# Patient Record
Sex: FEMALE | Race: WHITE | NOT HISPANIC OR LATINO | Employment: OTHER | URBAN - METROPOLITAN AREA
[De-identification: names, ages, dates, MRNs, and addresses within clinical notes are randomized per-mention and may not be internally consistent; named-entity substitution may affect disease eponyms.]

---

## 2017-02-17 ENCOUNTER — HOSPITAL ENCOUNTER (EMERGENCY)
Facility: MEDICAL CENTER | Age: 52
End: 2017-02-17
Payer: COMMERCIAL

## 2017-02-17 VITALS
HEART RATE: 100 BPM | TEMPERATURE: 98.8 F | WEIGHT: 138.89 LBS | OXYGEN SATURATION: 94 % | RESPIRATION RATE: 18 BRPM | HEIGHT: 68 IN | SYSTOLIC BLOOD PRESSURE: 130 MMHG | DIASTOLIC BLOOD PRESSURE: 93 MMHG | BODY MASS INDEX: 21.05 KG/M2

## 2017-02-17 ASSESSMENT — PAIN SCALES - GENERAL: PAINLEVEL_OUTOF10: 0

## 2017-02-17 NOTE — ED NOTES
Called for pt in lobby, no answer.  states he witnessed pt and friend walk out of ER to parking lot.

## 2017-03-06 ENCOUNTER — HOSPITAL ENCOUNTER (EMERGENCY)
Facility: MEDICAL CENTER | Age: 52
End: 2017-03-06
Attending: EMERGENCY MEDICINE
Payer: COMMERCIAL

## 2017-03-06 VITALS
RESPIRATION RATE: 16 BRPM | HEIGHT: 68 IN | SYSTOLIC BLOOD PRESSURE: 118 MMHG | TEMPERATURE: 98.5 F | WEIGHT: 140.65 LBS | OXYGEN SATURATION: 95 % | HEART RATE: 100 BPM | BODY MASS INDEX: 21.32 KG/M2 | DIASTOLIC BLOOD PRESSURE: 85 MMHG

## 2017-03-06 DIAGNOSIS — F10.29 ALCOHOL DEPENDENCE WITH UNSPECIFIED ALCOHOL-INDUCED DISORDER (HCC): ICD-10-CM

## 2017-03-06 PROCEDURE — 99284 EMERGENCY DEPT VISIT MOD MDM: CPT

## 2017-03-06 RX ORDER — CHLORDIAZEPOXIDE HYDROCHLORIDE 25 MG/1
25 CAPSULE, GELATIN COATED ORAL 3 TIMES DAILY PRN
Qty: 30 CAP | Refills: 0 | Status: ON HOLD | OUTPATIENT
Start: 2017-03-06 | End: 2020-06-21

## 2017-03-06 ASSESSMENT — PAIN SCALES - GENERAL: PAINLEVEL_OUTOF10: 0

## 2017-03-06 NOTE — DISCHARGE INSTRUCTIONS
Alcohol Abuse and Nutrition  Alcohol abuse is any pattern of alcohol consumption that harms your health, relationships, or work. Alcohol abuse can affect how your body breaks down and absorbs nutrients from food by causing your liver to work abnormally. Additionally, many people who abuse alcohol do not eat enough carbohydrates, protein, fat, vitamins, and minerals. This can cause poor nutrition (malnutrition) and a lack of nutrients (nutrient deficiencies), which can lead to further complications.  Nutrients that are commonly lacking (deficient) among people who abuse alcohol include:  · Vitamins.  ¨ Vitamin A. This is stored in your liver. It is important for your vision, metabolism, and ability to fight off infections (immunity).  ¨ B vitamins. These include vitamins such as folate, thiamin, and niacin. These are important in new cell growth and maintenance.  ¨ Vitamin C. This plays an important role in iron absorption, wound healing, and immunity.  ¨ Vitamin D. This is produced by your liver, but you can also get vitamin D from food. Vitamin D is necessary for your body to absorb and use calcium.  · Minerals.  ¨ Calcium. This is important for your bones and your heart and blood vessel (cardiovascular) function.  ¨ Iron. This is important for blood, muscle, and nervous system functioning.  ¨ Magnesium. This plays an important role in muscle and nerve function, and it helps to control blood sugar and blood pressure.  ¨ Zinc. This is important for the normal function of your nervous system and digestive system (gastrointestinal tract).  Nutrition is an essential component of therapy for alcohol abuse. Your health care provider or dietitian will work with you to design a plan that can help restore nutrients to your body and prevent potential complications.  WHAT IS MY PLAN?  Your dietitian may develop a specific diet plan that is based on your condition and any other complications you may have. A diet plan will  commonly include:  · A balanced diet.  ¨ Grains: 6-8 oz per day.  ¨ Vegetables: 2-3 cups per day.  ¨ Fruits: 1-2 cups per day.  ¨ Meat and other protein: 5-6 oz per day.  ¨ Dairy: 2-3 cups per day.  · Vitamin and mineral supplements.  WHAT DO I NEED TO KNOW ABOUT ALCOHOL AND NUTRITION?  · Consume foods that are high in antioxidants, such as grapes, berries, nuts, green tea, and dark green and orange vegetables. This can help to counteract some of the stress that is placed on your liver by consuming alcohol.  · Avoid food and drinks that are high in fat and sugar. Foods such as sugared soft drinks, salty snack foods, and candy contain empty calories. This means that they lack important nutrients such as protein, fiber, and vitamins.  · Eat frequent meals and snacks. Try to eat 5-6 small meals each day.  · Eat a variety of fresh fruits and vegetables each day. This will help you get plenty of water, fiber, and vitamins in your diet.  · Drink plenty of water and other clear fluids. Try to drink at least 48-64 oz (1.5-2 L) of water per day.  · If you are a vegetarian, eat a variety of protein-rich foods. Pair whole grains with plant-based proteins at meals and snacks to obtain the greatest nutrient benefit from your food. For example, eat rice with beans, put peanut butter on whole-grain toast, or eat oatmeal with sunflower seeds.  · Soak beans and whole grains overnight before cooking. This can help your body to absorb the nutrients more easily.  · Include foods fortified with vitamins and minerals in your diet. Commonly fortified foods include milk, orange juice, cereal, and bread.  · If you are malnourished, your dietitian may recommend a high-protein, high-calorie diet. This may include:  ¨ 2,000-3,000 calories (kilocalories) per day.  ¨  grams of protein per day.  · Your health care provider may recommend a complete nutritional supplement beverage. This can help to restore calories, protein, and vitamins to  your body. Depending on your condition, you may be advised to consume this instead of or in addition to meals.  · Limit your intake of caffeine. Replace drinks like coffee and black tea with decaffeinated coffee and herbal tea.  · Eat a variety of foods that are high in omega fatty acids. These include fish, nuts and seeds, and soybeans. These foods may help your liver to recover and may also stabilize your mood.  · Certain medicines may cause changes in your appetite, taste, and weight. Work with your health care provider and dietitian to make any adjustments to your medicines and diet plan.  · Include other healthy lifestyle choices in your daily routine.  ¨ Be physically active.  ¨ Get enough sleep.  ¨ Spend time doing activities that you enjoy.  · If you are unable to take in enough food and calories by mouth, your health care provider may recommend a feeding tube. This is a tube that passes through your nose and throat, directly into your stomach. Nutritional supplement beverages can be given to you through the feeding tube to help you get the nutrients you need.  · Take vitamin or mineral supplements as recommended by your health care provider.  WHAT FOODS CAN I EAT?  Grains  Enriched pasta. Enriched rice. Fortified whole-grain bread. Fortified whole-grain cereal. Barley. Brown rice. Quinoa. Millet.  Vegetables  All fresh, frozen, and canned vegetables. Spinach. Kale. Artichoke. Carrots. Winter squash and pumpkin. Sweet potatoes. Broccoli. Cabbage. Cucumbers. Tomatoes. Sweet peppers. Green beans. Peas. Corn.  Fruits  All fresh and frozen fruits. Berries. Grapes. Isaias. Papaya. Guava. Cherries. Apples. Bananas. Peaches. Plums. Pineapple. Watermelon. Cantaloupe. Oranges. Avocado.  Meats and Other Protein Sources  Beef liver. Lean beef. Pork. Fresh and canned chicken. Fresh fish. Oysters. Sardines. Canned tuna. Shrimp. Eggs with yolks. Nuts and seeds. Peanut butter. Beans and lentils. Soybeans.  Tofu.  Dairy  Whole, low-fat, and nonfat milk. Whole, low-fat, and nonfat yogurt. Cottage cheese. Sour cream. Hard and soft cheeses.  Beverages  Water. Herbal tea. Decaffeinated coffee. Decaffeinated green tea. 100% fruit juice. 100% vegetable juice. Instant breakfast shakes.  Condiments  Ketchup. Mayonnaise. Mustard. Salad dressing. Barbecue sauce.  Sweets and Desserts  Sugar-free ice cream. Sugar-free pudding. Sugar-free gelatin.  Fats and Oils  Butter. Vegetable oil, flaxseed oil, olive oil, and walnut oil.  Other  Complete nutrition shakes. Protein bars. Sugar-free gum.  The items listed above may not be a complete list of recommended foods or beverages. Contact your dietitian for more options.  WHAT FOODS ARE NOT RECOMMENDED?  Grains  Sugar-sweetened breakfast cereals. Flavored instant oatmeal. Fried breads.  Vegetables  Breaded or deep-fried vegetables.  Fruits  Dried fruit with added sugar. Candied fruit. Canned fruit in syrup.  Meats and Other Protein Sources  Breaded or deep-fried meats.  Dairy  Flavored milks. Fried cheese curds or fried cheese sticks.  Beverages  Alcohol. Sugar-sweetened soft drinks. Sugar-sweetened tea. Caffeinated coffee and tea.  Condiments  Sugar. Honey. Agave nectar. Molasses.  Sweets and Desserts  Chocolate. Cake. Cookies. Candy.  Other  Potato chips. Pretzels. Salted nuts. Candied nuts.  The items listed above may not be a complete list of foods and beverages to avoid. Contact your dietitian for more information.     This information is not intended to replace advice given to you by your health care provider. Make sure you discuss any questions you have with your health care provider.     Document Released: 10/12/2006 Document Revised: 01/08/2016 Document Reviewed: 07/21/2015  [employer] Interactive Patient Education ©2016 [employer]

## 2017-03-06 NOTE — ED AVS SNAPSHOT
Home Care Instructions                                                                                                                Yohana Brandon   MRN: [id number]    Department:  [facility], Emergency Dept   Date of Visit:  3/6/2017            [facility], Emergency Dept    [address]    [place] [address]    Phone:  [phone]      You were seen by     Asim Fry M.D.      Your Diagnosis Was     Alcohol dependence with unspecified alcohol-induced disorder (CMS-HCC)     F10.29       Follow-up Information     1. Follow up with Lopez Jackson M.D..    Specialty:  Internal Medicine    Contact information    [address] [place] [address] [phone]        Medication Information     Review all of your home medications and newly ordered medications with your primary doctor and/or pharmacist as soon as possible. Follow medication instructions as directed by your doctor and/or pharmacist.     Please keep your complete medication list with you and share with your physician. Update the information when medications are discontinued, doses are changed, or new medications (including over-the-counter products) are added; and carry medication information at all times in the event of emergency situations.               Medication List      START taking these medications        Instructions    chlordiazepoxide 25 MG Caps   Commonly known as:  LIBRIUM    Take 1 Cap by mouth 3 times a day as needed for Anxiety.   Dose:  25 mg         ASK your doctor about these medications        Instructions    DHEA PO    Take 2 Tabs by mouth every day.   Dose:  2 Tab       fish oil 1000 MG Caps capsule    Take 2,000 mg by mouth 4 times a day.   Dose:  2000 mg       hydrochlorothiazide 25 MG Tabs   Commonly known as:  HYDRODIURIL    Take 25 mg by mouth every day. Indications: High Blood Pressure   Dose:  25 mg       multivitamin Tabs    Take 1 Tab by mouth every  day.   Dose:  1 Tab       NON SPECIFIED    1 Dose by Injection route every day. HGH - From Dr. Jackson   Dose:  1 Dose       POTASSIUM PO    Take 1 Tab by mouth every day. Indications: OTC   Dose:  1 Tab       telmisartan 80 MG Tabs   Commonly known as:  MICARDIS    Take 80 mg by mouth every day.   Dose:  80 mg       VITAMIN D PO    Take 1 Tab by mouth every day.   Dose:  1 Tab                 Discharge Instructions       Alcohol Abuse and Nutrition  Alcohol abuse is any pattern of alcohol consumption that harms your health, relationships, or work. Alcohol abuse can affect how your body breaks down and absorbs nutrients from food by causing your liver to work abnormally. Additionally, many people who abuse alcohol do not eat enough carbohydrates, protein, fat, vitamins, and minerals. This can cause poor nutrition (malnutrition) and a lack of nutrients (nutrient deficiencies), which can lead to further complications.  Nutrients that are commonly lacking (deficient) among people who abuse alcohol include:  · Vitamins.  ¨ Vitamin A. This is stored in your liver. It is important for your vision, metabolism, and ability to fight off infections (immunity).  ¨ B vitamins. These include vitamins such as folate, thiamin, and niacin. These are important in new cell growth and maintenance.  ¨ Vitamin C. This plays an important role in iron absorption, wound healing, and immunity.  ¨ Vitamin D. This is produced by your liver, but you can also get vitamin D from food. Vitamin D is necessary for your body to absorb and use calcium.  · Minerals.  ¨ Calcium. This is important for your bones and your heart and blood vessel (cardiovascular) function.  ¨ Iron. This is important for blood, muscle, and nervous system functioning.  ¨ Magnesium. This plays an important role in muscle and nerve function, and it helps to control blood sugar and blood pressure.  ¨ Zinc. This is important for the normal function of your nervous system and  digestive system (gastrointestinal tract).  Nutrition is an essential component of therapy for alcohol abuse. Your health care provider or dietitian will work with you to design a plan that can help restore nutrients to your body and prevent potential complications.  WHAT IS MY PLAN?  Your dietitian may develop a specific diet plan that is based on your condition and any other complications you may have. A diet plan will commonly include:  · A balanced diet.  ¨ Grains: 6-8 oz per day.  ¨ Vegetables: 2-3 cups per day.  ¨ Fruits: 1-2 cups per day.  ¨ Meat and other protein: 5-6 oz per day.  ¨ Dairy: 2-3 cups per day.  · Vitamin and mineral supplements.  WHAT DO I NEED TO KNOW ABOUT ALCOHOL AND NUTRITION?  · Consume foods that are high in antioxidants, such as grapes, berries, nuts, green tea, and dark green and orange vegetables. This can help to counteract some of the stress that is placed on your liver by consuming alcohol.  · Avoid food and drinks that are high in fat and sugar. Foods such as sugared soft drinks, salty snack foods, and candy contain empty calories. This means that they lack important nutrients such as protein, fiber, and vitamins.  · Eat frequent meals and snacks. Try to eat 5-6 small meals each day.  · Eat a variety of fresh fruits and vegetables each day. This will help you get plenty of water, fiber, and vitamins in your diet.  · Drink plenty of water and other clear fluids. Try to drink at least 48-64 oz (1.5-2 L) of water per day.  · If you are a vegetarian, eat a variety of protein-rich foods. Pair whole grains with plant-based proteins at meals and snacks to obtain the greatest nutrient benefit from your food. For example, eat rice with beans, put peanut butter on whole-grain toast, or eat oatmeal with sunflower seeds.  · Soak beans and whole grains overnight before cooking. This can help your body to absorb the nutrients more easily.  · Include foods fortified with vitamins and minerals in  your diet. Commonly fortified foods include milk, orange juice, cereal, and bread.  · If you are malnourished, your dietitian may recommend a high-protein, high-calorie diet. This may include:  ¨ 2,000-3,000 calories (kilocalories) per day.  ¨  grams of protein per day.  · Your health care provider may recommend a complete nutritional supplement beverage. This can help to restore calories, protein, and vitamins to your body. Depending on your condition, you may be advised to consume this instead of or in addition to meals.  · Limit your intake of caffeine. Replace drinks like coffee and black tea with decaffeinated coffee and herbal tea.  · Eat a variety of foods that are high in omega fatty acids. These include fish, nuts and seeds, and soybeans. These foods may help your liver to recover and may also stabilize your mood.  · Certain medicines may cause changes in your appetite, taste, and weight. Work with your health care provider and dietitian to make any adjustments to your medicines and diet plan.  · Include other healthy lifestyle choices in your daily routine.  ¨ Be physically active.  ¨ Get enough sleep.  ¨ Spend time doing activities that you enjoy.  · If you are unable to take in enough food and calories by mouth, your health care provider may recommend a feeding tube. This is a tube that passes through your nose and throat, directly into your stomach. Nutritional supplement beverages can be given to you through the feeding tube to help you get the nutrients you need.  · Take vitamin or mineral supplements as recommended by your health care provider.  WHAT FOODS CAN I EAT?  Grains  Enriched pasta. Enriched rice. Fortified whole-grain bread. Fortified whole-grain cereal. Barley. Brown rice. Quinoa. Millet.  Vegetables  All fresh, frozen, and canned vegetables. Spinach. Kale. Artichoke. Carrots. Winter squash and pumpkin. Sweet potatoes. Broccoli. Cabbage. Cucumbers. Tomatoes. Sweet peppers. Green  beans. Peas. [place].  Fruits  All fresh and frozen fruits. Berries. Grapes. Isaias. Papaya. Guava. Cherries. Apples. Bananas. Peaches. Plums. Pineapple. Watermelon. Cantaloupe. Oranges. Avocado.  Meats and Other Protein Sources  Beef liver. Lean beef. Pork. Fresh and canned chicken. Fresh fish. Oysters. Sardines. Canned tuna. Shrimp. Eggs with yolks. Nuts and seeds. Peanut butter. Beans and lentils. Soybeans. Tofu.  Dairy  Whole, low-fat, and nonfat milk. Whole, low-fat, and nonfat yogurt. Cottage cheese. Sour cream. Hard and soft cheeses.  Beverages  Water. Herbal tea. Decaffeinated coffee. Decaffeinated green tea. 100% fruit juice. 100% vegetable juice. Instant breakfast shakes.  Condiments  Ketchup. Mayonnaise. Mustard. Salad dressing. Barbecue sauce.  Sweets and Desserts  Sugar-free ice cream. Sugar-free pudding. Sugar-free gelatin.  Fats and Oils  Butter. Vegetable oil, flaxseed oil, olive oil, and walnut oil.  Other  Complete nutrition shakes. Protein bars. Sugar-free gum.  The items listed above may not be a complete list of recommended foods or beverages. Contact your dietitian for more options.  WHAT FOODS ARE NOT RECOMMENDED?  Grains  Sugar-sweetened breakfast cereals. Flavored instant oatmeal. Fried breads.  Vegetables  Breaded or deep-fried vegetables.  Fruits  Dried fruit with added sugar. Candied fruit. Canned fruit in syrup.  Meats and Other Protein Sources  Breaded or deep-fried meats.  Dairy  Flavored milks. Fried cheese curds or fried cheese sticks.  Beverages  Alcohol. Sugar-sweetened soft drinks. Sugar-sweetened tea. Caffeinated coffee and tea.  Condiments  Sugar. Honey. Agave nectar. Molasses.  Sweets and Desserts  Chocolate. Cake. Cookies. Candy.  Other  Potato chips. Pretzels. Salted nuts. Candied nuts.  The items listed above may not be a complete list of foods and beverages to avoid. Contact your dietitian for more information.     This information is not intended to replace advice given to you  by your health care provider. Make sure you discuss any questions you have with your health care provider.     Document Released: 10/12/2006 Document Revised: 01/08/2016 Document Reviewed: 07/21/2015  [employer] Interactive Patient Education ©2016 [employer]            Patient Information     Patient Information    Following emergency treatment: all patient requiring follow-up care must return either to a private physician or a clinic if your condition worsens before you are able to obtain further medical attention, please return to the emergency room.     Billing Information    At [facility], we work to make the billing process streamlined for our patients.  Our Representatives are here to answer any questions you may have regarding your hospital bill.  If you have insurance coverage and have supplied your insurance information to us, we will submit a claim to your insurer on your behalf.  Should you have any questions regarding your bill, we can be reached online or by phone as follows:  Online: You are able pay your bills online or live chat with our representatives about any billing questions you may have. We are here to help Monday - Friday from 8:00am to 7:30pm and 9:00am - 12:00pm on Saturdays.  Please visit [email or web address]  for more information.   Phone:  [phone] or [phone]    Please note that your emergency physician, surgeon, pathologist, radiologist, anesthesiologist, and other specialists are not employed by [facility] and will therefore bill separately for their services.  Please contact them directly for any questions concerning their bills at the numbers below:     Emergency Physician Services:  [phone]  [facility]:  [phone]  [facility]:  [phone]  [facility]:  [phone]    1. Your final bill may vary from the amount quoted upon discharge if all procedures are not complete at that  time, or if your doctor has additional procedures of which we are not aware. You will receive an additional bill if you return to the Emergency Department at [facility] for suture removal regardless of the facility of which the sutures were placed.     2. Please arrange for settlement of this account at the emergency registration.    3. All self-pay accounts are due in full at the time of treatment.  If you are unable to meet this obligation then payment is expected within 4-5 days.     4. If you have had radiology studies (CT, X-ray, Ultrasound, MRI), you have received a preliminary result during your emergency department visit. Please contact the radiology department [phone] to receive a copy of your final result. Please discuss the Final result with your primary physician or with the follow up physician provided.     Crisis Hotline:  [place] Crisis Hotline:  3-242-GNQUPCN or 1-348.264.1911  [place] Crisis Hotline:    1-308.768.3712 or 307-875-3720         ED Discharge Follow Up Questions    1. In order to provide you with very good care, we would like to follow up with a phone call in the next few days.  May we have your permission to contact you?     YES /  NO    2. What is the best phone number to call you? (       )_____-__________    3. What is the best time to call you?      Morning  /  Afternoon  /  Evening                   Patient Signature:  ____________________________________________________________    Date:  ____________________________________________________________

## 2017-03-06 NOTE — ED NOTES
"Pt's s/o at pt bedside with questions r/t pt treatment. Pt's s/o wants pt to have \"banana bag\". Dr Fry to bedside to answer questions.   "

## 2017-03-06 NOTE — ED AVS SNAPSHOT
3/6/2017          Yohana Brandon  [address] [place] [address]    Dear Yohana:    [facility] wants to ensure your discharge home is safe and you or your loved ones have had all your questions answered regarding your care after you leave the hospital.    You may receive a telephone call within two days of your discharge.  This call is to make certain you understand your discharge instructions as well as ensure we provided you with the best care possible during your stay with us.     The call will only last approximately 3-5 minutes and will be done by a nurse.    Once again, we want to ensure your discharge home is safe and that you have a clear understanding of any next steps in your care.  If you have any questions or concerns, please do not hesitate to contact us, we are here for you.  Thank you for choosing [facility] for your healthcare needs.    Sincerely,    Jay Braswell    [facility]

## 2017-03-06 NOTE — ED NOTES
Pt ambulated independently from ER. Pt with ride home. Questions and concerns addressed by Dr Fry.

## 2017-03-06 NOTE — ED PROVIDER NOTES
ED Provider Note    CHIEF COMPLAINT  Chief Complaint   Patient presents with   • Alcohol Intoxication   • Detox   • Medical Clearance       HPI  Yohana Brandon is a 51 y.o. female who presents to for medical clearance. She has inpatient detox arranged tomorrow and [place]. Brought in by family who request detox in the hospital prior to going to [place]. The patient drank alcohol heavily this morning. She has had several falls lately. Denies any significant trauma. She's been at her baseline mental status. She has not had vomiting nausea or abdominal pain. She denies any symptoms and understands the need for cessation of alcohol.    Mental record is reviewed. Admitted to the hospital recently with alcohol withdrawal. Treated successfully. The next month it appears she came to the hospital, but left prior to being seen    REVIEW OF SYSTEMS  Pertinent negative: As per HPI. No black or bloody stool. No abdominal pain. No head injury. No neurologic symptoms.    PAST MEDICAL HISTORY  Past Medical History   Diagnosis Date   • Hypertension    • ASTHMA    • Psychiatric disorder      alcohol dependance   • Elevated LFTs    • Heart palpitations      w/ alcohol withdrawal   • Alcohol abuse        SOCIAL HISTORY  Social History   Substance Use Topics   • Smoking status: Never Smoker    • Smokeless tobacco: Never Used   • Alcohol Use: Yes      Comment: Daily Vodka       SURGICAL HISTORY  Past Surgical History   Procedure Laterality Date   • Appendectomy     • Pr endometrial ablation, thermal     • Pr remove tonsils/adenoids,<13 y/o  1973   • Pr anesth,surg breast reconstructive       bilateral    • Hemorrhoidectomy  2011     Performed by SONY MACDONALD at SURGERY [facility] ORS   • Anal sphincterotomy  2011     Performed by SONY MACDONALD at SURGERY [facility] ORS   • Bunionectomy       bilateral   • Pr  delivery+postpartum care       x2       ALLERGIES  Allergies   Allergen Reactions  "  • Egg White Swelling   • Kiwi Extract Swelling   • Latex Anaphylaxis   • Morphine Hives   • Banana    • Other Food      Tropical fruit         PHYSICAL EXAM  VITAL SIGNS: Pulse 100  Temp(Src) 36.9 °C (98.5 °F)  Resp 16  Ht 1.727 m (5' 8\")  Wt 63.8 kg (140 lb 10.5 oz)  BMI 21.39 kg/m2  SpO2 95%  LMP 09/14/2006   Constitutional: Awake and alert. Nontoxic  HENT:  Grossly normal  Eyes: Grossly normal  Neck: Normal range of motion  Cardiovascular: Normal heart rate   Thorax & Lungs: No respiratory distress  Abdomen: Nontender  Skin:  No pathologic rash.   Extremities: Well perfused  Neurologic: Awake alert oriented. Careful about the situation. Slurred speech. Odor of alcohol.      COURSE & MEDICAL DECISION MAKING  Patient presents to the ER intoxicated requesting detox. She drank just before coming in. The  wanted her to get a banana bag to help her sober up. I explained to him that this was not helpful in this situation. The patient was given oral multivitamin. She is intoxicated and does not require any medication for withdrawal at this time. I've given a prescription for Librium should withdrawal symptoms ensue which are likely. There is no indication for hospital admission. The patient has plan for inpatient detox tomorrow which appears entirely appropriate. At this point stable for discharge. She is to return to the ER for concerning medical symptoms.      FINAL IMPRESSION  1. Alcohol abuse and addiction      Disposition: home      This dictation was created using voice recognition software. The accuracy of the dictation is limited to the abilities of the software.  The nursing notes were reviewed and certain aspects of this information were incorporated into this note.      Electronically signed by: Asim Fry, 3/6/2017 11:12 AM        "

## 2017-03-06 NOTE — ED AVS SNAPSHOT
[facility] Access Code: [email or web address]  Expires: 4/5/2017 10:46 AM    [facility]  A secure, online tool to manage your health information     [facility]® is a secure, online tool that connects you to your personalized health information from the privacy of your home -- day or night - making it very easy for you to manage your healthcare. Once the activation process is completed, you can even access your medical information using the [facility] comfort, which is available for free in the Apple Comfort store or Google Play store.     [facility] provides the following levels of access (as shown below):   My Chart Features   [facility] Primary Care Doctor [facility]  Specialists [facility]  Urgent  Care [facility]  Primary Care  Doctor   Email your healthcare team securely and privately 24/7 X X X X   Manage appointments: schedule your next appointment; view details of past/upcoming appointments X      Request prescription refills. X      View recent personal medical records, including lab and immunizations X X X X   View health record, including health history, allergies, medications X X X X   Read reports about your outpatient visits, procedures, consult and ER notes X X X X   See your discharge summary, which is a recap of your hospital and/or ER visit that includes your diagnosis, lab results, and care plan. X X       How to register for [facility]:  1. Go to  [email or web address].  2. Click on the Sign Up Now box, which takes you to the New Member Sign Up page. You will need to provide the following information:  a. Enter your [facility] Access Code exactly as it appears at the top of this page. (You will not need to use this code after you’ve completed the sign-up process. If you do not sign up before the expiration date, you must request a new code.)   b. Enter your date of birth.   c. Enter your home email address.   d. Click Submit, and follow the next screen’s instructions.  3. Create a [facility] ID. This will be your [facility]  login ID and cannot be changed, so think of one that is secure and easy to remember.  4. Create a [employer] password. You can change your password at any time.  5. Enter your Password Reset Question and Answer. This can be used at a later time if you forget your password.   6. Enter your e-mail address. This allows you to receive e-mail notifications when new information is available in [employer].  7. Click Sign Up. You can now view your health information.    For assistance activating your [employer] account, call [phone]

## 2017-03-06 NOTE — ED NOTES
States is an alcoholic and wants to detox, last drank this am, needs medical clearance to enter program, seen here 3 weeks ago for same

## 2019-02-28 ENCOUNTER — HOSPITAL ENCOUNTER (EMERGENCY)
Facility: MEDICAL CENTER | Age: 54
End: 2019-03-01
Attending: EMERGENCY MEDICINE
Payer: COMMERCIAL

## 2019-02-28 ENCOUNTER — APPOINTMENT (OUTPATIENT)
Dept: RADIOLOGY | Facility: MEDICAL CENTER | Age: 54
End: 2019-02-28
Attending: EMERGENCY MEDICINE
Payer: COMMERCIAL

## 2019-02-28 VITALS
HEIGHT: 67 IN | HEART RATE: 100 BPM | OXYGEN SATURATION: 97 % | BODY MASS INDEX: 23.53 KG/M2 | RESPIRATION RATE: 17 BRPM | DIASTOLIC BLOOD PRESSURE: 68 MMHG | SYSTOLIC BLOOD PRESSURE: 102 MMHG | WEIGHT: 149.91 LBS | TEMPERATURE: 100.3 F

## 2019-02-28 DIAGNOSIS — R19.7 DIARRHEA OF PRESUMED INFECTIOUS ORIGIN: ICD-10-CM

## 2019-02-28 DIAGNOSIS — R11.2 NON-INTRACTABLE VOMITING WITH NAUSEA, UNSPECIFIED VOMITING TYPE: ICD-10-CM

## 2019-02-28 LAB
ALBUMIN SERPL BCP-MCNC: 4.7 G/DL (ref 3.2–4.9)
ALBUMIN/GLOB SERPL: 1.4 G/DL
ALP SERPL-CCNC: 81 U/L (ref 30–99)
ALT SERPL-CCNC: 37 U/L (ref 2–50)
ANION GAP SERPL CALC-SCNC: 12 MMOL/L (ref 0–11.9)
AST SERPL-CCNC: 29 U/L (ref 12–45)
BASOPHILS # BLD AUTO: 0.5 % (ref 0–1.8)
BASOPHILS # BLD: 0.06 K/UL (ref 0–0.12)
BILIRUB SERPL-MCNC: 1.4 MG/DL (ref 0.1–1.5)
BUN SERPL-MCNC: 26 MG/DL (ref 8–22)
CALCIUM SERPL-MCNC: 9.5 MG/DL (ref 8.4–10.2)
CHLORIDE SERPL-SCNC: 99 MMOL/L (ref 96–112)
CO2 SERPL-SCNC: 23 MMOL/L (ref 20–33)
CREAT SERPL-MCNC: 1.26 MG/DL (ref 0.5–1.4)
EOSINOPHIL # BLD AUTO: 0.1 K/UL (ref 0–0.51)
EOSINOPHIL NFR BLD: 0.8 % (ref 0–6.9)
ERYTHROCYTE [DISTWIDTH] IN BLOOD BY AUTOMATED COUNT: 41.1 FL (ref 35.9–50)
GLOBULIN SER CALC-MCNC: 3.4 G/DL (ref 1.9–3.5)
GLUCOSE SERPL-MCNC: 119 MG/DL (ref 65–99)
HCT VFR BLD AUTO: 46.9 % (ref 37–47)
HGB BLD-MCNC: 15.6 G/DL (ref 12–16)
IMM GRANULOCYTES # BLD AUTO: 0.05 K/UL (ref 0–0.11)
IMM GRANULOCYTES NFR BLD AUTO: 0.4 % (ref 0–0.9)
LACTATE BLD-SCNC: 1.5 MMOL/L (ref 0.5–2)
LYMPHOCYTES # BLD AUTO: 0.34 K/UL (ref 1–4.8)
LYMPHOCYTES NFR BLD: 2.6 % (ref 22–41)
MCH RBC QN AUTO: 30.6 PG (ref 27–33)
MCHC RBC AUTO-ENTMCNC: 33.3 G/DL (ref 33.6–35)
MCV RBC AUTO: 92.1 FL (ref 81.4–97.8)
MONOCYTES # BLD AUTO: 0.4 K/UL (ref 0–0.85)
MONOCYTES NFR BLD AUTO: 3 % (ref 0–13.4)
NEUTROPHILS # BLD AUTO: 12.17 K/UL (ref 2–7.15)
NEUTROPHILS NFR BLD: 92.7 % (ref 44–72)
NRBC # BLD AUTO: 0 K/UL
NRBC BLD-RTO: 0 /100 WBC
PLATELET # BLD AUTO: 285 K/UL (ref 164–446)
PMV BLD AUTO: 10 FL (ref 9–12.9)
POTASSIUM SERPL-SCNC: 3.1 MMOL/L (ref 3.6–5.5)
PROT SERPL-MCNC: 8.1 G/DL (ref 6–8.2)
RBC # BLD AUTO: 5.09 M/UL (ref 4.2–5.4)
SODIUM SERPL-SCNC: 134 MMOL/L (ref 135–145)
WBC # BLD AUTO: 13.1 K/UL (ref 4.8–10.8)

## 2019-02-28 PROCEDURE — 93005 ELECTROCARDIOGRAM TRACING: CPT | Performed by: EMERGENCY MEDICINE

## 2019-02-28 PROCEDURE — 99285 EMERGENCY DEPT VISIT HI MDM: CPT

## 2019-02-28 PROCEDURE — 36415 COLL VENOUS BLD VENIPUNCTURE: CPT

## 2019-02-28 PROCEDURE — 700102 HCHG RX REV CODE 250 W/ 637 OVERRIDE(OP): Performed by: EMERGENCY MEDICINE

## 2019-02-28 PROCEDURE — 71045 X-RAY EXAM CHEST 1 VIEW: CPT

## 2019-02-28 PROCEDURE — 87040 BLOOD CULTURE FOR BACTERIA: CPT | Mod: 91

## 2019-02-28 PROCEDURE — A9270 NON-COVERED ITEM OR SERVICE: HCPCS | Performed by: EMERGENCY MEDICINE

## 2019-02-28 PROCEDURE — 700111 HCHG RX REV CODE 636 W/ 250 OVERRIDE (IP): Performed by: EMERGENCY MEDICINE

## 2019-02-28 PROCEDURE — 83605 ASSAY OF LACTIC ACID: CPT

## 2019-02-28 PROCEDURE — 96374 THER/PROPH/DIAG INJ IV PUSH: CPT

## 2019-02-28 PROCEDURE — 700105 HCHG RX REV CODE 258: Performed by: EMERGENCY MEDICINE

## 2019-02-28 PROCEDURE — 80053 COMPREHEN METABOLIC PANEL: CPT

## 2019-02-28 PROCEDURE — 93005 ELECTROCARDIOGRAM TRACING: CPT

## 2019-02-28 PROCEDURE — 85025 COMPLETE CBC W/AUTO DIFF WBC: CPT

## 2019-02-28 RX ORDER — ONDANSETRON 4 MG/1
4 TABLET, ORALLY DISINTEGRATING ORAL EVERY 6 HOURS PRN
Qty: 15 TAB | Refills: 0 | Status: ON HOLD | OUTPATIENT
Start: 2019-02-28 | End: 2020-06-21

## 2019-02-28 RX ORDER — ACETAMINOPHEN 325 MG/1
650 TABLET ORAL ONCE
Status: COMPLETED | OUTPATIENT
Start: 2019-02-28 | End: 2019-02-28

## 2019-02-28 RX ORDER — DICYCLOMINE HCL 20 MG
20 TABLET ORAL EVERY 6 HOURS
Qty: 20 TAB | Refills: 0 | Status: ON HOLD | OUTPATIENT
Start: 2019-02-28 | End: 2020-06-21

## 2019-02-28 RX ORDER — SODIUM CHLORIDE 9 MG/ML
1000 INJECTION, SOLUTION INTRAVENOUS ONCE
Status: COMPLETED | OUTPATIENT
Start: 2019-02-28 | End: 2019-02-28

## 2019-02-28 RX ORDER — DICYCLOMINE HCL 20 MG
20 TABLET ORAL ONCE
Status: COMPLETED | OUTPATIENT
Start: 2019-02-28 | End: 2019-02-28

## 2019-02-28 RX ORDER — DIPHENOXYLATE HYDROCHLORIDE AND ATROPINE SULFATE 2.5; .025 MG/1; MG/1
1 TABLET ORAL 4 TIMES DAILY PRN
Qty: 20 TAB | Refills: 0 | Status: SHIPPED | OUTPATIENT
Start: 2019-02-28 | End: 2019-03-04

## 2019-02-28 RX ORDER — ONDANSETRON 2 MG/ML
4 INJECTION INTRAMUSCULAR; INTRAVENOUS ONCE
Status: COMPLETED | OUTPATIENT
Start: 2019-02-28 | End: 2019-02-28

## 2019-02-28 RX ADMIN — ACETAMINOPHEN 650 MG: 325 TABLET, FILM COATED ORAL at 22:44

## 2019-02-28 RX ADMIN — DICYCLOMINE HYDROCHLORIDE 20 MG: 20 TABLET ORAL at 22:45

## 2019-02-28 RX ADMIN — SODIUM CHLORIDE 1000 ML: 9 INJECTION, SOLUTION INTRAVENOUS at 22:44

## 2019-02-28 RX ADMIN — ONDANSETRON 4 MG: 2 INJECTION INTRAMUSCULAR; INTRAVENOUS at 22:44

## 2019-03-01 NOTE — ED TRIAGE NOTES
"Chief Complaint   Patient presents with   • Nausea/Vomiting/Diarrhea     Pt c/o \"violent vomiting and diarrhea\" all day   • Chest Pain     Central chest pain that started around 5pm   • Tingling     C/o left arm tingling   • Body Aches     Generalized body aches       "

## 2019-03-01 NOTE — ED NOTES
Pt given d/c paperwork and prescriptions, pt verbalized understanding all information given. Pt ambulated out of the ER w/o difficulty w/ spouse.

## 2019-03-05 LAB
BACTERIA BLD CULT: NORMAL
BACTERIA BLD CULT: NORMAL
SIGNIFICANT IND 70042: NORMAL
SIGNIFICANT IND 70042: NORMAL
SITE SITE: NORMAL
SITE SITE: NORMAL
SOURCE SOURCE: NORMAL
SOURCE SOURCE: NORMAL

## 2019-03-10 LAB — EKG IMPRESSION: NORMAL

## 2019-03-10 NOTE — ED PROVIDER NOTES
ED Provider Note    Chief complaint is nausea vomiting and diarrhea    \[facility] [place] [facility]\""  Yohana Brandon is a 53 y.o. female who presents with reported violent vomiting and diarrhea all day long that started early morning.  She began having some chest discomfort centralized about 5 PM with some left arm tingling but generalized body aches.  Arrives with slight fever but does not flag for sepsis.  Does have a history of alcohol dependence but has not been actively drinking lately.  No reported abdominal pain.  Denies any shortness of breath or other complaints    REVIEW OF SYSTEMS  See HPI for further details. All other systems are negative.     PAST MEDICAL HISTORY  Past Medical History:   Diagnosis Date   • Alcohol abuse    • ASTHMA    • Elevated LFTs    • Heart palpitations     w/ alcohol withdrawal   • Hypertension    • Psychiatric disorder     alcohol dependance       FAMILY HISTORY  Family History   Problem Relation Age of Onset   • Stroke Mother        SOCIAL HISTORY   reports that she has never smoked. She has never used smokeless tobacco. She reports that she does not drink alcohol or use drugs.    SURGICAL HISTORY  Past Surgical History:   Procedure Laterality Date   • HEMORRHOIDECTOMY  2011    Performed by SONY MACDONALD at SURGERY [facility]   • ANAL SPHINCTEROTOMY  2011    Performed by SONY MACDONALD at SURGERY [facility]   • PB ENDOMETRIAL ABLATION, THERMAL     • APPENDECTOMY     • PB ANESTH,SURG BREAST RECONSTRUCTIVE      bilateral    • BUNIONECTOMY      bilateral   • PB REMOVE TONSILS/ADENOIDS,<13 Y/O  1973   • PB  DELIVERY+POSTPARTUM CARE      x2       CURRENT MEDICATIONS  Home Medications    **Home medications have not yet been reviewed for this encounter**         ALLERGIES  Allergies   Allergen Reactions   • Egg White Swelling   • Kiwi Extract Swelling   • Latex Anaphylaxis   • Morphine Hives   • Banana    • Other Food      Tropical fruit         PHYSICAL  "EXAM  VITAL SIGNS: /68   Pulse 100   Temp 37.9 °C (100.3 °F) (Temporal)   Resp 17   Ht 1.702 m (5' 7\")   Wt 68 kg (149 lb 14.6 oz)   LMP 08/15/2011   SpO2 97%   BMI 23.48 kg/m²    Constitutional: Well developed, Well nourished, No acute distress, moderately ill appearance.   HENT: Normocephalic, Atraumatic, Bilateral external ears normal, Oropharynx is clear mucous membranes are dry. No oral exudates or nasal discharge.   Eyes: Pupils are equal round and reactive, EOMI, Conjunctiva normal, No discharge.   Neck: Normal range of motion, No tenderness, Supple, No stridor. No meningismus.  Lymphatic: No lymphadenopathy noted.   Cardiovascular: Regular rate and rhythm without murmur rub or gallop.  Thorax & Lungs: Clear breath sounds bilaterally without wheezes, rhonchi or rales. There is no chest wall tenderness.   Abdomen: Soft non-tender non-distended. There is no rebound or guarding. No organomegaly is appreciated. Bowel sounds are hyperactive.  Skin: Pallor without rash.   Back: No CVA or spinal tenderness.   Extremities: Intact distal pulses, No edema, No tenderness, No cyanosis, No clubbing. Capillary refill is less than 2 seconds.  Musculoskeletal: Good range of motion in all major joints. No tenderness to palpation or major deformities noted.   Neurologic: Alert & oriented x 3, Normal motor function, Normal sensory function, No focal deficits noted. Reflexes are normal.  Psychiatric: Affect normal, Judgment normal, Mood normal. There is no suicidal ideation or patient reported hallucinations.     EKG  Results for orders placed or performed during the hospital encounter of 19   EKG   Result Value Ref Range    Report       [facility] Emergency Dept.    Test Date:  2019  Pt Name:    SONIA DAMON          Department: [facility]  MRN:        [id number]                      Room:  Gender:     Female                       Technician: SHONNA  :        1965            "        Requested By:ER TRIAGE PROTOCOL  Order #:    [id number]                    Reading MD: MARISSA GOOD MD    Measurements  Intervals                                Axis  Rate:       78                           P:          74  VT:         128                          QRS:        55  QRSD:       78                           T:          73  QT:         412  QTc:        470    Interpretive Statements  SINUS RHYTHM  LEFT ATRIAL ABNORMALITY  Compared to ECG 09/22/2016 06:19:38  Atrial abnormality now present  T-wave abnormality no longer present    Electronically Signed On 3- 8:20:36 PDT by MARISSA GOOD MD           RADIOLOGY/PROCEDURES  DX-CHEST-PORTABLE (1 VIEW)   Final Result      No radiographic evidence of acute cardiopulmonary process.            COURSE & MEDICAL DECISION MAKING  Pertinent Labs & Imaging studies reviewed. (See chart for details)  Patient had a screening EKG performed given her chest complaints which I believe are secondary to her gastroenteritis symptoms.  She has normal sinus rhythm with no evidence of acute ischemic changes or dysrhythmia.    Chest x-ray shows no evidence of airspace disease.    HYDRATION: Based on the patient's presentation of Dehydration the patient was given IV fluids. IV Hydration was used because oral hydration was not adequate alone. Upon recheck following hydration, the patient was Much improved.     Laboratory evaluation reveals leukocytosis at 13,100 with a 93% neutrophil shift.  Slight hypokalemia at 3.1 with mild acidosis.  No evidence of liver or kidney injury.    Patient was reassessed just prior to discharge and found to be much improved.  Reexamination of the abdomen was quite soft and nontender.  I believe she has a viral gastroenteritis and is given medications on discharge to ameliorate symptoms she will return of any significant change in symptoms    FINAL IMPRESSION  1. Non-intractable vomiting with nausea, unspecified vomiting type    2. Diarrhea of  presumed infectious origin             Electronically signed by: Soren Barry, 3/10/2019 8:19 AM

## 2019-05-01 ENCOUNTER — HOSPITAL ENCOUNTER (OUTPATIENT)
Dept: RADIOLOGY | Facility: MEDICAL CENTER | Age: 54
End: 2019-05-01
Attending: ORTHOPAEDIC SURGERY
Payer: COMMERCIAL

## 2019-05-01 DIAGNOSIS — S93.692A OTHER SPRAIN OF LEFT FOOT, INITIAL ENCOUNTER: ICD-10-CM

## 2019-05-01 PROCEDURE — 73700 CT LOWER EXTREMITY W/O DYE: CPT | Mod: LT

## 2019-08-06 ENCOUNTER — OFFICE VISIT (OUTPATIENT)
Dept: DERMATOLOGY | Facility: IMAGING CENTER | Age: 54
End: 2019-08-06
Payer: COMMERCIAL

## 2019-08-06 VITALS — HEIGHT: 68 IN | BODY MASS INDEX: 21.98 KG/M2 | WEIGHT: 145 LBS

## 2019-08-06 DIAGNOSIS — L82.1 SEBORRHEIC KERATOSES: ICD-10-CM

## 2019-08-06 DIAGNOSIS — D18.01 CHERRY ANGIOMA: ICD-10-CM

## 2019-08-06 DIAGNOSIS — D22.9 MULTIPLE NEVI: ICD-10-CM

## 2019-08-06 PROCEDURE — 99203 OFFICE O/P NEW LOW 30 MIN: CPT | Performed by: DERMATOLOGY

## 2019-08-06 ASSESSMENT — ENCOUNTER SYMPTOMS
FEVER: 0
CHILLS: 0

## 2019-08-06 NOTE — PROGRESS NOTES
Dermatology New Patient Visit    Chief Complaint   Patient presents with   • Establish Care     SOHAIL       Subjective:     HPI:   Yohana Brandon is a 54 y.o. female presenting for    Patient here for full skin exam.  Last exam was twelve years ago      History of skin cancer: No  History of precancers/actinic keratoses: No  History of biopsies:No  History of blistering/severe sunburns:No  Family history of skin cancer:Yes, Details: Mother, type unknown, treating Efudex  Family history of atypical moles:No          Past Medical History:   Diagnosis Date   • Alcohol abuse    • ASTHMA    • Elevated LFTs    • Heart palpitations     w/ alcohol withdrawal   • Hypertension    • Psychiatric disorder     alcohol dependance       Current Outpatient Medications on File Prior to Visit   Medication Sig Dispense Refill   • NON SPECIFIED 1 Dose by Injection route every day. [facility] - From Dr. Jackson     • Omega-3 Fatty Acids (FISH OIL) 1000 MG Cap capsule Take 2,000 mg by mouth 4 times a day.     • Nutritional Supplements (DHEA PO) Take 2 Tabs by mouth every day.     • multivitamin (THERAGRAN) Tab Take 1 Tab by mouth every day.     • hydrochlorothiazide (HYDRODIURIL) 25 MG TABS Take 25 mg by mouth every day. Indications: High Blood Pressure     • telmisartan (MICARDIS) 80 MG TABS Take 80 mg by mouth every day.     • ondansetron (ZOFRAN ODT) 4 MG TABLET DISPERSIBLE Take 1 Tab by mouth every 6 hours as needed. (Patient not taking: Reported on 8/6/2019) 15 Tab 0   • dicyclomine (BENTYL) 20 MG Tab Take 1 Tab by mouth every 6 hours. (Patient not taking: Reported on 8/6/2019) 20 Tab 0   • POTASSIUM PO Take 1 Tab by mouth every day. Indications: OTC     • chlordiazepoxide (LIBRIUM) 25 MG Cap Take 1 Cap by mouth 3 times a day as needed for Anxiety. (Patient not taking: Reported on 8/6/2019) 30 Cap 0   • Cholecalciferol (VITAMIN D PO) Take 1 Tab by mouth every day.       No current facility-administered medications on file prior to  visit.        Allergies   Allergen Reactions   • Egg White Swelling   • Kiwi Extract Swelling   • Latex Anaphylaxis   • Morphine Hives   • Banana    • Other Food      Tropical fruit         Family History   Problem Relation Age of Onset   • Stroke Mother        Social History     Socioeconomic History   • Marital status:      Spouse name: Not on file   • Number of children: Not on file   • Years of education: Not on file   • Highest education level: Not on file   Occupational History   • Not on file   Social Needs   • Financial resource strain: Not on file   • Food insecurity:     Worry: Not on file     Inability: Not on file   • Transportation needs:     Medical: Not on file     Non-medical: Not on file   Tobacco Use   • Smoking status: Never Smoker   • Smokeless tobacco: Never Used   Substance and Sexual Activity   • Alcohol use: No     Comment: Pt been off alcohol for 22 monthes as of 2/28/19   • Drug use: No   • Sexual activity: Not on file   Lifestyle   • Physical activity:     Days per week: Not on file     Minutes per session: Not on file   • Stress: Not on file   Relationships   • Social connections:     Talks on phone: Not on file     Gets together: Not on file     Attends [employer] service: Not on file     Active member of club or organization: Not on file     Attends meetings of clubs or organizations: Not on file     Relationship status: Not on file   • Intimate partner violence:     Fear of current or ex partner: Not on file     Emotionally abused: Not on file     Physically abused: Not on file     Forced sexual activity: Not on file   Other Topics Concern   • Not on file   Social History Narrative   • Not on file       ROS     Objective:     A {kmskinexamfullfocused:20740} exam was completed including: scalp, hair, ears, face, eyelids, conjunctiva, lips, gums/tongue/oropharynx***, neck, {KMTSECB:20696}, abdomen, back, left and right upper extremities (including {KMTSEhands:20697}), left and  "right lower extremities (including {kmtsefeet:01522}), buttocks, {kmtsegenitalia:63672} external genitalia (patient refusal***) with the following pertinent findings listed below. Remaining above-listed examined areas within normal limits / negative for rashes or lesions.    Ht 1.727 m (5' 8\")   Wt 65.8 kg (145 lb)     Physical Exam    DATA: none applicable to review***    Assessment and Plan:     There are no diagnoses linked to this encounter.    Followup: No follow-ups on file.    Swetha Lundberg, Med Ass't        "

## 2019-08-06 NOTE — PROGRESS NOTES
DERMATOLOGY NEW PATIENT VISIT      Subjective:      Yohana Brandon is a 54 y.o. female who presents with Establish Care (SOHAIL)            Here to have several moles/spots checked  Has been 12 years since last exam  Tough to see the back side, but has noticed increased number of spots on the back since youth     History of skin cancer: No  History of precancers/actinic keratoses: No  History of biopsies:Yes, Details: chest, years ago - benign  Family history of skin cancer:No      Past Medical History:   Diagnosis Date   • Alcohol abuse    • ASTHMA    • Elevated LFTs    • Heart palpitations     w/ alcohol withdrawal   • Hypertension    • Psychiatric disorder     alcohol dependance     Egg white; Kiwi extract; Latex; Morphine; Banana; and Other food    Past Surgical History:   Procedure Laterality Date   • HEMORRHOIDECTOMY  2011    Performed by SONY MACDONALD at SURGERY [facility] ORS   • ANAL SPHINCTEROTOMY  2011    Performed by SONY MACDONALD at SURGERY [facility] OR[facility]   • PB ENDOMETRIAL ABLATION, THERMAL     • APPENDECTOMY     • PB ANESTH,SURG BREAST RECONSTRUCTIVE      bilateral    • BUNIONECTOMY      bilateral   • PB REMOVE TONSILS/ADENOIDS,<11 Y/O  1973   • PB  DELIVERY+POSTPARTUM CARE      x2     Social History     Socioeconomic History   • Marital status:      Spouse name: Not on file   • Number of children: Not on file   • Years of education: Not on file   • Highest education level: Not on file   Occupational History   • Not on file   Social Needs   • Financial resource strain: Not on file   • Food insecurity:     Worry: Not on file     Inability: Not on file   • Transportation needs:     Medical: Not on file     Non-medical: Not on file   Tobacco Use   • Smoking status: Never Smoker   • Smokeless tobacco: Never Used   Substance and Sexual Activity   • Alcohol use: No     Comment: Pt been off alcohol for 22 monthes as of 21   • Drug use: No   • Sexual  "activity: Not on file   Lifestyle   • Physical activity:     Days per week: Not on file     Minutes per session: Not on file   • Stress: Not on file   Relationships   • Social connections:     Talks on phone: Not on file     Gets together: Not on file     Attends [employer] service: Not on file     Active member of club or organization: Not on file     Attends meetings of clubs or organizations: Not on file     Relationship status: Not on file   • Intimate partner violence:     Fear of current or ex partner: Not on file     Emotionally abused: Not on file     Physically abused: Not on file     Forced sexual activity: Not on file   Other Topics Concern   • Not on file   Social History Narrative   • Not on file       Review of Systems   Constitutional: Negative for chills and fever.   Skin: Negative for itching and rash.          Objective:     Ht 1.727 m (5' 8\")   Wt 65.8 kg (145 lb)   LMP 08/15/2011   BMI 22.05 kg/m²      Physical Exam   Constitutional: She is oriented to person, place, and time. She appears well-developed and well-nourished.   HENT:   Head: Normocephalic and atraumatic.   Right Ear: External ear normal.   Left Ear: External ear normal.   Nose: Nose normal.   Mouth/Throat: Oropharynx is clear and moist.   Eyes: Pupils are equal, round, and reactive to light. Conjunctivae are normal.   Neck: Normal range of motion. Neck supple.   Cardiovascular: Intact distal pulses.   Pulmonary/Chest: Effort normal.   Musculoskeletal: Normal range of motion.   Neurological: She is alert and oriented to person, place, and time.   Skin: Skin is warm and dry.        Psychiatric: She has a normal mood and affect. Judgment normal.   Vitals reviewed.              Assessment/Plan:     1. Multiple nevi  - Benign-appearing nature of lesions discussed. Advised to return to clinic for any new or concerning changes.  - ABCDE's of melanoma discussed  - discussed importance of regular sun protection/sunscreen use, SPF 30 or " greater with broad spectrum coverage, need for reapplication every  minutes    2. Seborrheic keratoses  - Benign-appearing nature of lesions discussed. Advised to return to clinic for any new or concerning changes.    3. Cherry angiomas  - Benign-appearing nature of lesions discussed. Advised to return to clinic for any new or concerning changes.    RTC 1-2 years for SOHAIL, prn any new/changing lesions    Kate Craig

## 2019-08-23 ENCOUNTER — HOSPITAL ENCOUNTER (OUTPATIENT)
Dept: LAB | Facility: MEDICAL CENTER | Age: 54
End: 2019-08-23
Attending: NURSE PRACTITIONER
Payer: COMMERCIAL

## 2019-08-23 LAB
APPEARANCE UR: CLEAR
BACTERIA #/AREA URNS HPF: NEGATIVE /HPF
BILIRUB UR QL STRIP.AUTO: NEGATIVE
COLOR UR: ABNORMAL
EPI CELLS #/AREA URNS HPF: NORMAL /HPF
GLUCOSE UR STRIP.AUTO-MCNC: NEGATIVE MG/DL
HYALINE CASTS #/AREA URNS LPF: NORMAL /LPF
KETONES UR STRIP.AUTO-MCNC: NEGATIVE MG/DL
LEUKOCYTE ESTERASE UR QL STRIP.AUTO: ABNORMAL
MICRO URNS: ABNORMAL
NITRITE UR QL STRIP.AUTO: POSITIVE
PH UR STRIP.AUTO: 7 [PH] (ref 5–8)
PROT UR QL STRIP: NEGATIVE MG/DL
RBC # URNS HPF: NORMAL /HPF
RBC UR QL AUTO: NEGATIVE
SP GR UR STRIP.AUTO: 1.01
UROBILINOGEN UR STRIP.AUTO-MCNC: 1 MG/DL
WBC #/AREA URNS HPF: NORMAL /HPF

## 2019-08-23 PROCEDURE — 81001 URINALYSIS AUTO W/SCOPE: CPT

## 2019-08-23 PROCEDURE — 87086 URINE CULTURE/COLONY COUNT: CPT

## 2019-08-25 LAB
BACTERIA UR CULT: NORMAL
SIGNIFICANT IND 70042: NORMAL
SITE SITE: NORMAL
SOURCE SOURCE: NORMAL

## 2019-09-17 ENCOUNTER — HOSPITAL ENCOUNTER (OUTPATIENT)
Dept: LAB | Facility: MEDICAL CENTER | Age: 54
End: 2019-09-17
Attending: NURSE PRACTITIONER
Payer: COMMERCIAL

## 2019-09-17 LAB
HAV IGM SERPL QL IA: NEGATIVE
HBV CORE IGM SER QL: NEGATIVE
HBV SURFACE AG SER QL: NEGATIVE
HCV AB SER QL: NEGATIVE
HIV 1+2 AB+HIV1 P24 AG SERPL QL IA: NON REACTIVE
TREPONEMA PALLIDUM IGG+IGM AB [PRESENCE] IN SERUM OR PLASMA BY IMMUNOASSAY: NON REACTIVE

## 2019-09-17 PROCEDURE — 87389 HIV-1 AG W/HIV-1&-2 AB AG IA: CPT

## 2019-09-17 PROCEDURE — 86780 TREPONEMA PALLIDUM: CPT

## 2019-09-17 PROCEDURE — 36415 COLL VENOUS BLD VENIPUNCTURE: CPT

## 2019-09-17 PROCEDURE — 80074 ACUTE HEPATITIS PANEL: CPT

## 2019-09-17 PROCEDURE — 86696 HERPES SIMPLEX TYPE 2 TEST: CPT

## 2019-09-19 LAB — HSV2 GG IGG SER-ACNC: 0.1 IV

## 2019-10-21 ENCOUNTER — OFFICE VISIT (OUTPATIENT)
Dept: CARDIOLOGY | Facility: MEDICAL CENTER | Age: 54
End: 2019-10-21
Payer: COMMERCIAL

## 2019-10-21 VITALS
OXYGEN SATURATION: 97 % | BODY MASS INDEX: 21.98 KG/M2 | SYSTOLIC BLOOD PRESSURE: 110 MMHG | DIASTOLIC BLOOD PRESSURE: 70 MMHG | HEIGHT: 68 IN | WEIGHT: 145 LBS | HEART RATE: 94 BPM

## 2019-10-21 DIAGNOSIS — Z86.79 H/O: HYPERTENSION: ICD-10-CM

## 2019-10-21 DIAGNOSIS — I25.10 CORONARY ARTERY CALCIFICATION SEEN ON CT SCAN: ICD-10-CM

## 2019-10-21 PROCEDURE — 99243 OFF/OP CNSLTJ NEW/EST LOW 30: CPT | Performed by: INTERNAL MEDICINE

## 2019-10-21 RX ORDER — ROSUVASTATIN CALCIUM 10 MG/1
10 TABLET, COATED ORAL EVERY EVENING
Qty: 30 TAB | Refills: 11 | Status: SHIPPED | OUTPATIENT
Start: 2019-10-21 | End: 2021-01-05 | Stop reason: SDUPTHER

## 2019-10-21 ASSESSMENT — ENCOUNTER SYMPTOMS
RESPIRATORY NEGATIVE: 1
GASTROINTESTINAL NEGATIVE: 1
MUSCULOSKELETAL NEGATIVE: 1
CONSTITUTIONAL NEGATIVE: 1
NEUROLOGICAL NEGATIVE: 1
CARDIOVASCULAR NEGATIVE: 1

## 2019-10-21 NOTE — PROGRESS NOTES
No chief complaint on file.      Subjective:   Yohana Brandon is a 54 y.o. female who is seen in consultation at the request of Dr. Jackson for evaluation of abnormal coronary artery calcification scan.  There is no history of known coronary disease, exercise intolerance, palpitations or chest pain.  Patient is non-smoker not diabetic.  Her coronary calcification study revealed a score of 142, which places her in the 90th percentile for age.  Her family history is notable for the fact her mother had a stroke at young age but was also cigarette smoker.  The patient is a history of hypertension.  The patient been abstinent from alcohol for 2 years and is quite physically active and works on a treadmill etc.    Past Medical History:   Diagnosis Date   • Alcohol abuse    • ASTHMA    • Elevated LFTs    • Heart palpitations     w/ alcohol withdrawal   • Hypertension    • Psychiatric disorder     alcohol dependance     Past Surgical History:   Procedure Laterality Date   • HEMORRHOIDECTOMY  2011    Performed by SONY MACDONALD at SURGERY [facility] ORS   • ANAL SPHINCTEROTOMY  2011    Performed by SONY MACDONALD at SURGERY [facility] ORS   • PB ENDOMETRIAL ABLATION, THERMAL     • APPENDECTOMY     • PB ANESTH,SURG BREAST RECONSTRUCTIVE      bilateral    • BUNIONECTOMY      bilateral   • PB REMOVE TONSILS/ADENOIDS,<11 Y/O     • PB  DELIVERY+POSTPARTUM CARE      x2     Family History   Problem Relation Age of Onset   • Stroke Mother      Social History     Socioeconomic History   • Marital status:      Spouse name: Not on file   • Number of children: Not on file   • Years of education: Not on file   • Highest education level: Not on file   Occupational History   • Not on file   Social Needs   • Financial resource strain: Not on file   • Food insecurity:     Worry: Not on file     Inability: Not on file   • Transportation needs:     Medical: Not on file     Non-medical: Not  on file   Tobacco Use   • Smoking status: Never Smoker   • Smokeless tobacco: Never Used   Substance and Sexual Activity   • Alcohol use: No     Comment: Pt been off alcohol for 22 monthes as of 2/28/19   • Drug use: No   • Sexual activity: Not on file   Lifestyle   • Physical activity:     Days per week: Not on file     Minutes per session: Not on file   • Stress: Not on file   Relationships   • Social connections:     Talks on phone: Not on file     Gets together: Not on file     Attends [employer] service: Not on file     Active member of club or organization: Not on file     Attends meetings of clubs or organizations: Not on file     Relationship status: Not on file   • Intimate partner violence:     Fear of current or ex partner: Not on file     Emotionally abused: Not on file     Physically abused: Not on file     Forced sexual activity: Not on file   Other Topics Concern   • Not on file   Social History Narrative   • Not on file     Allergies   Allergen Reactions   • Egg White Swelling   • Kiwi Extract Swelling   • Latex Anaphylaxis   • Morphine Hives   • Banana    • Other Food      Tropical fruit       Outpatient Encounter Medications as of 10/21/2019   Medication Sig Dispense Refill   • Levothyroxine Sodium (LEVOTHROID PO) Take  by mouth.     • rosuvastatin (CRESTOR) 10 MG Tab Take 1 Tab by mouth every evening. 30 Tab 11   • POTASSIUM PO Take 1 Tab by mouth every day. Indications: OTC     • Omega-3 Fatty Acids (FISH OIL) 1000 MG Cap capsule Take 2,000 mg by mouth 4 times a day.     • Cholecalciferol (VITAMIN D PO) Take 1 Tab by mouth every day.     • Nutritional Supplements (DHEA PO) Take 2 Tabs by mouth every day.     • multivitamin (THERAGRAN) Tab Take 1 Tab by mouth every day.     • hydrochlorothiazide (HYDRODIURIL) 25 MG TABS Take 25 mg by mouth every day. Indications: High Blood Pressure     • telmisartan (MICARDIS) 80 MG TABS Take 80 mg by mouth every day.     • ondansetron (ZOFRAN ODT) 4 MG TABLET  "DISPERSIBLE Take 1 Tab by mouth every 6 hours as needed. (Patient not taking: Reported on 8/6/2019) 15 Tab 0   • dicyclomine (BENTYL) 20 MG Tab Take 1 Tab by mouth every 6 hours. (Patient not taking: Reported on 8/6/2019) 20 Tab 0   • chlordiazepoxide (LIBRIUM) 25 MG Cap Take 1 Cap by mouth 3 times a day as needed for Anxiety. (Patient not taking: Reported on 8/6/2019) 30 Cap 0   • NON SPECIFIED 1 Dose by Injection route every day. [facility] - From Dr. Jackson       No facility-administered encounter medications on file as of 10/21/2019.      Review of Systems   Constitutional: Negative.    HENT: Negative.    Respiratory: Negative.    Cardiovascular: Negative.    Gastrointestinal: Negative.    Musculoskeletal: Negative.    Skin: Negative.    Neurological: Negative.    Endo/Heme/Allergies: Negative.    Psychiatric/Behavioral:        History of alcohol abuse,  abstinent x2 years        Objective:   /70 (BP Location: Right arm, Patient Position: Sitting, BP Cuff Size: Adult)   Pulse 94   Ht 1.727 m (5' 8\")   Wt 65.8 kg (145 lb)   LMP 08/15/2011   SpO2 97%   BMI 22.05 kg/m²     Physical Exam   Constitutional: She is oriented to person, place, and time. No distress.   HENT:   Head: Normocephalic and atraumatic.   Eyes: Pupils are equal, round, and reactive to light. No scleral icterus.   Neck: No JVD present.   Cardiovascular: Normal rate and regular rhythm.   No murmur heard.  Pulmonary/Chest: No respiratory distress. She has no wheezes.   Abdominal: Soft. She exhibits no distension. There is no tenderness.   Musculoskeletal: She exhibits no edema.   Neurological: She is alert and oriented to person, place, and time.   Skin: Skin is warm.   Psychiatric: She has a normal mood and affect.       Assessment:     1. H/O: hypertension  Lipid Profile   2. Coronary artery calcification seen on CT scan  Lipid Profile       Medical Decision Making:  Today's Assessment / Status / Plan:   Coronary artery calcification: " Results as above.  According to recent review in the Journal of American College of cardiology, scores above 100 should be treated should be started on low-dose rosuvastatin and check lipid profile in about 3 months.  Her previous LDLs were reviewed and have been as high as 156 in the past.  Recommended target is between 80 and 100 for the LDL.    Hypertension: Overcontrolled now that she has been exercising.  Recommend she stop her hydrochlorothiazide,  particularly in the light of the fact that she been hypokalemic in the past.  She will check her pressure on Micardis only.    Plan: Start rosuvastatin 10 mg a day.  Check lipid profile in 3 months.

## 2019-10-21 NOTE — LETTER
[facility]   [address]   [address]  [place] [address]  Phone: [phone]  Fax: [phone]              Yohana Brandon  1965    Encounter Date: 10/21/2019    Devan Villavicencio M.D.          PROGRESS NOTE:  No chief complaint on file.      Subjective:   Yohana Brandon is a 54 y.o. female who is seen in consultation at the request of Dr. Jackson for evaluation of abnormal coronary artery calcification scan.  There is no history of known coronary disease, exercise intolerance, palpitations or chest pain.  Patient is non-smoker not diabetic.  Her coronary calcification study revealed a score of 142, which places her in the 90th percentile for age.  Her family history is notable for the fact her mother had a stroke at young age but was also cigarette smoker.  The patient is a history of hypertension.  The patient been abstinent from alcohol for 2 years and is quite physically active and works on a treadmill etc.    Past Medical History:   Diagnosis Date   • Alcohol abuse    • ASTHMA    • Elevated LFTs    • Heart palpitations     w/ alcohol withdrawal   • Hypertension    • Psychiatric disorder     alcohol dependance     Past Surgical History:   Procedure Laterality Date   • HEMORRHOIDECTOMY  2011    Performed by SONY MACDONALD at SURGERY [facility] ORS   • ANAL SPHINCTEROTOMY  2011    Performed by SONY MACDONALD at SURGERY [facility] ORS   • PB ENDOMETRIAL ABLATION, THERMAL     • APPENDECTOMY     • PB ANESTH,SURG BREAST RECONSTRUCTIVE      bilateral    • BUNIONECTOMY      bilateral   • PB REMOVE TONSILS/ADENOIDS,<13 Y/O     • PB  DELIVERY+POSTPARTUM CARE      x2     Family History   Problem Relation Age of Onset   • Stroke Mother      Social History     Socioeconomic History   • Marital status:      Spouse name: Not on file   • Number of children: Not on file   • Years of education: Not  on file   • Highest education level: Not on file   Occupational History   • Not on file   Social Needs   • Financial resource strain: Not on file   • Food insecurity:     Worry: Not on file     Inability: Not on file   • Transportation needs:     Medical: Not on file     Non-medical: Not on file   Tobacco Use   • Smoking status: Never Smoker   • Smokeless tobacco: Never Used   Substance and Sexual Activity   • Alcohol use: No     Comment: Pt been off alcohol for 22 monthes as of 2/28/19   • Drug use: No   • Sexual activity: Not on file   Lifestyle   • Physical activity:     Days per week: Not on file     Minutes per session: Not on file   • Stress: Not on file   Relationships   • Social connections:     Talks on phone: Not on file     Gets together: Not on file     Attends Anabaptism service: Not on file     Active member of club or organization: Not on file     Attends meetings of clubs or organizations: Not on file     Relationship status: Not on file   • Intimate partner violence:     Fear of current or ex partner: Not on file     Emotionally abused: Not on file     Physically abused: Not on file     Forced sexual activity: Not on file   Other Topics Concern   • Not on file   Social History Narrative   • Not on file     Allergies   Allergen Reactions   • Egg White Swelling   • Kiwi Extract Swelling   • Latex Anaphylaxis   • Morphine Hives   • Banana    • Other Food      Tropical fruit       Outpatient Encounter Medications as of 10/21/2019   Medication Sig Dispense Refill   • Levothyroxine Sodium (LEVOTHROID PO) Take  by mouth.     • rosuvastatin (CRESTOR) 10 MG Tab Take 1 Tab by mouth every evening. 30 Tab 11   • POTASSIUM PO Take 1 Tab by mouth every day. Indications: OTC     • Omega-3 Fatty Acids (FISH OIL) 1000 MG Cap capsule Take 2,000 mg by mouth 4 times a day.     • Cholecalciferol (VITAMIN D PO) Take 1 Tab by mouth every day.     • Nutritional Supplements (DHEA PO) Take 2 Tabs by mouth every day.     •  "multivitamin (THERAGRAN) Tab Take 1 Tab by mouth every day.     • hydrochlorothiazide (HYDRODIURIL) 25 MG TABS Take 25 mg by mouth every day. Indications: High Blood Pressure     • telmisartan (MICARDIS) 80 MG TABS Take 80 mg by mouth every day.     • ondansetron (ZOFRAN ODT) 4 MG TABLET DISPERSIBLE Take 1 Tab by mouth every 6 hours as needed. (Patient not taking: Reported on 8/6/2019) 15 Tab 0   • dicyclomine (BENTYL) 20 MG Tab Take 1 Tab by mouth every 6 hours. (Patient not taking: Reported on 8/6/2019) 20 Tab 0   • chlordiazepoxide (LIBRIUM) 25 MG Cap Take 1 Cap by mouth 3 times a day as needed for Anxiety. (Patient not taking: Reported on 8/6/2019) 30 Cap 0   • NON SPECIFIED 1 Dose by Injection route every day. [facility] - From Dr. Jackson       No facility-administered encounter medications on file as of 10/21/2019.      Review of Systems   Constitutional: Negative.    HENT: Negative.    Respiratory: Negative.    Cardiovascular: Negative.    Gastrointestinal: Negative.    Musculoskeletal: Negative.    Skin: Negative.    Neurological: Negative.    Endo/Heme/Allergies: Negative.    Psychiatric/Behavioral:        History of alcohol abuse,  abstinent x2 years        Objective:   /70 (BP Location: Right arm, Patient Position: Sitting, BP Cuff Size: Adult)   Pulse 94   Ht 1.727 m (5' 8\")   Wt 65.8 kg (145 lb)   LMP 08/15/2011   SpO2 97%   BMI 22.05 kg/m²      Physical Exam   Constitutional: She is oriented to person, place, and time. No distress.   HENT:   Head: Normocephalic and atraumatic.   Eyes: Pupils are equal, round, and reactive to light. No scleral icterus.   Neck: No JVD present.   Cardiovascular: Normal rate and regular rhythm.   No murmur heard.  Pulmonary/Chest: No respiratory distress. She has no wheezes.   Abdominal: Soft. She exhibits no distension. There is no tenderness.   Musculoskeletal: She exhibits no edema.   Neurological: She is alert and oriented to person, place, and time.   Skin: " Skin is warm.   Psychiatric: She has a normal mood and affect.       Assessment:     1. H/O: hypertension  Lipid Profile   2. Coronary artery calcification seen on CT scan  Lipid Profile       Medical Decision Making:  Today's Assessment / Status / Plan:   Coronary artery calcification: Results as above.  According to recent review in the Journal of American College of cardiology, scores above 100 should be treated should be started on low-dose rosuvastatin and check lipid profile in about 3 months.  Her previous LDLs were reviewed and have been as high as 156 in the past.  Recommended target is between 80 and 100 for the LDL.    Hypertension: Overcontrolled now that she has been exercising.  Recommend she stop her hydrochlorothiazide,  particularly in the light of the fact that she been hypokalemic in the past.  She will check her pressure on Micardis only.    Plan: Start rosuvastatin 10 mg a day.  Check lipid profile in 3 months.      Lopez Jackson M.D.  [address] [place] [address]  VIA Facsimile: [phone]

## 2020-03-10 ENCOUNTER — HOSPITAL ENCOUNTER (OUTPATIENT)
Dept: LAB | Facility: MEDICAL CENTER | Age: 55
End: 2020-03-10
Attending: EMERGENCY MEDICINE
Payer: COMMERCIAL

## 2020-03-10 LAB
ALBUMIN SERPL BCP-MCNC: 4.7 G/DL (ref 3.2–4.9)
ALBUMIN/GLOB SERPL: 1.5 G/DL
ALP SERPL-CCNC: 58 U/L (ref 30–99)
ALT SERPL-CCNC: 25 U/L (ref 2–50)
ANION GAP SERPL CALC-SCNC: 7 MMOL/L (ref 0–11.9)
AST SERPL-CCNC: 22 U/L (ref 12–45)
BASOPHILS # BLD AUTO: 0.9 % (ref 0–1.8)
BASOPHILS # BLD: 0.05 K/UL (ref 0–0.12)
BILIRUB SERPL-MCNC: 0.5 MG/DL (ref 0.1–1.5)
BUN SERPL-MCNC: 17 MG/DL (ref 8–22)
CALCIUM SERPL-MCNC: 10.4 MG/DL (ref 8.5–10.5)
CHLORIDE SERPL-SCNC: 97 MMOL/L (ref 96–112)
CHOLEST SERPL-MCNC: 192 MG/DL (ref 100–199)
CO2 SERPL-SCNC: 30 MMOL/L (ref 20–33)
CREAT SERPL-MCNC: 1.03 MG/DL (ref 0.5–1.4)
EOSINOPHIL # BLD AUTO: 0.13 K/UL (ref 0–0.51)
EOSINOPHIL NFR BLD: 2.4 % (ref 0–6.9)
ERYTHROCYTE [DISTWIDTH] IN BLOOD BY AUTOMATED COUNT: 42.9 FL (ref 35.9–50)
FASTING STATUS PATIENT QL REPORTED: NORMAL
GLOBULIN SER CALC-MCNC: 3.1 G/DL (ref 1.9–3.5)
GLUCOSE SERPL-MCNC: 91 MG/DL (ref 65–99)
HCT VFR BLD AUTO: 43.1 % (ref 37–47)
HDLC SERPL-MCNC: 66 MG/DL
HGB BLD-MCNC: 14.2 G/DL (ref 12–16)
IMM GRANULOCYTES # BLD AUTO: 0.03 K/UL (ref 0–0.11)
IMM GRANULOCYTES NFR BLD AUTO: 0.6 % (ref 0–0.9)
LDH SERPL L TO P-CCNC: 167 U/L (ref 107–266)
LDLC SERPL CALC-MCNC: 102 MG/DL
LYMPHOCYTES # BLD AUTO: 1.45 K/UL (ref 1–4.8)
LYMPHOCYTES NFR BLD: 27.2 % (ref 22–41)
MCH RBC QN AUTO: 32 PG (ref 27–33)
MCHC RBC AUTO-ENTMCNC: 32.9 G/DL (ref 33.6–35)
MCV RBC AUTO: 97.1 FL (ref 81.4–97.8)
MONOCYTES # BLD AUTO: 0.5 K/UL (ref 0–0.85)
MONOCYTES NFR BLD AUTO: 9.4 % (ref 0–13.4)
NEUTROPHILS # BLD AUTO: 3.17 K/UL (ref 2–7.15)
NEUTROPHILS NFR BLD: 59.5 % (ref 44–72)
NRBC # BLD AUTO: 0 K/UL
NRBC BLD-RTO: 0 /100 WBC
PHOSPHATE SERPL-MCNC: 3.5 MG/DL (ref 2.5–4.5)
PLATELET # BLD AUTO: 256 K/UL (ref 164–446)
PMV BLD AUTO: 11.2 FL (ref 9–12.9)
POTASSIUM SERPL-SCNC: 3.6 MMOL/L (ref 3.6–5.5)
PROGEST SERPL-MCNC: 2.7 NG/ML
PROT SERPL-MCNC: 7.8 G/DL (ref 6–8.2)
RBC # BLD AUTO: 4.44 M/UL (ref 4.2–5.4)
SODIUM SERPL-SCNC: 134 MMOL/L (ref 135–145)
TRIGL SERPL-MCNC: 119 MG/DL (ref 0–149)
URATE SERPL-MCNC: 6.1 MG/DL (ref 1.9–8.2)
WBC # BLD AUTO: 5.3 K/UL (ref 4.8–10.8)

## 2020-03-10 PROCEDURE — 84402 ASSAY OF FREE TESTOSTERONE: CPT

## 2020-03-10 PROCEDURE — 84305 ASSAY OF SOMATOMEDIN: CPT

## 2020-03-10 PROCEDURE — 80061 LIPID PANEL: CPT

## 2020-03-10 PROCEDURE — 83615 LACTATE (LD) (LDH) ENZYME: CPT

## 2020-03-10 PROCEDURE — 36415 COLL VENOUS BLD VENIPUNCTURE: CPT

## 2020-03-10 PROCEDURE — 80053 COMPREHEN METABOLIC PANEL: CPT

## 2020-03-10 PROCEDURE — 85025 COMPLETE CBC W/AUTO DIFF WBC: CPT

## 2020-03-10 PROCEDURE — 84144 ASSAY OF PROGESTERONE: CPT

## 2020-03-10 PROCEDURE — 84100 ASSAY OF PHOSPHORUS: CPT

## 2020-03-10 PROCEDURE — 84481 FREE ASSAY (FT-3): CPT

## 2020-03-10 PROCEDURE — 84550 ASSAY OF BLOOD/URIC ACID: CPT

## 2020-03-10 PROCEDURE — 84270 ASSAY OF SEX HORMONE GLOBUL: CPT

## 2020-03-10 PROCEDURE — 84403 ASSAY OF TOTAL TESTOSTERONE: CPT

## 2020-03-10 PROCEDURE — 82670 ASSAY OF TOTAL ESTRADIOL: CPT

## 2020-03-10 PROCEDURE — 82977 ASSAY OF GGT: CPT | Mod: QW

## 2020-03-12 LAB
ESTRADIOL SERPL-MCNC: 33 PG/ML
GGT SERPL-CCNC: 70 U/L (ref 7–34)
IGF-I SERPL-MCNC: 186 NG/ML (ref 53–234)
IGF-I Z-SCORE SERPL: 1.1
T3FREE SERPL-MCNC: 3.13 PG/ML (ref 2.4–4.2)

## 2020-03-14 LAB
SHBG SERPL-SCNC: 53 NMOL/L (ref 30–135)
TESTOST FREE SERPL-MCNC: 8.3 PG/ML (ref 0.6–3.8)
TESTOST SERPL-MCNC: 66 NG/DL (ref 9–55)

## 2020-05-11 ENCOUNTER — HOSPITAL ENCOUNTER (EMERGENCY)
Facility: MEDICAL CENTER | Age: 55
End: 2020-05-11
Attending: EMERGENCY MEDICINE
Payer: COMMERCIAL

## 2020-05-11 VITALS
HEART RATE: 91 BPM | RESPIRATION RATE: 18 BRPM | DIASTOLIC BLOOD PRESSURE: 81 MMHG | SYSTOLIC BLOOD PRESSURE: 130 MMHG | WEIGHT: 139.99 LBS | BODY MASS INDEX: 21.22 KG/M2 | OXYGEN SATURATION: 99 % | HEIGHT: 68 IN | TEMPERATURE: 98.2 F

## 2020-05-11 DIAGNOSIS — F10.220 ALCOHOL DEPENDENCE WITH UNCOMPLICATED INTOXICATION (HCC): ICD-10-CM

## 2020-05-11 LAB
ALBUMIN SERPL BCP-MCNC: 4.5 G/DL (ref 3.2–4.9)
ALBUMIN/GLOB SERPL: 1.7 G/DL
ALP SERPL-CCNC: 61 U/L (ref 30–99)
ALT SERPL-CCNC: 59 U/L (ref 2–50)
ANION GAP SERPL CALC-SCNC: 16 MMOL/L (ref 7–16)
AST SERPL-CCNC: 42 U/L (ref 12–45)
BASOPHILS # BLD AUTO: 1.4 % (ref 0–1.8)
BASOPHILS # BLD: 0.07 K/UL (ref 0–0.12)
BILIRUB SERPL-MCNC: 0.3 MG/DL (ref 0.1–1.5)
BUN SERPL-MCNC: 5 MG/DL (ref 8–22)
CALCIUM SERPL-MCNC: 9.1 MG/DL (ref 8.4–10.2)
CHLORIDE SERPL-SCNC: 106 MMOL/L (ref 96–112)
CO2 SERPL-SCNC: 26 MMOL/L (ref 20–33)
CREAT SERPL-MCNC: 0.76 MG/DL (ref 0.5–1.4)
EOSINOPHIL # BLD AUTO: 0.05 K/UL (ref 0–0.51)
EOSINOPHIL NFR BLD: 1 % (ref 0–6.9)
ERYTHROCYTE [DISTWIDTH] IN BLOOD BY AUTOMATED COUNT: 44.2 FL (ref 35.9–50)
GLOBULIN SER CALC-MCNC: 2.7 G/DL (ref 1.9–3.5)
GLUCOSE SERPL-MCNC: 93 MG/DL (ref 65–99)
HCT VFR BLD AUTO: 43.4 % (ref 37–47)
HGB BLD-MCNC: 14.8 G/DL (ref 12–16)
IMM GRANULOCYTES # BLD AUTO: 0 K/UL (ref 0–0.11)
IMM GRANULOCYTES NFR BLD AUTO: 0 % (ref 0–0.9)
LIPASE SERPL-CCNC: 30 U/L (ref 7–58)
LYMPHOCYTES # BLD AUTO: 2.23 K/UL (ref 1–4.8)
LYMPHOCYTES NFR BLD: 44.8 % (ref 22–41)
MCH RBC QN AUTO: 32 PG (ref 27–33)
MCHC RBC AUTO-ENTMCNC: 34.1 G/DL (ref 33.6–35)
MCV RBC AUTO: 93.9 FL (ref 81.4–97.8)
MONOCYTES # BLD AUTO: 0.47 K/UL (ref 0–0.85)
MONOCYTES NFR BLD AUTO: 9.4 % (ref 0–13.4)
NEUTROPHILS # BLD AUTO: 2.16 K/UL (ref 2–7.15)
NEUTROPHILS NFR BLD: 43.4 % (ref 44–72)
NRBC # BLD AUTO: 0 K/UL
NRBC BLD-RTO: 0 /100 WBC
PLATELET # BLD AUTO: 273 K/UL (ref 164–446)
PMV BLD AUTO: 9.3 FL (ref 9–12.9)
POTASSIUM SERPL-SCNC: 3.8 MMOL/L (ref 3.6–5.5)
PROT SERPL-MCNC: 7.2 G/DL (ref 6–8.2)
RBC # BLD AUTO: 4.62 M/UL (ref 4.2–5.4)
SODIUM SERPL-SCNC: 148 MMOL/L (ref 135–145)
WBC # BLD AUTO: 5 K/UL (ref 4.8–10.8)

## 2020-05-11 PROCEDURE — A9270 NON-COVERED ITEM OR SERVICE: HCPCS | Performed by: EMERGENCY MEDICINE

## 2020-05-11 PROCEDURE — 700101 HCHG RX REV CODE 250: Performed by: EMERGENCY MEDICINE

## 2020-05-11 PROCEDURE — 96375 TX/PRO/DX INJ NEW DRUG ADDON: CPT

## 2020-05-11 PROCEDURE — 700111 HCHG RX REV CODE 636 W/ 250 OVERRIDE (IP): Performed by: EMERGENCY MEDICINE

## 2020-05-11 PROCEDURE — 85025 COMPLETE CBC W/AUTO DIFF WBC: CPT

## 2020-05-11 PROCEDURE — 99284 EMERGENCY DEPT VISIT MOD MDM: CPT

## 2020-05-11 PROCEDURE — 700102 HCHG RX REV CODE 250 W/ 637 OVERRIDE(OP): Performed by: EMERGENCY MEDICINE

## 2020-05-11 PROCEDURE — 83690 ASSAY OF LIPASE: CPT

## 2020-05-11 PROCEDURE — 80053 COMPREHEN METABOLIC PANEL: CPT

## 2020-05-11 PROCEDURE — 96374 THER/PROPH/DIAG INJ IV PUSH: CPT

## 2020-05-11 PROCEDURE — 36415 COLL VENOUS BLD VENIPUNCTURE: CPT

## 2020-05-11 RX ORDER — ONDANSETRON 2 MG/ML
4 INJECTION INTRAMUSCULAR; INTRAVENOUS ONCE
Status: COMPLETED | OUTPATIENT
Start: 2020-05-11 | End: 2020-05-11

## 2020-05-11 RX ADMIN — THIAMINE HYDROCHLORIDE: 100 INJECTION, SOLUTION INTRAMUSCULAR; INTRAVENOUS at 18:23

## 2020-05-11 RX ADMIN — ONDANSETRON HYDROCHLORIDE 4 MG: 2 SOLUTION INTRAMUSCULAR; INTRAVENOUS at 20:23

## 2020-05-11 RX ADMIN — THERA TABS 1 TABLET: TAB at 18:22

## 2020-05-11 ASSESSMENT — FIBROSIS 4 INDEX: FIB4 SCORE: 0.89

## 2020-05-12 NOTE — ED TRIAGE NOTES
Pt amb to triage c/o alcohol intoxication; pt last drank vodka <1 hr ago. Pt has hx alcohol abuse. Pt denies suicidal ideation at this time

## 2020-05-12 NOTE — ED PROVIDER NOTES
ED Provider Note    CHIEF COMPLAINT  Chief Complaint   Patient presents with   • Alcohol Intoxication       HPI  Yohana Brandon is a 54 y.o. alcoholic female who presents stating that she has been drinking every day for the last several weeks.  She has been doing a lot of vomiting and diarrhea.  She states she has been dizzy and weak and falling down.  She denies having sustained injury.  She wants to get back on Suboxone as she was on that program through the [facility] clinic, but she does not think she has had a dose for several months.    REVIEW OF SYSTEMS  See HPI for further details.  The rest of the systems could not be obtained as the patient is intoxicated and not reliable historian    PAST MEDICAL HISTORY  Past Medical History:   Diagnosis Date   • Alcohol abuse    • ASTHMA    • Elevated LFTs    • Heart palpitations     w/ alcohol withdrawal   • Hypertension    • Psychiatric disorder     alcohol dependance       FAMILY HISTORY  Family History   Problem Relation Age of Onset   • Stroke Mother        SOCIAL HISTORY  Social History     Socioeconomic History   • Marital status:      Spouse name: Not on file   • Number of children: Not on file   • Years of education: Not on file   • Highest education level: Not on file   Occupational History   • Not on file   Social Needs   • Financial resource strain: Not on file   • Food insecurity     Worry: Not on file     Inability: Not on file   • Transportation needs     Medical: Not on file     Non-medical: Not on file   Tobacco Use   • Smoking status: Never Smoker   • Smokeless tobacco: Never Used   Substance and Sexual Activity   • Alcohol use: No     Comment: Pt been off alcohol for 22 monthes as of 2/28/19   • Drug use: No   • Sexual activity: Not on file   Lifestyle   • Physical activity     Days per week: Not on file     Minutes per session: Not on file   • Stress: Not on file   Relationships   • Social connections     Talks on phone: Not on file     Gets  together: Not on file     Attends [employer] service: Not on file     Active member of club or organization: Not on file     Attends meetings of clubs or organizations: Not on file     Relationship status: Not on file   • Intimate partner violence     Fear of current or ex partner: Not on file     Emotionally abused: Not on file     Physically abused: Not on file     Forced sexual activity: Not on file   Other Topics Concern   • Not on file   Social History Narrative   • Not on file       SURGICAL HISTORY  Past Surgical History:   Procedure Laterality Date   • HEMORRHOIDECTOMY  2011    Performed by SONY MACDONALD at SURGERY [facility] ORS   • ANAL SPHINCTEROTOMY  2011    Performed by SONY MACDONALD at SURGERY [facility] ORS   • PB ENDOMETRIAL ABLATION, THERMAL     • APPENDECTOMY     • PB ANESTH,SURG BREAST RECONSTRUCTIVE      bilateral    • BUNIONECTOMY      bilateral   • PB REMOVE TONSILS/ADENOIDS,<15 Y/O     • PB  DELIVERY+POSTPARTUM CARE      x2       CURRENT MEDICATIONS  No current facility-administered medications for this encounter.     Current Outpatient Medications:   •  Levothyroxine Sodium (LEVOTHROID PO), Take  by mouth., Disp: , Rfl:   •  rosuvastatin (CRESTOR) 10 MG Tab, Take 1 Tab by mouth every evening., Disp: 30 Tab, Rfl: 11  •  ondansetron (ZOFRAN ODT) 4 MG TABLET DISPERSIBLE, Take 1 Tab by mouth every 6 hours as needed. (Patient not taking: Reported on 2019), Disp: 15 Tab, Rfl: 0  •  dicyclomine (BENTYL) 20 MG Tab, Take 1 Tab by mouth every 6 hours. (Patient not taking: Reported on 2019), Disp: 20 Tab, Rfl: 0  •  POTASSIUM PO, Take 1 Tab by mouth every day. Indications: OTC, Disp: , Rfl:   •  chlordiazepoxide (LIBRIUM) 25 MG Cap, Take 1 Cap by mouth 3 times a day as needed for Anxiety. (Patient not taking: Reported on 2019), Disp: 30 Cap, Rfl: 0  •  NON SPECIFIED, 1 Dose by Injection route every day. [facility] - From Dr. Jackson, Disp: , Rfl:   •   "Omega-3 Fatty Acids (FISH OIL) 1000 MG Cap capsule, Take 2,000 mg by mouth 4 times a day., Disp: , Rfl:   •  Cholecalciferol (VITAMIN D PO), Take 1 Tab by mouth every day., Disp: , Rfl:   •  Nutritional Supplements (DHEA PO), Take 2 Tabs by mouth every day., Disp: , Rfl:   •  multivitamin (THERAGRAN) Tab, Take 1 Tab by mouth every day., Disp: , Rfl:   •  hydrochlorothiazide (HYDRODIURIL) 25 MG TABS, Take 25 mg by mouth every day. Indications: High Blood Pressure, Disp: , Rfl:   •  telmisartan (MICARDIS) 80 MG TABS, Take 80 mg by mouth every day., Disp: , Rfl:       ALLERGIES  Allergies   Allergen Reactions   • Egg White Swelling   • Kiwi Extract Swelling   • Latex Anaphylaxis   • Morphine Hives   • Banana    • Other Food      Tropical fruit         PHYSICAL EXAM  VITAL SIGNS: /108   Pulse (!) 118   Temp 36.8 °C (98.2 °F) (Temporal)   Resp 20   Ht 1.727 m (5' 8\")   Wt 63.5 kg (139 lb 15.9 oz)   LMP 08/15/2011   SpO2 95%   BMI 21.29 kg/m²  @[facility][[id number]::@   Constitutional: Well developed, Well nourished, No acute respiratory distress, Non-toxic appearance.   HENT: Normocephalic, Atraumatic, Bilateral external ears normal, Oropharynx clear, mucous membranes are dry t.  Eyes: PERRLA, EOMI, Conjunctiva normal, No discharge. No icterus.  Neck: Normal range of motion. Supple, No stridor.   Lymphatic: No cervical lymphadenopathy noted.   Cardiovascular: Echocardiac, Normal rhythm, No murmurs, No rubs, No gallops.   Thorax & Lungs: Clear to auscultation bilaterally, No respiratory distress, No wheezing.  Abdomen: Bowel sounds normal, Soft, No tenderness, No masses, no rebound or guarding   Skin: Warm, Dry, No erythema, No rash.   Extremities: Intact distal pulses, No edema, No tenderness  Musculoskeletal: Good range of motion in all major joints. No tenderness to palpation or major deformities noted.  Neurologic: Intoxicated but oriented x 3, cranial nerve and cerebellar exams grossly normal except for slurred " speech  Psychiatric: Agitated, intoxicated    COURSE & MEDICAL DECISION MAKING  Pertinent Labs & Imaging studies reviewed. (See chart for details)  Results for orders placed or performed during the hospital encounter of 05/11/20   CBC WITH DIFFERENTIAL   Result Value Ref Range    WBC 5.0 4.8 - 10.8 K/uL    RBC 4.62 4.20 - 5.40 M/uL    Hemoglobin 14.8 12.0 - 16.0 g/dL    Hematocrit 43.4 37.0 - 47.0 %    MCV 93.9 81.4 - 97.8 fL    MCH 32.0 27.0 - 33.0 pg    MCHC 34.1 33.6 - 35.0 g/dL    RDW 44.2 35.9 - 50.0 fL    Platelet Count 273 164 - 446 K/uL    MPV 9.3 9.0 - 12.9 fL    Neutrophils-Polys 43.40 (L) 44.00 - 72.00 %    Lymphocytes 44.80 (H) 22.00 - 41.00 %    Monocytes 9.40 0.00 - 13.40 %    Eosinophils 1.00 0.00 - 6.90 %    Basophils 1.40 0.00 - 1.80 %    Immature Granulocytes 0.00 0.00 - 0.90 %    Nucleated RBC 0.00 /100 WBC    Neutrophils (Absolute) 2.16 2.00 - 7.15 K/uL    Lymphs (Absolute) 2.23 1.00 - 4.80 K/uL    Monos (Absolute) 0.47 0.00 - 0.85 K/uL    Eos (Absolute) 0.05 0.00 - 0.51 K/uL    Baso (Absolute) 0.07 0.00 - 0.12 K/uL    Immature Granulocytes (abs) 0.00 0.00 - 0.11 K/uL    NRBC (Absolute) 0.00 K/uL   COMP METABOLIC PANEL   Result Value Ref Range    Sodium 148 (H) 135 - 145 mmol/L    Potassium 3.8 3.6 - 5.5 mmol/L    Chloride 106 96 - 112 mmol/L    Co2 26 20 - 33 mmol/L    Anion Gap 16.0 7.0 - 16.0    Glucose 93 65 - 99 mg/dL    Bun 5 (L) 8 - 22 mg/dL    Creatinine 0.76 0.50 - 1.40 mg/dL    Calcium 9.1 8.4 - 10.2 mg/dL    AST(SGOT) 42 12 - 45 U/L    ALT(SGPT) 59 (H) 2 - 50 U/L    Alkaline Phosphatase 61 30 - 99 U/L    Total Bilirubin 0.3 0.1 - 1.5 mg/dL    Albumin 4.5 3.2 - 4.9 g/dL    Total Protein 7.2 6.0 - 8.2 g/dL    Globulin 2.7 1.9 - 3.5 g/dL    A-G Ratio 1.7 g/dL   LIPASE   Result Value Ref Range    Lipase 30 7 - 58 U/L   ESTIMATED GFR   Result Value Ref Range    GFR If African American >60 >60 mL/min/1.73 m 2    GFR If Non African American >60 >60 mL/min/1.73 m 2      Patient was given IV  fluids for tachycardia and dry mucous membranes.  IV vitamins for likely abnormalities due to alcoholism.  Checked for hepatitis, pancreatitis which was negative.  After patient was hydrated, she felt better and was able to drink and eat some Jell-O.  She is feeling significantly better and wants to go home.  I have strongly suggested she get in touch with the [facility] clinic to get back into the Suboxone treatment program that she mentioned when she got here since she said that was successful for her.     The patient will return for new or worsening symptoms and is stable at the time of discharge.    The patient is referred to a primary physician for blood pressure management, diabetic screening, and for all other preventative health concerns.      DISPOSITION:  Patient will be discharged home in stable condition.    FOLLOW UP:  [address] [address]  [phone]  Schedule an appointment as soon as possible for a visit   for the Suboxone treatment program      OUTPATIENT MEDICATIONS:  New Prescriptions    No medications on file        FINAL IMPRESSION  1. Alcohol dependence with uncomplicated intoxication (HCC)               Electronically signed by: Candis Viera M.D., 5/11/2020 6:06 PM

## 2020-05-12 NOTE — ED NOTES
"Pt reports that she has been drinking more than her regular \"1/5th daily.\"  States that she would like to go to treatment.   "

## 2020-05-12 NOTE — ED NOTES
Assisted w/ patient care.  IV established.  Blood drawn and to lab.  Medicated as ordered and detox bag infusing.  Given warm blankets and call light in reach.

## 2020-05-22 ENCOUNTER — HOSPITAL ENCOUNTER (EMERGENCY)
Facility: MEDICAL CENTER | Age: 55
End: 2020-05-22
Attending: EMERGENCY MEDICINE
Payer: COMMERCIAL

## 2020-05-22 VITALS
TEMPERATURE: 98.5 F | OXYGEN SATURATION: 98 % | RESPIRATION RATE: 16 BRPM | WEIGHT: 140.21 LBS | SYSTOLIC BLOOD PRESSURE: 123 MMHG | HEIGHT: 68 IN | DIASTOLIC BLOOD PRESSURE: 74 MMHG | BODY MASS INDEX: 21.25 KG/M2 | HEART RATE: 85 BPM

## 2020-05-22 DIAGNOSIS — F10.20 ALCOHOLISM (HCC): ICD-10-CM

## 2020-05-22 LAB
ALBUMIN SERPL BCP-MCNC: 4.4 G/DL (ref 3.2–4.9)
ALBUMIN/GLOB SERPL: 1.5 G/DL
ALP SERPL-CCNC: 57 U/L (ref 30–99)
ALT SERPL-CCNC: 85 U/L (ref 2–50)
ANION GAP SERPL CALC-SCNC: 15 MMOL/L (ref 7–16)
AST SERPL-CCNC: 75 U/L (ref 12–45)
BASOPHILS # BLD AUTO: 1.8 % (ref 0–1.8)
BASOPHILS # BLD: 0.08 K/UL (ref 0–0.12)
BILIRUB SERPL-MCNC: 0.2 MG/DL (ref 0.1–1.5)
BUN SERPL-MCNC: 8 MG/DL (ref 8–22)
CALCIUM SERPL-MCNC: 9.4 MG/DL (ref 8.4–10.2)
CHLORIDE SERPL-SCNC: 104 MMOL/L (ref 96–112)
CO2 SERPL-SCNC: 26 MMOL/L (ref 20–33)
CREAT SERPL-MCNC: 0.76 MG/DL (ref 0.5–1.4)
EOSINOPHIL # BLD AUTO: 0.08 K/UL (ref 0–0.51)
EOSINOPHIL NFR BLD: 1.8 % (ref 0–6.9)
ERYTHROCYTE [DISTWIDTH] IN BLOOD BY AUTOMATED COUNT: 46.9 FL (ref 35.9–50)
ETHANOL BLD-MCNC: 347.5 MG/DL (ref 0–10.1)
GLOBULIN SER CALC-MCNC: 3 G/DL (ref 1.9–3.5)
GLUCOSE SERPL-MCNC: 81 MG/DL (ref 65–99)
HCG SERPL QL: NEGATIVE
HCT VFR BLD AUTO: 43.3 % (ref 37–47)
HGB BLD-MCNC: 14.3 G/DL (ref 12–16)
IMM GRANULOCYTES # BLD AUTO: 0.03 K/UL (ref 0–0.11)
IMM GRANULOCYTES NFR BLD AUTO: 0.7 % (ref 0–0.9)
LYMPHOCYTES # BLD AUTO: 1.97 K/UL (ref 1–4.8)
LYMPHOCYTES NFR BLD: 45.4 % (ref 22–41)
MCH RBC QN AUTO: 31.8 PG (ref 27–33)
MCHC RBC AUTO-ENTMCNC: 33 G/DL (ref 33.6–35)
MCV RBC AUTO: 96.2 FL (ref 81.4–97.8)
MONOCYTES # BLD AUTO: 0.41 K/UL (ref 0–0.85)
MONOCYTES NFR BLD AUTO: 9.4 % (ref 0–13.4)
NEUTROPHILS # BLD AUTO: 1.77 K/UL (ref 2–7.15)
NEUTROPHILS NFR BLD: 40.9 % (ref 44–72)
NRBC # BLD AUTO: 0 K/UL
NRBC BLD-RTO: 0 /100 WBC
PLATELET # BLD AUTO: 317 K/UL (ref 164–446)
PMV BLD AUTO: 9.5 FL (ref 9–12.9)
POTASSIUM SERPL-SCNC: 3.7 MMOL/L (ref 3.6–5.5)
PROT SERPL-MCNC: 7.4 G/DL (ref 6–8.2)
RBC # BLD AUTO: 4.5 M/UL (ref 4.2–5.4)
SODIUM SERPL-SCNC: 145 MMOL/L (ref 135–145)
WBC # BLD AUTO: 4.3 K/UL (ref 4.8–10.8)

## 2020-05-22 PROCEDURE — 84703 CHORIONIC GONADOTROPIN ASSAY: CPT

## 2020-05-22 PROCEDURE — 80307 DRUG TEST PRSMV CHEM ANLYZR: CPT

## 2020-05-22 PROCEDURE — 700105 HCHG RX REV CODE 258: Performed by: EMERGENCY MEDICINE

## 2020-05-22 PROCEDURE — 99284 EMERGENCY DEPT VISIT MOD MDM: CPT

## 2020-05-22 PROCEDURE — 96374 THER/PROPH/DIAG INJ IV PUSH: CPT

## 2020-05-22 PROCEDURE — 85025 COMPLETE CBC W/AUTO DIFF WBC: CPT

## 2020-05-22 PROCEDURE — 80053 COMPREHEN METABOLIC PANEL: CPT

## 2020-05-22 PROCEDURE — 700111 HCHG RX REV CODE 636 W/ 250 OVERRIDE (IP): Performed by: EMERGENCY MEDICINE

## 2020-05-22 RX ORDER — LORAZEPAM 2 MG/ML
1 INJECTION INTRAMUSCULAR ONCE
Status: COMPLETED | OUTPATIENT
Start: 2020-05-22 | End: 2020-05-22

## 2020-05-22 RX ORDER — SODIUM CHLORIDE 9 MG/ML
1000 INJECTION, SOLUTION INTRAVENOUS ONCE
Status: COMPLETED | OUTPATIENT
Start: 2020-05-22 | End: 2020-05-22

## 2020-05-22 RX ADMIN — SODIUM CHLORIDE 1000 ML: 9 INJECTION, SOLUTION INTRAVENOUS at 12:23

## 2020-05-22 RX ADMIN — LORAZEPAM 1 MG: 2 INJECTION INTRAMUSCULAR; INTRAVENOUS at 12:24

## 2020-05-22 ASSESSMENT — LIFESTYLE VARIABLES
PAROXYSMAL SWEATS: NO SWEAT VISIBLE
ORIENTATION AND CLOUDING OF SENSORIUM: DATE DISORIENTATION BY MORE THAN TWO CALENDAR DAYS
TREMOR: NO TREMOR
NAUSEA AND VOMITING: NO NAUSEA AND NO VOMITING
AGITATION: NORMAL ACTIVITY
HEADACHE, FULLNESS IN HEAD: NOT PRESENT
AUDITORY DISTURBANCES: NOT PRESENT
VISUAL DISTURBANCES: NOT PRESENT
ANXIETY: MILDLY ANXIOUS
TOTAL SCORE: 4

## 2020-05-22 ASSESSMENT — FIBROSIS 4 INDEX: FIB4 SCORE: 1.04

## 2020-05-22 NOTE — DISCHARGE INSTRUCTIONS
Reduce your alcohol intake.  Contact your primary care doctor and ask him to help you get into an alcohol treatment program.  Return here if you need emergency medical care or you have any wish to harm yourself or others

## 2020-05-22 NOTE — ED NOTES
Pt. Sister Madai called per pt. Request to have her come pick patient up. Pt. Wanting to go home; OK to discharge at this time per Dr. Muñiz.

## 2020-05-22 NOTE — ED TRIAGE NOTES
Pt presents with sister from home for alcohol detox. Last drink within the hour. Drinks approximately a bottle of vodka a day. Recently tried to stop but started drinking again on Sunday. Pt A&Ox2. Pt tearful. Pt unable walk with steady gait and has to be reminded of directions during triage.     Madai Sierra - sister [phone]; pt gives consent to share information with sister over the phone.     [phone] pt phone number for pharmacy med rec.    Patient masked. No respiratory symptoms, no recent travel, denies known COVID exposure.

## 2020-05-22 NOTE — ED PROVIDER NOTES
ED Provider Note    CHIEF COMPLAINT  No chief complaint on file.      HPI  Yohana Brandon is a 54 y.o. female who presents to the emergency department complaining that she is fearful that she has had too much to drink today.  The patient describes herself as an alcoholic she drinks heavily on a regular basis she has had previous ER visits for this.  The patient has not suffered any trauma her last drink of alcohol was about an hour prior to arrival.  She has no wish to harm herself or others and has not recently been ill    REVIEW OF SYSTEMS no recent fever chills nausea vomiting diarrhea constipation cough difficulty breathing no known COVID exposure no wish to harm herself or others.  All other systems negative    PAST MEDICAL HISTORY  Past Medical History:   Diagnosis Date   • Alcohol abuse    • ASTHMA    • Elevated LFTs    • Heart palpitations     w/ alcohol withdrawal   • Hypertension    • Psychiatric disorder     alcohol dependance       FAMILY HISTORY  Family History   Problem Relation Age of Onset   • Stroke Mother        SOCIAL HISTORY  Social History     Socioeconomic History   • Marital status:      Spouse name: Not on file   • Number of children: Not on file   • Years of education: Not on file   • Highest education level: Not on file   Occupational History   • Not on file   Social Needs   • Financial resource strain: Not on file   • Food insecurity     Worry: Not on file     Inability: Not on file   • Transportation needs     Medical: Not on file     Non-medical: Not on file   Tobacco Use   • Smoking status: Never Smoker   • Smokeless tobacco: Never Used   Substance and Sexual Activity   • Alcohol use: No     Comment: Pt been off alcohol for 22 monthes as of 2/28/19   • Drug use: No   • Sexual activity: Not on file   Lifestyle   • Physical activity     Days per week: Not on file     Minutes per session: Not on file   • Stress: Not on file   Relationships   • Social connections     Talks on  phone: Not on file     Gets together: Not on file     Attends Sikh service: Not on file     Active member of club or organization: Not on file     Attends meetings of clubs or organizations: Not on file     Relationship status: Not on file   • Intimate partner violence     Fear of current or ex partner: Not on file     Emotionally abused: Not on file     Physically abused: Not on file     Forced sexual activity: Not on file   Other Topics Concern   • Not on file   Social History Narrative   • Not on file       SURGICAL HISTORY  Past Surgical History:   Procedure Laterality Date   • HEMORRHOIDECTOMY  2011    Performed by SONY MACDONALD at SURGERY [facility]   • ANAL SPHINCTEROTOMY  2011    Performed by SONY MACDONALD at SURGERY [facility]   • PB ENDOMETRIAL ABLATION, THERMAL     • APPENDECTOMY     • PB ANESTH,SURG BREAST RECONSTRUCTIVE      bilateral    • BUNIONECTOMY      bilateral   • PB REMOVE TONSILS/ADENOIDS,<13 Y/O  1973   • PB  DELIVERY+POSTPARTUM CARE      x2       CURRENT MEDICATIONS  Home Medications     Reviewed by Leeann Ureña R.N. (Registered Nurse) on 20 at 1117  Med List Status: Not Addressed   Medication Last Dose Status   chlordiazepoxide (LIBRIUM) 25 MG Cap  Active   Cholecalciferol (VITAMIN D PO)  Active   dicyclomine (BENTYL) 20 MG Tab  Active   hydrochlorothiazide (HYDRODIURIL) 25 MG TABS  Active   Levothyroxine Sodium (LEVOTHROID PO)  Active   multivitamin (THERAGRAN) Tab  Active   NON SPECIFIED  Active   Nutritional Supplements (DHEA PO)  Active   Omega-3 Fatty Acids (FISH OIL) 1000 MG Cap capsule  Active   ondansetron (ZOFRAN ODT) 4 MG TABLET DISPERSIBLE  Active   POTASSIUM PO  Active   rosuvastatin (CRESTOR) 10 MG Tab  Active   telmisartan (MICARDIS) 80 MG TABS  Active                ALLERGIES  Allergies   Allergen Reactions   • Egg White Swelling   • Kiwi Extract Swelling   • Latex Anaphylaxis   • Morphine Hives   • Banana    • Other  "Food      Tropical fruit         PHYSICAL EXAM  VITAL SIGNS: /74   Pulse 85   Temp 36.9 °C (98.5 °F) (Temporal)   Resp 16   Ht 1.727 m (5' 8\")   Wt 63.6 kg (140 lb 3.4 oz)   LMP 08/15/2011   SpO2 98%   BMI 21.32 kg/m²    Oxygen saturation is interpreted as adequate  Constitutional: Awake and initially mildly slurring her speech consistent with intoxication but nontoxic-appearing  HENT: No trauma to the head  Eyes: Pupils round extraocular motion present no erythema discharge or jaundice  Neck: Trachea midline no JVD  Cardiovascular: Regular mild tachycardia at the time of arrival  Lungs: Clear and equal bilaterally with no apparent difficulty breathing  Abdomen/Back: Soft nontender nondistended no rebound guarding or peritoneal findings  Skin: Warm and dry  Musculoskeletal: No acute bony deformity  Neurologic: Awake verbal moving all extremities    CHART REVIEW  As noted above the patient has had previous ER visits for alcohol intoxication most recently on May 11 of this year    Laboratory  CBC is unremarkable basic metabolic panel is unremarkable there were minimal elevations of the LFTs with AST of 75 and ALT of 85 urine pregnancy test is negative blood alcohol level at the time of arrival is 347.5 which is elevated.    MEDICAL DECISION MAKING and DISPOSITION  At the time of arrival the patient was noted to be slightly hypertensive and tachycardic and I thought that she might already be having some signs of alcohol withdrawal so she was given intravenous Ativan and 1 L of intravenous normal saline.  Reevaluation shows that her vital sign abnormalities have completely resolved and over a period of time she clinically is more sober she is awake she is talking she is not slurring her speech she was able to tolerate oral intake with no difficulty and she is able to walk around with good balance and mobility.  The patient is a chronic alcoholic and at this point in time she is feeling more sober and would " like to go home she does not want urgent referral to a detox program and she has no wish to harm herself or others therefore I think it is safe for her to go home I have recommended that she reduce her intake of alcohol she is to call her primary care doctor and arrange office recheck next week and ask her doctor to refer her to an alcoholism treatment program.  If the patient has new or worsening symptoms or feels she needs emergency medical care or feels she may harm herself or others she is to return here immediately for recheck    IMPRESSION  1.  Alcoholism      Electronically signed by: Aniket Muñiz M.D., 5/22/2020 3:29 PM

## 2020-05-24 ENCOUNTER — APPOINTMENT (OUTPATIENT)
Dept: RADIOLOGY | Facility: MEDICAL CENTER | Age: 55
End: 2020-05-24
Attending: EMERGENCY MEDICINE
Payer: COMMERCIAL

## 2020-05-24 ENCOUNTER — HOSPITAL ENCOUNTER (EMERGENCY)
Facility: MEDICAL CENTER | Age: 55
End: 2020-05-24
Attending: EMERGENCY MEDICINE
Payer: COMMERCIAL

## 2020-05-24 VITALS
OXYGEN SATURATION: 93 % | BODY MASS INDEX: 22.25 KG/M2 | RESPIRATION RATE: 15 BRPM | SYSTOLIC BLOOD PRESSURE: 115 MMHG | DIASTOLIC BLOOD PRESSURE: 84 MMHG | WEIGHT: 141.76 LBS | TEMPERATURE: 98 F | HEART RATE: 92 BPM | HEIGHT: 67 IN

## 2020-05-24 DIAGNOSIS — F10.20 ALCOHOLISM (HCC): ICD-10-CM

## 2020-05-24 DIAGNOSIS — R07.89 ATYPICAL CHEST PAIN: ICD-10-CM

## 2020-05-24 LAB
ALBUMIN SERPL BCP-MCNC: 4.4 G/DL (ref 3.2–4.9)
ALBUMIN/GLOB SERPL: 1.5 G/DL
ALP SERPL-CCNC: 58 U/L (ref 30–99)
ALT SERPL-CCNC: 64 U/L (ref 2–50)
ANION GAP SERPL CALC-SCNC: 19 MMOL/L (ref 7–16)
AST SERPL-CCNC: 50 U/L (ref 12–45)
BASOPHILS # BLD AUTO: 1.5 % (ref 0–1.8)
BASOPHILS # BLD: 0.08 K/UL (ref 0–0.12)
BILIRUB SERPL-MCNC: 0.3 MG/DL (ref 0.1–1.5)
BUN SERPL-MCNC: 8 MG/DL (ref 8–22)
CALCIUM SERPL-MCNC: 9.7 MG/DL (ref 8.5–10.5)
CHLORIDE SERPL-SCNC: 100 MMOL/L (ref 96–112)
CO2 SERPL-SCNC: 24 MMOL/L (ref 20–33)
CREAT SERPL-MCNC: 0.7 MG/DL (ref 0.5–1.4)
EKG IMPRESSION: NORMAL
EOSINOPHIL # BLD AUTO: 0.07 K/UL (ref 0–0.51)
EOSINOPHIL NFR BLD: 1.3 % (ref 0–6.9)
ERYTHROCYTE [DISTWIDTH] IN BLOOD BY AUTOMATED COUNT: 46.7 FL (ref 35.9–50)
GLOBULIN SER CALC-MCNC: 3 G/DL (ref 1.9–3.5)
GLUCOSE SERPL-MCNC: 93 MG/DL (ref 65–99)
HCT VFR BLD AUTO: 43.1 % (ref 37–47)
HGB BLD-MCNC: 14.5 G/DL (ref 12–16)
IMM GRANULOCYTES # BLD AUTO: 0.02 K/UL (ref 0–0.11)
IMM GRANULOCYTES NFR BLD AUTO: 0.4 % (ref 0–0.9)
LIPASE SERPL-CCNC: 46 U/L (ref 11–82)
LYMPHOCYTES # BLD AUTO: 1.83 K/UL (ref 1–4.8)
LYMPHOCYTES NFR BLD: 33.5 % (ref 22–41)
MCH RBC QN AUTO: 32.3 PG (ref 27–33)
MCHC RBC AUTO-ENTMCNC: 33.6 G/DL (ref 33.6–35)
MCV RBC AUTO: 96 FL (ref 81.4–97.8)
MONOCYTES # BLD AUTO: 0.51 K/UL (ref 0–0.85)
MONOCYTES NFR BLD AUTO: 9.3 % (ref 0–13.4)
NEUTROPHILS # BLD AUTO: 2.95 K/UL (ref 2–7.15)
NEUTROPHILS NFR BLD: 54 % (ref 44–72)
NRBC # BLD AUTO: 0 K/UL
NRBC BLD-RTO: 0 /100 WBC
PLATELET # BLD AUTO: 368 K/UL (ref 164–446)
PMV BLD AUTO: 9.4 FL (ref 9–12.9)
POC BREATHALIZER: 0.26 PERCENT (ref 0–0.01)
POTASSIUM SERPL-SCNC: 3.7 MMOL/L (ref 3.6–5.5)
PROT SERPL-MCNC: 7.4 G/DL (ref 6–8.2)
RBC # BLD AUTO: 4.49 M/UL (ref 4.2–5.4)
SODIUM SERPL-SCNC: 143 MMOL/L (ref 135–145)
TROPONIN T SERPL-MCNC: 9 NG/L (ref 6–19)
WBC # BLD AUTO: 5.5 K/UL (ref 4.8–10.8)

## 2020-05-24 PROCEDURE — 93005 ELECTROCARDIOGRAM TRACING: CPT | Performed by: EMERGENCY MEDICINE

## 2020-05-24 PROCEDURE — 302970 POC BREATHALIZER

## 2020-05-24 PROCEDURE — 71045 X-RAY EXAM CHEST 1 VIEW: CPT

## 2020-05-24 PROCEDURE — 83690 ASSAY OF LIPASE: CPT

## 2020-05-24 PROCEDURE — 85025 COMPLETE CBC W/AUTO DIFF WBC: CPT

## 2020-05-24 PROCEDURE — 80053 COMPREHEN METABOLIC PANEL: CPT

## 2020-05-24 PROCEDURE — 84484 ASSAY OF TROPONIN QUANT: CPT

## 2020-05-24 PROCEDURE — 302970 POC BREATHALIZER: Performed by: EMERGENCY MEDICINE

## 2020-05-24 PROCEDURE — 99285 EMERGENCY DEPT VISIT HI MDM: CPT

## 2020-05-24 ASSESSMENT — FIBROSIS 4 INDEX: FIB4 SCORE: 1.33

## 2020-05-24 NOTE — ED PROVIDER NOTES
ED Provider Note    ED Provider Note    Primary care provider: Lopez Jackson M.D.  Means of arrival: Walk-in  History obtained from: Patient    CHIEF COMPLAINT  Chief Complaint   Patient presents with   • Detox     last alcoholic drink was this morning     Seen at 12:12 PM.   HPI  Yohana Brandon is a 54 y.o. female who presents to the Emergency Department alcohol intoxication and chest pain.  With regards the chest pain, the patient is unsure when it started.  She also notes some shortness of breath, again unsure when it started.  The patient pretty much shrugs her shoulders when I ask any particular type of questioning.  She admits to severe alcoholism and wanting to get sober.  She is in no apparent pain or distress but obviously intoxicated and further history is limited.    REVIEW OF SYSTEMS  See HPI,   Remainder of ROS unreliable.     PAST MEDICAL HISTORY   has a past medical history of Alcohol abuse, ASTHMA, Elevated LFTs, Heart palpitations, Hypertension, and Psychiatric disorder.    SURGICAL HISTORY   has a past surgical history that includes appendectomy (); endometrial ablation, thermal (); remove tonsils/adenoids,<13 y/o (); anesth,surg breast reconstructive (); hemorrhoidectomy (2011); anal sphincterotomy (2011); bunionectomy (); and  delivery+postpartum care.    SOCIAL HISTORY  Social History     Tobacco Use   • Smoking status: Never Smoker   • Smokeless tobacco: Never Used   Substance Use Topics   • Alcohol use: Yes     Comment: was sober for 2 years, started drinking again   • Drug use: No      Social History     Substance and Sexual Activity   Drug Use No       FAMILY HISTORY  Family History   Problem Relation Age of Onset   • Stroke Mother        CURRENT MEDICATIONS  Reviewed.  See Encounter Summary.     ALLERGIES  Allergies   Allergen Reactions   • Egg White Swelling   • Kiwi Extract Swelling   • Latex Anaphylaxis   • Morphine Hives   • Banana    •  "Other Food      Tropical fruit         PHYSICAL EXAM  VITAL SIGNS: /87   Pulse 83   Temp 35.8 °C (96.5 °F) (Temporal)   Resp 13   Ht 1.702 m (5' 7\")   Wt 64.3 kg (141 lb 12.1 oz)   LMP 08/15/2011   SpO2 93%   BMI 22.20 kg/m²   Constitutional: Awake, alert in no apparent distress.  Appears intoxicated.  HENT: Normocephalic, Bilateral external ears normal. Nose normal.   Eyes: Conjunctiva normal, non-icteric, EOMI.    Thorax & Lungs: Easy unlabored respirations, Clear to ascultation bilaterally.  Cardiovascular: Regular rate, Regular rhythm, No murmurs, rubs or gallops.  Abdomen:  Soft, nontender, nondistended, normal active bowel sounds.   :    Skin: Visualized skin is  Dry, No erythema, No rash.   Musculoskeletal:   No cyanosis, clubbing or edema.  Neurologic: Alert, Grossly non-focal.   Psychiatric: Normal affect, Normal mood  Lymphatic:  No cervical LAD    EKG   12 lead Interpreted by me  Rhythm:  Normal sinus rhythm   Rate: 87  Axis: normal  Ectopy: none  Conduction: normal  ST Segments: no acute change  T Waves: no acute change  Clinical Impression: Normal EKG without acute changes     RADIOLOGY  DX-CHEST-PORTABLE (1 VIEW)   Final Result      No acute cardiopulmonary abnormality.            COURSE & MEDICAL DECISION MAKING  Pertinent Labs & Imaging studies reviewed. (See chart for details)    Differential diagnoses include but are not limited to: Husam, gastritis, much less likely ACS.  Do not suspect PE.    12:12 PM - Medical record reviewed, patient seen and examined at bedside.    Decision Making:  This is a 54 y.o. year old female who presents with initial complaint of wanting detox when she checked into triage.  Apparently shortly after getting put in the exam room is when she complained of chest pain.  She was extremely vague and could not give me any specifics on to when it started.  She not appear to be in any pain or distress.  EKG does not show any acute ischemic changes.  " High-sensitivity troponin is negative.  Chest x-ray is normal, no evidence of infiltrate or widened mediastinum, vitals are unremarkable as well.    Heart score is low, do not suspect ACS at this time.  Sitting is her chief complaint was alcoholism we have given her resources with regard to detox.  She is intoxicated this time and not demonstrating any signs of withdrawal.  Plan to observe until she is clinically sober and able to ambulate without difficulty.  At this time she will be safely discharged and I recommend she go to [facility] for detox if tired.    Discharge Medications:  New Prescriptions    No medications on file       The patient was discharged home (see d/c instructions) was told to return immediately for any signs or symptoms listed, or any worsening at all.  The patient verbally agreed to the discharge precautions and follow-up plan which is documented in EPIC.    The patient's blood pressure is elevated today. >120/80. I have referred them to primary care for follow up.       FINAL IMPRESSION  1. Alcoholism (HCC)    2. Atypical chest pain

## 2020-05-24 NOTE — ED NOTES
ETA for ride 25 minutes, pt updated.  Patient to discharged with responsible party upon arrival.  Will continue to monitor.

## 2020-05-24 NOTE — ED NOTES
Pt discharged to home.  IV removed and bandaged.  No noted swelling or redness.  Pt tolerated well.  Catheter intact.  Pt given discharge paperwork and all applicable prescriptions written by provider.  Pt to follow with PCP, when indicated.  Pt verbalizes understanding of discharge teaching and will return to ED if condition worsens.  Pt discharged into the care of her twin sister.

## 2020-05-24 NOTE — ED NOTES
Pt rolled to room in wheelchair.  Pt stating I have chest pain right now, I've been having chest pain for many months.  Pt unable to recall how much alcohol she is using or what types.  Pt offered resources for detox, pt again stating concerns of chest pain.

## 2020-05-24 NOTE — ED TRIAGE NOTES
Wheeled to triage  Chief Complaint   Patient presents with   • Detox     last alcoholic drink was this morning     Pt was sober for 2 years, stated drinking again and would like help with quitting. Denies any n/v, hallucinations, headache or diaphoresis. Hx of withdrawal seizures.

## 2020-05-25 NOTE — DISCHARGE PLANNING
(late note) This 54y female  pt presented to the er with alcohol intoxcation. She denies any si or hi and denies any psychosis. She was discharged with her daughter and  op referrals and declined any inpt deox treatment at any local detox facilities, at ths time. Her ba on admit was 0.262. She is ambulatory and is a/a.ox3. She was given op referrals for alcohol treatment and therapy options form the er.

## 2020-06-18 ENCOUNTER — HOSPITAL ENCOUNTER (INPATIENT)
Facility: MEDICAL CENTER | Age: 55
LOS: 2 days | DRG: 897 | End: 2020-06-21
Attending: EMERGENCY MEDICINE | Admitting: HOSPITALIST
Payer: COMMERCIAL

## 2020-06-18 ENCOUNTER — APPOINTMENT (OUTPATIENT)
Dept: RADIOLOGY | Facility: MEDICAL CENTER | Age: 55
DRG: 897 | End: 2020-06-18
Attending: EMERGENCY MEDICINE
Payer: COMMERCIAL

## 2020-06-18 DIAGNOSIS — R07.9 ACUTE CHEST PAIN: ICD-10-CM

## 2020-06-18 DIAGNOSIS — E87.29 ALCOHOLIC KETOACIDOSIS: ICD-10-CM

## 2020-06-18 DIAGNOSIS — R94.31 PROLONGED Q-T INTERVAL ON ECG: ICD-10-CM

## 2020-06-18 DIAGNOSIS — E86.0 DEHYDRATION: ICD-10-CM

## 2020-06-18 DIAGNOSIS — E87.29 HIGH ANION GAP METABOLIC ACIDOSIS: ICD-10-CM

## 2020-06-18 DIAGNOSIS — F10.20 ALCOHOLISM (HCC): ICD-10-CM

## 2020-06-18 DIAGNOSIS — E87.20 LACTIC ACID ACIDOSIS: ICD-10-CM

## 2020-06-18 DIAGNOSIS — F41.9 ANXIETY: ICD-10-CM

## 2020-06-18 DIAGNOSIS — R11.14 BILIOUS VOMITING WITH NAUSEA: ICD-10-CM

## 2020-06-18 LAB
BASE EXCESS BLDV CALC-SCNC: -12 MMOL/L
BASOPHILS # BLD AUTO: 0.5 % (ref 0–1.8)
BASOPHILS # BLD: 0.05 K/UL (ref 0–0.12)
BODY TEMPERATURE: ABNORMAL CENTIGRADE
EOSINOPHIL # BLD AUTO: 0 K/UL (ref 0–0.51)
EOSINOPHIL NFR BLD: 0 % (ref 0–6.9)
ERYTHROCYTE [DISTWIDTH] IN BLOOD BY AUTOMATED COUNT: 43.8 FL (ref 35.9–50)
HCO3 BLDV-SCNC: 14 MMOL/L (ref 24–28)
HCT VFR BLD AUTO: 45.5 % (ref 37–47)
HGB BLD-MCNC: 14.9 G/DL (ref 12–16)
IMM GRANULOCYTES # BLD AUTO: 0.04 K/UL (ref 0–0.11)
IMM GRANULOCYTES NFR BLD AUTO: 0.4 % (ref 0–0.9)
LYMPHOCYTES # BLD AUTO: 0.76 K/UL (ref 1–4.8)
LYMPHOCYTES NFR BLD: 7.8 % (ref 22–41)
MCH RBC QN AUTO: 31.7 PG (ref 27–33)
MCHC RBC AUTO-ENTMCNC: 32.7 G/DL (ref 33.6–35)
MCV RBC AUTO: 96.8 FL (ref 81.4–97.8)
MONOCYTES # BLD AUTO: 0.21 K/UL (ref 0–0.85)
MONOCYTES NFR BLD AUTO: 2.2 % (ref 0–13.4)
NEUTROPHILS # BLD AUTO: 8.63 K/UL (ref 2–7.15)
NEUTROPHILS NFR BLD: 89.1 % (ref 44–72)
NRBC # BLD AUTO: 0 K/UL
NRBC BLD-RTO: 0 /100 WBC
PCO2 BLDV: 31.6 MMHG (ref 41–51)
PH BLDV: 7.25 [PH] (ref 7.31–7.45)
PLATELET # BLD AUTO: 392 K/UL (ref 164–446)
PMV BLD AUTO: 10 FL (ref 9–12.9)
PO2 BLDV: 50.6 MMHG (ref 25–40)
RBC # BLD AUTO: 4.7 M/UL (ref 4.2–5.4)
SAO2 % BLDV: 73.6 %
WBC # BLD AUTO: 9.7 K/UL (ref 4.8–10.8)

## 2020-06-18 PROCEDURE — 700111 HCHG RX REV CODE 636 W/ 250 OVERRIDE (IP)

## 2020-06-18 PROCEDURE — 82550 ASSAY OF CK (CPK): CPT

## 2020-06-18 PROCEDURE — 99285 EMERGENCY DEPT VISIT HI MDM: CPT

## 2020-06-18 PROCEDURE — 93005 ELECTROCARDIOGRAM TRACING: CPT | Performed by: EMERGENCY MEDICINE

## 2020-06-18 PROCEDURE — 83690 ASSAY OF LIPASE: CPT

## 2020-06-18 PROCEDURE — 96375 TX/PRO/DX INJ NEW DRUG ADDON: CPT

## 2020-06-18 PROCEDURE — 96374 THER/PROPH/DIAG INJ IV PUSH: CPT

## 2020-06-18 PROCEDURE — 84100 ASSAY OF PHOSPHORUS: CPT

## 2020-06-18 PROCEDURE — 83605 ASSAY OF LACTIC ACID: CPT

## 2020-06-18 PROCEDURE — 84484 ASSAY OF TROPONIN QUANT: CPT

## 2020-06-18 PROCEDURE — 85610 PROTHROMBIN TIME: CPT

## 2020-06-18 PROCEDURE — 84703 CHORIONIC GONADOTROPIN ASSAY: CPT

## 2020-06-18 PROCEDURE — 80307 DRUG TEST PRSMV CHEM ANLYZR: CPT

## 2020-06-18 PROCEDURE — 80053 COMPREHEN METABOLIC PANEL: CPT

## 2020-06-18 PROCEDURE — HZ2ZZZZ DETOXIFICATION SERVICES FOR SUBSTANCE ABUSE TREATMENT: ICD-10-PCS | Performed by: EMERGENCY MEDICINE

## 2020-06-18 PROCEDURE — 71045 X-RAY EXAM CHEST 1 VIEW: CPT

## 2020-06-18 PROCEDURE — 82803 BLOOD GASES ANY COMBINATION: CPT

## 2020-06-18 PROCEDURE — 85025 COMPLETE CBC W/AUTO DIFF WBC: CPT

## 2020-06-18 PROCEDURE — 83735 ASSAY OF MAGNESIUM: CPT | Mod: 91

## 2020-06-18 RX ORDER — LORAZEPAM 2 MG/ML
INJECTION INTRAMUSCULAR
Status: COMPLETED
Start: 2020-06-18 | End: 2020-06-18

## 2020-06-18 RX ORDER — LORAZEPAM 2 MG/ML
2 INJECTION INTRAMUSCULAR ONCE
Status: COMPLETED | OUTPATIENT
Start: 2020-06-19 | End: 2020-06-18

## 2020-06-18 RX ORDER — SODIUM CHLORIDE, SODIUM LACTATE, POTASSIUM CHLORIDE, CALCIUM CHLORIDE 600; 310; 30; 20 MG/100ML; MG/100ML; MG/100ML; MG/100ML
1000 INJECTION, SOLUTION INTRAVENOUS ONCE
Status: COMPLETED | OUTPATIENT
Start: 2020-06-19 | End: 2020-06-19

## 2020-06-18 RX ADMIN — LORAZEPAM 2 MG: 2 INJECTION INTRAMUSCULAR at 23:46

## 2020-06-18 RX ADMIN — LORAZEPAM 2 MG: 2 INJECTION INTRAMUSCULAR; INTRAVENOUS at 23:46

## 2020-06-18 ASSESSMENT — FIBROSIS 4 INDEX: FIB4 SCORE: 0.88

## 2020-06-19 PROBLEM — E87.29 ALCOHOLIC KETOACIDOSIS: Status: ACTIVE | Noted: 2020-06-19

## 2020-06-19 LAB
ALBUMIN SERPL BCP-MCNC: 4.8 G/DL (ref 3.2–4.9)
ALBUMIN/GLOB SERPL: 1.4 G/DL
ALP SERPL-CCNC: 72 U/L (ref 30–99)
ALT SERPL-CCNC: 52 U/L (ref 2–50)
ANION GAP SERPL CALC-SCNC: 35 MMOL/L (ref 7–16)
APAP SERPL-MCNC: <5 UG/ML (ref 10–30)
APPEARANCE UR: CLEAR
AST SERPL-CCNC: 53 U/L (ref 12–45)
BILIRUB SERPL-MCNC: 0.3 MG/DL (ref 0.1–1.5)
BILIRUB UR QL STRIP.AUTO: NEGATIVE
BUN SERPL-MCNC: 17 MG/DL (ref 8–22)
CALCIUM SERPL-MCNC: 9.8 MG/DL (ref 8.4–10.2)
CHLORIDE SERPL-SCNC: 93 MMOL/L (ref 96–112)
CK SERPL-CCNC: 32 U/L (ref 0–154)
CO2 SERPL-SCNC: 14 MMOL/L (ref 20–33)
COLOR UR: YELLOW
CREAT SERPL-MCNC: 1.33 MG/DL (ref 0.5–1.4)
EKG IMPRESSION: NORMAL
ETHANOL BLD-MCNC: 277.6 MG/DL (ref 0–10.1)
GLOBULIN SER CALC-MCNC: 3.4 G/DL (ref 1.9–3.5)
GLUCOSE SERPL-MCNC: 75 MG/DL (ref 65–99)
GLUCOSE UR STRIP.AUTO-MCNC: NEGATIVE MG/DL
HCG SERPL QL: NEGATIVE
INR PPP: 0.97 (ref 0.87–1.13)
KETONES UR STRIP.AUTO-MCNC: 15 MG/DL
LACTATE BLD-SCNC: 10.9 MMOL/L (ref 0.5–2)
LACTATE BLD-SCNC: 4 MMOL/L (ref 0.5–2)
LACTATE BLD-SCNC: 7.7 MMOL/L (ref 0.5–2)
LEUKOCYTE ESTERASE UR QL STRIP.AUTO: NEGATIVE
LIPASE SERPL-CCNC: 24 U/L (ref 7–58)
MAGNESIUM SERPL-MCNC: 1.4 MG/DL (ref 1.5–2.5)
MAGNESIUM SERPL-MCNC: 1.4 MG/DL (ref 1.5–2.5)
MICRO URNS: ABNORMAL
NITRITE UR QL STRIP.AUTO: NEGATIVE
PH UR STRIP.AUTO: 6 [PH] (ref 5–8)
PHOSPHATE SERPL-MCNC: 6.8 MG/DL (ref 2.5–4.5)
POTASSIUM SERPL-SCNC: 4.3 MMOL/L (ref 3.6–5.5)
PROT SERPL-MCNC: 8.2 G/DL (ref 6–8.2)
PROT UR QL STRIP: NEGATIVE MG/DL
PROTHROMBIN TIME: 13 SEC (ref 12–14.6)
RBC UR QL AUTO: NEGATIVE
SALICYLATES SERPL-MCNC: <1 MG/DL (ref 15–25)
SODIUM SERPL-SCNC: 142 MMOL/L (ref 135–145)
SP GR UR STRIP.AUTO: 1.02
TROPONIN T SERPL-MCNC: 10 NG/L (ref 6–19)

## 2020-06-19 PROCEDURE — 700111 HCHG RX REV CODE 636 W/ 250 OVERRIDE (IP): Performed by: HOSPITALIST

## 2020-06-19 PROCEDURE — 700105 HCHG RX REV CODE 258: Performed by: HOSPITALIST

## 2020-06-19 PROCEDURE — 81003 URINALYSIS AUTO W/O SCOPE: CPT

## 2020-06-19 PROCEDURE — 700105 HCHG RX REV CODE 258: Performed by: EMERGENCY MEDICINE

## 2020-06-19 PROCEDURE — A9270 NON-COVERED ITEM OR SERVICE: HCPCS | Performed by: HOSPITALIST

## 2020-06-19 PROCEDURE — 700102 HCHG RX REV CODE 250 W/ 637 OVERRIDE(OP): Performed by: HOSPITALIST

## 2020-06-19 PROCEDURE — 96375 TX/PRO/DX INJ NEW DRUG ADDON: CPT

## 2020-06-19 PROCEDURE — 93005 ELECTROCARDIOGRAM TRACING: CPT | Performed by: EMERGENCY MEDICINE

## 2020-06-19 PROCEDURE — 99223 1ST HOSP IP/OBS HIGH 75: CPT | Performed by: HOSPITALIST

## 2020-06-19 PROCEDURE — 700101 HCHG RX REV CODE 250: Performed by: EMERGENCY MEDICINE

## 2020-06-19 PROCEDURE — 83605 ASSAY OF LACTIC ACID: CPT | Mod: 91

## 2020-06-19 PROCEDURE — 770020 HCHG ROOM/CARE - TELE (206)

## 2020-06-19 RX ORDER — LORAZEPAM 1 MG/1
3 TABLET ORAL
Status: DISCONTINUED | OUTPATIENT
Start: 2020-06-19 | End: 2020-06-21 | Stop reason: HOSPADM

## 2020-06-19 RX ORDER — LORAZEPAM 1 MG/1
1 TABLET ORAL EVERY 4 HOURS PRN
Status: DISCONTINUED | OUTPATIENT
Start: 2020-06-19 | End: 2020-06-21 | Stop reason: HOSPADM

## 2020-06-19 RX ORDER — ONDANSETRON 4 MG/1
4 TABLET, ORALLY DISINTEGRATING ORAL EVERY 4 HOURS PRN
Status: DISCONTINUED | OUTPATIENT
Start: 2020-06-19 | End: 2020-06-21 | Stop reason: HOSPADM

## 2020-06-19 RX ORDER — SODIUM CHLORIDE 9 MG/ML
2000 INJECTION, SOLUTION INTRAVENOUS ONCE
Status: COMPLETED | OUTPATIENT
Start: 2020-06-19 | End: 2020-06-19

## 2020-06-19 RX ORDER — ONDANSETRON 2 MG/ML
4 INJECTION INTRAMUSCULAR; INTRAVENOUS EVERY 4 HOURS PRN
Status: DISCONTINUED | OUTPATIENT
Start: 2020-06-19 | End: 2020-06-21 | Stop reason: HOSPADM

## 2020-06-19 RX ORDER — PROCHLORPERAZINE EDISYLATE 5 MG/ML
5-10 INJECTION INTRAMUSCULAR; INTRAVENOUS EVERY 4 HOURS PRN
Status: DISCONTINUED | OUTPATIENT
Start: 2020-06-19 | End: 2020-06-21 | Stop reason: HOSPADM

## 2020-06-19 RX ORDER — LORAZEPAM 1 MG/1
0.5 TABLET ORAL EVERY 4 HOURS PRN
Status: DISCONTINUED | OUTPATIENT
Start: 2020-06-19 | End: 2020-06-21 | Stop reason: HOSPADM

## 2020-06-19 RX ORDER — SODIUM CHLORIDE, SODIUM LACTATE, POTASSIUM CHLORIDE, CALCIUM CHLORIDE 600; 310; 30; 20 MG/100ML; MG/100ML; MG/100ML; MG/100ML
INJECTION, SOLUTION INTRAVENOUS CONTINUOUS
Status: DISCONTINUED | OUTPATIENT
Start: 2020-06-19 | End: 2020-06-21 | Stop reason: HOSPADM

## 2020-06-19 RX ORDER — LORAZEPAM 2 MG/ML
2 INJECTION INTRAMUSCULAR
Status: DISCONTINUED | OUTPATIENT
Start: 2020-06-19 | End: 2020-06-21 | Stop reason: HOSPADM

## 2020-06-19 RX ORDER — LORAZEPAM 2 MG/ML
0.5 INJECTION INTRAMUSCULAR EVERY 4 HOURS PRN
Status: DISCONTINUED | OUTPATIENT
Start: 2020-06-19 | End: 2020-06-21 | Stop reason: HOSPADM

## 2020-06-19 RX ORDER — VALPROIC ACID 250 MG/1
250 CAPSULE, LIQUID FILLED ORAL EVERY 8 HOURS
Status: DISCONTINUED | OUTPATIENT
Start: 2020-06-19 | End: 2020-06-21 | Stop reason: HOSPADM

## 2020-06-19 RX ORDER — LORAZEPAM 1 MG/1
2 TABLET ORAL
Status: DISCONTINUED | OUTPATIENT
Start: 2020-06-19 | End: 2020-06-21 | Stop reason: HOSPADM

## 2020-06-19 RX ORDER — LORAZEPAM 1 MG/1
4 TABLET ORAL
Status: DISCONTINUED | OUTPATIENT
Start: 2020-06-19 | End: 2020-06-21 | Stop reason: HOSPADM

## 2020-06-19 RX ORDER — TELMISARTAN 40 MG/1
80 TABLET ORAL DAILY
Status: DISCONTINUED | OUTPATIENT
Start: 2020-06-19 | End: 2020-06-21 | Stop reason: HOSPADM

## 2020-06-19 RX ORDER — BISACODYL 10 MG
10 SUPPOSITORY, RECTAL RECTAL
Status: DISCONTINUED | OUTPATIENT
Start: 2020-06-19 | End: 2020-06-21 | Stop reason: HOSPADM

## 2020-06-19 RX ORDER — AMOXICILLIN 250 MG
2 CAPSULE ORAL 2 TIMES DAILY
Status: DISCONTINUED | OUTPATIENT
Start: 2020-06-19 | End: 2020-06-21 | Stop reason: HOSPADM

## 2020-06-19 RX ORDER — ROSUVASTATIN CALCIUM 10 MG/1
10 TABLET, COATED ORAL EVERY EVENING
Status: DISCONTINUED | OUTPATIENT
Start: 2020-06-19 | End: 2020-06-21 | Stop reason: HOSPADM

## 2020-06-19 RX ORDER — GABAPENTIN 100 MG/1
100 CAPSULE ORAL 3 TIMES DAILY
Status: DISCONTINUED | OUTPATIENT
Start: 2020-06-19 | End: 2020-06-21 | Stop reason: HOSPADM

## 2020-06-19 RX ORDER — PROMETHAZINE HYDROCHLORIDE 25 MG/1
12.5-25 SUPPOSITORY RECTAL EVERY 4 HOURS PRN
Status: DISCONTINUED | OUTPATIENT
Start: 2020-06-19 | End: 2020-06-21 | Stop reason: HOSPADM

## 2020-06-19 RX ORDER — PROMETHAZINE HYDROCHLORIDE 25 MG/1
12.5-25 TABLET ORAL EVERY 4 HOURS PRN
Status: DISCONTINUED | OUTPATIENT
Start: 2020-06-19 | End: 2020-06-21 | Stop reason: HOSPADM

## 2020-06-19 RX ORDER — LORAZEPAM 2 MG/ML
1.5 INJECTION INTRAMUSCULAR
Status: DISCONTINUED | OUTPATIENT
Start: 2020-06-19 | End: 2020-06-21 | Stop reason: HOSPADM

## 2020-06-19 RX ORDER — MAGNESIUM SULFATE HEPTAHYDRATE 40 MG/ML
2 INJECTION, SOLUTION INTRAVENOUS ONCE
Status: COMPLETED | OUTPATIENT
Start: 2020-06-19 | End: 2020-06-19

## 2020-06-19 RX ORDER — CLONIDINE HYDROCHLORIDE 0.1 MG/1
0.1 TABLET ORAL
Status: DISCONTINUED | OUTPATIENT
Start: 2020-06-19 | End: 2020-06-21 | Stop reason: HOSPADM

## 2020-06-19 RX ORDER — ACETAMINOPHEN 325 MG/1
650 TABLET ORAL EVERY 6 HOURS PRN
Status: DISCONTINUED | OUTPATIENT
Start: 2020-06-19 | End: 2020-06-21 | Stop reason: HOSPADM

## 2020-06-19 RX ORDER — LORAZEPAM 2 MG/ML
1 INJECTION INTRAMUSCULAR
Status: DISCONTINUED | OUTPATIENT
Start: 2020-06-19 | End: 2020-06-21 | Stop reason: HOSPADM

## 2020-06-19 RX ORDER — POLYETHYLENE GLYCOL 3350 17 G/17G
1 POWDER, FOR SOLUTION ORAL
Status: DISCONTINUED | OUTPATIENT
Start: 2020-06-19 | End: 2020-06-21 | Stop reason: HOSPADM

## 2020-06-19 RX ADMIN — LORAZEPAM 0.5 MG: 2 INJECTION INTRAMUSCULAR; INTRAVENOUS at 17:53

## 2020-06-19 RX ADMIN — VALPROIC ACID 250 MG: 250 CAPSULE, LIQUID FILLED ORAL at 13:40

## 2020-06-19 RX ADMIN — ROSUVASTATIN CALCIUM 10 MG: 10 TABLET, FILM COATED ORAL at 17:07

## 2020-06-19 RX ADMIN — SODIUM CHLORIDE, POTASSIUM CHLORIDE, SODIUM LACTATE AND CALCIUM CHLORIDE: 600; 310; 30; 20 INJECTION, SOLUTION INTRAVENOUS at 03:38

## 2020-06-19 RX ADMIN — LORAZEPAM 0.5 MG: 2 INJECTION INTRAMUSCULAR; INTRAVENOUS at 22:12

## 2020-06-19 RX ADMIN — LORAZEPAM 0.5 MG: 1 TABLET ORAL at 04:10

## 2020-06-19 RX ADMIN — ENOXAPARIN SODIUM 40 MG: 40 INJECTION SUBCUTANEOUS at 06:12

## 2020-06-19 RX ADMIN — GABAPENTIN 100 MG: 100 CAPSULE ORAL at 11:29

## 2020-06-19 RX ADMIN — TELMISARTAN 80 MG: 40 TABLET ORAL at 06:12

## 2020-06-19 RX ADMIN — GABAPENTIN 100 MG: 100 CAPSULE ORAL at 06:12

## 2020-06-19 RX ADMIN — THIAMINE HYDROCHLORIDE: 100 INJECTION, SOLUTION INTRAMUSCULAR; INTRAVENOUS at 00:05

## 2020-06-19 RX ADMIN — SODIUM CHLORIDE 2000 ML: 9 INJECTION, SOLUTION INTRAVENOUS at 01:45

## 2020-06-19 RX ADMIN — LORAZEPAM 0.5 MG: 2 INJECTION INTRAMUSCULAR; INTRAVENOUS at 09:38

## 2020-06-19 RX ADMIN — SODIUM CHLORIDE, POTASSIUM CHLORIDE, SODIUM LACTATE AND CALCIUM CHLORIDE: 600; 310; 30; 20 INJECTION, SOLUTION INTRAVENOUS at 19:00

## 2020-06-19 RX ADMIN — GABAPENTIN 100 MG: 100 CAPSULE ORAL at 17:07

## 2020-06-19 RX ADMIN — VALPROIC ACID 250 MG: 250 CAPSULE, LIQUID FILLED ORAL at 06:12

## 2020-06-19 RX ADMIN — SODIUM CHLORIDE, POTASSIUM CHLORIDE, SODIUM LACTATE AND CALCIUM CHLORIDE: 600; 310; 30; 20 INJECTION, SOLUTION INTRAVENOUS at 09:36

## 2020-06-19 RX ADMIN — MAGNESIUM SULFATE IN WATER 2 G: 40 INJECTION, SOLUTION INTRAVENOUS at 03:38

## 2020-06-19 RX ADMIN — VALPROIC ACID 250 MG: 250 CAPSULE, LIQUID FILLED ORAL at 22:05

## 2020-06-19 RX ADMIN — SODIUM CHLORIDE, POTASSIUM CHLORIDE, SODIUM LACTATE AND CALCIUM CHLORIDE 1000 ML: 600; 310; 30; 20 INJECTION, SOLUTION INTRAVENOUS at 00:05

## 2020-06-19 RX ADMIN — PROCHLORPERAZINE EDISYLATE 5 MG: 5 INJECTION INTRAMUSCULAR; INTRAVENOUS at 04:11

## 2020-06-19 RX ADMIN — LORAZEPAM 0.5 MG: 2 INJECTION INTRAMUSCULAR; INTRAVENOUS at 13:42

## 2020-06-19 RX ADMIN — SENNOSIDES-DOCUSATE SODIUM TAB 8.6-50 MG 2 TABLET: 8.6-5 TAB at 17:07

## 2020-06-19 ASSESSMENT — LIFESTYLE VARIABLES
NAUSEA AND VOMITING: NO NAUSEA AND NO VOMITING
AGITATION: NORMAL ACTIVITY
ALCOHOL_USE: YES
EVER_SMOKED: NEVER
HEADACHE, FULLNESS IN HEAD: NOT PRESENT
PAROXYSMAL SWEATS: NO SWEAT VISIBLE
HOW MANY TIMES IN THE PAST YEAR HAVE YOU HAD 5 OR MORE DRINKS IN A DAY: 365
TOTAL SCORE: 6
NAUSEA AND VOMITING: MILD NAUSEA WITH NO VOMITING
HEADACHE, FULLNESS IN HEAD: NOT PRESENT
EVER FELT BAD OR GUILTY ABOUT YOUR DRINKING: YES
HEADACHE, FULLNESS IN HEAD: NOT PRESENT
VISUAL DISTURBANCES: NOT PRESENT
ORIENTATION AND CLOUDING OF SENSORIUM: ORIENTED AND CAN DO SERIAL ADDITIONS
NAUSEA AND VOMITING: *
HEADACHE, FULLNESS IN HEAD: VERY MILD
ON A TYPICAL DAY WHEN YOU DRINK ALCOHOL HOW MANY DRINKS DO YOU HAVE: 8
VISUAL DISTURBANCES: NOT PRESENT
TOTAL SCORE: 4
VISUAL DISTURBANCES: NOT PRESENT
TOTAL SCORE: 8
AUDITORY DISTURBANCES: NOT PRESENT
VISUAL DISTURBANCES: NOT PRESENT
AUDITORY DISTURBANCES: NOT PRESENT
ORIENTATION AND CLOUDING OF SENSORIUM: ORIENTED AND CAN DO SERIAL ADDITIONS
ANXIETY: *
ORIENTATION AND CLOUDING OF SENSORIUM: CANNOT DO SERIAL ADDITIONS OR IS UNCERTAIN ABOUT DATE
AGITATION: NORMAL ACTIVITY
TOTAL SCORE: 9
ANXIETY: *
ANXIETY: *
PAROXYSMAL SWEATS: NO SWEAT VISIBLE
AGITATION: SOMEWHAT MORE THAN NORMAL ACTIVITY
PAROXYSMAL SWEATS: NO SWEAT VISIBLE
PAROXYSMAL SWEATS: NO SWEAT VISIBLE
AUDITORY DISTURBANCES: NOT PRESENT
AUDITORY DISTURBANCES: NOT PRESENT
NAUSEA AND VOMITING: NO NAUSEA AND NO VOMITING
ANXIETY: *
PAROXYSMAL SWEATS: NO SWEAT VISIBLE
AUDITORY DISTURBANCES: NOT PRESENT
TREMOR: *
AVERAGE NUMBER OF DAYS PER WEEK YOU HAVE A DRINK CONTAINING ALCOHOL: 7
HEADACHE, FULLNESS IN HEAD: NOT PRESENT
TOTAL SCORE: 3
TREMOR: MODERATE TREMOR WITH ARMS EXTENDED
ORIENTATION AND CLOUDING OF SENSORIUM: ORIENTED AND CAN DO SERIAL ADDITIONS
TOTAL SCORE: 8
SUBSTANCE_ABUSE: 1
AUDITORY DISTURBANCES: NOT PRESENT
TREMOR: MODERATE TREMOR WITH ARMS EXTENDED
TOTAL SCORE: 8
HAVE YOU EVER FELT YOU SHOULD CUT DOWN ON YOUR DRINKING: YES
CONSUMPTION TOTAL: POSITIVE
ANXIETY: MILDLY ANXIOUS
NAUSEA AND VOMITING: MILD NAUSEA WITH NO VOMITING
HAVE PEOPLE ANNOYED YOU BY CRITICIZING YOUR DRINKING: NO
HEADACHE, FULLNESS IN HEAD: NOT PRESENT
AUDITORY DISTURBANCES: NOT PRESENT
NAUSEA AND VOMITING: NO NAUSEA AND NO VOMITING
AGITATION: NORMAL ACTIVITY
ANXIETY: NO ANXIETY (AT EASE)
VISUAL DISTURBANCES: NOT PRESENT
PAROXYSMAL SWEATS: NO SWEAT VISIBLE
AGITATION: NORMAL ACTIVITY
TOTAL SCORE: 3
ORIENTATION AND CLOUDING OF SENSORIUM: ORIENTED AND CAN DO SERIAL ADDITIONS
TOTAL SCORE: 3
HEADACHE, FULLNESS IN HEAD: VERY MILD
TREMOR: MODERATE TREMOR WITH ARMS EXTENDED
VISUAL DISTURBANCES: NOT PRESENT
AGITATION: SOMEWHAT MORE THAN NORMAL ACTIVITY
EVER HAD A DRINK FIRST THING IN THE MORNING TO STEADY YOUR NERVES TO GET RID OF A HANGOVER: YES
TOTAL SCORE: 2
TREMOR: TREMOR NOT VISIBLE BUT CAN BE FELT, FINGERTIP TO FINGERTIP
ORIENTATION AND CLOUDING OF SENSORIUM: CANNOT DO SERIAL ADDITIONS OR IS UNCERTAIN ABOUT DATE
ORIENTATION AND CLOUDING OF SENSORIUM: ORIENTED AND CAN DO SERIAL ADDITIONS
TREMOR: *
VISUAL DISTURBANCES: VERY MILD SENSITIVITY
ANXIETY: MILDLY ANXIOUS
PAROXYSMAL SWEATS: BARELY PERCEPTIBLE SWEATING. PALMS MOIST
NAUSEA AND VOMITING: MILD NAUSEA WITH NO VOMITING
AGITATION: NORMAL ACTIVITY
TREMOR: TREMOR NOT VISIBLE BUT CAN BE FELT, FINGERTIP TO FINGERTIP

## 2020-06-19 ASSESSMENT — ENCOUNTER SYMPTOMS
MYALGIAS: 0
ABDOMINAL PAIN: 0
BRUISES/BLEEDS EASILY: 0
FLANK PAIN: 0
CONSTITUTIONAL NEGATIVE: 1
FEVER: 0
TREMORS: 1
RESPIRATORY NEGATIVE: 1
MUSCULOSKELETAL NEGATIVE: 1
NERVOUS/ANXIOUS: 0
BACK PAIN: 0
LOSS OF CONSCIOUSNESS: 0
DIZZINESS: 0
VOMITING: 1
WEIGHT LOSS: 0
CARDIOVASCULAR NEGATIVE: 1
COUGH: 0
WEAKNESS: 0
DEPRESSION: 0
NAUSEA: 1
SHORTNESS OF BREATH: 0
CHILLS: 0

## 2020-06-19 ASSESSMENT — COGNITIVE AND FUNCTIONAL STATUS - GENERAL
SUGGESTED CMS G CODE MODIFIER MOBILITY: CH
MOBILITY SCORE: 24
SUGGESTED CMS G CODE MODIFIER DAILY ACTIVITY: CH
DAILY ACTIVITIY SCORE: 24

## 2020-06-19 ASSESSMENT — COPD QUESTIONNAIRES
IN THE PAST 12 MONTHS DO YOU DO LESS THAN YOU USED TO BECAUSE OF YOUR BREATHING PROBLEMS: DISAGREE/UNSURE
DO YOU EVER COUGH UP ANY MUCUS OR PHLEGM?: NO/ONLY WITH OCCASIONAL COLDS OR INFECTIONS
COPD SCREENING SCORE: 1
HAVE YOU SMOKED AT LEAST 100 CIGARETTES IN YOUR ENTIRE LIFE: NO/DON'T KNOW
DURING THE PAST 4 WEEKS HOW MUCH DID YOU FEEL SHORT OF BREATH: NONE/LITTLE OF THE TIME

## 2020-06-19 ASSESSMENT — PATIENT HEALTH QUESTIONNAIRE - PHQ9
2. FEELING DOWN, DEPRESSED, IRRITABLE, OR HOPELESS: NOT AT ALL
1. LITTLE INTEREST OR PLEASURE IN DOING THINGS: NOT AT ALL
SUM OF ALL RESPONSES TO PHQ9 QUESTIONS 1 AND 2: 0

## 2020-06-19 ASSESSMENT — FIBROSIS 4 INDEX: FIB4 SCORE: 0.97

## 2020-06-19 NOTE — PROGRESS NOTES
Telemetry Shift Summary    Rhythm ST  HR Range 107  Ectopy rPVC  Measurements 0.16/0.08/0.34    Per [facility]    Normal Values  Rhythm SR  HR Range    Measurements 0.12-0.20 / 0.06-0.10  / 0.30-0.52

## 2020-06-19 NOTE — PROGRESS NOTES
0300: Received report from ER MILLER Philip, POC discussed. Patient not on floor, awaiting transport.    0311: Patient on floor, assessment complete. Complains of nausea, headache, and visual hallucinations, see MAR for interventions.

## 2020-06-19 NOTE — ASSESSMENT & PLAN NOTE
Resulting in severe lactic acidosis, dehydration.  IV fluids are cornerstone of treatment,  LA trended down  Zofran prn for vomiting  Advance diet as tolerated

## 2020-06-19 NOTE — CARE PLAN
Problem: Communication  Goal: The ability to communicate needs accurately and effectively will improve  Outcome: PROGRESSING AS EXPECTED  Intervention: [place] patient and significant other/support system to call light to alert staff of needs  Note: Patient will use call light for assistance.     Problem: Safety  Goal: Will remain free from injury  Outcome: PROGRESSING AS EXPECTED  Note: Seizure precautions in place, bed locked in lowest position, call light within reach.

## 2020-06-19 NOTE — PROGRESS NOTES
Patient seen and examined, admitted earlier today.  Alcohol withdrawal and alcoholic ketoacidosis.  On Ativan per CIWA, gabapentin, depakote.  She remains tachycardic and tremulous.

## 2020-06-19 NOTE — H&P
Hospital Medicine History & Physical Note    Date of Service  6/19/2020    Primary Care Physician  Lopez Jackson M.D.    Code Status  Full Code    Chief Complaint  No chief complaint on file.      History of Presenting Illness  54 y.o. female who presented 6/18/2020 with vomiting, tremors and anxiety. She has severe alcoholism failing multiple inpatient rehabilitation treatments and has been through withdrawal many times. Typically she drinks a liter of vodka per day but has cut back a bit and is feeling like she is going into withdrawal. Was vomiting all day long and came to ER when tremors worsened. She is seeing a psychiatrist to help her medically with her addiction problems.    I discussed the presenting symptoms, physical examination, lab and radiological study results with the emergency department physician.      Review of Systems  Review of Systems   Constitutional: Negative.  Negative for chills, fever, malaise/fatigue and weight loss.   HENT: Negative.    Respiratory: Negative.  Negative for cough and shortness of breath.    Cardiovascular: Negative.  Negative for chest pain and leg swelling.   Gastrointestinal: Positive for nausea and vomiting. Negative for abdominal pain.   Genitourinary: Negative.  Negative for dysuria and flank pain.   Musculoskeletal: Negative.  Negative for back pain and myalgias.   Neurological: Positive for tremors. Negative for dizziness, loss of consciousness and weakness.   Endo/Heme/Allergies: Negative.  Does not bruise/bleed easily.   Psychiatric/Behavioral: Positive for substance abuse. Negative for depression. The patient is not nervous/anxious.    All other systems reviewed and are negative.      Past Medical History   has a past medical history of Alcohol abuse, ASTHMA, Elevated LFTs, Heart palpitations, Hypertension, and Psychiatric disorder.    Surgical History   has a past surgical history that includes appendectomy (1997); pr endometrial ablation, thermal (1999);  pr remove tonsils/adenoids,<13 y/o (); pr anesth,surg breast reconstructive (); hemorrhoidectomy (2011); anal sphincterotomy (2011); bunionectomy (); and pr  delivery+postpartum care.     Family History  family history includes Stroke in her mother.     Social History   reports that she has never smoked. She has never used smokeless tobacco. She reports current alcohol use. She reports that she does not use drugs.    Allergies  Allergies   Allergen Reactions   • Egg White Swelling   • Kiwi Extract Swelling   • Latex Anaphylaxis   • Morphine Hives   • Banana    • Other Food      Tropical fruit         Medications  Prior to Admission Medications   Prescriptions Last Dose Informant Patient Reported? Taking?   Cholecalciferol (VITAMIN D PO)  Patient Yes No   Sig: Take 1 Tab by mouth every day.   Levothyroxine Sodium (LEVOTHROID PO)   Yes No   Sig: Take  by mouth.   NON SPECIFIED  Patient Yes No   Si Dose by Injection route every day. HGH - From Dr. Jackson   Nutritional Supplements (DHEA PO)  Patient Yes No   Sig: Take 2 Tabs by mouth every day.   Omega-3 Fatty Acids (FISH OIL) 1000 MG Cap capsule  Patient Yes No   Sig: Take 2,000 mg by mouth 4 times a day.   POTASSIUM PO   Yes No   Sig: Take 1 Tab by mouth every day. Indications: OTC   chlordiazepoxide (LIBRIUM) 25 MG Cap   No No   Sig: Take 1 Cap by mouth 3 times a day as needed for Anxiety.   Patient not taking: Reported on 2019   dicyclomine (BENTYL) 20 MG Tab   No No   Sig: Take 1 Tab by mouth every 6 hours.   Patient not taking: Reported on 2019   hydrochlorothiazide (HYDRODIURIL) 25 MG TABS  Patient Yes No   Sig: Take 25 mg by mouth every day. Indications: High Blood Pressure   multivitamin (THERAGRAN) Tab  Patient Yes No   Sig: Take 1 Tab by mouth every day.   ondansetron (ZOFRAN ODT) 4 MG TABLET DISPERSIBLE   No No   Sig: Take 1 Tab by mouth every 6 hours as needed.   Patient not taking: Reported on 2019    rosuvastatin (CRESTOR) 10 MG Tab   No No   Sig: Take 1 Tab by mouth every evening.   telmisartan (MICARDIS) 80 MG TABS  Patient Yes No   Sig: Take 80 mg by mouth every day.      Facility-Administered Medications: None       Physical Exam  Temp:  [36.6 °C (97.8 °F)] 36.6 °C (97.8 °F)  Pulse:  [104-124] 104  Resp:  [13-18] 13  BP: ()/(45-65) 106/65  SpO2:  [92 %-98 %] 92 %    Physical Exam  Vitals signs and nursing note reviewed.   Constitutional:       General: She is in acute distress.      Appearance: She is well-developed. She is ill-appearing. She is not diaphoretic.   HENT:      Right Ear: External ear normal.      Left Ear: External ear normal.      Nose: Nose normal.      Mouth/Throat:      Pharynx: No oropharyngeal exudate.   Eyes:      General: No scleral icterus.        Right eye: No discharge.         Left eye: No discharge.      Conjunctiva/sclera: Conjunctivae normal.   Neck:      Vascular: No JVD.      Trachea: No tracheal deviation.   Cardiovascular:      Rate and Rhythm: Normal rate and regular rhythm.      Heart sounds: Normal heart sounds.   Pulmonary:      Effort: Pulmonary effort is normal. No respiratory distress.      Breath sounds: Normal breath sounds. No stridor. No wheezing or rales.   Chest:      Chest wall: No tenderness.   Abdominal:      General: Bowel sounds are normal. There is no distension.      Palpations: Abdomen is soft.      Tenderness: There is no abdominal tenderness.   Musculoskeletal:         General: No tenderness.   Skin:     General: Skin is warm and dry.      Coloration: Skin is pale.   Neurological:      Mental Status: She is alert.      Cranial Nerves: No cranial nerve deficit.      Motor: No abnormal muscle tone.   Psychiatric:         Behavior: Behavior normal.         Laboratory:  Recent Labs     06/18/20  2342   WBC 9.7   RBC 4.70   HEMOGLOBIN 14.9   HEMATOCRIT 45.5   MCV 96.8   MCH 31.7   MCHC 32.7*   RDW 43.8   PLATELETCT 392   MPV 10.0     Recent Labs      06/18/20  2342   SODIUM 142   POTASSIUM 4.3   CHLORIDE 93*   CO2 14*   GLUCOSE 75   BUN 17   CREATININE 1.33   CALCIUM 9.8     Recent Labs     06/18/20  2342   ALTSGPT 52*   ASTSGOT 53*   ALKPHOSPHAT 72   TBILIRUBIN 0.3   LIPASE 24   GLUCOSE 75     Recent Labs     06/18/20  2342   INR 0.97     No results for input(s): NTPROBNP in the last 72 hours.      Recent Labs     06/18/20  2342   TROPONINT 10       Imaging:  DX-CHEST-PORTABLE (1 VIEW)   Final Result         1.  No acute cardiopulmonary disease.            Assessment/Plan:      * Alcoholic ketoacidosis- (present on admission)  Assessment & Plan  Resulting in severe lactic acidosis, dehydration.  IV fluids are cornerstone of treatment, LR boluses of three liters given in ER, continue at 125 cc per hour and trend lactic acid  Zofran prn for vomiting  Advance diet as tolerated    Alcohol withdrawal (HCC)- (present on admission)  Assessment & Plan  Admit to telemetry for treatment of alcohol withdrawal.  Alcohol withdrawal protocol ordered.  Ativan IV and oral on sliding scale based on CIWA score.  RAlly bag given in ER  Replace magnesium per protocol it is low  Check CMP, magnesium and phosphorus in am.  Clonidine 0.1 mg po every 3 hours as needed for blood pressure over 150 systolic.  Start oral gabapentin 100mg po tid and valproic acid 250mg po every 8 hours.  Social work consult during admission to provide information regarding alcohol dependence treatment options.      Hypomagnesemia- (present on admission)  Assessment & Plan  Due to alcohol abuse, replace per protocol, recheck in am

## 2020-06-19 NOTE — PROGRESS NOTES
Pt arrived to unit via gurney. Ambulated from [place] to bed, standby assist assist. Tele monitor applied, vitals taken. Pt assessed. A&O x4. Admit profile and med rec complete. Discussed POC with pt, including medications, trending labs and CIWA protocol. Welcome folder provided and discussed. Communication board filled out. Questions and concerns addressed, verbalized understanding. Fall precautions in place. Pt demonstrates ability to use call light appropriately. Pt left in lowest position. Bed locked and low.

## 2020-06-19 NOTE — ED PROVIDER NOTES
ED Provider Note    CHIEF COMPLAINT  No chief complaint on file.      HPI    Primary care provider: Lopez Jackson M.D.  Means of arrival: POV  History obtained from: Patient and   History limited by: Nothing    Yohana Brandon is a 54 y.o. female who presents with nausea vomiting and chest pain.  She also complains of tremulousness and anxiety.  The patient has a long history of alcoholism and has been through withdrawals many times, she has been drinking heavily up to a liter of vodka a day.  Last drink 2 hours ago.  She has been drinking slightly less than usual and is feeling like she is going into alcohol withdrawal, she has anterior achy nonradiating chest pain that is moderate in severity and constant for the last several hours.  She denies any abdominal pain but has had many bouts of nausea and vomiting this evening.  No black or bloody output.  She reports bilious vomiting.  She has been to rehab numerous times and been treated in the ER in the hospital numerous times for alcoholism.  Recently started on new medications with a new psychiatrist to try to manage her alcoholism, but she continues to drink heavily.  She does not want to stop drinking but has a very difficult time doing so.  Denies any suicidal thoughts or other ingestions today.  No sick contacts or cough or dyspnea or known close contact with anyone diagnosed with coronavirus.  No history of coronary artery disease.    REVIEW OF SYSTEMS  Constitutional: Negative for fever or chills.   Respiratory: Negative for cough or shortness of breath.    Cardiovascular: Positive for chest pain no syncope or palpitations.  Gastrointestinal: Positive for nausea and bilious vomiting, no bleeding or abdominal pain.  Genitourinary: Negative for dysuria or flank pain.   Musculoskeletal: Negative for back pain or joint pain.   Skin: Negative for itching or rash.   Neurological: Negative for sensory or motor changes.   Psychiatric/Behavioral:  "Positive for anxiety and alcoholism, no suicidal thoughts.  See HPI for further details. All other systems are negative.     PAST MEDICAL HISTORY   has a past medical history of Alcohol abuse, ASTHMA, Elevated LFTs, Heart palpitations, Hypertension, and Psychiatric disorder.    PAST FAMILY HISTORY  Family History   Problem Relation Age of Onset   • Stroke Mother        SOCIAL HISTORY  Social History     Tobacco Use   • Smoking status: Never Smoker   • Smokeless tobacco: Never Used   Substance and Sexual Activity   • Alcohol use: Yes     Comment: was sober for 2 years, started drinking again   • Drug use: No   • Sexual activity: Not on file       SURGICAL HISTORY   has a past surgical history that includes appendectomy (); endometrial ablation, thermal (); remove tonsils/adenoids,<13 y/o (); anesth,surg breast reconstructive (); hemorrhoidectomy (2011); anal sphincterotomy (2011); bunionectomy (); and  delivery+postpartum care.    CURRENT MEDICATIONS  Levothyroxine, rosuvastatin, Zofran, Bentyl, potassium, multivitamin, hydrochlorothiazide    ALLERGIES  Allergies   Allergen Reactions   • Egg White Swelling   • Kiwi Extract Swelling   • Latex Anaphylaxis   • Morphine Hives   • Banana    • Other Food      Tropical fruit         PHYSICAL EXAM  VITAL SIGNS: /65   Pulse (!) 104   Temp 36.6 °C (97.8 °F) (Temporal)   Resp 13   Ht 1.727 m (5' 8\")   Wt 62.3 kg (137 lb 5.6 oz)   LMP 08/15/2011   SpO2 92%   BMI 20.88 kg/m²    Pulse ox interpretation: On room air, I interpret this pulse ox as normal.  Constitutional: Well-developed, well-nourished, but lying on the stretcher in moderate distress actively vomiting.  HEENT: Normocephalic, atraumatic. Posterior pharynx clear, mucous membranes very dry.  Eyes:  EOMI. PERRLA 3-2, injected sclerae.  Neck: Supple, nontender.  Chest/Pulmonary: Clear to ausculation bilaterally, no wheezes or rhonchi.  Cardiovascular: Tachycardic rate, " regular rhythm, no murmur.   Abdomen: Soft, nontender; no rebound, guarding, or masses.  Back: No CVA or midline tenderness.   Musculoskeletal: No deformity or edema.  Neuro: Slightly slurred speech otherwise no focal weakness or asymmetry.  Psych: Anxious but quite pleasant and cooperative.  Skin: No rashes, warm and dry.      DIAGNOSTIC STUDIES / PROCEDURES    LABS & EKG  Results for orders placed or performed during the hospital encounter of 06/18/20   CBC WITH DIFFERENTIAL   Result Value Ref Range    WBC 9.7 4.8 - 10.8 K/uL    RBC 4.70 4.20 - 5.40 M/uL    Hemoglobin 14.9 12.0 - 16.0 g/dL    Hematocrit 45.5 37.0 - 47.0 %    MCV 96.8 81.4 - 97.8 fL    MCH 31.7 27.0 - 33.0 pg    MCHC 32.7 (L) 33.6 - 35.0 g/dL    RDW 43.8 35.9 - 50.0 fL    Platelet Count 392 164 - 446 K/uL    MPV 10.0 9.0 - 12.9 fL    Neutrophils-Polys 89.10 (H) 44.00 - 72.00 %    Lymphocytes 7.80 (L) 22.00 - 41.00 %    Monocytes 2.20 0.00 - 13.40 %    Eosinophils 0.00 0.00 - 6.90 %    Basophils 0.50 0.00 - 1.80 %    Immature Granulocytes 0.40 0.00 - 0.90 %    Nucleated RBC 0.00 /100 WBC    Neutrophils (Absolute) 8.63 (H) 2.00 - 7.15 K/uL    Lymphs (Absolute) 0.76 (L) 1.00 - 4.80 K/uL    Monos (Absolute) 0.21 0.00 - 0.85 K/uL    Eos (Absolute) 0.00 0.00 - 0.51 K/uL    Baso (Absolute) 0.05 0.00 - 0.12 K/uL    Immature Granulocytes (abs) 0.04 0.00 - 0.11 K/uL    NRBC (Absolute) 0.00 K/uL   COMP METABOLIC PANEL   Result Value Ref Range    Sodium 142 135 - 145 mmol/L    Potassium 4.3 3.6 - 5.5 mmol/L    Chloride 93 (L) 96 - 112 mmol/L    Co2 14 (L) 20 - 33 mmol/L    Anion Gap 35.0 (H) 7.0 - 16.0    Glucose 75 65 - 99 mg/dL    Bun 17 8 - 22 mg/dL    Creatinine 1.33 0.50 - 1.40 mg/dL    Calcium 9.8 8.4 - 10.2 mg/dL    AST(SGOT) 53 (H) 12 - 45 U/L    ALT(SGPT) 52 (H) 2 - 50 U/L    Alkaline Phosphatase 72 30 - 99 U/L    Total Bilirubin 0.3 0.1 - 1.5 mg/dL    Albumin 4.8 3.2 - 4.9 g/dL    Total Protein 8.2 6.0 - 8.2 g/dL    Globulin 3.4 1.9 - 3.5 g/dL    A-G  Ratio 1.4 g/dL   LIPASE   Result Value Ref Range    Lipase 24 7 - 58 U/L   TROPONIN   Result Value Ref Range    Troponin T 10 6 - 19 ng/L   URINALYSIS CULTURE, IF INDICATED    Specimen: Urine   Result Value Ref Range    Color Yellow     Character Clear     Specific Gravity 1.025 <1.035    Ph 6.0 5.0 - 8.0    Glucose Negative Negative mg/dL    Ketones 15 (A) Negative mg/dL    Protein Negative Negative mg/dL    Bilirubin Negative Negative    Nitrite Negative Negative    Leukocyte Esterase Negative Negative    Occult Blood Negative Negative    Micro Urine Req see below    MAGNESIUM   Result Value Ref Range    Magnesium 1.4 (L) 1.5 - 2.5 mg/dL   PROTHROMBIN TIME (INR)   Result Value Ref Range    PT 13.0 12.0 - 14.6 sec    INR 0.97 0.87 - 1.13   VENOUS BLOOD GAS   Result Value Ref Range    Venous Bg Ph 7.25 (L) 7.31 - 7.45    Venous Bg Pco2 31.6 (L) 41.0 - 51.0 mmHg    Venous Bg Po2 50.6 (H) 25.0 - 40.0 mmHg    Venous Bg O2 Saturation 73.6 %    Venous Bg Hco3 14 (L) 24 - 28 mmol/L    Venous Bg Base Excess -12 mmol/L    Body Temp see below Centigrade   BETA-HCG QUALITATIVE SERUM   Result Value Ref Range    Beta-Hcg Qualitative Serum Negative Negative   CREATINE KINASE   Result Value Ref Range    CPK Total 32 0 - 154 U/L   LACTIC ACID   Result Value Ref Range    Lactic Acid 10.9 (HH) 0.5 - 2.0 mmol/L   ACETAMINOPHEN   Result Value Ref Range    Acetaminophen -Tylenol <5 (L) 10 - 30 ug/mL   ETHYL ALCOHOL (BLOOD)   Result Value Ref Range    Diagnostic Alcohol 277.6 (H) 0.0 - 10.1 mg/dL   Salicylate   Result Value Ref Range    Salicylates, Quant. <1 (L) 15 - 25 mg/dL   ESTIMATED GFR   Result Value Ref Range    GFR If  51 (A) >60 mL/min/1.73 m 2    GFR If Non  42 (A) >60 mL/min/1.73 m 2   LACTIC ACID   Result Value Ref Range    Lactic Acid 7.7 (HH) 0.5 - 2.0 mmol/L   EKG (NOW)   Result Value Ref Range    Report       [facility] Emergency Dept[facility]    Test Date:   2020  Pt Name:    SONIA DAMON          Department: [facility]  MRN:        [id number]                      [facility]  Gender:     Female                       Technician:   :        [id number]                   Requested By:RAFFAELE MORALES  Order #:    [id number]                    Reading MD: Raffaele Morales MD    Measurements  Intervals                                Axis  Rate:       109                          P:          76  WA:         128                          QRS:        27  QRSD:       78                           T:          70  QT:         364  QTc:        491    Interpretive Statements  SINUS TACHYCARDIA  BORDERLINE PROLONGED QT INTERVAL  Compared to ECG 2020 12:08:19  Sinus rhythm no longer present  No STEMI  Electronically Signed On 2020 2:03:28 PDT by Raffaele Morales MD       Venous Access Procedure Note    Indication: emergent need for intravenous access    Procedure: The patient was placed in the appropriate position and the skin over the puncture site was prepped with chlorhexidine and draped in a sterile fashion. Intravenous access with an 18-gauge angiocatheter was obtained in the left antecubital vein and the site was secured appropriately.    The patient tolerated the procedure well.    Complications: None      RADIOLOGY  DX-CHEST-PORTABLE (1 VIEW)   Final Result         1.  No acute cardiopulmonary disease.          COURSE & MEDICAL DECISION MAKING    This is a 54 y.o. female who presents with vomiting chest pain anxiousness and tremulousness in the setting of alcohol abuse.    Differential Diagnosis includes but is not limited to:  Acute alcohol intoxication, pancreatitis, hepatobiliary disease, alcohol withdrawal, alcoholic ketoacidosis, dehydration, ACS    ED Course:  This is an unfortunate 54-year-old alcoholic female presenting with signs and symptoms consistent with prior episodes of alcohol withdrawal.  She is been drinking heavily recently, last  drink 2 hours ago, plan screening labs and EKG given the patient is quite tachycardic on arrival.  She is actively vomiting, will initially treat with 2 mg of lorazepam IV immediately, 2 peripheral IVs established on arrival.  Obviously dehydrated rally bag infusion as well as further crystalloid fluid bolus ordered because I must keep her n.p.o. in case she has a seizure and loses her airway or has any surgical process identified on work-up.    EKG reassuring sinus tachycardia no STEMI or strain or dysrhythmia, but labs are very concerning.  The patient has a lactic acidosis to 10.9 and an anion gap metabolic acidosis with a gap of 35.  This is likely alcoholic ketoacidosis no signs or symptoms of infection doubt sepsis.  Acetaminophen and salicylate levels are negative doubt occult other ingestion.  pH is low at 7.25.  INR is stable LFTs are very minimally elevated thankfully.  No fevers white count normal doubt infection.  No critical anemia.  Magnesium is low.    On serial rechecks patient's heart rate is improving with aggressive IV fluid rehydration I have ordered more than 3 L of crystalloids to be administered as well as parenteral vitamins and electrolytes.    Patient and  updated with concerning acidosis, and need for admission for further aggressive rehydration and management of the patient's acute alcohol withdrawal.  She is withdrawing despite having an alcohol level of greater than 200 meaning she could have a very complex clinical course.  However, vital signs are improving and stable and I feel it will be reasonable for her to be admitted to the telemetry unit should her lactic acid level start to clear with IV fluids.  Plan recheck of lactic acid level, discussed the case with hospitalist Dr. Lezama and she will kindly admit the patient for further work-up and treatment.    1:54 AM -repeat lactic acid level downtrending to 7.7, hemodynamics are stable.  After further discussion with  hospitalist patient stable for admission to telemetry floor in guarded condition.    Upon my evaluation, this patient had a high probability of imminent or life-threatening deterioration due to lactic acidosis, dehydration, alcoholic ketoacidosis, high anion gap metabolic acidosis.     I personally provided 35 minutes of total critical care time outside of time spent on separately billable/documented procedures. This required my direct attention, intervention, and management which included the following:  -review of laboratory data  -review of radiology studies  -discussion with consultant: hospitalist  -discussion with family and patient  -monitoring for potential decompensation with serial bedside reassessments  -aggressive IV fluid rehydration, parenteral benzodiazepines for acute alcohol withdrawal      Medications   LORazepam (ATIVAN) injection 2 mg (2 mg Intravenous Given 6/18/20 2346)   detox IV 1000 mL (D5LR + magnesium 1 g + thiamine 100 mg + folic acid 1 mg) infusion ( Intravenous Given 6/19/20 0005)   lactated ringers infusion (BOLUS) (0 mL Intravenous Stopped 6/19/20 0151)   NS infusion 2,000 mL (2,000 mL Intravenous New Bag 6/19/20 0145)   rosuvastatin (CRESTOR) tablet 10 mg (has no administration in time range)   telmisartan (MICARDIS) tablet 80 mg (has no administration in time range)   senna-docusate (PERICOLACE or SENOKOT S) 8.6-50 MG per tablet 2 Tab (has no administration in time range)   polyethylene glycol/lytes (MIRALAX) PACKET 1 Packet (has no administration in time range)   magnesium hydroxide (MILK OF MAGNESIA) suspension 30 mL (has no administration in time range)   bisacodyl (DULCOLAX) suppository 10 mg (has no administration in time range)   lactated ringers infusion (has no administration in time range)   enoxaparin (LOVENOX) inj 40 mg (has no administration in time range)   acetaminophen (TYLENOL) tablet 650 mg (has no administration in time range)   cloNIDine (CATAPRES) tablet 0.1 mg  (has no administration in time range)   ondansetron (ZOFRAN) syringe/vial injection 4 mg (has no administration in time range)   ondansetron (ZOFRAN ODT) dispertab 4 mg (has no administration in time range)   promethazine (PHENERGAN) tablet 12.5-25 mg (has no administration in time range)   promethazine (PHENERGAN) suppository 12.5-25 mg (has no administration in time range)   prochlorperazine (COMPAZINE) injection 5-10 mg (has no administration in time range)   LORazepam (ATIVAN) tablet 0.5 mg (has no administration in time range)   LORazepam (ATIVAN) tablet 1 mg (has no administration in time range)   LORazepam (ATIVAN) injection 0.5 mg (has no administration in time range)   LORazepam (ATIVAN) tablet 2 mg (has no administration in time range)   LORazepam (ATIVAN) injection 1 mg (has no administration in time range)   LORazepam (ATIVAN) tablet 3 mg (has no administration in time range)   LORazepam (ATIVAN) injection 1.5 mg (has no administration in time range)   LORazepam (ATIVAN) tablet 4 mg (has no administration in time range)   LORazepam (ATIVAN) injection 2 mg (has no administration in time range)   magnesium sulfate IVPB premix 2 g (has no administration in time range)   MAGNESIUM OXIDE 400 (241.3 MG) MG PO TABS (has no administration in time range)       FINAL IMPRESSION  1. Alcoholism (HCC)    2. Alcoholic ketoacidosis    3. Lactic acid acidosis    4. High anion gap metabolic acidosis    5. Dehydration    6. Acute chest pain    7. Bilious vomiting with nausea    8. Anxiety    9. Prolonged Q-T interval on ECG        -ADMIT-      Pertinent Labs & Imaging studies reviewed and verified by myself, as well as nursing notes and the patient's past medical, family, and social histories (See chart for details).    Portions of this record were made with voice recognition software.  Despite my review, spelling/grammar/context errors may still remain.  Interpretation of this chart should be taken in this  context.    Electronically signed by Raffaele Hartley M.D. on 6/19/2020 at 12:56 AM.

## 2020-06-19 NOTE — ED NOTES
Es from Lab called with critical result of lactic acid 10.9 at 0001. Critical lab result read back to Es.   Dr. Hartley notified of critical lab result at 0005.  Critical lab result read back by Dr. Hartley.

## 2020-06-19 NOTE — ASSESSMENT & PLAN NOTE
Admit to telemetry for treatment of alcohol withdrawal.  Alcohol withdrawal protocol ordered.  Ativan IV and oral on sliding scale based on CIWA score.  RAlly bag given in ER  Replace magnesium per protocol it is low  Check CMP, magnesium and phosphorus in am.  Clonidine 0.1 mg po every 3 hours as needed for blood pressure over 150 systolic.  Start oral gabapentin 100mg po tid and valproic acid 250mg po every 8 hours.  Social work consult during admission to provide information regarding alcohol dependence treatment options.

## 2020-06-20 LAB
ALBUMIN SERPL BCP-MCNC: 3.4 G/DL (ref 3.2–4.9)
ALBUMIN/GLOB SERPL: 1.5 G/DL
ALP SERPL-CCNC: 57 U/L (ref 30–99)
ALT SERPL-CCNC: 49 U/L (ref 2–50)
ANION GAP SERPL CALC-SCNC: 6 MMOL/L (ref 7–16)
AST SERPL-CCNC: 87 U/L (ref 12–45)
BASOPHILS # BLD AUTO: 0.6 % (ref 0–1.8)
BASOPHILS # BLD: 0.04 K/UL (ref 0–0.12)
BILIRUB SERPL-MCNC: 0.8 MG/DL (ref 0.1–1.5)
BUN SERPL-MCNC: 10 MG/DL (ref 8–22)
CALCIUM SERPL-MCNC: 8.7 MG/DL (ref 8.4–10.2)
CHLORIDE SERPL-SCNC: 103 MMOL/L (ref 96–112)
CO2 SERPL-SCNC: 27 MMOL/L (ref 20–33)
CREAT SERPL-MCNC: 0.69 MG/DL (ref 0.5–1.4)
EOSINOPHIL # BLD AUTO: 0.07 K/UL (ref 0–0.51)
EOSINOPHIL NFR BLD: 1.1 % (ref 0–6.9)
ERYTHROCYTE [DISTWIDTH] IN BLOOD BY AUTOMATED COUNT: 42.7 FL (ref 35.9–50)
GLOBULIN SER CALC-MCNC: 2.3 G/DL (ref 1.9–3.5)
GLUCOSE SERPL-MCNC: 89 MG/DL (ref 65–99)
HCT VFR BLD AUTO: 35.5 % (ref 37–47)
HGB BLD-MCNC: 12 G/DL (ref 12–16)
IMM GRANULOCYTES # BLD AUTO: 0.02 K/UL (ref 0–0.11)
IMM GRANULOCYTES NFR BLD AUTO: 0.3 % (ref 0–0.9)
LYMPHOCYTES # BLD AUTO: 1.81 K/UL (ref 1–4.8)
LYMPHOCYTES NFR BLD: 28.4 % (ref 22–41)
MCH RBC QN AUTO: 32.1 PG (ref 27–33)
MCHC RBC AUTO-ENTMCNC: 33.8 G/DL (ref 33.6–35)
MCV RBC AUTO: 94.9 FL (ref 81.4–97.8)
MONOCYTES # BLD AUTO: 0.4 K/UL (ref 0–0.85)
MONOCYTES NFR BLD AUTO: 6.3 % (ref 0–13.4)
NEUTROPHILS # BLD AUTO: 4.03 K/UL (ref 2–7.15)
NEUTROPHILS NFR BLD: 63.3 % (ref 44–72)
NRBC # BLD AUTO: 0 K/UL
NRBC BLD-RTO: 0 /100 WBC
PLATELET # BLD AUTO: 231 K/UL (ref 164–446)
PMV BLD AUTO: 9.7 FL (ref 9–12.9)
POTASSIUM SERPL-SCNC: 4 MMOL/L (ref 3.6–5.5)
PROT SERPL-MCNC: 5.7 G/DL (ref 6–8.2)
RBC # BLD AUTO: 3.74 M/UL (ref 4.2–5.4)
SODIUM SERPL-SCNC: 136 MMOL/L (ref 135–145)
WBC # BLD AUTO: 6.4 K/UL (ref 4.8–10.8)

## 2020-06-20 PROCEDURE — 80053 COMPREHEN METABOLIC PANEL: CPT

## 2020-06-20 PROCEDURE — 700105 HCHG RX REV CODE 258: Performed by: HOSPITALIST

## 2020-06-20 PROCEDURE — 85025 COMPLETE CBC W/AUTO DIFF WBC: CPT

## 2020-06-20 PROCEDURE — 770020 HCHG ROOM/CARE - TELE (206)

## 2020-06-20 PROCEDURE — 700105 HCHG RX REV CODE 258: Performed by: INTERNAL MEDICINE

## 2020-06-20 PROCEDURE — 99233 SBSQ HOSP IP/OBS HIGH 50: CPT | Performed by: INTERNAL MEDICINE

## 2020-06-20 PROCEDURE — 700102 HCHG RX REV CODE 250 W/ 637 OVERRIDE(OP): Performed by: HOSPITALIST

## 2020-06-20 PROCEDURE — 700111 HCHG RX REV CODE 636 W/ 250 OVERRIDE (IP): Performed by: HOSPITALIST

## 2020-06-20 PROCEDURE — A9270 NON-COVERED ITEM OR SERVICE: HCPCS | Performed by: HOSPITALIST

## 2020-06-20 RX ADMIN — SODIUM CHLORIDE, POTASSIUM CHLORIDE, SODIUM LACTATE AND CALCIUM CHLORIDE: 600; 310; 30; 20 INJECTION, SOLUTION INTRAVENOUS at 04:00

## 2020-06-20 RX ADMIN — ACETAMINOPHEN 650 MG: 325 TABLET, FILM COATED ORAL at 13:25

## 2020-06-20 RX ADMIN — GABAPENTIN 100 MG: 100 CAPSULE ORAL at 05:02

## 2020-06-20 RX ADMIN — LORAZEPAM 1 MG: 1 TABLET ORAL at 21:24

## 2020-06-20 RX ADMIN — ENOXAPARIN SODIUM 40 MG: 40 INJECTION SUBCUTANEOUS at 05:05

## 2020-06-20 RX ADMIN — VALPROIC ACID 250 MG: 250 CAPSULE, LIQUID FILLED ORAL at 21:25

## 2020-06-20 RX ADMIN — LORAZEPAM 0.5 MG: 1 TABLET ORAL at 17:30

## 2020-06-20 RX ADMIN — GABAPENTIN 100 MG: 100 CAPSULE ORAL at 17:29

## 2020-06-20 RX ADMIN — VALPROIC ACID 250 MG: 250 CAPSULE, LIQUID FILLED ORAL at 05:03

## 2020-06-20 RX ADMIN — SODIUM CHLORIDE, POTASSIUM CHLORIDE, SODIUM LACTATE AND CALCIUM CHLORIDE: 600; 310; 30; 20 INJECTION, SOLUTION INTRAVENOUS at 10:09

## 2020-06-20 RX ADMIN — SODIUM CHLORIDE, POTASSIUM CHLORIDE, SODIUM LACTATE AND CALCIUM CHLORIDE: 600; 310; 30; 20 INJECTION, SOLUTION INTRAVENOUS at 03:06

## 2020-06-20 RX ADMIN — ROSUVASTATIN CALCIUM 10 MG: 10 TABLET, FILM COATED ORAL at 17:29

## 2020-06-20 RX ADMIN — TELMISARTAN 80 MG: 40 TABLET ORAL at 05:03

## 2020-06-20 RX ADMIN — GABAPENTIN 100 MG: 100 CAPSULE ORAL at 13:19

## 2020-06-20 RX ADMIN — LORAZEPAM 1 MG: 1 TABLET ORAL at 10:06

## 2020-06-20 RX ADMIN — LORAZEPAM 0.5 MG: 1 TABLET ORAL at 13:25

## 2020-06-20 RX ADMIN — SODIUM CHLORIDE, POTASSIUM CHLORIDE, SODIUM LACTATE AND CALCIUM CHLORIDE: 600; 310; 30; 20 INJECTION, SOLUTION INTRAVENOUS at 21:26

## 2020-06-20 RX ADMIN — LORAZEPAM 1 MG: 1 TABLET ORAL at 05:03

## 2020-06-20 RX ADMIN — Medication 400 MG: at 17:29

## 2020-06-20 RX ADMIN — VALPROIC ACID 250 MG: 250 CAPSULE, LIQUID FILLED ORAL at 13:19

## 2020-06-20 RX ADMIN — Medication 400 MG: at 05:03

## 2020-06-20 ASSESSMENT — LIFESTYLE VARIABLES
VISUAL DISTURBANCES: NOT PRESENT
AUDITORY DISTURBANCES: NOT PRESENT
TOTAL SCORE: VERY MILD ITCHING, PINS AND NEEDLES SENSATION, BURNING OR NUMBNESS
TREMOR: *
PAROXYSMAL SWEATS: BARELY PERCEPTIBLE SWEATING. PALMS MOIST
VISUAL DISTURBANCES: NOT PRESENT
AUDITORY DISTURBANCES: NOT PRESENT
TOTAL SCORE: 8
ORIENTATION AND CLOUDING OF SENSORIUM: ORIENTED AND CAN DO SERIAL ADDITIONS
NAUSEA AND VOMITING: NO NAUSEA AND NO VOMITING
PAROXYSMAL SWEATS: BARELY PERCEPTIBLE SWEATING. PALMS MOIST
AUDITORY DISTURBANCES: NOT PRESENT
PAROXYSMAL SWEATS: BARELY PERCEPTIBLE SWEATING. PALMS MOIST
HEADACHE, FULLNESS IN HEAD: NOT PRESENT
AUDITORY DISTURBANCES: VERY MILD HARSHNESS OR ABILITY TO FRIGHTEN
VISUAL DISTURBANCES: NOT PRESENT
PAROXYSMAL SWEATS: NO SWEAT VISIBLE
HEADACHE, FULLNESS IN HEAD: NOT PRESENT
HEADACHE, FULLNESS IN HEAD: VERY MILD
TOTAL SCORE: 10
ANXIETY: *
PAROXYSMAL SWEATS: NO SWEAT VISIBLE
TOTAL SCORE: 3
ORIENTATION AND CLOUDING OF SENSORIUM: ORIENTED AND CAN DO SERIAL ADDITIONS
AGITATION: NORMAL ACTIVITY
TOTAL SCORE: 3
AGITATION: NORMAL ACTIVITY
AGITATION: NORMAL ACTIVITY
AUDITORY DISTURBANCES: NOT PRESENT
NAUSEA AND VOMITING: NO NAUSEA AND NO VOMITING
ANXIETY: MODERATELY ANXIOUS OR GUARDED, SO ANXIETY IS INFERRED
TREMOR: MODERATE TREMOR WITH ARMS EXTENDED
TOTAL SCORE: VERY MILD ITCHING, PINS AND NEEDLES SENSATION, BURNING OR NUMBNESS
ANXIETY: *
AGITATION: NORMAL ACTIVITY
NAUSEA AND VOMITING: NO NAUSEA AND NO VOMITING
HEADACHE, FULLNESS IN HEAD: VERY MILD
AGITATION: NORMAL ACTIVITY
NAUSEA AND VOMITING: NO NAUSEA AND NO VOMITING
TOTAL SCORE: VERY MILD ITCHING, PINS AND NEEDLES SENSATION, BURNING OR NUMBNESS
HEADACHE, FULLNESS IN HEAD: NOT PRESENT
ANXIETY: MILDLY ANXIOUS
ANXIETY: MILDLY ANXIOUS
VISUAL DISTURBANCES: NOT PRESENT
TREMOR: TREMOR NOT VISIBLE BUT CAN BE FELT, FINGERTIP TO FINGERTIP
ORIENTATION AND CLOUDING OF SENSORIUM: ORIENTED AND CAN DO SERIAL ADDITIONS
TOTAL SCORE: VERY MILD ITCHING, PINS AND NEEDLES SENSATION, BURNING OR NUMBNESS
TREMOR: *
AUDITORY DISTURBANCES: NOT PRESENT
ORIENTATION AND CLOUDING OF SENSORIUM: ORIENTED AND CAN DO SERIAL ADDITIONS
VISUAL DISTURBANCES: NOT PRESENT
TREMOR: TREMOR NOT VISIBLE BUT CAN BE FELT, FINGERTIP TO FINGERTIP
VISUAL DISTURBANCES: NOT PRESENT
ANXIETY: *
TOTAL SCORE: 8
TOTAL SCORE: VERY MILD ITCHING, PINS AND NEEDLES SENSATION, BURNING OR NUMBNESS
PAROXYSMAL SWEATS: NO SWEAT VISIBLE
ORIENTATION AND CLOUDING OF SENSORIUM: ORIENTED AND CAN DO SERIAL ADDITIONS
NAUSEA AND VOMITING: NO NAUSEA AND NO VOMITING
HEADACHE, FULLNESS IN HEAD: VERY MILD
TREMOR: *
NAUSEA AND VOMITING: NO NAUSEA AND NO VOMITING
AGITATION: NORMAL ACTIVITY
TOTAL SCORE: 7
ORIENTATION AND CLOUDING OF SENSORIUM: ORIENTED AND CAN DO SERIAL ADDITIONS

## 2020-06-20 ASSESSMENT — ENCOUNTER SYMPTOMS
SPEECH CHANGE: 0
CONSTIPATION: 0
INSOMNIA: 0
DEPRESSION: 0
HEADACHES: 0
SENSORY CHANGE: 0
CHILLS: 0
CLAUDICATION: 0
FEVER: 0
SHORTNESS OF BREATH: 0
MYALGIAS: 0
PHOTOPHOBIA: 0
HEARTBURN: 0
DIZZINESS: 0
NERVOUS/ANXIOUS: 1
BLURRED VISION: 0
WEAKNESS: 0
VOMITING: 0
COUGH: 0
DIARRHEA: 0
ABDOMINAL PAIN: 0

## 2020-06-20 ASSESSMENT — FIBROSIS 4 INDEX: FIB4 SCORE: 2.8

## 2020-06-20 NOTE — PROGRESS NOTES
Telemetry Shift Summary     Rhythm SR/ST  HR Range 87 - 108  Measurements 0.14 / 0.08 / 0.38           Normal Values  Rhythm SR  HR Range    Measurements 0.12-0.20 / 0.06-0.10  / 0.30-0.52

## 2020-06-20 NOTE — PROGRESS NOTES
Report received from MILLER Prakash. Pt denies needs at this time. Safety precautions in place. Call light within reach.

## 2020-06-20 NOTE — PROGRESS NOTES
Telemetry Shift Summary      Rhythm SR   HR Range 80-95  Ectopy -  Measurements 0.12/0.08/0.40        Normal Values  Rhythm SR  HR Range    Measurements 0.12-0.20 / 0.06-0.10  / 0.30-0.52

## 2020-06-20 NOTE — PROGRESS NOTES
Hospital Medicine Daily Progress Note    Date of Service  6/20/2020    Chief Complaint  54 y.o. female admitted 6/18/2020 with anxiety and vomiting.     Hospital Course    Patient presented with symptoms of alcohol withdrawal.  She was drinking 1 L of vodka daily and had cut back and started to feel withdrawal symptoms.  She was here 3 weeks prior with same symptoms.        Interval Problem Update  6/19 Patient seen and examined, admitted earlier today.  Alcohol withdrawal and alcoholic ketoacidosis.  On Ativan per CIWA, gabapentin, depakote.  She remains tachycardic and tremulous.  6/20 Patient admitted with CIWA protocol, gabapentin and depakote with improvement of symptoms. Day 2 she is feeling markedly better without need for ativan since early this am.  Will continue to monitor and I anticipate discharge home tomororw.    Consultants/Specialty  none    Code Status  full    Disposition  Home likely tomororw.    Review of Systems  Review of Systems   Constitutional: Negative for chills and fever.   HENT: Negative for congestion.    Eyes: Negative for blurred vision and photophobia.   Respiratory: Negative for cough and shortness of breath.    Cardiovascular: Negative for chest pain, claudication and leg swelling.   Gastrointestinal: Negative for abdominal pain, constipation, diarrhea, heartburn and vomiting.   Genitourinary: Negative for dysuria and hematuria.   Musculoskeletal: Negative for joint pain and myalgias.   Skin: Negative for itching and rash.   Neurological: Negative for dizziness, sensory change, speech change, weakness and headaches.   Psychiatric/Behavioral: Negative for depression. The patient is nervous/anxious (better). The patient does not have insomnia.         Physical Exam  Temp:  [36.6 °C (97.8 °F)-37.3 °C (99.2 °F)] 37.2 °C (98.9 °F)  Pulse:  [] 86  Resp:  [16-20] 18  BP: (120-159)/(66-90) 148/90  SpO2:  [92 %-96 %] 92 %    Physical Exam  Vitals signs and nursing note reviewed.    Constitutional:       General: She is not in acute distress.     Appearance: Normal appearance. She is not ill-appearing.   HENT:      Head: Normocephalic and atraumatic.      Nose: Nose normal.   Neck:      Musculoskeletal: Neck supple.   Cardiovascular:      Rate and Rhythm: Normal rate and regular rhythm.      Heart sounds: Normal heart sounds. No murmur.   Pulmonary:      Effort: Pulmonary effort is normal.      Breath sounds: Normal breath sounds.   Abdominal:      General: Bowel sounds are normal. There is no distension.      Palpations: Abdomen is soft.   Musculoskeletal:         General: No swelling or tenderness.   Skin:     General: Skin is warm and dry.   Neurological:      General: No focal deficit present.      Mental Status: She is alert and oriented to person, place, and time.   Psychiatric:         Mood and Affect: Mood normal.         Fluids    Intake/Output Summary (Last 24 hours) at 6/20/2020 1032  Last data filed at 6/20/2020 0729  Gross per 24 hour   Intake 1910 ml   Output --   Net 1910 ml       Laboratory  Recent Labs     06/18/20  2342 06/20/20  0138   WBC 9.7 6.4   RBC 4.70 3.74*   HEMOGLOBIN 14.9 12.0   HEMATOCRIT 45.5 35.5*   MCV 96.8 94.9   MCH 31.7 32.1   MCHC 32.7* 33.8   RDW 43.8 42.7   PLATELETCT 392 231   MPV 10.0 9.7     Recent Labs     06/18/20  2342 06/20/20  0138   SODIUM 142 136   POTASSIUM 4.3 4.0   CHLORIDE 93* 103   CO2 14* 27   GLUCOSE 75 89   BUN 17 10   CREATININE 1.33 0.69   CALCIUM 9.8 8.7     Recent Labs     06/18/20  2342   INR 0.97               Imaging  DX-CHEST-PORTABLE (1 VIEW)   Final Result         1.  No acute cardiopulmonary disease.           Assessment/Plan  * Alcoholic ketoacidosis- (present on admission)  Assessment & Plan  Resulting in severe lactic acidosis, dehydration.  IV fluids are cornerstone of treatment,  LA trended down  Zofran prn for vomiting  Advance diet as tolerated    Alcohol withdrawal (HCC)- (present on admission)  Assessment &  Plan  Admit to telemetry for treatment of alcohol withdrawal.  Alcohol withdrawal protocol ordered.  Ativan IV and oral on sliding scale based on CIWA score.  RAlly bag given in ER  Replace magnesium per protocol it is low  Check CMP, magnesium and phosphorus in am.  Clonidine 0.1 mg po every 3 hours as needed for blood pressure over 150 systolic.  Start oral gabapentin 100mg po tid and valproic acid 250mg po every 8 hours.  Social work consult during admission to provide information regarding alcohol dependence treatment options.      Hypomagnesemia- (present on admission)  Assessment & Plan  Due to alcohol abuse, replace per protocol, recheck in am         VTE prophylaxis: lovenox

## 2020-06-20 NOTE — PROGRESS NOTES
Patient rounded. Patient sleeping. Comfortable in bed. Appropriate safety precautions in place. Bed alarm ON. Will continue to monitor patient.

## 2020-06-20 NOTE — PROGRESS NOTES
Patient rounded. POC discussed. Medications administered as ordered. Comfortable in bed. Appropriate safety precautions in place. Bed alarm ON. Will continue to monitor patient.

## 2020-06-20 NOTE — PROGRESS NOTES
Received report from MILLER Rod, assumed care of pt. Pt disoriented to time, follows appropriately, pt tremulous. Tele strip reviewed, VSS. Medicated per DANITA, see MAR. Denies pain or needs, discussed plan of care, call light within reach.

## 2020-06-20 NOTE — PROGRESS NOTES
Received bedside patient report from MILELR Navas. Patient resting comfortably in bed, no complaints at this time. Safety precautions in place. Will continue to monitor.

## 2020-06-20 NOTE — CARE PLAN
Problem: Safety  Goal: Will remain free from injury  Outcome: PROGRESSING AS EXPECTED   Pt rings in appropriately for assistance after being reminded to use her call bell.  socks on, area free from clutter.    Problem: Pain Management  Goal: Pain level will decrease to patient's comfort goal  Outcome: PROGRESSING AS EXPECTED   Pt denies any pain at this time

## 2020-06-21 VITALS
HEART RATE: 79 BPM | BODY MASS INDEX: 23.09 KG/M2 | WEIGHT: 152.34 LBS | HEIGHT: 68 IN | RESPIRATION RATE: 18 BRPM | TEMPERATURE: 98 F | OXYGEN SATURATION: 94 % | SYSTOLIC BLOOD PRESSURE: 156 MMHG | DIASTOLIC BLOOD PRESSURE: 96 MMHG

## 2020-06-21 LAB
ANION GAP SERPL CALC-SCNC: 9 MMOL/L (ref 7–16)
BUN SERPL-MCNC: 6 MG/DL (ref 8–22)
CALCIUM SERPL-MCNC: 9 MG/DL (ref 8.4–10.2)
CHLORIDE SERPL-SCNC: 110 MMOL/L (ref 96–112)
CO2 SERPL-SCNC: 24 MMOL/L (ref 20–33)
CREAT SERPL-MCNC: 0.58 MG/DL (ref 0.5–1.4)
ERYTHROCYTE [DISTWIDTH] IN BLOOD BY AUTOMATED COUNT: 41.7 FL (ref 35.9–50)
GLUCOSE SERPL-MCNC: 85 MG/DL (ref 65–99)
HCT VFR BLD AUTO: 36.7 % (ref 37–47)
HGB BLD-MCNC: 12.3 G/DL (ref 12–16)
MCH RBC QN AUTO: 32.2 PG (ref 27–33)
MCHC RBC AUTO-ENTMCNC: 33.5 G/DL (ref 33.6–35)
MCV RBC AUTO: 96.1 FL (ref 81.4–97.8)
PLATELET # BLD AUTO: 179 K/UL (ref 164–446)
PMV BLD AUTO: 10.6 FL (ref 9–12.9)
POTASSIUM SERPL-SCNC: 3.8 MMOL/L (ref 3.6–5.5)
RBC # BLD AUTO: 3.82 M/UL (ref 4.2–5.4)
SODIUM SERPL-SCNC: 143 MMOL/L (ref 135–145)
WBC # BLD AUTO: 3.6 K/UL (ref 4.8–10.8)

## 2020-06-21 PROCEDURE — A9270 NON-COVERED ITEM OR SERVICE: HCPCS | Performed by: HOSPITALIST

## 2020-06-21 PROCEDURE — 85027 COMPLETE CBC AUTOMATED: CPT

## 2020-06-21 PROCEDURE — 700111 HCHG RX REV CODE 636 W/ 250 OVERRIDE (IP): Performed by: HOSPITALIST

## 2020-06-21 PROCEDURE — 80048 BASIC METABOLIC PNL TOTAL CA: CPT

## 2020-06-21 PROCEDURE — 99239 HOSP IP/OBS DSCHRG MGMT >30: CPT | Performed by: INTERNAL MEDICINE

## 2020-06-21 PROCEDURE — 700102 HCHG RX REV CODE 250 W/ 637 OVERRIDE(OP): Performed by: HOSPITALIST

## 2020-06-21 RX ORDER — LORAZEPAM 0.5 MG/1
0.5 TABLET ORAL EVERY 6 HOURS PRN
Qty: 10 TAB | Refills: 0 | Status: SHIPPED | OUTPATIENT
Start: 2020-06-21 | End: 2020-06-26

## 2020-06-21 RX ORDER — ONDANSETRON 4 MG/1
4 TABLET, ORALLY DISINTEGRATING ORAL EVERY 4 HOURS PRN
Qty: 30 TAB | Refills: 0 | Status: ON HOLD | OUTPATIENT
Start: 2020-06-21 | End: 2021-06-22

## 2020-06-21 RX ADMIN — LORAZEPAM 0.5 MG: 1 TABLET ORAL at 05:11

## 2020-06-21 RX ADMIN — GABAPENTIN 100 MG: 100 CAPSULE ORAL at 05:11

## 2020-06-21 RX ADMIN — Medication 400 MG: at 05:11

## 2020-06-21 RX ADMIN — TELMISARTAN 80 MG: 40 TABLET ORAL at 05:11

## 2020-06-21 RX ADMIN — ENOXAPARIN SODIUM 40 MG: 40 INJECTION SUBCUTANEOUS at 05:11

## 2020-06-21 RX ADMIN — VALPROIC ACID 250 MG: 250 CAPSULE, LIQUID FILLED ORAL at 05:11

## 2020-06-21 ASSESSMENT — LIFESTYLE VARIABLES
AUDITORY DISTURBANCES: NOT PRESENT
HEADACHE, FULLNESS IN HEAD: NOT PRESENT
AUDITORY DISTURBANCES: VERY MILD HARSHNESS OR ABILITY TO FRIGHTEN
VISUAL DISTURBANCES: NOT PRESENT
HEADACHE, FULLNESS IN HEAD: NOT PRESENT
ANXIETY: MILDLY ANXIOUS
ORIENTATION AND CLOUDING OF SENSORIUM: ORIENTED AND CAN DO SERIAL ADDITIONS
PAROXYSMAL SWEATS: BARELY PERCEPTIBLE SWEATING. PALMS MOIST
NAUSEA AND VOMITING: NO NAUSEA AND NO VOMITING
TREMOR: *
ANXIETY: *
AUDITORY DISTURBANCES: NOT PRESENT
ORIENTATION AND CLOUDING OF SENSORIUM: ORIENTED AND CAN DO SERIAL ADDITIONS
TOTAL SCORE: 4
NAUSEA AND VOMITING: NO NAUSEA AND NO VOMITING
PAROXYSMAL SWEATS: BARELY PERCEPTIBLE SWEATING. PALMS MOIST
TREMOR: *
PAROXYSMAL SWEATS: NO SWEAT VISIBLE
AGITATION: SOMEWHAT MORE THAN NORMAL ACTIVITY
ANXIETY: MILDLY ANXIOUS
TREMOR: TREMOR NOT VISIBLE BUT CAN BE FELT, FINGERTIP TO FINGERTIP
HEADACHE, FULLNESS IN HEAD: NOT PRESENT
AGITATION: NORMAL ACTIVITY
TOTAL SCORE: 6
VISUAL DISTURBANCES: NOT PRESENT
ORIENTATION AND CLOUDING OF SENSORIUM: ORIENTED AND CAN DO SERIAL ADDITIONS
TOTAL SCORE: 4
VISUAL DISTURBANCES: VERY MILD SENSITIVITY
NAUSEA AND VOMITING: NO NAUSEA AND NO VOMITING
AGITATION: NORMAL ACTIVITY

## 2020-06-21 NOTE — PROGRESS NOTES
Received report from MILLER Prakash. Plan of care discussed. Patient resting in bed comfortably with eyes closed. Bed alarm set. Assumed care of patient.

## 2020-06-21 NOTE — DISCHARGE SUMMARY
Discharge Summary    CHIEF COMPLAINT ON ADMISSION  No chief complaint on file.      Reason for Admission  detox, bp issues, heart issues     Admission Date  6/18/2020    CODE STATUS  Full Code    HPI & HOSPITAL COURSE  This is a 54 y.o. female here with nausea, anxiety and tremors.  She    presented with symptoms of acute alcohol withdrawal.  She was drinking 1 L of vodka daily and had cut back and started to feel withdrawal symptoms.  She was here 3 weeks prior with same symptoms.   She was admitted for [facility] protocol and received depakote and gabapentin to lessen the symptoms of withdrawal.  She tolerated this well and is appropriate for discharge home.      Therefore, she is discharged in good and stable condition to home with close outpatient follow-up.    The patient met 2-midnight criteria for an inpatient stay at the time of discharge.    Discharge Date  6/21/2020    FOLLOW UP ITEMS POST DISCHARGE  PCP - Dr Jackson    DISCHARGE DIAGNOSES  Principal Problem:    Alcoholic ketoacidosis POA: Yes  Active Problems:    Alcohol withdrawal (HCC) POA: Yes    Hypomagnesemia POA: Yes  Resolved Problems:    * No resolved hospital problems. *      FOLLOW UP  No future appointments.  Lopez Jackson M.D.  [address] [place] [address]  [phone]    In 2 weeks  follow up from hospitalization      MEDICATIONS ON DISCHARGE     Medication List      START taking these medications      Instructions   LORazepam 0.5 MG Tabs  Commonly known as:  ATIVAN   Take 1 Tab by mouth every 6 hours as needed for Anxiety for up to 5 days.  Dose:  0.5 mg        CHANGE how you take these medications      Instructions   ondansetron 4 MG Tbdp  What changed:    · when to take this  · reasons to take this  Commonly known as:  ZOFRAN ODT   Take 1 Tab by mouth every four hours as needed for Nausea (give PO if no IV route available).  Dose:  4 mg        CONTINUE taking these medications      Instructions   DHEA PO   Take 2 Tabs by mouth every  day.  Dose:  2 Tab     fish oil 1000 MG Caps capsule   Take 2,000 mg by mouth 4 times a day.  Dose:  2,000 mg     hydroCHLOROthiazide 25 MG Tabs  Commonly known as:  HYDRODIURIL   Take 25 mg by mouth every day. Indications: High Blood Pressure  Dose:  25 mg     LEVOTHROID PO   Take  by mouth.     multivitamin Tabs   Take 1 Tab by mouth every day.  Dose:  1 Tab     NON SPECIFIED   1 Dose by Injection route every day. HGH - From Dr. Jackson  Dose:  1 Dose     POTASSIUM PO   Take 1 Tab by mouth every day. Indications: OTC  Dose:  1 Tab     rosuvastatin 10 MG Tabs  Commonly known as:  CRESTOR   Take 1 Tab by mouth every evening.  Dose:  10 mg     telmisartan 80 MG Tabs  Commonly known as:  MICARDIS   Take 80 mg by mouth every day.  Dose:  80 mg     VITAMIN D PO   Take 1 Tab by mouth every day.  Dose:  1 Tab        STOP taking these medications    chlordiazePOXIDE 25 MG Caps  Commonly known as:  LIBRIUM     dicyclomine 20 MG Tabs  Commonly known as:  BENTYL            Allergies  Allergies   Allergen Reactions   • Kiwi Extract Swelling   • Latex Anaphylaxis   • Morphine Hives   • Vicodin [Hydrocodone-Acetaminophen] Rash     Generalized rash, puritis    • Banana    • Other Food      Tropical fruit         DIET  Orders Placed This Encounter   Procedures   • Diet Order Regular     Standing Status:   Standing     Number of Occurrences:   1     Order Specific Question:   Diet:     Answer:   Regular [1]       ACTIVITY  As tolerated.  Weight bearing as tolerated    CONSULTATIONS  none    PROCEDURES  none    LABORATORY  Lab Results   Component Value Date    SODIUM 143 06/21/2020    POTASSIUM 3.8 06/21/2020    CHLORIDE 110 06/21/2020    CO2 24 06/21/2020    GLUCOSE 85 06/21/2020    BUN 6 (L) 06/21/2020    CREATININE 0.58 06/21/2020        Lab Results   Component Value Date    WBC 3.6 (L) 06/21/2020    HEMOGLOBIN 12.3 06/21/2020    HEMATOCRIT 36.7 (L) 06/21/2020    PLATELETCT 179 06/21/2020        Total time of the discharge  process exceeds 35 minutes.

## 2020-06-21 NOTE — PROGRESS NOTES
Discharge order written. IV removed, patient tolerated. All personal belongings are in possession. AVS printed, reviewed and copy signed and placed on the chart. Patient has no further questions. Prescriptions sent to [facility] pharmacy and Rx hardcopy given to pt.  Discharged in satisfactory condition.PT left unit with self via walking. Staff escort  Transport.     Home medications returned to pt.

## 2020-06-21 NOTE — PROGRESS NOTES
Telemetry Strip     Strip printed: 0928  Measurements from am strip were as follows:  Rhythm: SR  HR: 70-74  Measurements: 0.10/ 0.10/ 0.40  Ectopy: r PVC             Normal Values  Rhythm SR  HR Range    Measurements 0.12-0.20 / 0.06-0.10  / 0.30-0.52

## 2020-06-21 NOTE — CARE PLAN
Problem: Communication  Goal: The ability to communicate needs accurately and effectively will improve  Outcome: PROGRESSING AS EXPECTED  Note: Pt verbalizes needs, call light in reach     Problem: Safety  Goal: Will remain free from injury  Note: Safety precautions in place, educated pt on plan of care including CIWA precautions.

## 2020-06-21 NOTE — DISCHARGE INSTRUCTIONS
Discharge Instructions    Discharged to home by car with relative. Discharged via wheelchair, hospital escort: Yes.  Special equipment needed: Not Applicable    Be sure to schedule a follow-up appointment with your primary care doctor or any specialists as instructed.     Discharge Plan:        I understand that a diet low in cholesterol, fat, and sodium is recommended for good health. Unless I have been given specific instructions below for another diet, I accept this instruction as my diet prescription.   Other diet: regular    Special Instructions: None    · Is patient discharged on Warfarin / Coumadin?   No     Depression / Suicide Risk    As you are discharged from this [facility] facility, it is important to learn how to keep safe from harming yourself.    Recognize the warning signs:  · Abrupt changes in personality, positive or negative- including increase in energy   · Giving away possessions  · Change in eating patterns- significant weight changes-  positive or negative  · Change in sleeping patterns- unable to sleep or sleeping all the time   · Unwillingness or inability to communicate  · Depression  · Unusual sadness, discouragement and loneliness  · Talk of wanting to die  · Neglect of personal appearance   · Rebelliousness- reckless behavior  · Withdrawal from people/activities they love  · Confusion- inability to concentrate     If you or a loved one observes any of these behaviors or has concerns about self-harm, here's what you can do:  · Talk about it- your feelings and reasons for harming yourself  · Remove any means that you might use to hurt yourself (examples: pills, rope, extension cords, firearm)  · Get professional help from the community (Mental Health, Substance Abuse, psychological counseling)  · Do not be alone:Call your Safe Contact- someone whom you trust who will be there for you.  · Call your local CRISIS HOTLINE 014-7628 or 004-419-0178  · Call your local Children's Mobile Crisis  Response Team [facility] (802) 292-4362 or www.Kere[employer]  · Call the toll free National Suicide Prevention Hotlines   · National Suicide Prevention Lifeline 338-448-MUMY (5343)  · [facility] Line Network 800-SUICIDE (055-8331)        Alcohol Withdrawal  Alcohol withdrawal is a group of symptoms that can develop when a person who drinks heavily and regularly stops drinking or drinks less.  What are the causes?  Heavy and regular drinking can cause chemicals that send signals from the brain to the body (neurotransmitters) to deactivate. Alcohol withdrawal develops when deactivated neurotransmitters reactivate because a person stops drinking or drinks less.  What increases the risk?  The more a person drinks and the longer he or she drinks, the greater the risk of alcohol withdrawal. Severe withdrawal is more likely to develop in someone who:  · Had severe alcohol withdrawal in the past.  · Had a seizure during a previous episode of alcohol withdrawal.  · Is elderly.  · Is pregnant.  · Has been abusing drugs.  · Has other medical problems, including:  ¨ Infection.  ¨ Heart, lung, or liver disease.  ¨ Seizures.  ¨ Mental health problems.  What are the signs or symptoms?  Symptoms of this condition can be mild to moderate, or they can be severe.  Mild to moderate symptoms may include:  · Fatigue.  · Nightmares.  · Trouble sleeping.  · Depression.  · Anxiety.  · Inability to think clearly.  · Mood swings.  · Irritability.  · Loss of appetite.  · Nausea or vomiting.  · Clammy skin.  · Extreme sweating.  · Rapid heartbeat.  · Shakiness.  · Uncontrollable shaking (tremor).  Severe symptoms may include:  · Fever.  · Seizures.  · Severe confusion.  · Feeling or seeing things that are not there (hallucinations).  Symptoms usually begin within eight hours after a person stops drinking or drinks less. They can last for weeks.  How is this diagnosed?  Alcohol withdrawal is diagnosed with a medical history and physical  exam. Sometimes, urine and blood tests are also done.  How is this treated?  Treatment may involve:  · Monitoring blood pressure, pulse, and breathing.  · Getting fluids through an IV tube.  · Medicine to reduce anxiety.  · Medicine to prevent or control seizures.  · Multivitamins and B vitamins.  · Having a health care provider check on you daily.  If symptoms are moderate to severe or if there is a risk of severe withdrawal, treatment may be done at a hospital or treatment center.  Follow these instructions at home:  · Take medicines and vitamin supplements only as directed by your health care provider.  · Do not drink alcohol.  · Have someone stay with you or be available if you need help.  · Drink enough fluid to keep your urine clear or pale yellow.  · Consider joining a 12-step program or another alcohol support group.  Contact a health care provider if:  · Your symptoms get worse or do not go away.  · You cannot keep food or water in your stomach.  · You are struggling with not drinking alcohol.  · You cannot stop drinking alcohol.  Get help right away if:  · You have an irregular heartbeat.  · You have chest pain.  · You have trouble breathing.  · You have symptoms of severe withdrawal, such as:  ¨ A fever.  ¨ Seizures.  ¨ Severe confusion.  ¨ Hallucinations.  This information is not intended to replace advice given to you by your health care provider. Make sure you discuss any questions you have with your health care provider.  Document Released: 09/27/2006 Document Revised: 04/26/2017 Document Reviewed: 10/06/2015  [employer] Interactive Patient Education © 2017 [employer]

## 2020-06-21 NOTE — PROGRESS NOTES
Telemetry Shift Summary    Rhythm SR  HR Range 70-85  Ectopy rPVC, oPAC  Measurements 0.14/0.08/0.40        Normal Values  Rhythm SR  HR Range    Measurements 0.12-0.20 / 0.06-0.10  / 0.30-0.52

## 2020-06-21 NOTE — PROGRESS NOTES
Took report from Dakota BHATT, pt is resting in bed no needs at this time. Bed in low locked position, call light in reach will continue to monitor.

## 2020-06-21 NOTE — PROGRESS NOTES
Received bedside patient report from MILLER Putnam. Patient resting comfortably in bed, no complaints at this time. Safety precautions in place. Family at bedside. Will continue to monitor.

## 2020-07-02 ENCOUNTER — HOSPITAL ENCOUNTER (EMERGENCY)
Facility: MEDICAL CENTER | Age: 55
End: 2020-07-02
Attending: EMERGENCY MEDICINE
Payer: COMMERCIAL

## 2020-07-02 VITALS
RESPIRATION RATE: 22 BRPM | TEMPERATURE: 98.3 F | HEART RATE: 83 BPM | DIASTOLIC BLOOD PRESSURE: 86 MMHG | HEIGHT: 67 IN | OXYGEN SATURATION: 95 % | BODY MASS INDEX: 22.01 KG/M2 | SYSTOLIC BLOOD PRESSURE: 134 MMHG | WEIGHT: 140.21 LBS

## 2020-07-02 DIAGNOSIS — F10.220 ALCOHOL DEPENDENCE WITH UNCOMPLICATED INTOXICATION (HCC): ICD-10-CM

## 2020-07-02 PROCEDURE — 99284 EMERGENCY DEPT VISIT MOD MDM: CPT

## 2020-07-02 SDOH — HEALTH STABILITY: MENTAL HEALTH: HOW MANY STANDARD DRINKS CONTAINING ALCOHOL DO YOU HAVE ON A TYPICAL DAY?: 10 OR MORE

## 2020-07-02 SDOH — HEALTH STABILITY: MENTAL HEALTH: HOW OFTEN DO YOU HAVE 6 OR MORE DRINKS ON ONE OCCASION?: DAILY OR ALMOST DAILY

## 2020-07-02 SDOH — HEALTH STABILITY: MENTAL HEALTH: HOW OFTEN DO YOU HAVE A DRINK CONTAINING ALCOHOL?: 4 OR MORE TIMES A WEEK

## 2020-07-02 ASSESSMENT — ENCOUNTER SYMPTOMS
SHORTNESS OF BREATH: 0
FEVER: 0
SENSORY CHANGE: 0
COUGH: 0
DIZZINESS: 0
HEADACHES: 0

## 2020-07-02 ASSESSMENT — FIBROSIS 4 INDEX: FIB4 SCORE: 3.61

## 2020-07-02 ASSESSMENT — LIFESTYLE VARIABLES: SUBSTANCE_ABUSE: 1

## 2020-07-02 NOTE — ED PROVIDER NOTES
ED Provider Note   2020  1:31 AM    Means of Arrival: Walk In  History obtained by: patient and sister  Limitations: none    CHIEF COMPLAINT  Chief Complaint   Patient presents with   • Detox     Pt states she needs to detox from alcohl, last drink about 1 hr ago       HPI  Yohana Brandon is a 54 y.o. female with history of alcohol dependence, multiple attempts at detox again presenting with concerns of wanting detox. She says she had several drinks today. Denies any withdrawal symptoms at this time but wants help with stopping drinking. Last drink was 1 hour ago. She has no complaints at this time. Last admission was on 20. Relapse shortly after discharge.     REVIEW OF SYSTEMS  Review of Systems   Constitutional: Negative for fever.   Respiratory: Negative for cough and shortness of breath.    Cardiovascular: Negative for chest pain.   Neurological: Negative for dizziness, sensory change and headaches.   Psychiatric/Behavioral: Positive for substance abuse.   All other systems reviewed and are negative.    See HPI for further details.     PAST MEDICAL HISTORY   has a past medical history of Alcohol abuse, ASTHMA, Elevated LFTs, Heart palpitations, Hypertension, and Psychiatric disorder.    SOCIAL HISTORY  Social History     Tobacco Use   • Smoking status: Never Smoker   • Smokeless tobacco: Never Used   Substance and Sexual Activity   • Alcohol use: Yes     Frequency: 4 or more times a week     Drinks per session: 10 or more     Binge frequency: Daily or almost daily     Comment: was sober for 2 years, started drinking again   • Drug use: No   • Sexual activity: Not on file       SURGICAL HISTORY   has a past surgical history that includes appendectomy (); endometrial ablation, thermal (); remove tonsils/adenoids,<11 y/o (); anesth,surg breast reconstructive (); hemorrhoidectomy (2011); anal sphincterotomy (2011); bunionectomy (); and  delivery+postpartum  "care.    CURRENT MEDICATIONS  Home Medications     Reviewed by Esperanza Gramajo R.N. (Registered Nurse) on 07/02/20 at 0123  Med List Status: Partial   Medication Last Dose Status   Cholecalciferol (VITAMIN D PO)  Active   hydrochlorothiazide (HYDRODIURIL) 25 MG TABS 7/1/2020 Active   Levothyroxine Sodium (LEVOTHROID PO) 7/1/2020 Active   multivitamin (THERAGRAN) Tab 7/1/2020 Active   NON SPECIFIED 7/1/2020 Active   Nutritional Supplements (DHEA PO) 7/1/2020 Active   Omega-3 Fatty Acids (FISH OIL) 1000 MG Cap capsule 7/1/2020 Active   ondansetron (ZOFRAN ODT) 4 MG TABLET DISPERSIBLE  Active   POTASSIUM PO 7/1/2020 Active   rosuvastatin (CRESTOR) 10 MG Tab 7/1/2020 Active   telmisartan (MICARDIS) 80 MG TABS 7/1/2020 Active                ALLERGIES  Allergies   Allergen Reactions   • Kiwi Extract Swelling   • Latex Anaphylaxis   • Morphine Hives   • Vicodin [Hydrocodone-Acetaminophen] Rash     Generalized rash, puritis    • Banana    • Other Food      Tropical fruit         PHYSICAL EXAM  VITAL SIGNS: /95   Pulse 89   Temp 36.8 °C (98.3 °F) (Temporal)   Resp (!) 24   Ht 1.702 m (5' 7\")   Wt 63.6 kg (140 lb 3.4 oz)   LMP 08/15/2011   SpO2 93%   Breastfeeding No   BMI 21.96 kg/m²    Pulse ox interpretation: I interpret this pulse ox as normal.  Constitutional: Alert in no apparent distress. Pleasant  HENT: Normocephalic, Atraumatic, Bilateral external ears normal. Nose normal.   Eyes: Pupils are equal. Conjunctiva normal, non-icteric.   Heart: Regular rate   Lungs: No respiratory distress, regular respirations.   Skin: Warm, Dry, No erythema, No rash.   Neurologic: Alert and oriented. Slightly slurred speech. Ambulatory.   Psychiatric: Affect normal, Judgment normal, Mood normal, Appears appropriate and showing mild signs of clinical intoxication  Physical Exam      COURSE & MEDICAL DECISION MAKING  Pertinent Labs & Imaging studies reviewed. (See chart for details)    1:31 AM This is an emergent " evaluation of a 54 y.o., female who presents with request for alcohol detox.  She is not showing any clinical signs of withdrawal at this time.  She does show signs of mild alcohol intoxication.  She also admits to drinking several drinks of alcohol today.  She has no other medical complaints.  She is here with her sister.  We have contacted renal behavioral health to see if there is a bed available for detox.  There is.  She is medically cleared.  There is no indication for further testing in the emergency department.  Her sister will provide [facility].       The patient will return for worsening symptoms and is stable at the time of discharge. The patient verbalizes understanding. Guidance was provided on appropriate use of medications including driving under the influence, overdose, and side effects.     FINAL IMPRESSION  1. Alcohol dependence with uncomplicated intoxication (HCC)               Electronically signed by: Tejas Kapoor II, M.D., 7/2/2020 1:31 AM

## 2020-07-02 NOTE — ED NOTES
Patient is stable for discharge at this time, anticipatory guidance provided, close follow-up is encouraged, and ED return instructions have been detailed. Patient and sister are both agreeable to the disposition and plan and discharged to go to [facility] for further Tx in ambulatory state and in good condition.

## 2020-07-02 NOTE — ED TRIAGE NOTES
"Chief Complaint   Patient presents with   • Detox     Pt states she needs to detox from alcohl, last drink about 1 hr ago     Pulse 89   Temp 36.8 °C (98.3 °F) (Temporal)   Resp (!) 24   Ht 1.702 m (5' 7\")   Wt 63.6 kg (140 lb 3.4 oz)   LMP 08/15/2011   SpO2 93%   Breastfeeding No   BMI 21.96 kg/m²       "

## 2020-08-20 ENCOUNTER — APPOINTMENT (OUTPATIENT)
Dept: RADIOLOGY | Facility: MEDICAL CENTER | Age: 55
End: 2020-08-20
Attending: FAMILY MEDICINE
Payer: COMMERCIAL

## 2020-08-25 ENCOUNTER — HOSPITAL ENCOUNTER (EMERGENCY)
Facility: MEDICAL CENTER | Age: 55
End: 2020-08-25
Attending: EMERGENCY MEDICINE
Payer: COMMERCIAL

## 2020-08-25 VITALS
SYSTOLIC BLOOD PRESSURE: 141 MMHG | HEART RATE: 89 BPM | DIASTOLIC BLOOD PRESSURE: 83 MMHG | OXYGEN SATURATION: 91 % | TEMPERATURE: 97.6 F | RESPIRATION RATE: 21 BRPM

## 2020-08-25 DIAGNOSIS — F10.920 ALCOHOLIC INTOXICATION WITHOUT COMPLICATION (HCC): ICD-10-CM

## 2020-08-25 DIAGNOSIS — F10.20 ALCOHOLISM (HCC): ICD-10-CM

## 2020-08-25 LAB
ALBUMIN SERPL BCP-MCNC: 4.7 G/DL (ref 3.2–4.9)
ALBUMIN/GLOB SERPL: 1.4 G/DL
ALP SERPL-CCNC: 86 U/L (ref 30–99)
ALT SERPL-CCNC: 38 U/L (ref 2–50)
ANION GAP SERPL CALC-SCNC: 16 MMOL/L (ref 7–16)
AST SERPL-CCNC: 38 U/L (ref 12–45)
BASOPHILS # BLD AUTO: 1.6 % (ref 0–1.8)
BASOPHILS # BLD: 0.09 K/UL (ref 0–0.12)
BILIRUB SERPL-MCNC: 0.2 MG/DL (ref 0.1–1.5)
BUN SERPL-MCNC: 15 MG/DL (ref 8–22)
CALCIUM SERPL-MCNC: 10.1 MG/DL (ref 8.4–10.2)
CHLORIDE SERPL-SCNC: 99 MMOL/L (ref 96–112)
CO2 SERPL-SCNC: 26 MMOL/L (ref 20–33)
CREAT SERPL-MCNC: 0.86 MG/DL (ref 0.5–1.4)
EKG IMPRESSION: NORMAL
EOSINOPHIL # BLD AUTO: 0.11 K/UL (ref 0–0.51)
EOSINOPHIL NFR BLD: 2 % (ref 0–6.9)
ERYTHROCYTE [DISTWIDTH] IN BLOOD BY AUTOMATED COUNT: 45.1 FL (ref 35.9–50)
ETHANOL BLD-MCNC: 403.2 MG/DL (ref 0–10.1)
GLOBULIN SER CALC-MCNC: 3.3 G/DL (ref 1.9–3.5)
GLUCOSE SERPL-MCNC: 99 MG/DL (ref 65–99)
HCT VFR BLD AUTO: 41.4 % (ref 37–47)
HGB BLD-MCNC: 14 G/DL (ref 12–16)
IMM GRANULOCYTES # BLD AUTO: 0.02 K/UL (ref 0–0.11)
IMM GRANULOCYTES NFR BLD AUTO: 0.4 % (ref 0–0.9)
LYMPHOCYTES # BLD AUTO: 1.74 K/UL (ref 1–4.8)
LYMPHOCYTES NFR BLD: 31.9 % (ref 22–41)
MCH RBC QN AUTO: 32.6 PG (ref 27–33)
MCHC RBC AUTO-ENTMCNC: 33.8 G/DL (ref 33.6–35)
MCV RBC AUTO: 96.5 FL (ref 81.4–97.8)
MONOCYTES # BLD AUTO: 0.43 K/UL (ref 0–0.85)
MONOCYTES NFR BLD AUTO: 7.9 % (ref 0–13.4)
NEUTROPHILS # BLD AUTO: 3.07 K/UL (ref 2–7.15)
NEUTROPHILS NFR BLD: 56.2 % (ref 44–72)
NRBC # BLD AUTO: 0 K/UL
NRBC BLD-RTO: 0 /100 WBC
PLATELET # BLD AUTO: 320 K/UL (ref 164–446)
PMV BLD AUTO: 9.2 FL (ref 9–12.9)
POTASSIUM SERPL-SCNC: 3.9 MMOL/L (ref 3.6–5.5)
PROT SERPL-MCNC: 8 G/DL (ref 6–8.2)
RBC # BLD AUTO: 4.29 M/UL (ref 4.2–5.4)
SODIUM SERPL-SCNC: 141 MMOL/L (ref 135–145)
TROPONIN T SERPL-MCNC: 8 NG/L (ref 6–19)
WBC # BLD AUTO: 5.5 K/UL (ref 4.8–10.8)

## 2020-08-25 PROCEDURE — 85025 COMPLETE CBC W/AUTO DIFF WBC: CPT

## 2020-08-25 PROCEDURE — 80307 DRUG TEST PRSMV CHEM ANLYZR: CPT

## 2020-08-25 PROCEDURE — 80053 COMPREHEN METABOLIC PANEL: CPT

## 2020-08-25 PROCEDURE — 84484 ASSAY OF TROPONIN QUANT: CPT

## 2020-08-25 PROCEDURE — 96374 THER/PROPH/DIAG INJ IV PUSH: CPT

## 2020-08-25 PROCEDURE — 99284 EMERGENCY DEPT VISIT MOD MDM: CPT

## 2020-08-25 PROCEDURE — 700111 HCHG RX REV CODE 636 W/ 250 OVERRIDE (IP): Performed by: EMERGENCY MEDICINE

## 2020-08-25 PROCEDURE — 36415 COLL VENOUS BLD VENIPUNCTURE: CPT

## 2020-08-25 PROCEDURE — 93005 ELECTROCARDIOGRAM TRACING: CPT | Performed by: EMERGENCY MEDICINE

## 2020-08-25 PROCEDURE — 700105 HCHG RX REV CODE 258: Performed by: EMERGENCY MEDICINE

## 2020-08-25 RX ORDER — TRAMADOL HYDROCHLORIDE 50 MG/1
50 TABLET ORAL 3 TIMES DAILY
Status: SHIPPED | COMMUNITY
Start: 2020-08-14 | End: 2021-06-20

## 2020-08-25 RX ORDER — SUVOREXANT 15 MG/1
15 TABLET, FILM COATED ORAL
Status: ON HOLD | COMMUNITY
End: 2021-12-28

## 2020-08-25 RX ORDER — SODIUM CHLORIDE, SODIUM LACTATE, POTASSIUM CHLORIDE, CALCIUM CHLORIDE 600; 310; 30; 20 MG/100ML; MG/100ML; MG/100ML; MG/100ML
1000 INJECTION, SOLUTION INTRAVENOUS ONCE
Status: COMPLETED | OUTPATIENT
Start: 2020-08-25 | End: 2020-08-25

## 2020-08-25 RX ORDER — LORAZEPAM 2 MG/ML
0.5 INJECTION INTRAMUSCULAR ONCE
Status: COMPLETED | OUTPATIENT
Start: 2020-08-25 | End: 2020-08-25

## 2020-08-25 RX ORDER — LORAZEPAM 0.5 MG/1
0.5 TABLET ORAL 2 TIMES DAILY
Status: SHIPPED | COMMUNITY
End: 2021-06-20

## 2020-08-25 RX ORDER — SUVOREXANT 15 MG/1
TABLET, FILM COATED ORAL
Status: SHIPPED | COMMUNITY
End: 2020-08-25

## 2020-08-25 RX ORDER — SUVOREXANT 15 MG/1
TABLET, FILM COATED ORAL
Status: SHIPPED | COMMUNITY
Start: 2020-08-18 | End: 2020-08-25

## 2020-08-25 RX ORDER — TRAMADOL HYDROCHLORIDE 50 MG/1
50 TABLET ORAL
Status: SHIPPED | COMMUNITY
Start: 2020-08-14 | End: 2020-08-25

## 2020-08-25 RX ORDER — ONDANSETRON 4 MG/1
TABLET, ORALLY DISINTEGRATING ORAL
Status: SHIPPED | COMMUNITY
End: 2020-08-25

## 2020-08-25 RX ADMIN — LORAZEPAM 0.5 MG: 2 INJECTION INTRAMUSCULAR; INTRAVENOUS at 15:25

## 2020-08-25 RX ADMIN — SODIUM CHLORIDE, POTASSIUM CHLORIDE, SODIUM LACTATE AND CALCIUM CHLORIDE 1000 ML: 600; 310; 30; 20 INJECTION, SOLUTION INTRAVENOUS at 15:25

## 2020-08-25 NOTE — ED NOTES
Pt sleeping Roused easily to tactile stimulation Pleasant and co operative VSS Warm blankets renewed POC reviewed

## 2020-08-25 NOTE — ED TRIAGE NOTES
Chief Complaint   Patient presents with   • Detox     alcohol detox      pt bib family. Pt  wanting to detox from alcohol and then go to rehab. Last drink about 2 hrs ago.

## 2020-08-25 NOTE — ED NOTES
Assumed pt care Pt sleeping VSS Remains sl tachy Family at bedside POC reviewed Still awaiting lab results

## 2020-08-25 NOTE — ED NOTES
Med rec updated and complete  Allergies reviewed  Pt is not sure what medication she is taking.  Called  ([facility]) @ [phone], wrong number  Called pts sister (Madai) @ [phone], left message.  Called [facility] @ [phone], to verify all prescription medications.  Pt is not sure when she took her medications last.  Pts pharmacy reports no antibiotics in the last 2 weeks  Per pharmacy pt had an RX for LORAZEPAM 0.5MG 1 tablet BID on 7/15/2020 for 30 day course

## 2020-08-25 NOTE — ED PROVIDER NOTES
"ED Provider Note    CHIEF COMPLAINT  Chief Complaint   Patient presents with   • Detox     alcohol detox       HPI  Yohana Brandon is a 55 y.o. female who presents for evaluation of alcohol detox.  Patient is an alcoholic.  She has had multiple visits to the emergency department this year for alcohol intoxication.  She evidently has had court ordered treatment in the past.  She is here with a friend who states that they went to [facility] today for treatment but they wanted her evaluated in the emergency department prior to excepting her.  She states her last drink was 2 hours ago.  She is complaining of \"heart pain\".  She denies any vomiting.    REVIEW OF SYSTEMS  See HPI for further details. All other systems negative.    PAST MEDICAL HISTORY  Past Medical History:   Diagnosis Date   • Alcohol abuse    • ASTHMA    • Elevated LFTs    • Heart palpitations     w/ alcohol withdrawal   • Hypertension    • Psychiatric disorder     alcohol dependance       FAMILY HISTORY  Family History   Problem Relation Age of Onset   • Stroke Mother        SOCIAL HISTORY  Social History     Socioeconomic History   • Marital status:      Spouse name: Not on file   • Number of children: Not on file   • Years of education: Not on file   • Highest education level: Not on file   Occupational History   • Not on file   Social Needs   • Financial resource strain: Not on file   • Food insecurity     Worry: Not on file     Inability: Not on file   • Transportation needs     Medical: Not on file     Non-medical: Not on file   Tobacco Use   • Smoking status: Never Smoker   • Smokeless tobacco: Never Used   Substance and Sexual Activity   • Alcohol use: Yes     Frequency: 4 or more times a week     Drinks per session: 10 or more     Binge frequency: Daily or almost daily     Comment: last drink was about 2 hrs ago   • Drug use: No   • Sexual activity: Not on file   Lifestyle   • Physical activity     Days per week: Not " on file     Minutes per session: Not on file   • Stress: Not on file   Relationships   • Social connections     Talks on phone: Not on file     Gets together: Not on file     Attends Confucianist service: Not on file     Active member of club or organization: Not on file     Attends meetings of clubs or organizations: Not on file     Relationship status: Not on file   • Intimate partner violence     Fear of current or ex partner: Not on file     Emotionally abused: Not on file     Physically abused: Not on file     Forced sexual activity: Not on file   Other Topics Concern   • Not on file   Social History Narrative   • Not on file       SURGICAL HISTORY  Past Surgical History:   Procedure Laterality Date   • HEMORRHOIDECTOMY  2011    Performed by SONY MACDONALD at SURGERY [facility]   • ANAL SPHINCTEROTOMY  2011    Performed by SONY MACDONALD at SURGERY [facility]   • PB ENDOMETRIAL ABLATION, THERMAL     • APPENDECTOMY     • PB ANESTH,SURG BREAST RECONSTRUCTIVE      bilateral    • BUNIONECTOMY      bilateral   • PB REMOVE TONSILS/ADENOIDS,<13 Y/O     • PB  DELIVERY+POSTPARTUM CARE      x2       CURRENT MEDICATIONS  Home Medications    **Home medications have not yet been reviewed for this encounter**         ALLERGIES  Allergies   Allergen Reactions   • Kiwi Extract Swelling   • Latex Anaphylaxis   • Morphine Hives   • Vicodin [Hydrocodone-Acetaminophen] Rash     Generalized rash, puritis    • Banana    • Other Food      Tropical fruit         PHYSICAL EXAM  VITAL SIGNS: /88   Pulse (!) 120   Temp 36.4 °C (97.6 °F) (Temporal)   Resp (!) 24   LMP 08/15/2011   SpO2 90%   Constitutional: Well developed, Well nourished,  Non-toxic appearance.   HENT: Normocephalic, Atraumatic.  Eyes:  EOMI, Conjunctiva normal, No discharge.   Cardiovascular: Tachycardic, Normal rhythm, No murmurs, No rubs, No gallops.   Thorax & Lungs: Clear to auscultation without wheezes, rales, or  rhonchi. No chest tenderness.   Abdomen: Soft and nontender.  Skin: Warm, Dry.  Musculoskeletal: Good range of motion in all major joints.   Neurologic: Awake and alert, No focal deficits noted.             COURSE & MEDICAL DECISION MAKING  Pertinent Labs & Imaging studies reviewed. (See chart for details)  This is a 55-year-old here for alcohol detox.  Patient is an alcoholic.  She has had multiple visits to the emergency department.  She is here with a friend.  They went to renal behavioral today and they requested that she come to the emergency department for evaluation prior to admission into their program.  Patient states that her last drink was 2 hours prior to arrival.  Patient is awake but does appear to be intoxicated.  She is tachycardic and states she is feeling anxious.  An IV is established and she is hydrated with IV fluid and treated with 1/2 mg of Ativan.  Her laboratories include chemistries which are completely normal to include liver function studies.  She had complained of heart pain and therefore a troponin is obtained and is normal.  I do not believe this to be cardiac in etiology.  CBC is normal.  Diagnostic alcohol is 400.  She has a heart score of 2 indicative of no need for acute hospitalization for cardiac evaluation.  At this point the patient will be here hours before she is sober enough to go to renal behavioral health for inpatient alcohol detoxification.  I will sign care of the patient out to my partner Dr. Meredith.    FINAL IMPRESSION  1.  Acute alcohol intoxication  2.  Alcoholism   3.         Electronically signed by: Surinder Powell M.D., 8/25/2020 3:16 PM

## 2020-08-26 NOTE — ED PROVIDER NOTES
ED Provider Note    Patient was signed out to me at approximately 5:30 PM.  We were awaiting the patient to be clinically sober enough to go to alcohol rehab.  After several hours of observation she was dilatory without ataxia and clinically appropriate for evaluation with rehab.  She will be taken over there by her sister

## 2020-08-26 NOTE — ED NOTES
Road tested pt Ambul about dept Staedy gait No assistance required  Well cyndi Pt AAO x 4 Pleasant and co operative ERP aware

## 2020-11-05 ENCOUNTER — APPOINTMENT (OUTPATIENT)
Dept: RADIOLOGY | Facility: MEDICAL CENTER | Age: 55
End: 2020-11-05
Attending: EMERGENCY MEDICINE
Payer: COMMERCIAL

## 2020-11-05 ENCOUNTER — HOSPITAL ENCOUNTER (EMERGENCY)
Facility: MEDICAL CENTER | Age: 55
End: 2020-11-06
Attending: EMERGENCY MEDICINE | Admitting: EMERGENCY MEDICINE
Payer: COMMERCIAL

## 2020-11-05 DIAGNOSIS — R07.9 CHEST PAIN, UNSPECIFIED TYPE: ICD-10-CM

## 2020-11-05 DIAGNOSIS — F10.920 ALCOHOLIC INTOXICATION WITHOUT COMPLICATION (HCC): ICD-10-CM

## 2020-11-05 DIAGNOSIS — K29.20 ACUTE ALCOHOLIC GASTRITIS WITHOUT HEMORRHAGE: ICD-10-CM

## 2020-11-05 LAB
ALBUMIN SERPL BCP-MCNC: 4.7 G/DL (ref 3.2–4.9)
ALBUMIN/GLOB SERPL: 1.5 G/DL
ALP SERPL-CCNC: 91 U/L (ref 30–99)
ALT SERPL-CCNC: 37 U/L (ref 2–50)
ANION GAP SERPL CALC-SCNC: 18 MMOL/L (ref 7–16)
AST SERPL-CCNC: 42 U/L (ref 12–45)
BASOPHILS # BLD AUTO: 1.1 % (ref 0–1.8)
BASOPHILS # BLD: 0.08 K/UL (ref 0–0.12)
BILIRUB SERPL-MCNC: 0.3 MG/DL (ref 0.1–1.5)
BUN SERPL-MCNC: 11 MG/DL (ref 8–22)
CALCIUM SERPL-MCNC: 10.3 MG/DL (ref 8.4–10.2)
CHLORIDE SERPL-SCNC: 100 MMOL/L (ref 96–112)
CO2 SERPL-SCNC: 25 MMOL/L (ref 20–33)
COVID ORDER STATUS COVID19: NORMAL
CREAT SERPL-MCNC: 0.67 MG/DL (ref 0.5–1.4)
EKG IMPRESSION: NORMAL
EOSINOPHIL # BLD AUTO: 0.07 K/UL (ref 0–0.51)
EOSINOPHIL NFR BLD: 1 % (ref 0–6.9)
ERYTHROCYTE [DISTWIDTH] IN BLOOD BY AUTOMATED COUNT: 43.4 FL (ref 35.9–50)
ETHANOL BLD-MCNC: 313.4 MG/DL (ref 0–10)
GLOBULIN SER CALC-MCNC: 3.2 G/DL (ref 1.9–3.5)
GLUCOSE SERPL-MCNC: 107 MG/DL (ref 65–99)
HCT VFR BLD AUTO: 44.3 % (ref 37–47)
HGB BLD-MCNC: 14.8 G/DL (ref 12–16)
IMM GRANULOCYTES # BLD AUTO: 0.02 K/UL (ref 0–0.11)
IMM GRANULOCYTES NFR BLD AUTO: 0.3 % (ref 0–0.9)
LIPASE SERPL-CCNC: 45 U/L (ref 7–58)
LYMPHOCYTES # BLD AUTO: 2.48 K/UL (ref 1–4.8)
LYMPHOCYTES NFR BLD: 34.3 % (ref 22–41)
MCH RBC QN AUTO: 32.1 PG (ref 27–33)
MCHC RBC AUTO-ENTMCNC: 33.4 G/DL (ref 33.6–35)
MCV RBC AUTO: 96.1 FL (ref 81.4–97.8)
MONOCYTES # BLD AUTO: 0.5 K/UL (ref 0–0.85)
MONOCYTES NFR BLD AUTO: 6.9 % (ref 0–13.4)
NEUTROPHILS # BLD AUTO: 4.08 K/UL (ref 2–7.15)
NEUTROPHILS NFR BLD: 56.4 % (ref 44–72)
NRBC # BLD AUTO: 0 K/UL
NRBC BLD-RTO: 0 /100 WBC
PLATELET # BLD AUTO: 299 K/UL (ref 164–446)
PMV BLD AUTO: 9.4 FL (ref 9–12.9)
POTASSIUM SERPL-SCNC: 3.9 MMOL/L (ref 3.6–5.5)
PROT SERPL-MCNC: 7.9 G/DL (ref 6–8.2)
RBC # BLD AUTO: 4.61 M/UL (ref 4.2–5.4)
SODIUM SERPL-SCNC: 143 MMOL/L (ref 135–145)
TROPONIN T SERPL-MCNC: <6 NG/L (ref 6–19)
WBC # BLD AUTO: 7.2 K/UL (ref 4.8–10.8)

## 2020-11-05 PROCEDURE — 96375 TX/PRO/DX INJ NEW DRUG ADDON: CPT

## 2020-11-05 PROCEDURE — 93005 ELECTROCARDIOGRAM TRACING: CPT

## 2020-11-05 PROCEDURE — 85025 COMPLETE CBC W/AUTO DIFF WBC: CPT

## 2020-11-05 PROCEDURE — U0003 INFECTIOUS AGENT DETECTION BY NUCLEIC ACID (DNA OR RNA); SEVERE ACUTE RESPIRATORY SYNDROME CORONAVIRUS 2 (SARS-COV-2) (CORONAVIRUS DISEASE [COVID-19]), AMPLIFIED PROBE TECHNIQUE, MAKING USE OF HIGH THROUGHPUT TECHNOLOGIES AS DESCRIBED BY CMS-2020-01-R: HCPCS

## 2020-11-05 PROCEDURE — 96374 THER/PROPH/DIAG INJ IV PUSH: CPT

## 2020-11-05 PROCEDURE — 80053 COMPREHEN METABOLIC PANEL: CPT

## 2020-11-05 PROCEDURE — A9270 NON-COVERED ITEM OR SERVICE: HCPCS | Performed by: EMERGENCY MEDICINE

## 2020-11-05 PROCEDURE — 71045 X-RAY EXAM CHEST 1 VIEW: CPT

## 2020-11-05 PROCEDURE — 80307 DRUG TEST PRSMV CHEM ANLYZR: CPT

## 2020-11-05 PROCEDURE — 99284 EMERGENCY DEPT VISIT MOD MDM: CPT

## 2020-11-05 PROCEDURE — 700102 HCHG RX REV CODE 250 W/ 637 OVERRIDE(OP): Performed by: EMERGENCY MEDICINE

## 2020-11-05 PROCEDURE — 83690 ASSAY OF LIPASE: CPT

## 2020-11-05 PROCEDURE — 700101 HCHG RX REV CODE 250: Performed by: EMERGENCY MEDICINE

## 2020-11-05 PROCEDURE — C9803 HOPD COVID-19 SPEC COLLECT: HCPCS | Performed by: EMERGENCY MEDICINE

## 2020-11-05 PROCEDURE — 84484 ASSAY OF TROPONIN QUANT: CPT

## 2020-11-05 PROCEDURE — 93005 ELECTROCARDIOGRAM TRACING: CPT | Performed by: EMERGENCY MEDICINE

## 2020-11-05 RX ORDER — LORAZEPAM 1 MG/1
3 TABLET ORAL
Status: DISCONTINUED | OUTPATIENT
Start: 2020-11-05 | End: 2020-11-06 | Stop reason: HOSPADM

## 2020-11-05 RX ORDER — LORAZEPAM 1 MG/1
2 TABLET ORAL
Status: DISCONTINUED | OUTPATIENT
Start: 2020-11-05 | End: 2020-11-06 | Stop reason: HOSPADM

## 2020-11-05 RX ORDER — LORAZEPAM 1 MG/1
4 TABLET ORAL
Status: DISCONTINUED | OUTPATIENT
Start: 2020-11-05 | End: 2020-11-06 | Stop reason: HOSPADM

## 2020-11-05 RX ORDER — LORAZEPAM 2 MG/ML
1.5 INJECTION INTRAMUSCULAR
Status: DISCONTINUED | OUTPATIENT
Start: 2020-11-05 | End: 2020-11-06 | Stop reason: HOSPADM

## 2020-11-05 RX ORDER — LORAZEPAM 2 MG/ML
1 INJECTION INTRAMUSCULAR
Status: DISCONTINUED | OUTPATIENT
Start: 2020-11-05 | End: 2020-11-06 | Stop reason: HOSPADM

## 2020-11-05 RX ORDER — LORAZEPAM 2 MG/ML
2 INJECTION INTRAMUSCULAR
Status: DISCONTINUED | OUTPATIENT
Start: 2020-11-05 | End: 2020-11-06 | Stop reason: HOSPADM

## 2020-11-05 RX ORDER — LORAZEPAM 2 MG/ML
0.5 INJECTION INTRAMUSCULAR EVERY 4 HOURS PRN
Status: DISCONTINUED | OUTPATIENT
Start: 2020-11-05 | End: 2020-11-06 | Stop reason: HOSPADM

## 2020-11-05 RX ORDER — LORAZEPAM 1 MG/1
0.5 TABLET ORAL EVERY 4 HOURS PRN
Status: DISCONTINUED | OUTPATIENT
Start: 2020-11-05 | End: 2020-11-06 | Stop reason: HOSPADM

## 2020-11-05 RX ORDER — LORAZEPAM 1 MG/1
1 TABLET ORAL EVERY 4 HOURS PRN
Status: DISCONTINUED | OUTPATIENT
Start: 2020-11-05 | End: 2020-11-06 | Stop reason: HOSPADM

## 2020-11-05 RX ADMIN — THIAMINE HYDROCHLORIDE: 100 INJECTION, SOLUTION INTRAMUSCULAR; INTRAVENOUS at 22:28

## 2020-11-05 RX ADMIN — LIDOCAINE HYDROCHLORIDE 30 ML: 20 SOLUTION OROPHARYNGEAL at 22:27

## 2020-11-05 ASSESSMENT — LIFESTYLE VARIABLES
AUDITORY DISTURBANCES: NOT PRESENT
TREMOR: NO TREMOR
TOTAL SCORE: 0
VISUAL DISTURBANCES: NOT PRESENT
PAROXYSMAL SWEATS: NO SWEAT VISIBLE
ANXIETY: NO ANXIETY (AT EASE)
HEADACHE, FULLNESS IN HEAD: NOT PRESENT
NAUSEA AND VOMITING: NO NAUSEA AND NO VOMITING
ORIENTATION AND CLOUDING OF SENSORIUM: ORIENTED AND CAN DO SERIAL ADDITIONS
AGITATION: NORMAL ACTIVITY

## 2020-11-05 ASSESSMENT — FIBROSIS 4 INDEX: FIB4 SCORE: 1.02

## 2020-11-06 VITALS
RESPIRATION RATE: 24 BRPM | SYSTOLIC BLOOD PRESSURE: 142 MMHG | WEIGHT: 153.44 LBS | TEMPERATURE: 98.2 F | DIASTOLIC BLOOD PRESSURE: 79 MMHG | BODY MASS INDEX: 23.26 KG/M2 | HEART RATE: 92 BPM | HEIGHT: 68 IN | OXYGEN SATURATION: 93 %

## 2020-11-06 LAB
SARS-COV-2 RNA RESP QL NAA+PROBE: NOTDETECTED
SPECIMEN SOURCE: NORMAL
TROPONIN T SERPL-MCNC: <6 NG/L (ref 6–19)

## 2020-11-06 PROCEDURE — 84484 ASSAY OF TROPONIN QUANT: CPT

## 2020-11-06 RX ORDER — OMEPRAZOLE 20 MG/1
20 CAPSULE, DELAYED RELEASE ORAL DAILY
Qty: 30 CAP | Refills: 0 | Status: SHIPPED | OUTPATIENT
Start: 2020-11-06 | End: 2020-12-06

## 2020-11-06 NOTE — DISCHARGE INSTRUCTIONS
You were seen in the ER for epigastric pain in the context of heavy alcohol use which is likely due to alcohol gastritis.  This is inflammation of the lining of the stomach due to alcohol as we discussed in the ER.  You did receive relief from the GI cocktail that we provided to you and so I am giving you a prescription for Prilosec.  Your labs and imaging did not reveal an acute abnormality and you are safe to go home.  Please follow-up with your primary care physician and continue to commit to discontinuing your alcohol use as you have planned.  It was a pleasure to meet you, good luck I hope you feel better soon!

## 2020-11-06 NOTE — ED PROVIDER NOTES
"ED Provider Note    CHIEF COMPLAINT  Chief Complaint   Patient presents with   • Alcohol Intoxication     drank \"a lot\" of vodka tonight, unsure of amount.    • Chest Pain     pt states she feels like shes having a heart attack. denies arm pain, jaw pain or back pain.        HPI  Yohana Brandon is a 55 y.o. female with a history of alcohol abuse, hypertension, and hyperlipidemia who presents with a chief complaint of nonradiating, constant, sharp epigastric pain that started approximately 1 hour prior to arrival while she was drinking alcohol.  She notes that she is due to go into an inpatient alcohol detox facility tomorrow morning but tonight, while drinking, she developed severe chest pain.  She did not try any medications for her symptoms.  She notes associated mild shortness of breath.  Nothing makes the pain better or worse.  She did not have any associated diaphoresis, nausea, vomiting beyond her baseline, diarrhea, constipation.  She has had a nonproductive cough for the past week without fevers but her  is currently ill with fever and cough.  She is uncertain if she was COVID-19 positive and has not been tested in the past.  She drinks at least 1 pint of alcohol on a daily basis and her last drink was 30 minutes prior to arrival.  She does have a history of alcohol withdrawal seizures.  She uses Ativan and Zofran at baseline at home.    ACS risk factors: No history of MI or DM, tobacco use, amphetamine/cocaine use, obesity, or family history of CAD prior to age 60.  Patient does have a history of hypertension and hyperlipidemia.    ACS features: Not exertional, no diaphoresis    PE risk factors: No history of DVT/PE, pleuritic component, hemoptysis, unilateral leg swelling/pain, recent travel/immobilization, recent surgery.  She is currently taking exogenous estrogen for menopausal symptoms.    Aortic dissection features: No neuro symptoms, does not radiate to the back, no ripping/tearing " "pain    No IVDA, not positional    REVIEW OF SYSTEMS  See HPI for further details.  Chest pain.  Alcohol intoxication.  All other systems are negative.     PAST MEDICAL HISTORY   has a past medical history of Alcohol abuse, ASTHMA, Elevated LFTs, Heart palpitations, Hypertension, and Psychiatric disorder.    SOCIAL HISTORY  Social History     Tobacco Use   • Smoking status: Never Smoker   • Smokeless tobacco: Never Used   Substance and Sexual Activity   • Alcohol use: Yes     Frequency: 4 or more times a week     Drinks per session: 10 or more     Binge frequency: Daily or almost daily   • Drug use: No   • Sexual activity: Not on file       SURGICAL HISTORY   has a past surgical history that includes appendectomy (); endometrial ablation, thermal (); remove tonsils/adenoids,<13 y/o (); anesth,surg breast reconstructive (); hemorrhoidectomy (2011); anal sphincterotomy (2011); bunionectomy (); and  delivery+postpartum care.    CURRENT MEDICATIONS  Home Medications    **Home medications have not yet been reviewed for this encounter**         ALLERGIES  Allergies   Allergen Reactions   • Kiwi Extract Swelling   • Latex Anaphylaxis   • Morphine Hives   • Vicodin [Hydrocodone-Acetaminophen] Rash     Generalized rash, puritis    • Banana Shortness of Breath   • Other Food Hives and Shortness of Breath     Tropical fruit         PHYSICAL EXAM  VITAL SIGNS: /92   Pulse 86   Temp 36.8 °C (98.2 °F) (Temporal)   Resp 16   Ht 1.715 m (5' 7.5\")   Wt 69.6 kg (153 lb 7 oz)   LMP 08/15/2011   SpO2 97%   Breastfeeding No   BMI 23.68 kg/m²    Pulse ox interpretation: I interpret this pulse ox as normal.  Constitutional: Alert in no apparent distress.  HENT: No signs of trauma, Bilateral external ears normal, Nose normal.  Dry mucous membranes.  Eyes: Pupils are equal and reactive, Conjunctiva normal, Non-icteric.   Neck: Normal range of motion, No tenderness, Supple, No stridor. "   Lymphatic: No lymphadenopathy noted.   Cardiovascular: Regular rate and rhythm, no murmurs. Pulses symmetrical.  Thorax & Lungs: Normal breath sounds, No respiratory distress, No wheezing, No chest tenderness.   Abdomen: Bowel sounds normal, Soft, No tenderness, No masses, No pulsatile masses. No peritoneal signs.  Skin: Warm, Dry, No erythema, No rash.   Back: Normal alignment.  Extremities: Intact distal pulses, No edema, No tenderness, No cyanosis.  Musculoskeletal: Good range of motion in all major joints. No tenderness to palpation or major deformities noted.   Neurologic: Alert, Normal motor function, Normal sensory function, No focal deficits noted.   Psychiatric: Affect normal, Judgment normal, Mood normal.     DIAGNOSTIC STUDIES / PROCEDURES    EKG    LABS  Results for orders placed or performed during the hospital encounter of 11/05/20   CBC WITH DIFFERENTIAL   Result Value Ref Range    WBC 7.2 4.8 - 10.8 K/uL    RBC 4.61 4.20 - 5.40 M/uL    Hemoglobin 14.8 12.0 - 16.0 g/dL    Hematocrit 44.3 37.0 - 47.0 %    MCV 96.1 81.4 - 97.8 fL    MCH 32.1 27.0 - 33.0 pg    MCHC 33.4 (L) 33.6 - 35.0 g/dL    RDW 43.4 35.9 - 50.0 fL    Platelet Count 299 164 - 446 K/uL    MPV 9.4 9.0 - 12.9 fL    Neutrophils-Polys 56.40 44.00 - 72.00 %    Lymphocytes 34.30 22.00 - 41.00 %    Monocytes 6.90 0.00 - 13.40 %    Eosinophils 1.00 0.00 - 6.90 %    Basophils 1.10 0.00 - 1.80 %    Immature Granulocytes 0.30 0.00 - 0.90 %    Nucleated RBC 0.00 /100 WBC    Neutrophils (Absolute) 4.08 2.00 - 7.15 K/uL    Lymphs (Absolute) 2.48 1.00 - 4.80 K/uL    Monos (Absolute) 0.50 0.00 - 0.85 K/uL    Eos (Absolute) 0.07 0.00 - 0.51 K/uL    Baso (Absolute) 0.08 0.00 - 0.12 K/uL    Immature Granulocytes (abs) 0.02 0.00 - 0.11 K/uL    NRBC (Absolute) 0.00 K/uL   Comp Metabolic Panel   Result Value Ref Range    Sodium 143 135 - 145 mmol/L    Potassium 3.9 3.6 - 5.5 mmol/L    Chloride 100 96 - 112 mmol/L    Co2 25 20 - 33 mmol/L    Anion Gap 18.0  (H) 7.0 - 16.0    Glucose 107 (H) 65 - 99 mg/dL    Bun 11 8 - 22 mg/dL    Creatinine 0.67 0.50 - 1.40 mg/dL    Calcium 10.3 (H) 8.4 - 10.2 mg/dL    AST(SGOT) 42 12 - 45 U/L    ALT(SGPT) 37 2 - 50 U/L    Alkaline Phosphatase 91 30 - 99 U/L    Total Bilirubin 0.3 0.1 - 1.5 mg/dL    Albumin 4.7 3.2 - 4.9 g/dL    Total Protein 7.9 6.0 - 8.2 g/dL    Globulin 3.2 1.9 - 3.5 g/dL    A-G Ratio 1.5 g/dL   TROPONIN   Result Value Ref Range    Troponin T <6 6 - 19 ng/L   LIPASE   Result Value Ref Range    Lipase 45 7 - 58 U/L   ETHYL ALCOHOL (BLOOD)   Result Value Ref Range    Diagnostic Alcohol 313.4 (H) 0.0 - 10.0 mg/dL   COVID/SARS CoV-2 PCR    Specimen: Nasopharyngeal; Respirate   Result Value Ref Range    COVID Order Status Received    ESTIMATED GFR   Result Value Ref Range    GFR If African American >60 >60 mL/min/1.73 m 2    GFR If Non African American >60 >60 mL/min/1.73 m 2   SARS-CoV-2, PCR (In-House)   Result Value Ref Range    SARS-CoV-2 Source NP Swab    EKG   Result Value Ref Range    Report       [facility] Emergency Dept.    Test Date:  2020  Pt Name:    SONIA DAMON          Department: [facility]  MRN:        [id number]                      Room:  Gender:     Female                       Technician: KENA  :        [id number]                   Requested By:ER TRIAGE PROTOCOL  Order #:    [id number]                    Reading MD: Peewee Cohen MD    Measurements  Intervals                                Axis  Rate:       91                           P:          79  AR:         143                          QRS:        21  QRSD:       84                           T:          78  QT:         365  QTc:        450    Interpretive Statements  Sinus rhythm  Baseline wander in lead(s) V5  Compared to ECG 2020 15:38:10  Sinus tachycardia no longer present  Electronically Signed On 2020 22:19:25 PST by Peewee Cohen MD       RADIOLOGY  Chest x-ray    COURSE & MEDICAL DECISION  "MAKING  Pertinent Labs & Imaging studies reviewed. (See chart for details)  Records obtained and reviewed: Patient was most recently seen in this emergency department for alcohol detox.  It was noted that she has had multiple visits to the ER for alcohol intoxication and had tried court ordered treatment in the past.  During this hospitalization she was complaining of \"heart pain\".  Her labs did not reveal acute abnormality.  She was observed until sober and was discharged to [facility] for inpatient alcohol detoxification.    This is a 55-year-old female with a history of alcohol abuse who is here with sharp, nonradiating epigastric pain after drinking at least 1 pint of vodka this evening.  Differential diagnosis includes but is not limited to, pancreatitis, gastritis, hepatitis, peptic ulcer disease, perforated peptic ulcer, ACS, PE, aortic dissection, pneumothorax, pneumonia, COVID-19 or other viral syndrome, natalee/pericarditis.    Patient arrives afebrile with normal vital signs.  She appears dehydrated with dry mucous membranes but nontoxic.  She does have epigastric tenderness without focal right or left upper quadrant tenderness.  Despite the tenderness her abdomen is soft without any masses or peritoneal signs.    EKG does not suggest ischemia.    Patient was given a GI cocktail with complete resolution of her symptoms.  Initial labs are reassuring with normal lipase and troponin.  She has no leukocytosis or left shift.  Her anion gap is slightly elevated to 18 with a blood glucose of 107.  Calcium is also slightly elevated to 10.3.  Diagnostic alcohol is quite elevated to 313.4 and the patient notes that she is going tomorrow to a 30-day inpatient alcohol detox program.  She does not want admission tonight for the same.    Chest x-ray is without acute abnormality.    A SARS-CoV-2 swab was taken given her 's fever and cough as this may potentially be the etiology of her symptoms as " well.    Patient was given a prescription for Prilosec.  I suspect she has gastritis due to her alcohol intake.  Low suspicion for ACS and/or PE.    Heart score: 2    The patient will return for worsening symptoms and is stable at the time of discharge. The patient verbalizes understanding and will comply.    HYDRATION  HYDRATION: Based on the patient's presentation of CIWA and Dehydration the patient was given IV fluids. IV Hydration was used because oral hydration was not adequate alone. Upon recheck following hydration, the patient was Improved.    FINAL IMPRESSION  1. Chest pain, unspecified type     2. Acute alcoholic gastritis without hemorrhage     3. Alcoholic intoxication without complication (HCC)           Electronically signed by: Peewee Cohen M.D., 11/5/2020 9:59 PM

## 2020-11-06 NOTE — ED NOTES
Pt given discharge instructions. RN answered questions. VSS. Pt ambulated steadily out to ER jonnie,  in car waiting.

## 2020-11-06 NOTE — ED TRIAGE NOTES
"Bib  for above complaints.     Chief Complaint   Patient presents with   • Alcohol Intoxication     drank \"a lot\" of vodka tonight, unsure of amount.    • Chest Pain     pt states she feels like shes having a heart attack. denies arm pain, jaw pain or back pain.      /92   Pulse 86   Temp 36.8 °C (98.2 °F) (Temporal)   Resp 16   Ht 1.715 m (5' 7.5\")   Wt 69.6 kg (153 lb 7 oz)   LMP 08/15/2011   SpO2 97%   Breastfeeding No   BMI 23.68 kg/m²     Pt states she is going to \"[facility]\" in [place] for 30 detox program tomorrow.     Denies respiratory symptoms or known covid contacts.   "

## 2020-12-09 ENCOUNTER — HOSPITAL ENCOUNTER (OUTPATIENT)
Dept: LAB | Facility: MEDICAL CENTER | Age: 55
End: 2020-12-09
Attending: FAMILY MEDICINE
Payer: COMMERCIAL

## 2020-12-09 PROCEDURE — C9803 HOPD COVID-19 SPEC COLLECT: HCPCS

## 2020-12-09 PROCEDURE — U0003 INFECTIOUS AGENT DETECTION BY NUCLEIC ACID (DNA OR RNA); SEVERE ACUTE RESPIRATORY SYNDROME CORONAVIRUS 2 (SARS-COV-2) (CORONAVIRUS DISEASE [COVID-19]), AMPLIFIED PROBE TECHNIQUE, MAKING USE OF HIGH THROUGHPUT TECHNOLOGIES AS DESCRIBED BY CMS-2020-01-R: HCPCS

## 2020-12-10 LAB — COVID ORDER STATUS COVID19: NORMAL

## 2020-12-11 LAB
SARS-COV-2 RNA RESP QL NAA+PROBE: NOTDETECTED
SPECIMEN SOURCE: NORMAL

## 2020-12-29 ENCOUNTER — HOSPITAL ENCOUNTER (EMERGENCY)
Facility: MEDICAL CENTER | Age: 55
End: 2020-12-29
Attending: EMERGENCY MEDICINE | Admitting: EMERGENCY MEDICINE
Payer: COMMERCIAL

## 2020-12-29 VITALS
WEIGHT: 149.47 LBS | TEMPERATURE: 98.2 F | RESPIRATION RATE: 19 BRPM | BODY MASS INDEX: 23.46 KG/M2 | DIASTOLIC BLOOD PRESSURE: 90 MMHG | HEART RATE: 75 BPM | OXYGEN SATURATION: 95 % | SYSTOLIC BLOOD PRESSURE: 122 MMHG | HEIGHT: 67 IN

## 2020-12-29 DIAGNOSIS — F10.20 ALCOHOLISM (HCC): ICD-10-CM

## 2020-12-29 LAB — EKG IMPRESSION: NORMAL

## 2020-12-29 PROCEDURE — 99284 EMERGENCY DEPT VISIT MOD MDM: CPT

## 2020-12-29 PROCEDURE — 700111 HCHG RX REV CODE 636 W/ 250 OVERRIDE (IP): Performed by: EMERGENCY MEDICINE

## 2020-12-29 PROCEDURE — 93005 ELECTROCARDIOGRAM TRACING: CPT | Performed by: EMERGENCY MEDICINE

## 2020-12-29 PROCEDURE — 96374 THER/PROPH/DIAG INJ IV PUSH: CPT

## 2020-12-29 PROCEDURE — 93005 ELECTROCARDIOGRAM TRACING: CPT

## 2020-12-29 RX ORDER — PHENOBARBITAL SODIUM 130 MG/ML
130 INJECTION, SOLUTION INTRAMUSCULAR; INTRAVENOUS ONCE
Status: COMPLETED | OUTPATIENT
Start: 2020-12-29 | End: 2020-12-29

## 2020-12-29 RX ADMIN — PHENOBARBITAL SODIUM 130 MG: 130 INJECTION INTRAMUSCULAR; INTRAVENOUS at 21:36

## 2020-12-29 ASSESSMENT — FIBROSIS 4 INDEX: FIB4 SCORE: 1.22

## 2020-12-30 NOTE — ED PROVIDER NOTES
"ED Provider Note    Scribed for Luis Ramirez M.D. by Luis Ramirez M.D.. 12/29/2020,  9:26 PM.    CHIEF COMPLAINT  Chief Complaint   Patient presents with   • Alcohol Intoxication       HPI  Yohana Brandon is a 55 y.o. chronic alcoholic female who presents to the Emergency Department after being refused by [facility] because her breathalyzer and blood pressure numbers were \"too high.\"  She has had several rounds of inpatient rehab, and went to [facility] today asking for admission for detox, because she likes that program.  She had blood pressure in the 170s systolic, though it is 147 systolic here, and a blood alcohol level of greater than 0.3 on breathalyzer.  On arrival here, she is tearful, but alert and cooperative, slightly slurred speech, but not somnolent.  She does report having some withdrawal symptoms if she stops drinking.  She is not had fevers or chills, cough, nausea or vomiting or shortness of breath.  The patient is accompanied by her , who reports that \"[facility] is saving her a bed for after you guys stabilize her.\"  I discussed with him that from our perspective, there is nothing to stabilize, but that we can assist with medication to try to control the patient's withdrawal symptoms, allowing her to metabolize her alcohol to allow for that [facility] will find acceptable. The patient and her  are agreeable to this plan of care.    REVIEW OF SYSTEMS  See HPI for further details. All other systems are negative.     PAST MEDICAL HISTORY   has a past medical history of Alcohol abuse, ASTHMA, Elevated LFTs, Heart palpitations, Hypertension, and Psychiatric disorder.    SOCIAL HISTORY  Social History     Tobacco Use   • Smoking status: Never Smoker   • Smokeless tobacco: Never Used   Substance and Sexual Activity   • Alcohol use: Yes     Frequency: 4 or more times a week     Drinks per session: 10 or more     Binge " "frequency: Daily or almost daily   • Drug use: No   • Sexual activity: Not on file     Social History     Substance and Sexual Activity   Drug Use No       SURGICAL HISTORY   has a past surgical history that includes appendectomy (); endometrial ablation, thermal (); remove tonsils/adenoids,<13 y/o (); anesth,surg breast reconstructive (); hemorrhoidectomy (2011); anal sphincterotomy (2011); bunionectomy (); and  delivery+postpartum care.    CURRENT MEDICATIONS  Home Medications     Reviewed by Leeann Ureña R.N. (Registered Nurse) on 20 at 2116  Med List Status: Not Addressed   Medication Last Dose Status   Cholecalciferol (VITAMIN D3 PO)  Active   hydrochlorothiazide (HYDRODIURIL) 25 MG TABS  Active   LORazepam (ATIVAN) 0.5 MG Tab  Active   multivitamin (THERAGRAN) Tab  Active   NON SPECIFIED  Active   Nutritional Supplements (DHEA PO)  Active   Omega-3 Fatty Acids (FISH OIL) 1000 MG Cap capsule  Active   ondansetron (ZOFRAN ODT) 4 MG TABLET DISPERSIBLE  Active   POTASSIUM PO  Active   rosuvastatin (CRESTOR) 10 MG Tab  Active   Suvorexant (BELSOMRA) 15 MG Tab  Active   telmisartan (MICARDIS) 80 MG TABS  Active   tramadol (ULTRAM) 50 MG Tab  Active                ALLERGIES  Allergies   Allergen Reactions   • Kiwi Extract Swelling   • Latex Anaphylaxis   • Morphine Hives   • Vicodin [Hydrocodone-Acetaminophen] Rash     Generalized rash, puritis    • Banana Shortness of Breath   • Other Food Hives and Shortness of Breath     Tropical fruit         PHYSICAL EXAM  VITAL SIGNS: /103   Pulse (!) 101   Temp 36.8 °C (98.2 °F) (Temporal)   Resp 19   Ht 1.702 m (5' 7\")   Wt 67.8 kg (149 lb 7.6 oz)   LMP 08/15/2011   SpO2 93%   BMI 23.41 kg/m²   Pulse ox interpretation: I interpret this pulse ox as normal.  Constitutional: Alert, tearful, cooperative.  HENT: No signs of trauma, Bilateral external ears normal, Nose normal.   Eyes: Pupils are equal and reactive, " Conjunctiva normal, Non-icteric.   Neck: Normal range of motion, Supple, No stridor.    Cardiovascular: Normal peripheral perfusion  Thorax & Lungs: Unlabored respirations, equal chest expansion, no accessory muscle use  Abdomen: Non-distended  Skin:  No erythema, No rash.   Back: Normal alignment and ROM  Extremities: No gross deformity  Musculoskeletal: Good range of motion in all major joints.   Neurologic: Alert, Normal motor function, No focal deficits noted.   Psychiatric: Affect emotional, Judgment normal, Mood normal.      DIAGNOSTIC STUDIES / PROCEDURES      LABS  Labs Reviewed - No data to display  All labs reviewed by me.    RADIOLOGY  No orders to display     The radiologist's interpretation of all radiological studies have been reviewed by me.    COURSE & MEDICAL DECISION MAKING  Nursing notes, VS, PMSFHx reviewed in chart.     9:26 PM Patient seen and examined at bedside.  She is an alcoholic who gets withdrawal symptoms.  She does drink quite heavily, as demonstrated by her alcohol level reportedly greater than 0.3, with which she is still awake and alert and conversant with only slightly slurred speech.  She does have a history of hypertension, and reports compliance with her medications.  Here, her blood pressure is 147/103.  I do not see any reason to treat that directly, since we discussed giving her a dose of IV phenobarbital to allow her to stop drinking, minimize withdrawal symptoms, and metabolize her alcohol to a lower level so that she can be admitted to treatment.  This will likely lower her blood pressure somewhat.     10:21 PM this patient has responded very well to treatment.  She is calm her, her blood pressure is much lower in the 120s systolic, she is not tachycardic, and is [place].  She is happy to be discharged to pursue alcohol recovery treatment.    This patient ambulated independently from the emergency department without difficulty or distress or any signs of withdrawal, and was  very grateful for the help provided today.       The patient will return for new or worsening symptoms and is stable at the time of discharge.    The patient is referred to a primary physician for blood pressure management, diabetic screening, and for all other preventative health concerns.    DISPOSITION:  Patient will be discharged home in stable condition.    FOLLOW UP:  [facility]  [address] [facility] [facility] [id number] [phone]  Today      OUTPATIENT MEDICATIONS:  New Prescriptions    No medications on file     FINAL IMPRESSION  1. Alcoholism (HCC)

## 2020-12-30 NOTE — ED NOTES
Pt was sent here from [facility] because here BP was too high and she blew a .3 on there breathalyzer. She went to [facility] to detox

## 2020-12-30 NOTE — DISCHARGE INSTRUCTIONS
You were given 130mg of IV phenobarbital to minimize withdrawal symptoms.  You do not have any medical condition that should prevent you from going to alcohol rehabilitation treatment.

## 2020-12-30 NOTE — ED NOTES
Patient and  verbalized understanding to plan of care and discharge information. Patient in stable condition. No signs of distress. Patient pain free. Patient ambulatory out of ED to personal vehicle with stable gait with family.

## 2021-01-05 DIAGNOSIS — I25.10 CORONARY ARTERY CALCIFICATION SEEN ON CT SCAN: ICD-10-CM

## 2021-01-06 RX ORDER — ROSUVASTATIN CALCIUM 10 MG/1
10 TABLET, COATED ORAL EVERY EVENING
Qty: 90 TAB | Refills: 0 | Status: ON HOLD | OUTPATIENT
Start: 2021-01-06 | End: 2021-06-22

## 2021-03-08 ENCOUNTER — APPOINTMENT (OUTPATIENT)
Dept: RADIOLOGY | Facility: MEDICAL CENTER | Age: 56
End: 2021-03-08
Attending: EMERGENCY MEDICINE
Payer: COMMERCIAL

## 2021-03-08 ENCOUNTER — HOSPITAL ENCOUNTER (EMERGENCY)
Facility: MEDICAL CENTER | Age: 56
End: 2021-03-09
Attending: EMERGENCY MEDICINE
Payer: COMMERCIAL

## 2021-03-08 DIAGNOSIS — R07.9 CHEST PAIN, UNSPECIFIED TYPE: ICD-10-CM

## 2021-03-08 DIAGNOSIS — R00.0 TACHYCARDIA: ICD-10-CM

## 2021-03-08 DIAGNOSIS — E86.0 DEHYDRATION: ICD-10-CM

## 2021-03-08 DIAGNOSIS — R10.13 EPIGASTRIC PAIN: ICD-10-CM

## 2021-03-08 DIAGNOSIS — F10.10 ALCOHOL ABUSE: ICD-10-CM

## 2021-03-08 LAB
ALBUMIN SERPL BCP-MCNC: 4.7 G/DL (ref 3.2–4.9)
ALBUMIN/GLOB SERPL: 1.4 G/DL
ALP SERPL-CCNC: 85 U/L (ref 30–99)
ALT SERPL-CCNC: 27 U/L (ref 2–50)
ANION GAP SERPL CALC-SCNC: 19 MMOL/L (ref 7–16)
APTT PPP: 29.3 SEC (ref 24.7–36)
AST SERPL-CCNC: 23 U/L (ref 12–45)
BASOPHILS # BLD AUTO: 1 % (ref 0–1.8)
BASOPHILS # BLD: 0.09 K/UL (ref 0–0.12)
BILIRUB SERPL-MCNC: 0.3 MG/DL (ref 0.1–1.5)
BUN SERPL-MCNC: 18 MG/DL (ref 8–22)
CALCIUM SERPL-MCNC: 9.9 MG/DL (ref 8.4–10.2)
CHLORIDE SERPL-SCNC: 94 MMOL/L (ref 96–112)
CO2 SERPL-SCNC: 24 MMOL/L (ref 20–33)
CREAT SERPL-MCNC: 0.89 MG/DL (ref 0.5–1.4)
EKG IMPRESSION: NORMAL
EOSINOPHIL # BLD AUTO: 0.04 K/UL (ref 0–0.51)
EOSINOPHIL NFR BLD: 0.5 % (ref 0–6.9)
ERYTHROCYTE [DISTWIDTH] IN BLOOD BY AUTOMATED COUNT: 40.6 FL (ref 35.9–50)
ETHANOL BLD-MCNC: 264 MG/DL (ref 0–10)
GLOBULIN SER CALC-MCNC: 3.4 G/DL (ref 1.9–3.5)
GLUCOSE SERPL-MCNC: 96 MG/DL (ref 65–99)
HCG SERPL QL: NEGATIVE
HCT VFR BLD AUTO: 47.5 % (ref 37–47)
HGB BLD-MCNC: 16.3 G/DL (ref 12–16)
IMM GRANULOCYTES # BLD AUTO: 0.02 K/UL (ref 0–0.11)
IMM GRANULOCYTES NFR BLD AUTO: 0.2 % (ref 0–0.9)
INR PPP: 0.93 (ref 0.87–1.13)
LYMPHOCYTES # BLD AUTO: 2.4 K/UL (ref 1–4.8)
LYMPHOCYTES NFR BLD: 28 % (ref 22–41)
MCH RBC QN AUTO: 31.8 PG (ref 27–33)
MCHC RBC AUTO-ENTMCNC: 34.3 G/DL (ref 33.6–35)
MCV RBC AUTO: 92.6 FL (ref 81.4–97.8)
MONOCYTES # BLD AUTO: 0.56 K/UL (ref 0–0.85)
MONOCYTES NFR BLD AUTO: 6.5 % (ref 0–13.4)
NEUTROPHILS # BLD AUTO: 5.47 K/UL (ref 2–7.15)
NEUTROPHILS NFR BLD: 63.8 % (ref 44–72)
NRBC # BLD AUTO: 0 K/UL
NRBC BLD-RTO: 0 /100 WBC
PLATELET # BLD AUTO: 363 K/UL (ref 164–446)
PMV BLD AUTO: 9.8 FL (ref 9–12.9)
POTASSIUM SERPL-SCNC: 3.7 MMOL/L (ref 3.6–5.5)
PROT SERPL-MCNC: 8.1 G/DL (ref 6–8.2)
PROTHROMBIN TIME: 12.2 SEC (ref 12–14.6)
RBC # BLD AUTO: 5.13 M/UL (ref 4.2–5.4)
SODIUM SERPL-SCNC: 137 MMOL/L (ref 135–145)
TROPONIN T SERPL-MCNC: 7 NG/L (ref 6–19)
WBC # BLD AUTO: 8.6 K/UL (ref 4.8–10.8)

## 2021-03-08 PROCEDURE — 96375 TX/PRO/DX INJ NEW DRUG ADDON: CPT

## 2021-03-08 PROCEDURE — 84484 ASSAY OF TROPONIN QUANT: CPT

## 2021-03-08 PROCEDURE — 80053 COMPREHEN METABOLIC PANEL: CPT

## 2021-03-08 PROCEDURE — 85610 PROTHROMBIN TIME: CPT

## 2021-03-08 PROCEDURE — 84703 CHORIONIC GONADOTROPIN ASSAY: CPT

## 2021-03-08 PROCEDURE — 99285 EMERGENCY DEPT VISIT HI MDM: CPT

## 2021-03-08 PROCEDURE — 93005 ELECTROCARDIOGRAM TRACING: CPT | Performed by: EMERGENCY MEDICINE

## 2021-03-08 PROCEDURE — 71045 X-RAY EXAM CHEST 1 VIEW: CPT

## 2021-03-08 PROCEDURE — 36415 COLL VENOUS BLD VENIPUNCTURE: CPT

## 2021-03-08 PROCEDURE — 85025 COMPLETE CBC W/AUTO DIFF WBC: CPT

## 2021-03-08 PROCEDURE — 85730 THROMBOPLASTIN TIME PARTIAL: CPT

## 2021-03-08 PROCEDURE — A9270 NON-COVERED ITEM OR SERVICE: HCPCS | Performed by: EMERGENCY MEDICINE

## 2021-03-08 PROCEDURE — 700111 HCHG RX REV CODE 636 W/ 250 OVERRIDE (IP): Performed by: EMERGENCY MEDICINE

## 2021-03-08 PROCEDURE — 96374 THER/PROPH/DIAG INJ IV PUSH: CPT

## 2021-03-08 PROCEDURE — 82077 ASSAY SPEC XCP UR&BREATH IA: CPT

## 2021-03-08 PROCEDURE — 700102 HCHG RX REV CODE 250 W/ 637 OVERRIDE(OP): Performed by: EMERGENCY MEDICINE

## 2021-03-08 PROCEDURE — 700105 HCHG RX REV CODE 258: Performed by: EMERGENCY MEDICINE

## 2021-03-08 RX ORDER — SODIUM CHLORIDE 9 MG/ML
1000 INJECTION, SOLUTION INTRAVENOUS ONCE
Status: COMPLETED | OUTPATIENT
Start: 2021-03-08 | End: 2021-03-08

## 2021-03-08 RX ORDER — DIAZEPAM 5 MG/1
10 TABLET ORAL ONCE
Status: COMPLETED | OUTPATIENT
Start: 2021-03-09 | End: 2021-03-09

## 2021-03-08 RX ORDER — SUCRALFATE 1 G/1
1 TABLET ORAL
Qty: 56 TABLET | Refills: 0 | Status: SHIPPED | OUTPATIENT
Start: 2021-03-08 | End: 2021-03-22

## 2021-03-08 RX ORDER — ONDANSETRON 2 MG/ML
4 INJECTION INTRAMUSCULAR; INTRAVENOUS ONCE
Status: COMPLETED | OUTPATIENT
Start: 2021-03-08 | End: 2021-03-08

## 2021-03-08 RX ORDER — OMEPRAZOLE 20 MG/1
20 CAPSULE, DELAYED RELEASE ORAL DAILY
Qty: 14 CAPSULE | Refills: 0 | Status: SHIPPED | OUTPATIENT
Start: 2021-03-08 | End: 2021-03-22

## 2021-03-08 RX ORDER — LORAZEPAM 2 MG/ML
1 INJECTION INTRAMUSCULAR ONCE
Status: COMPLETED | OUTPATIENT
Start: 2021-03-08 | End: 2021-03-08

## 2021-03-08 RX ADMIN — FAMOTIDINE 20 MG: 10 INJECTION INTRAVENOUS at 22:56

## 2021-03-08 RX ADMIN — LIDOCAINE HYDROCHLORIDE 30 ML: 20 SOLUTION OROPHARYNGEAL at 22:56

## 2021-03-08 RX ADMIN — LORAZEPAM 1 MG: 2 INJECTION INTRAMUSCULAR; INTRAVENOUS at 22:55

## 2021-03-08 RX ADMIN — SODIUM CHLORIDE 1000 ML: 9 INJECTION, SOLUTION INTRAVENOUS at 22:56

## 2021-03-08 RX ADMIN — ONDANSETRON 4 MG: 2 INJECTION INTRAMUSCULAR; INTRAVENOUS at 22:56

## 2021-03-08 ASSESSMENT — FIBROSIS 4 INDEX: FIB4 SCORE: 1.22

## 2021-03-08 ASSESSMENT — PAIN DESCRIPTION - PAIN TYPE: TYPE: ACUTE PAIN

## 2021-03-09 VITALS
WEIGHT: 147.27 LBS | DIASTOLIC BLOOD PRESSURE: 79 MMHG | SYSTOLIC BLOOD PRESSURE: 123 MMHG | RESPIRATION RATE: 15 BRPM | OXYGEN SATURATION: 91 % | HEIGHT: 67 IN | TEMPERATURE: 98.2 F | HEART RATE: 93 BPM | BODY MASS INDEX: 23.11 KG/M2

## 2021-03-09 PROCEDURE — A9270 NON-COVERED ITEM OR SERVICE: HCPCS | Performed by: EMERGENCY MEDICINE

## 2021-03-09 PROCEDURE — 700102 HCHG RX REV CODE 250 W/ 637 OVERRIDE(OP): Performed by: EMERGENCY MEDICINE

## 2021-03-09 RX ADMIN — DIAZEPAM 10 MG: 5 TABLET ORAL at 00:03

## 2021-03-09 NOTE — ED PROVIDER NOTES
ED Provider Note    CHIEF COMPLAINT  Chief Complaint   Patient presents with   • Detox   • Chest Pain       HPI  Patient is a 55-year-old female with a history of chronic alcohol abuse who presents emergency room for sensations of detoxing from alcohol and concerns regarding chest discomfort.  Per  who is in the room, he also states that the patient has been dealing with chronic alcohol use and left rehab approximately 28 years ago and fell off sobriety approximately Tuesday or Wednesday of last week.  During this time she has been drinking up to a liter of vodka daily, this is been a chronic issue for her, she has been having intermittent nausea, shakiness when she stops drinking causing her to drink more, intermittent chest discomfort that she says is always there whenever she drinks, she denies any exertional chest pain or discomfort, no syncope, no headaches or vision changes.  She reports that she fell, landing on her left arm with no acute pain or discomfort.  She denies any head injury, headaches or any persistent symptoms at this time.    PPE Note: I personally donned full PPE for all patient encounters during this visit, including being clean-shaven with an N95 respirator mask, gloves, and goggles.     Chart review shows that the patient has been seen multiple times in the emergency department and at [facility] for alcohol intoxication, inpatient admission for detox.      REVIEW OF SYSTEMS  Constitutional: No fevers, chills, or recent illness.  Skin: No rashes or diaphoresis.  No reported bruising  Eyes: No change in vision, no discharge.  ENT: No hearing change. No rhinorrhea or nasal congestion, no ST or difficulty swallowing.  Respiratory: No SOB. No coughing or hemoptysis. No Wheezing.  Cardiac: As above, history of palpitations with withdrawal, denies edema. No PND or orthopnea.  GI: Upper Abdominal Pain; positive nausea without vomiting, denies diarrhea, constipation. No blood in  stool.  : No dysuria. No D/C. No frequency or urgency. No hesitancy. LMP= unknown  MSK: No pain in joints or muscles. No calf pain or swelling.  Neuro: No HA or paresthesias. No focal weakness.  Endocrine: No polyuria or polydipsia. No heat or cold intolerance.  Heme: No easy bruising. No history of bleeding disorders or anemia.    PAST MEDICAL HISTORY   has a past medical history of Alcohol abuse, ASTHMA, Elevated LFTs, Heart palpitations, Hypertension, and Psychiatric disorder.    SOCIAL HISTORY  Social History     Tobacco Use   • Smoking status: Never Smoker   • Smokeless tobacco: Never Used   Substance and Sexual Activity   • Alcohol use: Yes   • Drug use: No   • Sexual activity: Not on file       SURGICAL HISTORY   has a past surgical history that includes appendectomy (); endometrial ablation, thermal (); remove tonsils/adenoids,<13 y/o (); anesth,surg breast reconstructive (); hemorrhoidectomy (2011); anal sphincterotomy (2011); bunionectomy (); and  delivery+postpartum care.    CURRENT MEDICATIONS  Home Medications     Reviewed by Leeann Ureña R.N. (Registered Nurse) on 21 at 2156  Med List Status: Not Addressed   Medication Last Dose Status   Cholecalciferol (VITAMIN D3 PO)  Active   hydrochlorothiazide (HYDRODIURIL) 25 MG TABS  Active   LORazepam (ATIVAN) 0.5 MG Tab  Active   multivitamin (THERAGRAN) Tab  Active   NON SPECIFIED  Active   Nutritional Supplements (DHEA PO)  Active   Omega-3 Fatty Acids (FISH OIL) 1000 MG Cap capsule  Active   ondansetron (ZOFRAN ODT) 4 MG TABLET DISPERSIBLE  Active   POTASSIUM PO  Active   rosuvastatin (CRESTOR) 10 MG Tab  Active   Suvorexant (BELSOMRA) 15 MG Tab  Active   telmisartan (MICARDIS) 80 MG TABS  Active   tramadol (ULTRAM) 50 MG Tab  Active                ALLERGIES  Allergies   Allergen Reactions   • Kiwi Extract Swelling   • Latex Anaphylaxis   • Morphine Hives   • Vicodin [Hydrocodone-Acetaminophen] Rash      "Generalized rash, puritis    • Banana Shortness of Breath   • Other Food Hives and Shortness of Breath     Tropical fruit         PHYSICAL EXAM  VITAL SIGNS: /83   Pulse (!) 102   Temp 37.1 °C (98.7 °F) (Temporal)   Resp 17   Ht 1.702 m (5' 7\")   Wt 66.8 kg (147 lb 4.3 oz)   LMP 08/15/2011   SpO2 91%   BMI 23.07 kg/m²   Pulse ox interpretation:   I interpret this pulse ox as normal.  Genl: F, lying in gurney comfortably, speaking clearly, appears in mild distress  Head: NC/AT   ENT: Mucous membranes dry, posterior pharynx clear, uvula midline, nares patent bilaterally   Eyes: Normal sclera, pupils equal round reactive to light  Neck: Supple, FROM, no LAD appreciated  Pulmonary: Lungs are clear to auscultation bilaterally  Chest: No TTP  CV:  tachycardia (108), no murmur appreciated, pulses 2+ in both upper and lower extremities,  Abdomen: soft, epigastric tenderness, no true Davis sign, no McBurney's point tenderness. ND; no rebound/guarding, no masses palpated, no HSM  : no CVA or suprapubic tenderness   Musculoskeletal: Pain free ROM of the neck. Moving upper and lower extremities and spontaneous in coordinated fashion  Neuro: A&Ox4 (person, place, time, situation), speech fluent, gait steady, no focal deficits appreciated, No cerebellar signs. Sensation is grossly intact in the distal upper and lower extremities.  5/5 strength in  and dorsiflexion/plantar flexion of the ankles  Psych: Patient has an appropriate affect and behavior  Skin: No rash or lesions.  No pallor or jaundice.  No cyanosis.  Warm and dry.     DIAGNOSTIC STUDIES / PROCEDURES    EKG  As interpreted by me at 2158: This is a sinus tachycardia at a rate of 108, no NM prolongation, no QTC prolongation, there is some nonspecific ST segment abnormalities, no concurrent ST elevations or significant reciprocal changes are noted.  Abnormal EKG.    LABS  Labs Reviewed   CBC WITH DIFFERENTIAL - Abnormal; Notable for the following " components:       Result Value    Hemoglobin 16.3 (*)     Hematocrit 47.5 (*)     All other components within normal limits    Narrative:     Indicate which anticoagulants the patient is on:->UNKNOWN  ** chronic ETOH   COMP METABOLIC PANEL - Abnormal; Notable for the following components:    Chloride 94 (*)     Anion Gap 19.0 (*)     All other components within normal limits    Narrative:     Indicate which anticoagulants the patient is on:->UNKNOWN  ** chronic ETOH   ETHYL ALCOHOL (BLOOD) - Abnormal; Notable for the following components:    Diagnostic Alcohol 264.0 (*)     All other components within normal limits    Narrative:     Indicate which anticoagulants the patient is on:->UNKNOWN  ** chronic ETOH   TROPONIN    Narrative:     Indicate which anticoagulants the patient is on:->UNKNOWN  ** chronic ETOH   PROTHROMBIN TIME    Narrative:     Indicate which anticoagulants the patient is on:->UNKNOWN  ** chronic ETOH   APTT    Narrative:     Indicate which anticoagulants the patient is on:->UNKNOWN  ** chronic ETOH   HCG QUAL SERUM    Narrative:     Indicate which anticoagulants the patient is on:->UNKNOWN  ** chronic ETOH   ESTIMATED GFR    Narrative:     Indicate which anticoagulants the patient is on:->UNKNOWN  ** chronic ETOH   DIAGNOSTIC ALCOHOL     RADIOLOGY  DX-CHEST-PORTABLE (1 VIEW)   Final Result         1.  No acute cardiopulmonary disease.        COURSE & MEDICAL DECISION MAKING  Pertinent Labs & Imaging studies reviewed. (See chart for details)    DDX:  Alcohol withdrawal, esophagitis, gastritis, alcoholic gastritis, pneumomediastinum, ACS unlikely, dissection and vascular disease unlikely,Pneumonia, pericarditis, PUD/GERD, anxiety, alcohol intoxication    MDM    Initial evaluation at 2230:  Patient is seen and evaluated for symptoms as described above.  The patient has a history of alcohol abuse, is initially tachycardic but does not have tremulousness, is not an active signs of withdrawal.  She has  been continuously drinking for some time, has a history of binging and recurrent episodes of sobriety.  No coagulopathy, anion gap is elevated with elevated alcohol at 264.  Clinical signs of dehydration, slight hemoconcentration and IV fluids are given for resuscitation.  Troponin is not acutely elevated, EKG has nonspecific changes with no signs of acute ischemia or infarction.  Chest x-ray is reassuring, no evidence of acute cardiopulmonary process.    Chest pain symptomology is likely related to esophagitis/alcoholic gastritis.  She has no timing consistent with acute ischemia and no prior history concerning for ACS pathology.  With no acute findings consistent for this, persistent pain and discomfort in the setting of active alcohol drinking, this is likely the etiology repeat troponins are not obtained.  She is not in signs of fulminant liver failure, she has improvement with her heart rate into the high 80s low 90s following completion of fluids and administration of benzodiazepines.  She has overall improvement with administration of famotidine and GI cocktail.  She would prefer to follow-up with her outpatient providers, she is bridged with single doses of diazepam oral tablets here, will follow up with her outpatient physicians upon discharge.  She is not interested in going to [facility] tonight for inpatient admission and has multiple resources available to her and her .   assumes care for her as she is clinically intoxicated.  They are discharged home in stable condition.    HYDRATION: Based on the patient's presentation of Dehydration and Tachycardia the patient was given IV fluids. IV Hydration was used because oral hydration was not adequate alone. Upon recheck following hydration, the patient was improved.    The patient will not drink alcohol nor drive with prescribed medications. The patient will return for worsening symptoms and is stable at the time of discharge. The  patient verbalizes understanding and will comply.    FINAL IMPRESSION  Visit Diagnoses     ICD-10-CM   1. Chest pain, unspecified type  R07.9   2. Alcohol abuse  F10.10   3. Tachycardia  R00.0   4. Dehydration  E86.0   5. Epigastric pain  R10.13     Electronically signed by: Reji Dupont M.D., 3/8/2021 10:30 PM

## 2021-03-09 NOTE — ED TRIAGE NOTES
Pt presents with  from home for detox. Last drink within the past hour. Also reporting chest pain - unable to tell me how long. A&Ox4 and ambulatory with standby assist.    Patient masked. No respiratory symptoms, no recent travel, denies known COVID exposure.

## 2021-04-09 ENCOUNTER — HOSPITAL ENCOUNTER (OUTPATIENT)
Dept: LAB | Facility: MEDICAL CENTER | Age: 56
End: 2021-04-09
Attending: EMERGENCY MEDICINE
Payer: COMMERCIAL

## 2021-04-09 PROCEDURE — 82533 TOTAL CORTISOL: CPT

## 2021-04-09 PROCEDURE — 83002 ASSAY OF GONADOTROPIN (LH): CPT

## 2021-04-09 PROCEDURE — 86140 C-REACTIVE PROTEIN: CPT

## 2021-04-09 PROCEDURE — 83525 ASSAY OF INSULIN: CPT

## 2021-04-09 PROCEDURE — 85025 COMPLETE CBC W/AUTO DIFF WBC: CPT

## 2021-04-09 PROCEDURE — 83001 ASSAY OF GONADOTROPIN (FSH): CPT

## 2021-04-09 PROCEDURE — 82306 VITAMIN D 25 HYDROXY: CPT

## 2021-04-09 PROCEDURE — 84550 ASSAY OF BLOOD/URIC ACID: CPT

## 2021-04-09 PROCEDURE — 84481 FREE ASSAY (FT-3): CPT

## 2021-04-09 PROCEDURE — 36415 COLL VENOUS BLD VENIPUNCTURE: CPT

## 2021-04-09 PROCEDURE — 82977 ASSAY OF GGT: CPT

## 2021-04-09 PROCEDURE — 82627 DEHYDROEPIANDROSTERONE: CPT

## 2021-04-09 PROCEDURE — 84439 ASSAY OF FREE THYROXINE: CPT

## 2021-04-09 PROCEDURE — 84144 ASSAY OF PROGESTERONE: CPT

## 2021-04-09 PROCEDURE — 84443 ASSAY THYROID STIM HORMONE: CPT

## 2021-04-09 PROCEDURE — 80053 COMPREHEN METABOLIC PANEL: CPT

## 2021-04-09 PROCEDURE — 83036 HEMOGLOBIN GLYCOSYLATED A1C: CPT

## 2021-04-09 PROCEDURE — 84100 ASSAY OF PHOSPHORUS: CPT

## 2021-04-09 PROCEDURE — 80061 LIPID PANEL: CPT

## 2021-04-09 PROCEDURE — 83090 ASSAY OF HOMOCYSTEINE: CPT

## 2021-04-09 PROCEDURE — 82670 ASSAY OF TOTAL ESTRADIOL: CPT

## 2021-04-09 PROCEDURE — 83519 RIA NONANTIBODY: CPT

## 2021-04-10 LAB
ALBUMIN SERPL BCP-MCNC: 4.5 G/DL (ref 3.2–4.9)
ALBUMIN/GLOB SERPL: 1.5 G/DL
ALP SERPL-CCNC: 86 U/L (ref 30–99)
ALT SERPL-CCNC: 33 U/L (ref 2–50)
ANION GAP SERPL CALC-SCNC: 10 MMOL/L (ref 7–16)
AST SERPL-CCNC: 28 U/L (ref 12–45)
BASOPHILS # BLD AUTO: 1.2 % (ref 0–1.8)
BASOPHILS # BLD: 0.07 K/UL (ref 0–0.12)
BILIRUB SERPL-MCNC: 0.4 MG/DL (ref 0.1–1.5)
BUN SERPL-MCNC: 21 MG/DL (ref 8–22)
CALCIUM SERPL-MCNC: 9.9 MG/DL (ref 8.5–10.5)
CHLORIDE SERPL-SCNC: 100 MMOL/L (ref 96–112)
CHOLEST SERPL-MCNC: 181 MG/DL (ref 100–199)
CO2 SERPL-SCNC: 25 MMOL/L (ref 20–33)
CORTIS SERPL-MCNC: 9.2 UG/DL (ref 0–23)
CREAT SERPL-MCNC: 0.74 MG/DL (ref 0.5–1.4)
CRP SERPL HS-MCNC: 0.69 MG/DL (ref 0–0.75)
DHEA-S SERPL-MCNC: 112 UG/DL (ref 35.4–256)
EOSINOPHIL # BLD AUTO: 0.19 K/UL (ref 0–0.51)
EOSINOPHIL NFR BLD: 3.3 % (ref 0–6.9)
ERYTHROCYTE [DISTWIDTH] IN BLOOD BY AUTOMATED COUNT: 40.6 FL (ref 35.9–50)
EST. AVERAGE GLUCOSE BLD GHB EST-MCNC: 126 MG/DL
FASTING STATUS PATIENT QL REPORTED: NORMAL
FSH SERPL-ACNC: 104 MIU/ML
GGT SERPL-CCNC: 94 U/L (ref 7–34)
GLOBULIN SER CALC-MCNC: 3.1 G/DL (ref 1.9–3.5)
GLUCOSE SERPL-MCNC: 91 MG/DL (ref 65–99)
HBA1C MFR BLD: 6 % (ref 4–5.6)
HCT VFR BLD AUTO: 42.2 % (ref 37–47)
HCYS SERPL-SCNC: 11.18 UMOL/L
HDLC SERPL-MCNC: 68 MG/DL
HGB BLD-MCNC: 14.2 G/DL (ref 12–16)
IMM GRANULOCYTES # BLD AUTO: 0.01 K/UL (ref 0–0.11)
IMM GRANULOCYTES NFR BLD AUTO: 0.2 % (ref 0–0.9)
LDLC SERPL CALC-MCNC: 91 MG/DL
LH SERPL-ACNC: 45 IU/L
LYMPHOCYTES # BLD AUTO: 1.5 K/UL (ref 1–4.8)
LYMPHOCYTES NFR BLD: 26 % (ref 22–41)
MCH RBC QN AUTO: 31.8 PG (ref 27–33)
MCHC RBC AUTO-ENTMCNC: 33.6 G/DL (ref 33.6–35)
MCV RBC AUTO: 94.6 FL (ref 81.4–97.8)
MONOCYTES # BLD AUTO: 0.77 K/UL (ref 0–0.85)
MONOCYTES NFR BLD AUTO: 13.3 % (ref 0–13.4)
NEUTROPHILS # BLD AUTO: 3.23 K/UL (ref 2–7.15)
NEUTROPHILS NFR BLD: 56 % (ref 44–72)
NRBC # BLD AUTO: 0 K/UL
NRBC BLD-RTO: 0 /100 WBC
PHOSPHATE SERPL-MCNC: 3.4 MG/DL (ref 2.5–4.5)
PLATELET # BLD AUTO: 277 K/UL (ref 164–446)
PMV BLD AUTO: 11.2 FL (ref 9–12.9)
POTASSIUM SERPL-SCNC: 3.7 MMOL/L (ref 3.6–5.5)
PROGEST SERPL-MCNC: <0.05 NG/ML
PROT SERPL-MCNC: 7.6 G/DL (ref 6–8.2)
RBC # BLD AUTO: 4.46 M/UL (ref 4.2–5.4)
SODIUM SERPL-SCNC: 135 MMOL/L (ref 135–145)
T3FREE SERPL-MCNC: 2.81 PG/ML (ref 2–4.4)
TRIGL SERPL-MCNC: 110 MG/DL (ref 0–149)
TSH SERPL DL<=0.005 MIU/L-ACNC: 0.7 UIU/ML (ref 0.38–5.33)
URATE SERPL-MCNC: 5.8 MG/DL (ref 1.9–8.2)
WBC # BLD AUTO: 5.8 K/UL (ref 4.8–10.8)

## 2021-04-11 LAB — ESTRADIOL SERPL-MCNC: <15 PG/ML

## 2021-04-12 LAB — INSULIN P FAST SERPL-ACNC: 12 UIU/ML (ref 3–19)

## 2021-04-14 ENCOUNTER — HOSPITAL ENCOUNTER (OUTPATIENT)
Dept: LAB | Facility: MEDICAL CENTER | Age: 56
End: 2021-04-14
Attending: EMERGENCY MEDICINE
Payer: COMMERCIAL

## 2021-04-14 LAB
25(OH)D3 SERPL-MCNC: 61 NG/ML (ref 30–80)
APPEARANCE UR: CLEAR
BILIRUB UR QL STRIP.AUTO: NEGATIVE
COLOR UR: YELLOW
GLUCOSE UR STRIP.AUTO-MCNC: NEGATIVE MG/DL
KETONES UR STRIP.AUTO-MCNC: NEGATIVE MG/DL
LEUKOCYTE ESTERASE UR QL STRIP.AUTO: NEGATIVE
MICRO URNS: NORMAL
NITRITE UR QL STRIP.AUTO: NEGATIVE
PH UR STRIP.AUTO: 6.5 [PH] (ref 5–8)
PROT UR QL STRIP: NEGATIVE MG/DL
RBC UR QL AUTO: NEGATIVE
SP GR UR STRIP.AUTO: 1.01
UROBILINOGEN UR STRIP.AUTO-MCNC: 0.2 MG/DL

## 2021-04-14 PROCEDURE — 36415 COLL VENOUS BLD VENIPUNCTURE: CPT

## 2021-04-14 PROCEDURE — 81003 URINALYSIS AUTO W/O SCOPE: CPT

## 2021-04-14 PROCEDURE — 84403 ASSAY OF TOTAL TESTOSTERONE: CPT

## 2021-04-17 LAB — T4 FREE SERPL DIALY-MCNC: 1.5 NG/DL (ref 1.1–2.4)

## 2021-04-19 LAB — TESTOST SERPL-MCNC: 37 NG/DL (ref 9–55)

## 2021-04-29 LAB — IGF BP1 SERPL-MCNC: <5 NG/ML (ref 5–34)

## 2021-05-01 ENCOUNTER — HOSPITAL ENCOUNTER (EMERGENCY)
Facility: MEDICAL CENTER | Age: 56
End: 2021-05-01
Attending: EMERGENCY MEDICINE
Payer: COMMERCIAL

## 2021-05-01 VITALS
TEMPERATURE: 97.7 F | RESPIRATION RATE: 14 BRPM | HEIGHT: 68 IN | OXYGEN SATURATION: 94 % | WEIGHT: 152.78 LBS | SYSTOLIC BLOOD PRESSURE: 117 MMHG | HEART RATE: 74 BPM | BODY MASS INDEX: 23.15 KG/M2 | DIASTOLIC BLOOD PRESSURE: 78 MMHG

## 2021-05-01 DIAGNOSIS — F10.920 ALCOHOLIC INTOXICATION WITHOUT COMPLICATION (HCC): ICD-10-CM

## 2021-05-01 LAB
ALBUMIN SERPL BCP-MCNC: 4.3 G/DL (ref 3.2–4.9)
ALBUMIN/GLOB SERPL: 1.4 G/DL
ALP SERPL-CCNC: 72 U/L (ref 30–99)
ALT SERPL-CCNC: 33 U/L (ref 2–50)
ANION GAP SERPL CALC-SCNC: 16 MMOL/L (ref 7–16)
AST SERPL-CCNC: 47 U/L (ref 12–45)
BILIRUB SERPL-MCNC: 0.4 MG/DL (ref 0.1–1.5)
BUN SERPL-MCNC: 13 MG/DL (ref 8–22)
CALCIUM SERPL-MCNC: 9.7 MG/DL (ref 8.4–10.2)
CHLORIDE SERPL-SCNC: 100 MMOL/L (ref 96–112)
CO2 SERPL-SCNC: 23 MMOL/L (ref 20–33)
CREAT SERPL-MCNC: 0.62 MG/DL (ref 0.5–1.4)
GLOBULIN SER CALC-MCNC: 3.1 G/DL (ref 1.9–3.5)
GLUCOSE SERPL-MCNC: 76 MG/DL (ref 65–99)
POTASSIUM SERPL-SCNC: 4 MMOL/L (ref 3.6–5.5)
PROT SERPL-MCNC: 7.4 G/DL (ref 6–8.2)
SODIUM SERPL-SCNC: 139 MMOL/L (ref 135–145)

## 2021-05-01 PROCEDURE — 96366 THER/PROPH/DIAG IV INF ADDON: CPT

## 2021-05-01 PROCEDURE — 36415 COLL VENOUS BLD VENIPUNCTURE: CPT

## 2021-05-01 PROCEDURE — 80053 COMPREHEN METABOLIC PANEL: CPT

## 2021-05-01 PROCEDURE — 700101 HCHG RX REV CODE 250: Performed by: EMERGENCY MEDICINE

## 2021-05-01 PROCEDURE — 99284 EMERGENCY DEPT VISIT MOD MDM: CPT

## 2021-05-01 PROCEDURE — 96365 THER/PROPH/DIAG IV INF INIT: CPT

## 2021-05-01 RX ORDER — ONDANSETRON 4 MG/1
4 TABLET, ORALLY DISINTEGRATING ORAL EVERY 8 HOURS PRN
Qty: 10 TABLET | Refills: 1 | Status: ON HOLD
Start: 2021-05-01 | End: 2021-06-22

## 2021-05-01 RX ADMIN — THIAMINE HYDROCHLORIDE: 100 INJECTION, SOLUTION INTRAMUSCULAR; INTRAVENOUS at 09:27

## 2021-05-01 ASSESSMENT — FIBROSIS 4 INDEX: FIB4 SCORE: 0.93

## 2021-05-01 NOTE — ED NOTES
Assumed care of pt at this specific time; no acute exacerbations in condition are noted since last evaluation.  Call light is within reach and pt is strongly encouraged to call for any assistance.    Expected safety measures such as locked and lowest setting gurney, side-rails up, accessible call light have been properly implemented.  Pt verbalizes understanding of these precautions and is able to identify potential needs.

## 2021-05-01 NOTE — ED PROVIDER NOTES
ED Provider Note    CHIEF COMPLAINT  Chief Complaint   Patient presents with   • Alcohol Intoxication     Pt reports went to rehab facility this AM which was full and told to come to the ED for help with alcoholism.        HPI  Yohana Brandon is a 55 y.o. female here for evaluation of chronic alcoholism.  Patient went to a rehab facility this morning, and was told to come here because they were full.  Patient has no fever chills no vomiting, no medical complaints.  She drinks daily reportedly vodka, but denies any fall or trauma.    ROS;  Please see HPI  O/W negative     PAST MEDICAL HISTORY   has a past medical history of Alcohol abuse, ASTHMA, Elevated LFTs, Heart palpitations, Hypertension, and Psychiatric disorder.    SOCIAL HISTORY  Social History     Tobacco Use   • Smoking status: Never Smoker   • Smokeless tobacco: Never Used   Substance and Sexual Activity   • Alcohol use: Yes   • Drug use: No   • Sexual activity: Not on file       SURGICAL HISTORY   has a past surgical history that includes appendectomy (); endometrial ablation, thermal (); remove tonsils/adenoids,<15 y/o (); anesth,surg breast reconstructive (); hemorrhoidectomy (2011); anal sphincterotomy (2011); bunionectomy (); and  delivery+postpartum care.    CURRENT MEDICATIONS  Home Medications     Reviewed by Mehul Tolbert R.N. (Registered Nurse) on 21 at 0645  Med List Status: Not Addressed   Medication Last Dose Status   Cholecalciferol (VITAMIN D3 PO)  Active   hydrochlorothiazide (HYDRODIURIL) 25 MG TABS  Active   LORazepam (ATIVAN) 0.5 MG Tab  Active   multivitamin (THERAGRAN) Tab  Active   NON SPECIFIED  Active   Nutritional Supplements (DHEA PO)  Active   Omega-3 Fatty Acids (FISH OIL) 1000 MG Cap capsule  Active   ondansetron (ZOFRAN ODT) 4 MG TABLET DISPERSIBLE  Active   POTASSIUM PO  Active   rosuvastatin (CRESTOR) 10 MG Tab  Active   Suvorexant (BELSOMRA) 15 MG Tab  Active  "  telmisartan (MICARDIS) 80 MG TABS  Active   tramadol (ULTRAM) 50 MG Tab  Active                ALLERGIES  Allergies   Allergen Reactions   • Kiwi Extract Swelling   • Latex Anaphylaxis   • Morphine Hives   • Vicodin [Hydrocodone-Acetaminophen] Rash     Generalized rash, puritis    • Banana Shortness of Breath   • Other Food Hives and Shortness of Breath     Tropical fruit         REVIEW OF SYSTEMS  See HPI for further details. Review of systems as above, otherwise all other systems are negative.     PHYSICAL EXAM  VITAL SIGNS: BP (!) 97/74   Pulse 76   Temp 36.6 °C (97.8 °F) (Temporal)   Resp 19   Ht 1.727 m (5' 8\")   Wt 69.3 kg (152 lb 12.5 oz)   LMP 08/15/2011   SpO2 92%   BMI 23.23 kg/m²     Constitutional: Well developed, well nourished. No acute distress.  HEENT: Normocephalic, atraumatic. MMM  Neck: Supple, Full range of motion   Chest/Pulmonary:  No respiratory distress.  Equal expansion   Musculoskeletal: No deformity, no edema, neurovascular intact.   Neuro: Slurred speech, appropriate, cooperative, cranial nerves II-XII grossly intact.  No tremor  Psych: Normal mood and affect      Results for orders placed or performed during the hospital encounter of 05/01/21   COMP METABOLIC PANEL   Result Value Ref Range    Sodium 139 135 - 145 mmol/L    Potassium 4.0 3.6 - 5.5 mmol/L    Chloride 100 96 - 112 mmol/L    Co2 23 20 - 33 mmol/L    Anion Gap 16.0 7.0 - 16.0    Glucose 76 65 - 99 mg/dL    Bun 13 8 - 22 mg/dL    Creatinine 0.62 0.50 - 1.40 mg/dL    Calcium 9.7 8.4 - 10.2 mg/dL    AST(SGOT) 47 (H) 12 - 45 U/L    ALT(SGPT) 33 2 - 50 U/L    Alkaline Phosphatase 72 30 - 99 U/L    Total Bilirubin 0.4 0.1 - 1.5 mg/dL    Albumin 4.3 3.2 - 4.9 g/dL    Total Protein 7.4 6.0 - 8.2 g/dL    Globulin 3.1 1.9 - 3.5 g/dL    A-G Ratio 1.4 g/dL   ESTIMATED GFR   Result Value Ref Range    GFR If African American >60 >60 mL/min/1.73 m 2    GFR If Non African American >60 >60 mL/min/1.73 m 2           PROCEDURES "     MEDICAL RECORD  I have reviewed patient's medical record and pertinent results are listed.    COURSE & MEDICAL DECISION MAKING  I have reviewed any medical record information, laboratory studies and radiographic results as noted above.    I you have had any blood pressure issues while here in the emergency department, please see your doctor for a further evaluation or work up.    The pt tolerated po without difficulty, and appears well.  She had a rally bag given here. Her  states that she has an issue with this, and has been in treatment in the past.     Differential diagnoses include but not limited to:  etoh     This patient presents with alcohol intoxication .  At this time, I have counseled the patient/family regarding their medications, pain control, and follow up.  They will continue their medications, if any, as prescribed.  They will return immediately for any worsening symptoms and/or any other medical concerns.  They will see their doctor, or contact the doctor provided, in 1-2 days for follow up.       FINAL IMPRESSION  1. Alcoholic intoxication without complication (HCC)             Electronically signed by: Adán Tapia D.O., 5/1/2021 7:17 AM

## 2021-05-01 NOTE — ED TRIAGE NOTES
Pt arrives to ED with . Pt reports went to rehab facility and was told facility was full. Pt reports was told to come to ED to get help with alcoholism. Pt reports drinking vodka this AM and states drinks heavily every day. Pt denies any pain, N/V, or other complaints at this time besides expressing need for help with alcoholism.

## 2021-06-20 ENCOUNTER — HOSPITAL ENCOUNTER (INPATIENT)
Facility: MEDICAL CENTER | Age: 56
LOS: 1 days | DRG: 897 | End: 2021-06-22
Attending: EMERGENCY MEDICINE | Admitting: INTERNAL MEDICINE
Payer: COMMERCIAL

## 2021-06-20 ENCOUNTER — APPOINTMENT (OUTPATIENT)
Dept: RADIOLOGY | Facility: MEDICAL CENTER | Age: 56
DRG: 897 | End: 2021-06-20
Attending: EMERGENCY MEDICINE
Payer: COMMERCIAL

## 2021-06-20 DIAGNOSIS — S00.83XA CONTUSION OF FACE, INITIAL ENCOUNTER: ICD-10-CM

## 2021-06-20 DIAGNOSIS — E83.42 HYPOMAGNESEMIA: ICD-10-CM

## 2021-06-20 DIAGNOSIS — F10.10 ALCOHOL ABUSE: ICD-10-CM

## 2021-06-20 PROBLEM — S01.00XA OPEN WOUND OF SCALP: Status: ACTIVE | Noted: 2021-06-20

## 2021-06-20 LAB
ALBUMIN SERPL BCP-MCNC: 4.2 G/DL (ref 3.2–4.9)
ALBUMIN/GLOB SERPL: 1.3 G/DL
ALP SERPL-CCNC: 82 U/L (ref 30–99)
ALT SERPL-CCNC: 23 U/L (ref 2–50)
ANION GAP SERPL CALC-SCNC: 14 MMOL/L (ref 7–16)
AST SERPL-CCNC: 28 U/L (ref 12–45)
BASOPHILS # BLD AUTO: 1 % (ref 0–1.8)
BASOPHILS # BLD: 0.05 K/UL (ref 0–0.12)
BILIRUB SERPL-MCNC: 0.3 MG/DL (ref 0.1–1.5)
BUN SERPL-MCNC: 15 MG/DL (ref 8–22)
CALCIUM SERPL-MCNC: 9.6 MG/DL (ref 8.4–10.2)
CHLORIDE SERPL-SCNC: 97 MMOL/L (ref 96–112)
CO2 SERPL-SCNC: 24 MMOL/L (ref 20–33)
CREAT SERPL-MCNC: 0.74 MG/DL (ref 0.5–1.4)
EKG IMPRESSION: NORMAL
EOSINOPHIL # BLD AUTO: 0.21 K/UL (ref 0–0.51)
EOSINOPHIL NFR BLD: 4 % (ref 0–6.9)
ERYTHROCYTE [DISTWIDTH] IN BLOOD BY AUTOMATED COUNT: 42.8 FL (ref 35.9–50)
ETHANOL BLD-MCNC: 291.5 MG/DL (ref 0–10)
GLOBULIN SER CALC-MCNC: 3.2 G/DL (ref 1.9–3.5)
GLUCOSE SERPL-MCNC: 82 MG/DL (ref 65–99)
HCT VFR BLD AUTO: 42.9 % (ref 37–47)
HGB BLD-MCNC: 14.6 G/DL (ref 12–16)
LIPASE SERPL-CCNC: 29 U/L (ref 7–58)
LYMPHOCYTES # BLD AUTO: 1.72 K/UL (ref 1–4.8)
LYMPHOCYTES NFR BLD: 33 % (ref 22–41)
MAGNESIUM SERPL-MCNC: 1.4 MG/DL (ref 1.5–2.5)
MANUAL DIFF BLD: NORMAL
MCH RBC QN AUTO: 31.6 PG (ref 27–33)
MCHC RBC AUTO-ENTMCNC: 34 G/DL (ref 33.6–35)
MCV RBC AUTO: 92.9 FL (ref 81.4–97.8)
MONOCYTES # BLD AUTO: 0.1 K/UL (ref 0–0.85)
MONOCYTES NFR BLD AUTO: 2 % (ref 0–13.4)
NEUTROPHILS # BLD AUTO: 3.12 K/UL (ref 2–7.15)
NEUTROPHILS NFR BLD: 60 % (ref 44–72)
NRBC # BLD AUTO: 0 K/UL
NRBC BLD-RTO: 0 /100 WBC
PLATELET # BLD AUTO: 303 K/UL (ref 164–446)
PLATELET BLD QL SMEAR: NORMAL
PMV BLD AUTO: 9 FL (ref 9–12.9)
POTASSIUM SERPL-SCNC: 3.9 MMOL/L (ref 3.6–5.5)
PROT SERPL-MCNC: 7.4 G/DL (ref 6–8.2)
RBC # BLD AUTO: 4.62 M/UL (ref 4.2–5.4)
RBC BLD AUTO: NORMAL
SARS-COV-2 RNA RESP QL NAA+PROBE: NOTDETECTED
SODIUM SERPL-SCNC: 135 MMOL/L (ref 135–145)
SPECIMEN SOURCE: NORMAL
TROPONIN T SERPL-MCNC: 8 NG/L (ref 6–19)
WBC # BLD AUTO: 5.2 K/UL (ref 4.8–10.8)

## 2021-06-20 PROCEDURE — 96374 THER/PROPH/DIAG INJ IV PUSH: CPT

## 2021-06-20 PROCEDURE — 99285 EMERGENCY DEPT VISIT HI MDM: CPT

## 2021-06-20 PROCEDURE — 70450 CT HEAD/BRAIN W/O DYE: CPT

## 2021-06-20 PROCEDURE — 99220 PR INITIAL OBSERVATION CARE,LEVL III: CPT | Performed by: INTERNAL MEDICINE

## 2021-06-20 PROCEDURE — U0003 INFECTIOUS AGENT DETECTION BY NUCLEIC ACID (DNA OR RNA); SEVERE ACUTE RESPIRATORY SYNDROME CORONAVIRUS 2 (SARS-COV-2) (CORONAVIRUS DISEASE [COVID-19]), AMPLIFIED PROBE TECHNIQUE, MAKING USE OF HIGH THROUGHPUT TECHNOLOGIES AS DESCRIBED BY CMS-2020-01-R: HCPCS

## 2021-06-20 PROCEDURE — 83690 ASSAY OF LIPASE: CPT

## 2021-06-20 PROCEDURE — G0378 HOSPITAL OBSERVATION PER HR: HCPCS

## 2021-06-20 PROCEDURE — U0005 INFEC AGEN DETEC AMPLI PROBE: HCPCS

## 2021-06-20 PROCEDURE — 72125 CT NECK SPINE W/O DYE: CPT

## 2021-06-20 PROCEDURE — HZ2ZZZZ DETOXIFICATION SERVICES FOR SUBSTANCE ABUSE TREATMENT: ICD-10-PCS | Performed by: INTERNAL MEDICINE

## 2021-06-20 PROCEDURE — 80053 COMPREHEN METABOLIC PANEL: CPT

## 2021-06-20 PROCEDURE — 303747 HCHG EXTRA SUTURE

## 2021-06-20 PROCEDURE — 304217 HCHG IRRIGATION SYSTEM

## 2021-06-20 PROCEDURE — 304999 HCHG REPAIR-SIMPLE/INTERMED LEVEL 1

## 2021-06-20 PROCEDURE — 0HQ1XZZ REPAIR FACE SKIN, EXTERNAL APPROACH: ICD-10-PCS | Performed by: EMERGENCY MEDICINE

## 2021-06-20 PROCEDURE — 71045 X-RAY EXAM CHEST 1 VIEW: CPT

## 2021-06-20 PROCEDURE — 93005 ELECTROCARDIOGRAM TRACING: CPT | Performed by: EMERGENCY MEDICINE

## 2021-06-20 PROCEDURE — 700101 HCHG RX REV CODE 250: Performed by: EMERGENCY MEDICINE

## 2021-06-20 PROCEDURE — 83735 ASSAY OF MAGNESIUM: CPT

## 2021-06-20 PROCEDURE — 82077 ASSAY SPEC XCP UR&BREATH IA: CPT

## 2021-06-20 PROCEDURE — 85007 BL SMEAR W/DIFF WBC COUNT: CPT

## 2021-06-20 PROCEDURE — A9270 NON-COVERED ITEM OR SERVICE: HCPCS | Performed by: INTERNAL MEDICINE

## 2021-06-20 PROCEDURE — 85027 COMPLETE CBC AUTOMATED: CPT

## 2021-06-20 PROCEDURE — 70486 CT MAXILLOFACIAL W/O DYE: CPT

## 2021-06-20 PROCEDURE — 84484 ASSAY OF TROPONIN QUANT: CPT

## 2021-06-20 PROCEDURE — 700102 HCHG RX REV CODE 250 W/ 637 OVERRIDE(OP): Performed by: INTERNAL MEDICINE

## 2021-06-20 RX ORDER — LORAZEPAM 0.5 MG/1
0.5 TABLET ORAL EVERY 4 HOURS PRN
Status: DISCONTINUED | OUTPATIENT
Start: 2021-06-20 | End: 2021-06-22 | Stop reason: HOSPADM

## 2021-06-20 RX ORDER — ROSUVASTATIN CALCIUM 10 MG/1
20 TABLET, COATED ORAL EVERY EVENING
Status: DISCONTINUED | OUTPATIENT
Start: 2021-06-20 | End: 2021-06-22 | Stop reason: HOSPADM

## 2021-06-20 RX ORDER — ALPRAZOLAM 0.5 MG/1
0.5 TABLET ORAL 2 TIMES DAILY PRN
COMMUNITY
End: 2023-06-15

## 2021-06-20 RX ORDER — LORAZEPAM 2 MG/ML
0.5 INJECTION INTRAMUSCULAR EVERY 4 HOURS PRN
Status: DISCONTINUED | OUTPATIENT
Start: 2021-06-20 | End: 2021-06-22 | Stop reason: HOSPADM

## 2021-06-20 RX ORDER — ACETAMINOPHEN 325 MG/1
650 TABLET ORAL EVERY 6 HOURS PRN
Status: DISCONTINUED | OUTPATIENT
Start: 2021-06-20 | End: 2021-06-22 | Stop reason: HOSPADM

## 2021-06-20 RX ORDER — LORAZEPAM 2 MG/ML
2 INJECTION INTRAMUSCULAR
Status: DISCONTINUED | OUTPATIENT
Start: 2021-06-20 | End: 2021-06-22 | Stop reason: HOSPADM

## 2021-06-20 RX ORDER — HYDROCHLOROTHIAZIDE 25 MG/1
25 TABLET ORAL
Status: DISCONTINUED | OUTPATIENT
Start: 2021-06-20 | End: 2021-06-22 | Stop reason: HOSPADM

## 2021-06-20 RX ORDER — LORAZEPAM 1 MG/1
1 TABLET ORAL EVERY 4 HOURS PRN
Status: DISCONTINUED | OUTPATIENT
Start: 2021-06-20 | End: 2021-06-22 | Stop reason: HOSPADM

## 2021-06-20 RX ORDER — ONDANSETRON 2 MG/ML
4 INJECTION INTRAMUSCULAR; INTRAVENOUS EVERY 4 HOURS PRN
Status: DISCONTINUED | OUTPATIENT
Start: 2021-06-20 | End: 2021-06-22 | Stop reason: HOSPADM

## 2021-06-20 RX ORDER — PROMETHAZINE HYDROCHLORIDE 25 MG/1
12.5-25 SUPPOSITORY RECTAL EVERY 4 HOURS PRN
Status: DISCONTINUED | OUTPATIENT
Start: 2021-06-20 | End: 2021-06-22 | Stop reason: HOSPADM

## 2021-06-20 RX ORDER — PROMETHAZINE HYDROCHLORIDE 25 MG/1
12.5-25 TABLET ORAL EVERY 4 HOURS PRN
Status: DISCONTINUED | OUTPATIENT
Start: 2021-06-20 | End: 2021-06-22 | Stop reason: HOSPADM

## 2021-06-20 RX ORDER — FOLIC ACID 1 MG/1
1 TABLET ORAL DAILY
Status: DISCONTINUED | OUTPATIENT
Start: 2021-06-20 | End: 2021-06-22 | Stop reason: HOSPADM

## 2021-06-20 RX ORDER — GAUZE BANDAGE 2" X 2"
100 BANDAGE TOPICAL DAILY
Status: DISCONTINUED | OUTPATIENT
Start: 2021-06-20 | End: 2021-06-22 | Stop reason: HOSPADM

## 2021-06-20 RX ORDER — LABETALOL HYDROCHLORIDE 5 MG/ML
10 INJECTION, SOLUTION INTRAVENOUS EVERY 4 HOURS PRN
Status: DISCONTINUED | OUTPATIENT
Start: 2021-06-20 | End: 2021-06-22 | Stop reason: HOSPADM

## 2021-06-20 RX ORDER — ROSUVASTATIN CALCIUM 10 MG/1
10 TABLET, COATED ORAL EVERY EVENING
COMMUNITY

## 2021-06-20 RX ORDER — LORAZEPAM 1 MG/1
4 TABLET ORAL
Status: DISCONTINUED | OUTPATIENT
Start: 2021-06-20 | End: 2021-06-22 | Stop reason: HOSPADM

## 2021-06-20 RX ORDER — TELMISARTAN 40 MG/1
80 TABLET ORAL
Status: DISCONTINUED | OUTPATIENT
Start: 2021-06-20 | End: 2021-06-22 | Stop reason: HOSPADM

## 2021-06-20 RX ORDER — AMOXICILLIN 250 MG
2 CAPSULE ORAL 2 TIMES DAILY
Status: DISCONTINUED | OUTPATIENT
Start: 2021-06-20 | End: 2021-06-22 | Stop reason: HOSPADM

## 2021-06-20 RX ORDER — TELMISARTAN AND HYDROCHLORTHIAZIDE 80; 25 MG/1; MG/1
1 TABLET ORAL DAILY
Status: DISCONTINUED | OUTPATIENT
Start: 2021-06-21 | End: 2021-06-20

## 2021-06-20 RX ORDER — THYROID 30 MG/1
60 TABLET ORAL DAILY
Status: DISCONTINUED | OUTPATIENT
Start: 2021-06-21 | End: 2021-06-22 | Stop reason: HOSPADM

## 2021-06-20 RX ORDER — LORAZEPAM 1 MG/1
3 TABLET ORAL
Status: DISCONTINUED | OUTPATIENT
Start: 2021-06-20 | End: 2021-06-22 | Stop reason: HOSPADM

## 2021-06-20 RX ORDER — PROCHLORPERAZINE EDISYLATE 5 MG/ML
5-10 INJECTION INTRAMUSCULAR; INTRAVENOUS EVERY 4 HOURS PRN
Status: DISCONTINUED | OUTPATIENT
Start: 2021-06-20 | End: 2021-06-22 | Stop reason: HOSPADM

## 2021-06-20 RX ORDER — THYROID 60 MG/1
60 TABLET ORAL DAILY
COMMUNITY

## 2021-06-20 RX ORDER — LORAZEPAM 2 MG/ML
1 INJECTION INTRAMUSCULAR
Status: DISCONTINUED | OUTPATIENT
Start: 2021-06-20 | End: 2021-06-22 | Stop reason: HOSPADM

## 2021-06-20 RX ORDER — BISACODYL 10 MG
10 SUPPOSITORY, RECTAL RECTAL
Status: DISCONTINUED | OUTPATIENT
Start: 2021-06-20 | End: 2021-06-22 | Stop reason: HOSPADM

## 2021-06-20 RX ORDER — LORAZEPAM 1 MG/1
2 TABLET ORAL
Status: DISCONTINUED | OUTPATIENT
Start: 2021-06-20 | End: 2021-06-22 | Stop reason: HOSPADM

## 2021-06-20 RX ORDER — LIDOCAINE HYDROCHLORIDE AND EPINEPHRINE 10; 10 MG/ML; UG/ML
20 INJECTION, SOLUTION INFILTRATION; PERINEURAL ONCE
Status: COMPLETED | OUTPATIENT
Start: 2021-06-20 | End: 2021-06-20

## 2021-06-20 RX ORDER — TELMISARTAN AND HYDROCHLORTHIAZIDE 80; 25 MG/1; MG/1
1 TABLET ORAL DAILY
COMMUNITY
End: 2023-02-03

## 2021-06-20 RX ORDER — LORAZEPAM 2 MG/ML
1.5 INJECTION INTRAMUSCULAR
Status: DISCONTINUED | OUTPATIENT
Start: 2021-06-20 | End: 2021-06-22 | Stop reason: HOSPADM

## 2021-06-20 RX ORDER — ONDANSETRON 4 MG/1
4 TABLET, ORALLY DISINTEGRATING ORAL EVERY 4 HOURS PRN
Status: DISCONTINUED | OUTPATIENT
Start: 2021-06-20 | End: 2021-06-22 | Stop reason: HOSPADM

## 2021-06-20 RX ORDER — POLYETHYLENE GLYCOL 3350 17 G/17G
1 POWDER, FOR SOLUTION ORAL
Status: DISCONTINUED | OUTPATIENT
Start: 2021-06-20 | End: 2021-06-22 | Stop reason: HOSPADM

## 2021-06-20 RX ADMIN — MAGNESIUM OXIDE 400 MG (241.3 MG MAGNESIUM) TABLET 400 MG: TABLET at 16:26

## 2021-06-20 RX ADMIN — THIAMINE HCL TAB 100 MG 100 MG: 100 TAB at 16:27

## 2021-06-20 RX ADMIN — LORAZEPAM 1 MG: 1 TABLET ORAL at 20:21

## 2021-06-20 RX ADMIN — THERA TABS 1 TABLET: TAB at 16:27

## 2021-06-20 RX ADMIN — ROSUVASTATIN 20 MG: 10 TABLET, FILM COATED ORAL at 18:00

## 2021-06-20 RX ADMIN — FOLIC ACID 1 MG: 1 TABLET ORAL at 16:27

## 2021-06-20 RX ADMIN — LORAZEPAM 2 MG: 1 TABLET ORAL at 16:27

## 2021-06-20 RX ADMIN — THIAMINE HYDROCHLORIDE: 100 INJECTION, SOLUTION INTRAMUSCULAR; INTRAVENOUS at 12:45

## 2021-06-20 RX ADMIN — LIDOCAINE HYDROCHLORIDE AND EPINEPHRINE 20 ML: 10; 10 INJECTION, SOLUTION INFILTRATION; PERINEURAL at 15:00

## 2021-06-20 RX ADMIN — LORAZEPAM 1 MG: 1 TABLET ORAL at 23:58

## 2021-06-20 ASSESSMENT — PATIENT HEALTH QUESTIONNAIRE - PHQ9
SUM OF ALL RESPONSES TO PHQ9 QUESTIONS 1 AND 2: 0
2. FEELING DOWN, DEPRESSED, IRRITABLE, OR HOPELESS: NOT AT ALL
1. LITTLE INTEREST OR PLEASURE IN DOING THINGS: NOT AT ALL

## 2021-06-20 ASSESSMENT — LIFESTYLE VARIABLES
EVER HAD A DRINK FIRST THING IN THE MORNING TO STEADY YOUR NERVES TO GET RID OF A HANGOVER: NO
HEADACHE, FULLNESS IN HEAD: MILD
ORIENTATION AND CLOUDING OF SENSORIUM: ORIENTED AND CAN DO SERIAL ADDITIONS
ORIENTATION AND CLOUDING OF SENSORIUM: ORIENTED AND CAN DO SERIAL ADDITIONS
TOTAL SCORE: 4
PAROXYSMAL SWEATS: BARELY PERCEPTIBLE SWEATING. PALMS MOIST
AUDITORY DISTURBANCES: NOT PRESENT
PAROXYSMAL SWEATS: NO SWEAT VISIBLE
NAUSEA AND VOMITING: NO NAUSEA AND NO VOMITING
ALCOHOL_USE: YES
ANXIETY: NO ANXIETY (AT EASE)
AGITATION: NORMAL ACTIVITY
ON A TYPICAL DAY WHEN YOU DRINK ALCOHOL HOW MANY DRINKS DO YOU HAVE: 12
HAVE YOU EVER FELT YOU SHOULD CUT DOWN ON YOUR DRINKING: YES
HEADACHE, FULLNESS IN HEAD: MODERATE
ORIENTATION AND CLOUDING OF SENSORIUM: ORIENTED AND CAN DO SERIAL ADDITIONS
TOTAL SCORE: 3
ON A TYPICAL DAY WHEN YOU DRINK ALCOHOL HOW MANY DRINKS DO YOU HAVE: 12
HAVE YOU EVER FELT YOU SHOULD CUT DOWN ON YOUR DRINKING: YES
ANXIETY: *
AGITATION: NORMAL ACTIVITY
TOTAL SCORE: 3
AUDITORY DISTURBANCES: NOT PRESENT
HAVE PEOPLE ANNOYED YOU BY CRITICIZING YOUR DRINKING: YES
HEADACHE, FULLNESS IN HEAD: MILD
AUDITORY DISTURBANCES: NOT PRESENT
VISUAL DISTURBANCES: NOT PRESENT
AVERAGE NUMBER OF DAYS PER WEEK YOU HAVE A DRINK CONTAINING ALCOHOL: 7
CONSUMPTION TOTAL: INCOMPLETE
TOTAL SCORE: 3
TOTAL SCORE: 12
ANXIETY: MODERATELY ANXIOUS OR GUARDED, SO ANXIETY IS INFERRED
EVER HAD A DRINK FIRST THING IN THE MORNING TO STEADY YOUR NERVES TO GET RID OF A HANGOVER: NO
AGITATION: NORMAL ACTIVITY
TREMOR: *
PAROXYSMAL SWEATS: NO SWEAT VISIBLE
TOTAL SCORE: 3
TOTAL SCORE: 9
EVER FELT BAD OR GUILTY ABOUT YOUR DRINKING: YES
PAROXYSMAL SWEATS: NO SWEAT VISIBLE
TREMOR: *
CONSUMPTION TOTAL: INCOMPLETE
AGITATION: NORMAL ACTIVITY
VISUAL DISTURBANCES: NOT PRESENT
AVERAGE NUMBER OF DAYS PER WEEK YOU HAVE A DRINK CONTAINING ALCOHOL: 7
HEADACHE, FULLNESS IN HEAD: VERY MILD
HAVE PEOPLE ANNOYED YOU BY CRITICIZING YOUR DRINKING: YES
EVER FELT BAD OR GUILTY ABOUT YOUR DRINKING: YES
TREMOR: *
TOTAL SCORE: 8
VISUAL DISTURBANCES: NOT PRESENT
HOW MANY TIMES IN THE PAST YEAR HAVE YOU HAD 5 OR MORE DRINKS IN A DAY: 365
NAUSEA AND VOMITING: NO NAUSEA AND NO VOMITING
VISUAL DISTURBANCES: NOT PRESENT
TREMOR: *
HOW MANY TIMES IN THE PAST YEAR HAVE YOU HAD 5 OR MORE DRINKS IN A DAY: 365
NAUSEA AND VOMITING: NO NAUSEA AND NO VOMITING
ORIENTATION AND CLOUDING OF SENSORIUM: ORIENTED AND CAN DO SERIAL ADDITIONS
ALCOHOL_USE: YES
AUDITORY DISTURBANCES: NOT PRESENT
SUBSTANCE_ABUSE: 1
ANXIETY: *
CONSUMPTION TOTAL: POSITIVE
NAUSEA AND VOMITING: NO NAUSEA AND NO VOMITING

## 2021-06-20 ASSESSMENT — ENCOUNTER SYMPTOMS
DOUBLE VISION: 0
DIZZINESS: 0
SHORTNESS OF BREATH: 0
VOMITING: 0
DIAPHORESIS: 0
NAUSEA: 0
DIARRHEA: 0
BLURRED VISION: 0
ROS SKIN COMMENTS: FOREHEAD LAC
ABDOMINAL PAIN: 0
MYALGIAS: 0
HEADACHES: 0

## 2021-06-20 ASSESSMENT — COGNITIVE AND FUNCTIONAL STATUS - GENERAL
DAILY ACTIVITIY SCORE: 24
MOBILITY SCORE: 24
SUGGESTED CMS G CODE MODIFIER DAILY ACTIVITY: CH
SUGGESTED CMS G CODE MODIFIER MOBILITY: CH

## 2021-06-20 ASSESSMENT — PAIN DESCRIPTION - PAIN TYPE
TYPE: ACUTE PAIN
TYPE: ACUTE PAIN

## 2021-06-20 ASSESSMENT — FIBROSIS 4 INDEX
FIB4 SCORE: 1.57
FIB4 SCORE: 1.02

## 2021-06-20 NOTE — ED TRIAGE NOTES
"This patient was treated in our facility recently, for chronic alcoholism.  She  denies fever, chills, nausea, vomiting, today. C/O right facial injury, after hitting a wall yesterday. Reportedly she drinks daily vodka.  Chief Complaint   Patient presents with   • Alcohol Intoxication   • Detox     /85   Pulse 94   Temp 36.1 °C (97 °F) (Temporal)   Resp 18   Ht 1.727 m (5' 8\")   Wt 68 kg (149 lb 14.6 oz)   LMP 08/15/2011   SpO2 95%   BMI 22.79 kg/m²      "

## 2021-06-20 NOTE — ASSESSMENT & PLAN NOTE
With positive alcohol level, has been drinking today  Chronic alcohol dependence who would like to stop, she is concern about detox symptoms as she had in the past  She denies history of alcohol related seizures  Monitor on [facility]

## 2021-06-20 NOTE — ED NOTES
Med Rec completed: per pt at bedside.     No ORAL antibiotics in last 14 days    Preferred [facility]acy: [facility] P: [phone]    Pt confirmed following allergies:  Allergies   Allergen Reactions   • Kiwi Extract Swelling   • Latex Anaphylaxis   • Morphine Hives   • Vicodin [Hydrocodone-Acetaminophen] Rash     Generalized rash, puritis    • Banana Shortness of Breath   • Other Food Hives and Shortness of Breath     Tropical fruit          Pt's home medications:   No current facility-administered medications on file prior to encounter.     Medication Sig   • telmisartan-hydrochlorothiazide (MICARDIS HCT) 80-25 MG per tablet Take 1 tablet by mouth every day.   • rosuvastatin (CRESTOR) 20 MG Tab Take 20 mg by mouth every evening.   • ALPRAZolam (XANAX) 0.5 MG Tab Take 0.5 mg by mouth 2 times a day as needed for Anxiety.   • thyroid (ARMOUR THYROID) 60 MG Tab Take 60 mg by mouth every day.   • Suvorexant (BELSOMRA) 15 MG Tab Take 15 mg by mouth every bedtime.   • Cholecalciferol (VITAMIN D3 PO) Take 1 Cap by mouth every day.   • Potassium 99 MG Tab Take 99 mg by mouth every day. Indications: OTC   • Omega-3 Fatty Acids (FISH OIL) 1000 MG Cap capsule Take 2,000 mg by mouth every day.   • Nutritional Supplements (DHEA PO) Take 2 Tabs by mouth every day.   • multivitamin (THERAGRAN) Tab Take 1 Tab by mouth every day.

## 2021-06-20 NOTE — ED PROVIDER NOTES
ED Provider Note  CHIEF COMPLAINT  Chief Complaint   Patient presents with   • Alcohol Intoxication   • Detox       HPI  Yohana Brandon is a 55 y.o. female who presents to the ER with alcohol intoxication and request for alcohol detox.  Additionally, she fell sometime after midnight last night. She fell in the bathroom at home.  She struck her head, likely on the shower wall or around the door just outside the shower.  Patient says she was taking a shower but she does not recall exactly what happen but she does not believe she passed out.  She was drinking alcohol at the time she fell. She does not know when her last drink was, but she drinks at least 750 cc of hard liquor (alcohol) a day.  She sustained a laceration over her right eyebrow.  The patient denies headache.  She denies any visual changes.  She states she can see just fine out of her right eye.  She is up-to-date on her tdap.  Patient denies neck pain.  No rib pain.  No abdominal pain.  No arm pain or leg pain.  No pain in her chest wall with deep breathing.  She is not on any blood thinners.  She has been drinking alcohol for many years.  She has been in and out of rehab and her longest period of sobriety was 2-1/2 years.  She just came out of rehab a little over a month ago.  She typically goes to rehabilitation centers in either [place] or down at [facility] in [place].  Her  says she would like to be detoxed here at [facility] this time.  She has gone through withdrawal before.  She gets tremulous.  She has never had seizures or hallucinated with her withdrawal.  No recent cough, cold or flulike symptoms.  No vomiting or diarrhea.  No abdominal pain.  No chest pain.  No shortness of breath.  No cough.    REVIEW OF SYSTEMS  See HPI for further details. All other systems are negative.    PAST MEDICAL HISTORY  Past Medical History:   Diagnosis Date   • Alcohol abuse    • ASTHMA    • Elevated LFTs    • Heart palpitations     w/ alcohol  withdrawal   • Hypertension    • Psychiatric disorder     alcohol dependance       FAMILY HISTORY  Family History   Problem Relation Age of Onset   • Stroke Mother        SOCIAL HISTORY  Social History     Socioeconomic History   • Marital status:      Spouse name: Not on file   • Number of children: Not on file   • Years of education: Not on file   • Highest education level: Not on file   Occupational History   • Not on file   Tobacco Use   • Smoking status: Never Smoker   • Smokeless tobacco: Never Used   Vaping Use   • Vaping Use: Never used   Substance and Sexual Activity   • Alcohol use: Yes     Comment: hx of alcoholism   • Drug use: No   • Sexual activity: Not on file   Other Topics Concern   • Not on file   Social History Narrative   • Not on file     Social Determinants of Health     Financial Resource Strain:    • Difficulty of Paying Living Expenses:    Food Insecurity:    • Worried About Running Out of Food in the Last Year:    • Ran Out of Food in the Last Year:    Transportation Needs:    • Lack of Transportation (Medical):    • Lack of Transportation (Non-Medical):    Physical Activity:    • Days of Exercise per Week:    • Minutes of Exercise per Session:    Stress:    • Feeling of Stress :    Social Connections:    • Frequency of Communication with Friends and Family:    • Frequency of Social Gatherings with Friends and Family:    • Attends Jainism Services:    • Active Member of Clubs or Organizations:    • Attends Club or Organization Meetings:    • Marital Status:    Intimate Partner Violence:    • Fear of Current or Ex-Partner:    • Emotionally Abused:    • Physically Abused:    • Sexually Abused:        SURGICAL HISTORY  Past Surgical History:   Procedure Laterality Date   • HEMORRHOIDECTOMY  8/24/2011    Performed by SONY MACDONALD at SURGERY [facility] ORS   • ANAL SPHINCTEROTOMY  8/24/2011    Performed by SONY MACDONALD at SURGERY [facility] ORS   • PB ENDOMETRIAL ABLATION, THERMAL   "   • APPENDECTOMY     • PB ANESTH,SURG BREAST RECONSTRUCTIVE      bilateral    • BUNIONECTOMY      bilateral   • PB REMOVE TONSILS/ADENOIDS,<11 Y/O     • PB  DELIVERY+POSTPARTUM CARE      x2       CURRENT MEDICATIONS  Home Medications     Reviewed by Mir Thomas PhT (Pharmacy Tech) on 21 at 1246  Med List Status: Complete   Medication Last Dose Status   ALPRAZolam (XANAX) 0.5 MG Tab 2021 Active   Cholecalciferol (VITAMIN D3 PO) 2021 Active   multivitamin (THERAGRAN) Tab 2021 Active   Nutritional Supplements (DHEA PO) 2021 Active   Omega-3 Fatty Acids (FISH OIL) 1000 MG Cap capsule 2021 Active   ondansetron (ZOFRAN ODT) 4 MG TABLET DISPERSIBLE Not Taking Active   ondansetron (ZOFRAN ODT) 4 MG TABLET DISPERSIBLE Not Taking Active   Potassium 99 MG Tab 2021 Active   rosuvastatin (CRESTOR) 10 MG Tab Not Taking Active   rosuvastatin (CRESTOR) 20 MG Tab 2021 Active   Suvorexant (BELSOMRA) 15 MG Tab 2021 Active   telmisartan-hydrochlorothiazide (MICARDIS HCT) 80-25 MG per tablet 2021 Active   thyroid (ARMOUR THYROID) 60 MG Tab 2021 Active                ALLERGIES  Allergies   Allergen Reactions   • Kiwi Extract Swelling   • Latex Anaphylaxis   • Morphine Hives   • Vicodin [Hydrocodone-Acetaminophen] Rash     Generalized rash, puritis    • Banana Shortness of Breath   • Other Food Hives and Shortness of Breath     Tropical fruit         PHYSICAL EXAM  VITAL SIGNS: /83   Pulse 80   Temp 36.1 °C (97 °F) (Temporal)   Resp 18   Ht 1.727 m (5' 8\")   Wt 68 kg (149 lb 14.6 oz)   LMP 08/15/2011   SpO2 90%   BMI 22.79 kg/m²      Constitutional: Well developed, well nourished; No acute distress; Non-toxic appearance.   HENT: Normocephalic, atraumatic; Bilateral external ears normal; Oropharynx with moist mucous membranes; No erythema or exudates in the posterior oropharynx.   Eyes: PERRL, EOMI, ecchymosis around the upper portion " of the right eye and along the lateral portion of the right eye.  The conjunctive itself is clear.  No subconjunctival hemorrhage.  Pupils are equal round and reactive to light.  No hyphema.  Vision is normal.  Normal finger counting.  Extraocular movements are intact without pain or limitation of movement.  There is a 3 cm laceration to the lateral aspect of the right eyebrow.  The laceration extends inferiorly and laterally.  The deepest portion of the wound is the inferior aspect.  There is no deep structure involvement.  It is into the subcutaneous tissue.  No involvement of the muscle.  There is some diffuse swelling to the tissues  Neck:  Supple, nontender midline; No stridor; No nuchal rigidity.   Lymphatic: No cervical lymphadenopathy noted.   Cardiovascular: Regular rate and rhythm without murmurs, rubs, or gallop.   Thorax & Lungs: No respiratory distress, breath sounds clear to auscultation bilaterally without wheezing, rales or rhonchi. Nontender chest wall. No crepitus or subcutaneous air  Abdomen: Soft, nontender, bowel sounds normal. No obvious masses; No pulsatile masses; no rebound, guarding, or peritoneal signs.   Skin: Good color; warm and dry without rash or petechia.  Back: Nontender, No CVA tenderness.   Extremities: Distal radial, dorsalis pedis, posterior tibial pulses are equal bilaterally; No edema; Nontender calves or saphenous, No cyanosis, No clubbing.   Musculoskeletal: Good range of motion in all major joints. No tenderness to palpation or major deformities noted.   Neurologic: Alert & oriented x 4, clear speech    EKG  Please see my EKG interpretation below    RADIOLOGY/PROCEDURES  DX-CHEST-PORTABLE (1 VIEW)   Final Result         No acute cardiac or pulmonary abnormality is identified.      CT-HEAD W/O   Final Result      1.  No acute intracranial hemorrhage is identified.      2.  Moderate for age atrophy      3.  Moderate sinus inflammatory disease with probable obstructive  component on the left. ENT referral is recommended      CT-CSPINE WITHOUT PLUS RECONS   Final Result      No acute fracture or dislocation seen in the CT scan of the cervical spine.      CT-MAXILLOFACIAL W/O PLUS RECONS   Final Result      1.  No displaced fracture of the facial bones.      2.  Chronic sinus inflammatory disease with probable obstructive component on the left          COURSE & MEDICAL DECISION MAKING  Pertinent Labs & Imaging studies reviewed. (See chart for details)  Results for orders placed or performed during the hospital encounter of 06/20/21   CBC WITH DIFFERENTIAL   Result Value Ref Range    WBC 5.2 4.8 - 10.8 K/uL    RBC 4.62 4.20 - 5.40 M/uL    Hemoglobin 14.6 12.0 - 16.0 g/dL    Hematocrit 42.9 37.0 - 47.0 %    MCV 92.9 81.4 - 97.8 fL    MCH 31.6 27.0 - 33.0 pg    MCHC 34.0 33.6 - 35.0 g/dL    RDW 42.8 35.9 - 50.0 fL    Platelet Count 303 164 - 446 K/uL    MPV 9.0 9.0 - 12.9 fL    Neutrophils-Polys 60.00 44.00 - 72.00 %    Lymphocytes 33.00 22.00 - 41.00 %    Monocytes 2.00 0.00 - 13.40 %    Eosinophils 4.00 0.00 - 6.90 %    Basophils 1.00 0.00 - 1.80 %    Nucleated RBC 0.00 /100 WBC    Neutrophils (Absolute) 3.12 2.00 - 7.15 K/uL    Lymphs (Absolute) 1.72 1.00 - 4.80 K/uL    Monos (Absolute) 0.10 0.00 - 0.85 K/uL    Eos (Absolute) 0.21 0.00 - 0.51 K/uL    Baso (Absolute) 0.05 0.00 - 0.12 K/uL    NRBC (Absolute) 0.00 K/uL   COMP METABOLIC PANEL   Result Value Ref Range    Sodium 135 135 - 145 mmol/L    Potassium 3.9 3.6 - 5.5 mmol/L    Chloride 97 96 - 112 mmol/L    Co2 24 20 - 33 mmol/L    Anion Gap 14.0 7.0 - 16.0    Glucose 82 65 - 99 mg/dL    Bun 15 8 - 22 mg/dL    Creatinine 0.74 0.50 - 1.40 mg/dL    Calcium 9.6 8.4 - 10.2 mg/dL    AST(SGOT) 28 12 - 45 U/L    ALT(SGPT) 23 2 - 50 U/L    Alkaline Phosphatase 82 30 - 99 U/L    Total Bilirubin 0.3 0.1 - 1.5 mg/dL    Albumin 4.2 3.2 - 4.9 g/dL    Total Protein 7.4 6.0 - 8.2 g/dL    Globulin 3.2 1.9 - 3.5 g/dL    A-G Ratio 1.3 g/dL   LIPASE    Result Value Ref Range    Lipase 29 7 - 58 U/L   MAGNESIUM   Result Value Ref Range    Magnesium 1.4 (L) 1.5 - 2.5 mg/dL   ETHYL ALCOHOL (BLOOD)   Result Value Ref Range    Diagnostic Alcohol 291.5 (H) 0.0 - 10.0 mg/dL   ESTIMATED GFR   Result Value Ref Range    GFR If African American >60 >60 mL/min/1.73 m 2    GFR If Non African American >60 >60 mL/min/1.73 m 2   DIFFERENTIAL MANUAL   Result Value Ref Range    Manual Diff Status PERFORMED    PLATELET ESTIMATE   Result Value Ref Range    Plt Estimation Normal    MORPHOLOGY   Result Value Ref Range    RBC Morphology Normal    TROPONIN   Result Value Ref Range    Troponin T 8 6 - 19 ng/L   SARS-CoV-2 PCR (24 hour In-House): Collect NP swab in VTM    Specimen: Nasopharyngeal; Respirate   Result Value Ref Range    SARS-CoV-2 Source NP Swab    EKG   Result Value Ref Range    Report       [facility].    Test Date:  2021  Pt Name:    SONIA DAMON          Department: [facility]  MRN:        [id number]                      [facility]  Gender:     Female                       Technician: GAURI  :        [id number]                   Requested By:HAKAN VILA  Order #:    [id number]                    Reading MD: Hakan Vila    Measurements  Intervals                                Axis  Rate:       77                           P:          72  SD:         162                          QRS:        43  QRSD:       88                           T:          66  QT:         406  QTc:        460    Interpretive Statements  Sinus rhythm rate 77  Normal axis  Normal intervals  No ST elevation or depression  Probable left atrial enlargement  Compared to ECG 2021 21:58:07  Sinus tachycardia no longer present  ST (T wave) deviation no longer present  Electronically Signed On 2021 13:53:47 PDT by Hakan Vila         Patient presents to the ER requesting detox.  She is a alcoholic.  She would like to detox here at [facility].  She is detoxed previously in rehab previously at other facilities around [place], most recently at a facility in [place].  She just was released from there about a month ago.  She drinks about a 750 mL bottle of vodka a day.  Her  is requesting detox here at [facility].  She has gone through withdrawal before but is never had any seizures or DTs.  Additionally, she presents with a laceration and contusion to her right eyebrow region.  She fell last night in the shower.  She does not know exactly what time.  She remembers taking a shower.  She does not really recall falling but there was blood on the marble floor in the bathroom.  She hit her face either on the side of the shower or somewhere on the floor.  She sustained a laceration to her right eyebrow region.   patient's  requested plastic surgery to repair her wound.  Unfortunately her plastic surgeon does not have privileges here at [facility] so her plastic surgeon cannot come her to repair her laceration.  We do not have any plastic surgery on-call here at [facility] today.  I attempted to see if the on-call plastic surgeon at [facility] would come help out here at [facility] but he does not come out here to [facility] and was unable to repair the laceration.  Very long discussion with the patient and her significant other regarding ER MD repairing the wound.  I told him a very comfortable repairing this.  No matter who repairs the wound there will be a scar.  She can always follow-up with her own plastic surgeon for revision if she is not happy with the result.  They agreed to allow me to repair the wound.  Wound did come together nicely.  She is a bit swollen from the ecchymosis and swelling related to the soft tissue contusion from the fall.  She has some ecchymosis around her eye.  This will take time to resolve.  CT brain was performed.  Negative for intracranial bleed.  No evidence of skull  fracture or facial fracture.  She was found to have an obstructive type process in the left maxillary sinus which requires ENT follow-up.   and patient understand and agree importance of follow-up with ENT for this incidental finding.  CT of the cervical spine was obtained as well given patient's head injury while intoxicated.  CT C-spine is negative for any acute fracture.  She had a little bit of tenderness over the right lateral chest wall.  Chest x-ray is negative.  Evidence for rib fracture or pneumothorax.  Patient's alcohol level is nearly 300.  Labs are otherwise unremarkable except for low potassium.  She was given a rally bag.   said they plan on going to [place] later this week.  I explained to them that she should not get the wound wet with any sort of pool water or water ocean water as water contact with a healing or open wound can cause infection.  They understand and agree.  I spoke with Dr. Weathers, hospitalist on-call, and she will kindly evaluate patient for admission for alcohol withdrawal and hypomagnesemia.      I spoke with nurse practitioner for Dr. Corrina Pimentel, patient's private plastic surgeon.  She said Dr. Pimentel does not have privileges at [facility] and therefore cannot come to the ER to repair the patient's laceration.    I spoke with Dr. Lewis Rice, plastic surgeon, on-call at [facility].  He says he does not come out here to [facility].  He says that ER MD will need to fix patient's face.  It does not matter if significant other is requesting plastics if there is no plastics available, and he is not available.    I spoke with the patient and her significant other regarding my discussions with both Dr. Benitez's nurse practitioner as well as with Dr. Rice.  They are aware that their option here today is for ER MD (myself) to repair this wound.  This is a very straightforward laceration to repair.  There is no deep structure involvement.  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  If they would like, they can follow-up with Dr. Corrina Pimentel when she is discharged from the hospital.  She is unhappy with the results of the scar, her plastic surgeon can revise the wound.  I explained to the patient significant other that I did my best to try to get a plastic surgeon to repair the patient's eyebrow laceration as requested by her significant other.  However, at this time there is no plastic surgery available at [facility] and therefore the wound will need to be closed by ER MD.  It cannot be left open.  The scar would be much worse if it would be left open.  At this point the patient agrees for me to suture her wound.  Initially the  was reluctant, but then agreed since the patient is comfortable with me repairing her laceration.      PROCEDURE:   Wound repair was discussed.  Patient consented to wound repair.  Wound was anesthetized using lidocaine 1% with epinephrine.  The wound was then irrigated using copious amounts of normal saline.  Wound was carefully explored.  There is no deep structure involvement.  No foreign body.  The wound was repaired without difficulty using a total of 9 sutures.  Suture material was six-point 0 nylon on a P-3 needle.  Wound margins approximated well.  Patient tolerated procedure well.      Patient and her significant other were advised that they need to follow-up with an ear nose and throat specialist for the findings seen today on a CT scan of the face/head revealing a possible obstructive process within the left maxillary sinus.  They also are aware of the need suture removal in 5 days.    CRITICAL CARE  The very real possibilty of a deterioration of this patient's condition required the highest level of my preparedness for sudden, emergent intervention.  I  provided critical care services, which included medication orders, frequent reevaluations of the patient's condition and response to treatment, ordering and reviewing test results, and discussing the case with various consultants.  The critical care time associated with the care of the patient was 35 minutes. Review chart for interventions. This time is exclusive of any other billable procedures.       I verified that the patient was wearing a mask and I was wearing appropriate PPE every time I entered the room. The patient's mask was on the patient at all times during my encounter except for a brief view of the oropharynx.    FINAL IMPRESSION  1. Alcohol abuse     2. Hypomagnesemia     3. Contusion of face, initial encounter     The critical care time associated with the care of the patient was 35 minutes.     This dictation has been created using voice recognition software. The accuracy of the dictation is limited by the abilities of the software. I expect there may be some errors of grammar and possibly content. I made every attempt to manually correct the errors within my dictation. However, errors related to voice recognition software may still exist and should be interpreted within the appropriate context.    Electronically signed by: Madai Vila M.D., 6/20/2021 2:43 PM

## 2021-06-20 NOTE — H&P
Hospital Medicine History & Physical Note    Date of Service  2021    Primary Care Physician  Deborah Moctezuma M.D.    Consultants  None    Code Status  Full    Chief Complaint  Chief Complaint   Patient presents with   • Alcohol Intoxication   • Detox       History of Presenting Illness  55 y.o. female who presented 2021 with alcohol dependence, HTN who has been to alcohol rehab in the past multiple times.  She is here for alcohol detox. She also had slipped and fall last night, hitting her head with a forehead laceration. She does not remember what happen, says she blacked out. She last drank this morning. She can't quantify amount of alcohol, but says it's a lot. She currently denies dizziness, vision changes, CP, SOB, and pain, nausea.    Review of Systems  Review of Systems   Constitutional: Negative for diaphoresis.   Eyes: Negative for blurred vision and double vision.   Respiratory: Negative for shortness of breath.    Cardiovascular: Negative for chest pain.   Gastrointestinal: Negative for abdominal pain, diarrhea, nausea and vomiting.   Genitourinary: Negative for dysuria and urgency.   Musculoskeletal: Negative for myalgias.   Skin:        Forehead lac   Neurological: Negative for dizziness and headaches.   Psychiatric/Behavioral: Positive for substance abuse.   All other systems reviewed and are negative.      Past Medical History   has a past medical history of Alcohol abuse, ASTHMA, Elevated LFTs, Heart palpitations, Hypertension, and Psychiatric disorder.    Surgical History   has a past surgical history that includes appendectomy (); pr endometrial ablation, thermal (); pr remove tonsils/adenoids,<15 y/o (); pr anesth,surg breast reconstructive (); hemorrhoidectomy (2011); anal sphincterotomy (2011); bunionectomy (); and pr  delivery+postpartum care.     Family History  family history includes Stroke in her mother.     Social History   reports that she  has never smoked. She has never used smokeless tobacco. She reports current alcohol use. She reports that she does not use drugs.    Allergies  Allergies   Allergen Reactions   • Kiwi Extract Swelling   • Latex Anaphylaxis   • Morphine Hives   • Vicodin [Hydrocodone-Acetaminophen] Rash     Generalized rash, puritis    • Banana Shortness of Breath   • Other Food Hives and Shortness of Breath     Tropical fruit         Medications  Prior to Admission Medications   Prescriptions Last Dose Informant Patient Reported? Taking?   ALPRAZolam (XANAX) 0.5 MG Tab 6/20/2021 at am Patient Yes Yes   Sig: Take 0.5 mg by mouth 2 times a day as needed for Anxiety.   Cholecalciferol (VITAMIN D3 PO) 6/20/2021 at am Patient Yes No   Sig: Take 1 Cap by mouth every day.   Nutritional Supplements (DHEA PO) 6/20/2021 at am Patient Yes No   Sig: Take 2 Tabs by mouth every day.   Omega-3 Fatty Acids (FISH OIL) 1000 MG Cap capsule 6/20/2021 at am Patient Yes No   Sig: Take 2,000 mg by mouth every day.   Potassium 99 MG Tab 6/20/2021 at am Patient Yes No   Sig: Take 99 mg by mouth every day. Indications: OTC   Suvorexant (BELSOMRA) 15 MG Tab 6/19/2021 at pm Patient Yes No   Sig: Take 15 mg by mouth every bedtime.   multivitamin (THERAGRAN) Tab 6/20/2021 at am Patient Yes No   Sig: Take 1 Tab by mouth every day.   ondansetron (ZOFRAN ODT) 4 MG TABLET DISPERSIBLE Not Taking at Unknown time Patient No No   Sig: Take 1 Tab by mouth every four hours as needed for Nausea (give PO if no IV route available).   Patient not taking: Reported on 6/20/2021   ondansetron (ZOFRAN ODT) 4 MG TABLET DISPERSIBLE Not Taking at Unknown time Patient No No   Sig: Take 1 tablet by mouth every 8 hours as needed for Nausea.   Patient not taking: Reported on 6/20/2021   rosuvastatin (CRESTOR) 10 MG Tab Not Taking at Unknown time Patient No No   Sig: Take 1 Tab by mouth every evening.   Patient not taking: Reported on 6/20/2021   rosuvastatin (CRESTOR) 20 MG Tab  6/19/2021 at pm Patient Yes Yes   Sig: Take 20 mg by mouth every evening.   telmisartan-hydrochlorothiazide (MICARDIS HCT) 80-25 MG per tablet 6/20/2021 at am Patient Yes Yes   Sig: Take 1 tablet by mouth every day.   thyroid (ARMOUR THYROID) 60 MG Tab 6/20/2021 at am Patient Yes Yes   Sig: Take 60 mg by mouth every day.      Facility-Administered Medications: None       Physical Exam  Temp:  [36.1 °C (97 °F)] 36.1 °C (97 °F)  Pulse:  [80-94] 80  Resp:  [18] 18  BP: (123-138)/(83-85) 138/83  SpO2:  [90 %-96 %] 90 %    Physical Exam  Vitals and nursing note reviewed.   Constitutional:       Appearance: She is not toxic-appearing or diaphoretic.   HENT:      Head:      Comments: Laceration above right eye     Mouth/Throat:      Mouth: Mucous membranes are moist.   Eyes:      General: No scleral icterus.        Right eye: No discharge.         Left eye: No discharge.      Extraocular Movements: Extraocular movements intact.      Pupils: Pupils are equal, round, and reactive to light.   Cardiovascular:      Rate and Rhythm: Normal rate and regular rhythm.   Pulmonary:      Effort: Pulmonary effort is normal. No respiratory distress.      Breath sounds: No wheezing or rales.   Abdominal:      Palpations: Abdomen is soft.      Tenderness: There is no abdominal tenderness. There is no guarding or rebound.   Musculoskeletal:         General: No swelling.      Cervical back: Neck supple.      Right lower leg: No edema.      Left lower leg: No edema.   Skin:     General: Skin is warm and dry.   Neurological:      General: No focal deficit present.      Mental Status: She is alert and oriented to person, place, and time.      Comments: No tremors to hands   Psychiatric:         Mood and Affect: Mood normal.         Behavior: Behavior normal.         Laboratory:  Recent Labs     06/20/21  1244   WBC 5.2   RBC 4.62   HEMOGLOBIN 14.6   HEMATOCRIT 42.9   MCV 92.9   MCH 31.6   MCHC 34.0   RDW 42.8   PLATELETCT 303   MPV 9.0      Recent Labs     06/20/21  1244   SODIUM 135   POTASSIUM 3.9   CHLORIDE 97   CO2 24   GLUCOSE 82   BUN 15   CREATININE 0.74   CALCIUM 9.6     Recent Labs     06/20/21  1244   ALTSGPT 23   ASTSGOT 28   ALKPHOSPHAT 82   TBILIRUBIN 0.3   LIPASE 29   GLUCOSE 82         No results for input(s): NTPROBNP in the last 72 hours.      Recent Labs     06/20/21  1244   TROPONINT 8       Imaging:  DX-CHEST-PORTABLE (1 VIEW)   Final Result         No acute cardiac or pulmonary abnormality is identified.      CT-HEAD W/O   Final Result      1.  No acute intracranial hemorrhage is identified.      2.  Moderate for age atrophy      3.  Moderate sinus inflammatory disease with probable obstructive component on the left. ENT referral is recommended      CT-CSPINE WITHOUT PLUS RECONS   Final Result      No acute fracture or dislocation seen in the CT scan of the cervical spine.      CT-MAXILLOFACIAL W/O PLUS RECONS   Final Result      1.  No displaced fracture of the facial bones.      2.  Chronic sinus inflammatory disease with probable obstructive component on the left            Assessment/Plan:  I anticipate this patient is appropriate for observation status at this time.    * Alcohol dependence (HCC)- (present on admission)  Assessment & Plan  With positive alcohol level, has been drinking today  Chronic alcohol dependence who would like to stop, she is concern about detox symptoms as she had in the past  She denies history of alcohol related seizures  Monitor on CIWA    Open wound of scalp  Assessment & Plan  CTs negative for fracture  Laceration repair by ERP    Hypomagnesemia- (present on admission)  Assessment & Plan  Replete and recheck    H/O: hypertension- (present on admission)  Assessment & Plan  Minimally hypertensive  Continue home medication

## 2021-06-20 NOTE — ED NOTES
Pt alert and oriented. Pt has no signs of withdrawal at this time. POC dicussed with pt and  at bedside.
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
## 2021-06-21 LAB
ANION GAP SERPL CALC-SCNC: 9 MMOL/L (ref 7–16)
BUN SERPL-MCNC: 16 MG/DL (ref 8–22)
CALCIUM SERPL-MCNC: 9.3 MG/DL (ref 8.4–10.2)
CHLORIDE SERPL-SCNC: 101 MMOL/L (ref 96–112)
CK SERPL-CCNC: 52 U/L (ref 0–154)
CO2 SERPL-SCNC: 28 MMOL/L (ref 20–33)
CREAT SERPL-MCNC: 0.75 MG/DL (ref 0.5–1.4)
GLUCOSE SERPL-MCNC: 105 MG/DL (ref 65–99)
MAGNESIUM SERPL-MCNC: 1.7 MG/DL (ref 1.5–2.5)
PHOSPHATE SERPL-MCNC: 2.2 MG/DL (ref 2.5–4.5)
POTASSIUM SERPL-SCNC: 3.8 MMOL/L (ref 3.6–5.5)
SODIUM SERPL-SCNC: 138 MMOL/L (ref 135–145)

## 2021-06-21 PROCEDURE — 80048 BASIC METABOLIC PNL TOTAL CA: CPT

## 2021-06-21 PROCEDURE — 700102 HCHG RX REV CODE 250 W/ 637 OVERRIDE(OP): Performed by: STUDENT IN AN ORGANIZED HEALTH CARE EDUCATION/TRAINING PROGRAM

## 2021-06-21 PROCEDURE — 83735 ASSAY OF MAGNESIUM: CPT

## 2021-06-21 PROCEDURE — A9270 NON-COVERED ITEM OR SERVICE: HCPCS | Performed by: INTERNAL MEDICINE

## 2021-06-21 PROCEDURE — 82550 ASSAY OF CK (CPK): CPT

## 2021-06-21 PROCEDURE — 700102 HCHG RX REV CODE 250 W/ 637 OVERRIDE(OP): Performed by: INTERNAL MEDICINE

## 2021-06-21 PROCEDURE — 770020 HCHG ROOM/CARE - TELE (206)

## 2021-06-21 PROCEDURE — 99233 SBSQ HOSP IP/OBS HIGH 50: CPT | Performed by: STUDENT IN AN ORGANIZED HEALTH CARE EDUCATION/TRAINING PROGRAM

## 2021-06-21 PROCEDURE — 84100 ASSAY OF PHOSPHORUS: CPT

## 2021-06-21 PROCEDURE — A9270 NON-COVERED ITEM OR SERVICE: HCPCS | Performed by: STUDENT IN AN ORGANIZED HEALTH CARE EDUCATION/TRAINING PROGRAM

## 2021-06-21 RX ORDER — CLONIDINE HYDROCHLORIDE 0.1 MG/1
0.1 TABLET ORAL TWICE DAILY
Status: DISCONTINUED | OUTPATIENT
Start: 2021-06-21 | End: 2021-06-22 | Stop reason: HOSPADM

## 2021-06-21 RX ADMIN — DIBASIC SODIUM PHOSPHATE, MONOBASIC POTASSIUM PHOSPHATE AND MONOBASIC SODIUM PHOSPHATE 500 MG: 852; 155; 130 TABLET ORAL at 13:38

## 2021-06-21 RX ADMIN — TELMISARTAN 80 MG: 40 TABLET ORAL at 05:08

## 2021-06-21 RX ADMIN — ROSUVASTATIN 20 MG: 10 TABLET, FILM COATED ORAL at 17:25

## 2021-06-21 RX ADMIN — THERA TABS 1 TABLET: TAB at 05:09

## 2021-06-21 RX ADMIN — FOLIC ACID 1 MG: 1 TABLET ORAL at 05:09

## 2021-06-21 RX ADMIN — CLONIDINE HYDROCHLORIDE 0.1 MG: 0.1 TABLET ORAL at 20:02

## 2021-06-21 RX ADMIN — LORAZEPAM 0.5 MG: 0.5 TABLET ORAL at 17:25

## 2021-06-21 RX ADMIN — HYDROCHLOROTHIAZIDE 25 MG: 25 TABLET ORAL at 05:09

## 2021-06-21 RX ADMIN — DOCUSATE SODIUM 50 MG AND SENNOSIDES 8.6 MG 2 TABLET: 8.6; 5 TABLET, FILM COATED ORAL at 05:08

## 2021-06-21 RX ADMIN — THYROID, PORCINE 60 MG: 30 TABLET ORAL at 05:09

## 2021-06-21 RX ADMIN — DIBASIC SODIUM PHOSPHATE, MONOBASIC POTASSIUM PHOSPHATE AND MONOBASIC SODIUM PHOSPHATE 500 MG: 852; 155; 130 TABLET ORAL at 17:25

## 2021-06-21 RX ADMIN — LORAZEPAM 1 MG: 1 TABLET ORAL at 12:51

## 2021-06-21 RX ADMIN — THIAMINE HCL TAB 100 MG 100 MG: 100 TAB at 05:08

## 2021-06-21 RX ADMIN — MAGNESIUM OXIDE 400 MG (241.3 MG MAGNESIUM) TABLET 400 MG: TABLET at 05:09

## 2021-06-21 ASSESSMENT — LIFESTYLE VARIABLES
TREMOR: MODERATE TREMOR WITH ARMS EXTENDED
TOTAL SCORE: 4
HEADACHE, FULLNESS IN HEAD: NOT PRESENT
TOTAL SCORE: 2
ORIENTATION AND CLOUDING OF SENSORIUM: ORIENTED AND CAN DO SERIAL ADDITIONS
PAROXYSMAL SWEATS: NO SWEAT VISIBLE
TREMOR: MODERATE TREMOR WITH ARMS EXTENDED
PAROXYSMAL SWEATS: *
NAUSEA AND VOMITING: NO NAUSEA AND NO VOMITING
ANXIETY: MILDLY ANXIOUS
NAUSEA AND VOMITING: NO NAUSEA AND NO VOMITING
TOTAL SCORE: 9
AGITATION: NORMAL ACTIVITY
NAUSEA AND VOMITING: NO NAUSEA AND NO VOMITING
AGITATION: NORMAL ACTIVITY
TOTAL SCORE: 5
PAROXYSMAL SWEATS: NO SWEAT VISIBLE
HEADACHE, FULLNESS IN HEAD: NOT PRESENT
ANXIETY: NO ANXIETY (AT EASE)
TREMOR: *
NAUSEA AND VOMITING: NO NAUSEA AND NO VOMITING
VISUAL DISTURBANCES: NOT PRESENT
VISUAL DISTURBANCES: NOT PRESENT
ORIENTATION AND CLOUDING OF SENSORIUM: ORIENTED AND CAN DO SERIAL ADDITIONS
VISUAL DISTURBANCES: NOT PRESENT
NAUSEA AND VOMITING: NO NAUSEA AND NO VOMITING
HEADACHE, FULLNESS IN HEAD: NOT PRESENT
ORIENTATION AND CLOUDING OF SENSORIUM: ORIENTED AND CAN DO SERIAL ADDITIONS
ORIENTATION AND CLOUDING OF SENSORIUM: ORIENTED AND CAN DO SERIAL ADDITIONS
AGITATION: NORMAL ACTIVITY
ORIENTATION AND CLOUDING OF SENSORIUM: ORIENTED AND CAN DO SERIAL ADDITIONS
TOTAL SCORE: 1
AUDITORY DISTURBANCES: NOT PRESENT
AUDITORY DISTURBANCES: NOT PRESENT
PAROXYSMAL SWEATS: NO SWEAT VISIBLE
VISUAL DISTURBANCES: NOT PRESENT
HEADACHE, FULLNESS IN HEAD: NOT PRESENT
HEADACHE, FULLNESS IN HEAD: NOT PRESENT
AGITATION: NORMAL ACTIVITY
ANXIETY: MILDLY ANXIOUS
TREMOR: TREMOR NOT VISIBLE BUT CAN BE FELT, FINGERTIP TO FINGERTIP
ANXIETY: *
AGITATION: NORMAL ACTIVITY
PAROXYSMAL SWEATS: NO SWEAT VISIBLE
TREMOR: TREMOR NOT VISIBLE BUT CAN BE FELT, FINGERTIP TO FINGERTIP
AUDITORY DISTURBANCES: NOT PRESENT
VISUAL DISTURBANCES: NOT PRESENT
ANXIETY: MILDLY ANXIOUS
AUDITORY DISTURBANCES: NOT PRESENT
AUDITORY DISTURBANCES: NOT PRESENT

## 2021-06-21 ASSESSMENT — ENCOUNTER SYMPTOMS
PALPITATIONS: 0
MYALGIAS: 0
COUGH: 0
BLURRED VISION: 0
HEARTBURN: 0
FEVER: 0
DIZZINESS: 0

## 2021-06-21 NOTE — DISCHARGE PLANNING
Anticipated Discharge Disposition:   Home when medically cleared     Action:   Chart review complete.    Discussed patient's plan of care and plans for discharge during rounds.   Per MD, patient is not medically cleared at this time.     Barriers to Discharge:   Medical Clearance    Plan:   Hospital care management will continue to assist with discharge planning needs.     Care Transition Team Assessment    Patient lives with her spouse and has 5 children. Prior to admission, patient was independent with all ADL's apart from driving, as her  would drive her. Patient has [facility] and has RX coverage. Her preferred pharmacy is [facility]. This patient has a history of alcohol use and reports that her last drink was Sunday morning. Substance abuse resources, including a list of Rehab facilities, given to patient.     Information Source  Orientation Level: Oriented X4  Information Given By: Patient  Informant's Name: Yohana Brandon  Who is responsible for making decisions for patient? : Patient    Readmission Evaluation  Is this a readmission?: No    Elopement Risk  Legal Hold: No  Ambulatory or Self Mobile in Wheelchair: No-Not an Elopement Risk  Elopement Risk: Not at Risk for Elopement    Interdisciplinary Discharge Planning  Does Admitting Nurse Feel This Could be a Complex Discharge?: No  Primary Care Physician: GRANT JIMÉNEZ M.D  Lives with - Patient's Self Care Capacity: Spouse  Patient or legal guardian wants to designate a caregiver: No  Support Systems: Spouse / Significant Other, Children, Family Member(s)  Housing / Facility: [address]  Do You Take your Prescribed Medications Regularly: Yes  Able to Return to Previous ADL's: Yes  Mobility Issues: No  Prior Services: Other (Comments) (inpatient etoh rehab)  Assistance Needed: Yes  Durable Medical Equipment: Not Applicable    Discharge Preparedness  What is your plan after discharge?: Other (comment), Uncertain - pending  medical team collaboration  What are your discharge supports?: Child, Spouse    Finances  Financial Barriers to Discharge: No  Prescription Coverage: Yes    Vision / Hearing Impairment  Vision Impairment : Yes  Right Eye Vision: Impaired, Wears Glasses  Left Eye Vision: Impaired, Wears Glasses  Hearing Impairment : No    Advance Directive  Advance Directive?: None    Domestic Abuse  Have you ever been the victim of abuse or violence?: No  Physical Abuse or Sexual Abuse: No  Verbal Abuse or Emotional Abuse: No  Possible Abuse/Neglect Reported to:: Not Applicable    Psychological Assessment  History of Substance Abuse: Alcohol  Date Last Used - Alcohol: 6/20   Substance Abuse Comments:  (patient would like to quit)    Discharge Risks or Barriers  Discharge risks or barriers?: Complex medical needs  Patient risk factors: Complex medical needs    Anticipated Discharge Information  Discharge Disposition: Still a Patient (30)

## 2021-06-21 NOTE — CARE PLAN
The patient is Watcher - Medium risk of patient condition declining or worsening    Shift Goals  Clinical Goals: monitor withdrawal  Patient Goals: feel better and go home    Progress made toward(s) clinical / shift goals: Patient CIWA score of 4 and 9, medicated per MAR with ativan. Patient continues to have tremors but headache is gone.    Patient is not progressing towards the following goals: Patient would like to go home but stated she would like to quit drinking and agrees with going through withdrawal in the hospital.     Problem: Knowledge Deficit - Standard  Goal: Patient and family/care givers will demonstrate understanding of plan of care, disease process/condition, diagnostic tests and medications  Outcome: Progressing  Discussed plan of care with patient including CIWA scoring, ativan administered and safety with ambulation. Allowed time for questions, patient agreed and verbalized understanding.     Problem: Optimal Care for Alcohol Withdrawal  Goal: Optimal Care for the alcohol withdrawal patient  Outcome: Progressing  CIWA protocol in place, will assess and medicate accordingly.

## 2021-06-21 NOTE — RESPIRATORY CARE
"   COPD EDUCATION by COPD CLINICAL EDUCATOR  6/21/2021 at 11:23 AM by Daina Shah, RRT     Patient reviewed by COPD education team. Patient does have a history or diagnosis of COPD, but pt states has Asthma but not any more and is a non-smoker.  Therefore does not qualify for the COPD program.    COPD Screen  COPD Risk Screening  Do you have a history of COPD?: No    COPD Assessment  COPD Clinical Specialists ONLY  COPD Education Initiated: No--Quick Screen (Pt denies COPD, hx Asthma, which pt states has under control and is not afraid of it any more and doesnt use any inhalers)  Referrals Initiated:  (NA)  Is this a COPD exacerbation patient?: No    Meds to Beds  Would the patient like to opt in for Bedside Medication Delivery at Discharge?: No     MY COPD ACTION PLAN     It is recommended that patients and physicians /healthcare providers complete this action plan together. This plan should be discussed at each physician visit and updated as needed.    The green, yellow and red zones show groups of symptoms of COPD. This list of symptoms is not comprehensive, and you may experience other symptoms. In the \"Actions\" column, your healthcare provider has recommended actions for you to take based on your symptoms.    Patient Name: Yohana Brandon   YOB: 1965   Last Updated on:     Green Zone:  I am doing well today Actions   •  Usual activitiy and exercise level •  Take daily medications   •  Usual amounts of cough and phlegm/mucus •  Use oxygen as prescribed   •  Sleep well at night •  Continue regular exercise/diet plan   •  Appetite is good •  At all times avoid cigarette smoke, inhaled irritants     Daily Medications (these medications are taken every day):                Yellow Zone:  I am having a bad day or a COPD flare Actions   •  More breathless than usual •  Continue daily medications   •  I have less energy for my daily activities •  Use quick relief inhaler as ordered   •  " "Increased or thicker phlegm/mucus •  Use oxygen as prescribed   •  Using quick relief inhaler/nebulizer more often •  Get plenty of rest   •  Swelling of ankles more than usual •  Use pursed lip breathing   •  More coughing than usual •  At all times avoid cigarette smoke, inhaled irritants   •  I feel like I have a \"chest cold\"   •  Poor sleep and my symptoms woke me up   •  My appetite is not good   •  My medicine is not helping    •  Call provider immediately if symptoms don’t improve     Continue daily medications, add rescue medications:               Medications to be used during a flare up, (as Discussed with Provider):              Red Zone:  I need urgent medical care Actions   •  Severe shortness of breath even at rest •  Call 911 or seek medical care immediately   •  Not able to do any activity because of breathing    •  Fever or shaking chills    •  Feeling confused or very drowsy     •  Chest pains    •  Coughing up blood              "

## 2021-06-21 NOTE — PROGRESS NOTES
Spiritual Care Note    Patient Information     Patient's Name: Yohana Brandon   MRN: [id number]    YOB: 1965   Age and Gender: 55 y.o. female   Service Area: MEDICAL [facility]   Room (and Bed): [address] [place] [address]   Ethnicity or Nationality:     Primary Language: English   Amish/Spiritual preference: Methodist   Place of Residence: [place]   Family/Friends/Others Present:   no   Clinical Team Present: no   Medical Diagnosis(-es)/Procedure(s): Alcohol Intoxication withdrawal   Code Status: Full Code    Date of Admission: 6/20/2021   Length of Stay: 0 days        Spiritual Care Provider Information:  Name of Spiritual Care Provider: Justine Carranza  Title of Spiritual Care Provider: Associate   Phone Number: [phone]  E-mail: [email or web address]  Total time : 10 minutes    Spiritual Screen Results:    Gen Nursing  Spiritual Screen  Was spiritual care education provided to the patient?: Yes     Palliative Care  PC Amish/Spiritual Screening  Was spiritual care education provided to the patient?: Yes      Encounter/Request Information  Encounter/Request Type   Visited With: Patient  Nature of the Visit: Initial, On shift  General Visit: Yes  Referral From/ Origin of Request: [facility] nursing    Spiritual Assessment   Spiritual Care Encounters    Observations/Symptoms: Accepting, Thankfulness (Pt sitting up in bed, awake/alert)    Interacton/Conversation:  introduced self to pt.  Pt greeted  with smile - pt appreciated  stopping by - pt denied any spiritual care needs at this time.  If pt changes mind, please place another spiritual care request order.      Assessment:  (no needs at this time)    Interventions: Companionship, Conversation    Outcomes: Other (Comment) (Pt appreciated  stopping by)    Plan: Visit Upon Request    Notes:

## 2021-06-21 NOTE — PROGRESS NOTES
Pt arrived to unit via gurney. Ambulated from gurney to bed, standby assist. Tele monitor applied, vitals taken. Pt assessed. A&O x4. Admit profile and med rec complete. Discussed POC with pt, including CIWA, safety precautions and medications ordered. Welcome folder provided and discussed. Communication board filled out. Questions and concerns addressed, verbalized understanding. Fall precautions in place. Pt demonstrates ability to use call light appropriately. Pt left in lowest position. Bed locked and low, bed alarm on.

## 2021-06-21 NOTE — CARE PLAN
The patient is Stable - Low risk of patient condition declining or worsening    Shift Goals  Clinical Goals: monitor withdrawl  Patient Goals: monitor withdrawl    Progress made toward(s) clinical / shift goals:  CIWA score of 12, medicated per MAR.     Patient is not progressing towards the following goals:    Problem: Pain - Standard  Goal: Alleviation of pain or a reduction in pain to the patient’s comfort goal  Outcome: Progressing  Headache is now controlled after ativan given.     Problem: Knowledge Deficit - Standard  Goal: Patient and family/care givers will demonstrate understanding of plan of care, disease process/condition, diagnostic tests and medications  Outcome: Progressing  Discussed plan of care with patient including CIWA, medications administered, and safety precautions. Allowed time for questions, patient agreed and verbalized understanding.

## 2021-06-21 NOTE — CARE PLAN
The patient is Stable - Low risk of patient condition declining or worsening    Shift Goals  Clinical Goals: monitor withdrawal  Patient Goals: withdrawal    Progress made toward(s) clinical / shift goals:  Monitor patient's CIWA and medicate per MAR.     Patient is not progressing towards the following goals:      Problem: Pain - Standard  Goal: Alleviation of pain or a reduction in pain to the patient’s comfort goal  Outcome: Progressing     Problem: Knowledge Deficit - Standard  Goal: Patient and family/care givers will demonstrate understanding of plan of care, disease process/condition, diagnostic tests and medications  Outcome: Progressing     Problem: Optimal Care for Alcohol Withdrawal  Goal: Optimal Care for the alcohol withdrawal patient  Outcome: Progressing     Problem: Seizure Precautions  Goal: Implementation of seizure precautions  Outcome: Progressing     Problem: Lifestyle Changes  Goal: Patient's ability to identify lifestyle changes and available resources to help reduce recurrence of condition will improve  Outcome: Progressing     Problem: Psychosocial  Goal: Patient's level of anxiety will decrease  Outcome: Progressing     Problem: Fall Risk  Goal: Patient will remain free from falls  Outcome: Progressing

## 2021-06-21 NOTE — PROGRESS NOTES
0715 Report received from Jarret BHATT. Plan of care discussed. Patient resting comfortably in bed. Safety precautions in place.     0830 Assessment completed, patient is alert and oriented x 4, patient denies any pain at this time. CIWA score of 4, no medication needed at this time. Assisted patient ambulate to the bathroom and back to bed, patient tolerated well. Patient concerned about SBP of 150, blood pressure retaken and , patient feeling better but stated she takes clonidine at home when blood pressure is elevated, would like it to be ordered prn, will updated MD during rounds. Patient states she would like to discharge today, patient stated that she would like to quit drinking, educated patient on safety of withdrawing in the hospital, patient agrees. Safety precautions in place.

## 2021-06-21 NOTE — PROGRESS NOTES
Telemetry Shift Summary    Rhythm SR  HR Range 71-93  Ectopy rare PVCs  Measurements .14/.08/.40        Normal Values  Rhythm SR  HR Range    Measurements 0.12-0.20 / 0.06-0.10  / 0.30-0.52

## 2021-06-21 NOTE — PROGRESS NOTES
2 RN skin check complete.   Devices in place tele monitor.  Skin assessed under devices yes.  Confirmed pressure ulcers found on n/a.  New potential pressure ulcers noted on n/a. Wound consult placed n/a.  The following interventions in place pillows pillows provided for support.    Patient has bruise on her right eye with nine stitches on right eyebrow, patient has bruise on right side of chin and back of right elbow from a fall.

## 2021-06-21 NOTE — PROGRESS NOTES
Telemetry Shift Summary     Rhythm: SR  HR: 74-92  Ectopy: r PVC    Measurements: 0.16/0.08/0.36    Normal Values  Rhythm: SR  HR:   Measurements: 0.12-0.20/0.08-0.10/0.30-0.52

## 2021-06-21 NOTE — PROGRESS NOTES
Hospital Medicine Daily Progress Note    Date of Service  6/21/2021    Chief Complaint  55 y.o. female admitted 6/20/2021 with alcohol intoxication    Hospital Course  55 y.o. female who presented 6/20/2021 with alcohol dependence, HTN who has been to alcohol rehab in the past multiple times.  She is here for alcohol detox. She also had slipped and fall last night, hitting her head with a forehead laceration. She does not remember what happen, says she blacked out. She last drank this morning. She can't quantify amount of alcohol, but says it's a lot. She currently denies dizziness, vision changes, CP, SOB, and pain, nausea.     Interval Problem Update  6/21/2021  Vitals remained stable  Labs noted with low phosphorus  Started K-Phos    Imaging reviewed.  Chronic sinus inflammatory disease with probable obstruction component on the left.  ENT follow-up as outpatient    Continue on [facility] protocol  Watch for DT    Consultants/Specialty  N/A    Code Status  Full Code    Disposition  TBD    Review of Systems  Review of Systems   Constitutional: Negative for fever.   HENT: Negative for hearing loss.    Eyes: Negative for blurred vision.   Respiratory: Negative for cough.    Cardiovascular: Negative for chest pain and palpitations.   Gastrointestinal: Negative for heartburn.   Genitourinary: Negative for dysuria.   Musculoskeletal: Negative for myalgias.   Skin: Negative for rash.   Neurological: Negative for dizziness.        Physical Exam  Temp:  [36.2 °C (97.2 °F)-36.9 °C (98.5 °F)] 36.2 °C (97.2 °F)  Pulse:  [75-94] 76  Resp:  [16-18] 16  BP: (124-160)/(75-90) 151/87  SpO2:  [94 %-95 %] 95 %    Physical Exam  Constitutional:       General: She is not in acute distress.  HENT:      Head: Normocephalic and atraumatic.      Comments: Right eye bruises , suture on place .     Right Ear: Tympanic membrane normal.      Nose: Nose normal.      Mouth/Throat:      Mouth: Mucous membranes are moist.   Eyes:      Pupils: Pupils  are equal, round, and reactive to light.   Cardiovascular:      Rate and Rhythm: Normal rate and regular rhythm.      Pulses: Normal pulses.   Pulmonary:      Effort: Pulmonary effort is normal. No respiratory distress.      Breath sounds: Normal breath sounds.   Abdominal:      General: Abdomen is flat. There is no distension.      Palpations: Abdomen is soft.   Musculoskeletal:      Right lower leg: No edema.      Left lower leg: No edema.   Skin:     General: Skin is warm.      Coloration: Skin is not jaundiced.   Neurological:      General: No focal deficit present.      Mental Status: She is alert. Mental status is at baseline.      Motor: No weakness.      Comments: Noted generalized tremor   Psychiatric:         Mood and Affect: Mood normal.         Fluids  No intake or output data in the 24 hours ending 06/21/21 1629    Laboratory  Recent Labs     06/20/21  1244   WBC 5.2   RBC 4.62   HEMOGLOBIN 14.6   HEMATOCRIT 42.9   MCV 92.9   MCH 31.6   MCHC 34.0   RDW 42.8   PLATELETCT 303   MPV 9.0     Recent Labs     06/20/21  1244 06/21/21  0323   SODIUM 135 138   POTASSIUM 3.9 3.8   CHLORIDE 97 101   CO2 24 28   GLUCOSE 82 105*   BUN 15 16   CREATININE 0.74 0.75   CALCIUM 9.6 9.3                   Imaging  DX-CHEST-PORTABLE (1 VIEW)   Final Result         No acute cardiac or pulmonary abnormality is identified.      CT-HEAD W/O   Final Result      1.  No acute intracranial hemorrhage is identified.      2.  Moderate for age atrophy      3.  Moderate sinus inflammatory disease with probable obstructive component on the left. ENT referral is recommended      CT-CSPINE WITHOUT PLUS RECONS   Final Result      No acute fracture or dislocation seen in the CT scan of the cervical spine.      CT-MAXILLOFACIAL W/O PLUS RECONS   Final Result      1.  No displaced fracture of the facial bones.      2.  Chronic sinus inflammatory disease with probable obstructive component on the left           Assessment/Plan  * Alcohol  dependence (HCC)- (present on admission)  Assessment & Plan  With positive alcohol level, has been drinking today  Chronic alcohol dependence who would like to stop, she is concern about detox symptoms as she had in the past  She denies history of alcohol related seizures  Monitor on CIWA    Fall  Assessment & Plan  Imaging unremarkable    Open wound of scalp  Assessment & Plan  CTs negative for fracture  Laceration repair by ERP    Hypomagnesemia- (present on admission)  Assessment & Plan  Replete and recheck    H/O: hypertension- (present on admission)  Assessment & Plan  Minimally hypertensive  Continue home medication       VTE prophylaxis: lovenox

## 2021-06-22 VITALS
BODY MASS INDEX: 29.55 KG/M2 | WEIGHT: 156.53 LBS | DIASTOLIC BLOOD PRESSURE: 85 MMHG | RESPIRATION RATE: 18 BRPM | SYSTOLIC BLOOD PRESSURE: 139 MMHG | HEIGHT: 61 IN | TEMPERATURE: 97.3 F | HEART RATE: 68 BPM | OXYGEN SATURATION: 95 %

## 2021-06-22 LAB
ALBUMIN SERPL BCP-MCNC: 4 G/DL (ref 3.2–4.9)
ALBUMIN/GLOB SERPL: 1.3 G/DL
ALP SERPL-CCNC: 77 U/L (ref 30–99)
ALT SERPL-CCNC: 19 U/L (ref 2–50)
ANION GAP SERPL CALC-SCNC: 10 MMOL/L (ref 7–16)
AST SERPL-CCNC: 24 U/L (ref 12–45)
BASOPHILS # BLD AUTO: 1.9 % (ref 0–1.8)
BASOPHILS # BLD: 0.11 K/UL (ref 0–0.12)
BILIRUB SERPL-MCNC: 0.7 MG/DL (ref 0.1–1.5)
BUN SERPL-MCNC: 11 MG/DL (ref 8–22)
CALCIUM SERPL-MCNC: 9.8 MG/DL (ref 8.4–10.2)
CHLORIDE SERPL-SCNC: 100 MMOL/L (ref 96–112)
CO2 SERPL-SCNC: 24 MMOL/L (ref 20–33)
CREAT SERPL-MCNC: 0.67 MG/DL (ref 0.5–1.4)
EOSINOPHIL # BLD AUTO: 0.28 K/UL (ref 0–0.51)
EOSINOPHIL NFR BLD: 4.9 % (ref 0–6.9)
ERYTHROCYTE [DISTWIDTH] IN BLOOD BY AUTOMATED COUNT: 42.3 FL (ref 35.9–50)
GLOBULIN SER CALC-MCNC: 3.2 G/DL (ref 1.9–3.5)
GLUCOSE SERPL-MCNC: 95 MG/DL (ref 65–99)
HCT VFR BLD AUTO: 41.5 % (ref 37–47)
HGB BLD-MCNC: 14 G/DL (ref 12–16)
IMM GRANULOCYTES # BLD AUTO: 0.02 K/UL (ref 0–0.11)
IMM GRANULOCYTES NFR BLD AUTO: 0.4 % (ref 0–0.9)
LYMPHOCYTES # BLD AUTO: 1.33 K/UL (ref 1–4.8)
LYMPHOCYTES NFR BLD: 23.4 % (ref 22–41)
MAGNESIUM SERPL-MCNC: 1.6 MG/DL (ref 1.5–2.5)
MCH RBC QN AUTO: 30.9 PG (ref 27–33)
MCHC RBC AUTO-ENTMCNC: 33.7 G/DL (ref 33.6–35)
MCV RBC AUTO: 91.6 FL (ref 81.4–97.8)
MONOCYTES # BLD AUTO: 0.82 K/UL (ref 0–0.85)
MONOCYTES NFR BLD AUTO: 14.4 % (ref 0–13.4)
NEUTROPHILS # BLD AUTO: 3.12 K/UL (ref 2–7.15)
NEUTROPHILS NFR BLD: 55 % (ref 44–72)
NRBC # BLD AUTO: 0 K/UL
NRBC BLD-RTO: 0 /100 WBC
PHOSPHATE SERPL-MCNC: 3.7 MG/DL (ref 2.5–4.5)
PLATELET # BLD AUTO: 275 K/UL (ref 164–446)
PMV BLD AUTO: 9.3 FL (ref 9–12.9)
POTASSIUM SERPL-SCNC: 3.8 MMOL/L (ref 3.6–5.5)
PROT SERPL-MCNC: 7.2 G/DL (ref 6–8.2)
RBC # BLD AUTO: 4.53 M/UL (ref 4.2–5.4)
SODIUM SERPL-SCNC: 134 MMOL/L (ref 135–145)
WBC # BLD AUTO: 5.7 K/UL (ref 4.8–10.8)

## 2021-06-22 PROCEDURE — 83735 ASSAY OF MAGNESIUM: CPT

## 2021-06-22 PROCEDURE — A9270 NON-COVERED ITEM OR SERVICE: HCPCS | Performed by: HOSPITALIST

## 2021-06-22 PROCEDURE — 700102 HCHG RX REV CODE 250 W/ 637 OVERRIDE(OP): Performed by: HOSPITALIST

## 2021-06-22 PROCEDURE — 80053 COMPREHEN METABOLIC PANEL: CPT

## 2021-06-22 PROCEDURE — A9270 NON-COVERED ITEM OR SERVICE: HCPCS | Performed by: STUDENT IN AN ORGANIZED HEALTH CARE EDUCATION/TRAINING PROGRAM

## 2021-06-22 PROCEDURE — 85025 COMPLETE CBC W/AUTO DIFF WBC: CPT

## 2021-06-22 PROCEDURE — 700102 HCHG RX REV CODE 250 W/ 637 OVERRIDE(OP): Performed by: INTERNAL MEDICINE

## 2021-06-22 PROCEDURE — 84100 ASSAY OF PHOSPHORUS: CPT

## 2021-06-22 PROCEDURE — 99239 HOSP IP/OBS DSCHRG MGMT >30: CPT | Performed by: FAMILY MEDICINE

## 2021-06-22 PROCEDURE — 700102 HCHG RX REV CODE 250 W/ 637 OVERRIDE(OP): Performed by: STUDENT IN AN ORGANIZED HEALTH CARE EDUCATION/TRAINING PROGRAM

## 2021-06-22 PROCEDURE — A9270 NON-COVERED ITEM OR SERVICE: HCPCS | Performed by: INTERNAL MEDICINE

## 2021-06-22 RX ORDER — GUAIFENESIN/DEXTROMETHORPHAN 100-10MG/5
5 SYRUP ORAL EVERY 6 HOURS PRN
Status: DISCONTINUED | OUTPATIENT
Start: 2021-06-22 | End: 2021-06-22 | Stop reason: HOSPADM

## 2021-06-22 RX ORDER — LANOLIN ALCOHOL/MO/W.PET/CERES
100 CREAM (GRAM) TOPICAL DAILY
Qty: 30 TABLET | Refills: 0 | Status: ON HOLD
Start: 2021-06-23 | End: 2021-12-28

## 2021-06-22 RX ORDER — FOLIC ACID 1 MG/1
1 TABLET ORAL DAILY
Qty: 30 TABLET | Refills: 0 | Status: ON HOLD
Start: 2021-06-23 | End: 2021-12-28

## 2021-06-22 RX ADMIN — MAGNESIUM OXIDE 400 MG (241.3 MG MAGNESIUM) TABLET 400 MG: TABLET at 05:01

## 2021-06-22 RX ADMIN — GUAIFENESIN AND DEXTROMETHORPHAN 5 ML: 100; 10 SYRUP ORAL at 02:27

## 2021-06-22 RX ADMIN — THERA TABS 1 TABLET: TAB at 05:01

## 2021-06-22 RX ADMIN — DIBASIC SODIUM PHOSPHATE, MONOBASIC POTASSIUM PHOSPHATE AND MONOBASIC SODIUM PHOSPHATE 500 MG: 852; 155; 130 TABLET ORAL at 05:00

## 2021-06-22 RX ADMIN — THIAMINE HCL TAB 100 MG 100 MG: 100 TAB at 05:01

## 2021-06-22 RX ADMIN — CLONIDINE HYDROCHLORIDE 0.1 MG: 0.1 TABLET ORAL at 05:01

## 2021-06-22 RX ADMIN — HYDROCHLOROTHIAZIDE 25 MG: 25 TABLET ORAL at 05:01

## 2021-06-22 RX ADMIN — TELMISARTAN 80 MG: 40 TABLET ORAL at 05:01

## 2021-06-22 RX ADMIN — FOLIC ACID 1 MG: 1 TABLET ORAL at 05:01

## 2021-06-22 RX ADMIN — THYROID, PORCINE 60 MG: 30 TABLET ORAL at 05:00

## 2021-06-22 ASSESSMENT — LIFESTYLE VARIABLES
TOTAL SCORE: 0
AUDITORY DISTURBANCES: NOT PRESENT
HEADACHE, FULLNESS IN HEAD: NOT PRESENT
VISUAL DISTURBANCES: NOT PRESENT
ORIENTATION AND CLOUDING OF SENSORIUM: ORIENTED AND CAN DO SERIAL ADDITIONS
TOTAL SCORE: 1
ANXIETY: NO ANXIETY (AT EASE)
NAUSEA AND VOMITING: NO NAUSEA AND NO VOMITING
NAUSEA AND VOMITING: NO NAUSEA AND NO VOMITING
HEADACHE, FULLNESS IN HEAD: NOT PRESENT
AUDITORY DISTURBANCES: NOT PRESENT
PAROXYSMAL SWEATS: NO SWEAT VISIBLE
TREMOR: TREMOR NOT VISIBLE BUT CAN BE FELT, FINGERTIP TO FINGERTIP
NAUSEA AND VOMITING: NO NAUSEA AND NO VOMITING
ANXIETY: NO ANXIETY (AT EASE)
TREMOR: NO TREMOR
TREMOR: TREMOR NOT VISIBLE BUT CAN BE FELT, FINGERTIP TO FINGERTIP
AGITATION: NORMAL ACTIVITY
ORIENTATION AND CLOUDING OF SENSORIUM: ORIENTED AND CAN DO SERIAL ADDITIONS
VISUAL DISTURBANCES: NOT PRESENT
AUDITORY DISTURBANCES: NOT PRESENT
PAROXYSMAL SWEATS: NO SWEAT VISIBLE
HEADACHE, FULLNESS IN HEAD: NOT PRESENT
ORIENTATION AND CLOUDING OF SENSORIUM: ORIENTED AND CAN DO SERIAL ADDITIONS
PAROXYSMAL SWEATS: NO SWEAT VISIBLE
ANXIETY: NO ANXIETY (AT EASE)
TOTAL SCORE: 1
AGITATION: NORMAL ACTIVITY
VISUAL DISTURBANCES: NOT PRESENT
AGITATION: NORMAL ACTIVITY

## 2021-06-22 ASSESSMENT — PAIN DESCRIPTION - PAIN TYPE: TYPE: ACUTE PAIN

## 2021-06-22 NOTE — HOSPITAL COURSE
55 y.o. female who presented 6/20/2021 with alcohol dependence, HTN who has been to alcohol rehab in the past multiple times.  She is here for alcohol detox. She also had slipped and fall last night, hitting her head with a forehead laceration. She does not remember what happen, says she blacked out. She last drank this morning. She can't quantify amount of alcohol, but says it's a lot. She currently denies dizziness, vision changes, CP, SOB, and pain, nausea.   admitted for alcohol withdrawal.

## 2021-06-22 NOTE — PROGRESS NOTES
Telemetry Shift Summary     Rhythm SR - ST  HR Range 62 - 107  Ectopy R PAC; R PVC  Measurements 0.16 / 0.08 / 0.42           Normal Values  Rhythm SR  HR Range    Measurements 0.12-0.20 / 0.06-0.10  / 0.30-0.52

## 2021-06-22 NOTE — DISCHARGE SUMMARY
Discharge Summary    CHIEF COMPLAINT ON ADMISSION  Chief Complaint   Patient presents with   • Alcohol Intoxication   • Detox       Reason for Admission  Detox, t5000, Facial Pain     Admission Date  6/20/2021    CODE STATUS  Full Code    HPI & HOSPITAL COURSE  This is a 55 y.o. female here with alcohol withdrawal; she has a history of EtOH dependence and has been to rehab in the past, and is established with an addiction therapist. She slipped and hit her head with a forehead laceration and presented to hospital for that - her last drink was the day of admit. Her lac was stitched and she states she is planning on following up with her plastic surgeon on Friday for suture removal. CT facial showed opacification of her sinus - she states this is chronic for her and she previously followed with Dr Valdez, and will follow up again upon discharge. Her CIWA has consistently been 0-1 and she states that in the past, her worst DTs were day 1 and 2 - she feels that she is out of the woods for withdrawal, and wishes to dc home.  She is medically stable to do so.       Therefore, she is discharged in good and stable condition to home with close outpatient follow-up.    The patient met 2-midnight criteria for an inpatient stay at the time of discharge.    Discharge Date  6/22/21    FOLLOW UP ITEMS POST DISCHARGE  None    DISCHARGE DIAGNOSES  Principal Problem:    Alcohol dependence (HCC) POA: Yes  Active Problems:    H/O: hypertension POA: Yes    Hypomagnesemia POA: Yes    Open wound of scalp POA: Unknown    Fall POA: Unknown  Resolved Problems:    * No resolved hospital problems. *      FOLLOW UP  No future appointments.  Deborah Moctezuma M.D.  [address] [place] [address] [address] [place] [address]  [phone]      This week      MEDICATIONS ON DISCHARGE     Medication List      START taking these medications      Instructions   folic acid 1 MG Tabs  Start taking on: June 23, 2021  Commonly known as: FOLVITE   Take 1 tablet by mouth every  day.  Dose: 1 mg     magnesium oxide 400 MG Tabs tablet  Start taking on: June 23, 2021  Commonly known as: MAG-OX   Take 1 tablet by mouth every day.  Dose: 400 mg     thiamine 100 MG tablet  Start taking on: June 23, 2021  Commonly known as: THIAMINE   Take 1 tablet by mouth every day.  Dose: 100 mg        CHANGE how you take these medications      Instructions   rosuvastatin 20 MG Tabs  What changed: Another medication with the same name was removed. Continue taking this medication, and follow the directions you see here.  Commonly known as: CRESTOR   Take 20 mg by mouth every evening.  Dose: 20 mg        CONTINUE taking these medications      Instructions   ALPRAZolam 0.5 MG Tabs  Commonly known as: XANAX   Take 0.5 mg by mouth 2 times a day as needed for Anxiety.  Dose: 0.5 mg     [place] Thyroid 60 MG Tabs  Generic drug: thyroid   Take 60 mg by mouth every day.  Dose: 60 mg     Belsomra 15 MG Tabs  Generic drug: Suvorexant   Take 15 mg by mouth every bedtime.  Dose: 15 mg     DHEA PO   Take 2 Tabs by mouth every day.  Dose: 2 tablet     fish oil 1000 MG Caps capsule   Take 2,000 mg by mouth every day.  Dose: 2,000 mg     Micardis HCT 80-25 MG per tablet  Generic drug: telmisartan-hydrochlorothiazide   Take 1 tablet by mouth every day.  Dose: 1 tablet     multivitamin Tabs   Take 1 Tab by mouth every day.  Dose: 1 tablet     Potassium 99 MG Tabs   Take 99 mg by mouth every day. Indications: OTC  Dose: 99 mg     VITAMIN D3 PO   Take 1 Cap by mouth every day.  Dose: 1 capsule        STOP taking these medications    ondansetron 4 MG Tbdp  Commonly known as: Zofran ODT            Allergies  Allergies   Allergen Reactions   • Kiwi Extract Swelling   • Latex Anaphylaxis   • Morphine Hives   • Vicodin [Hydrocodone-Acetaminophen] Rash     Generalized rash, puritis    • Banana Shortness of Breath   • Other Food Hives and Shortness of Breath     Tropical fruit         DIET  Orders Placed This Encounter   Procedures   •  Diet Order Diet: Regular     Standing Status:   Standing     Number of Occurrences:   1     Order Specific Question:   Diet:     Answer:   Regular [1]       ACTIVITY  As tolerated.  Weight bearing as tolerated    CONSULTATIONS  None    PROCEDURES  None    LABORATORY  Lab Results   Component Value Date    SODIUM 134 (L) 06/22/2021    POTASSIUM 3.8 06/22/2021    CHLORIDE 100 06/22/2021    CO2 24 06/22/2021    GLUCOSE 95 06/22/2021    BUN 11 06/22/2021    CREATININE 0.67 06/22/2021        Lab Results   Component Value Date    WBC 5.7 06/22/2021    HEMOGLOBIN 14.0 06/22/2021    HEMATOCRIT 41.5 06/22/2021    PLATELETCT 275 06/22/2021        Total time of the discharge process exceeds 31 minutes.

## 2021-06-22 NOTE — PROGRESS NOTES
Report given to Olinda BHATT. Plan of care discussed. Patient resting comfortably in bed. Safety precautions in place.

## 2021-06-22 NOTE — CARE PLAN
The patient is Stable - Low risk of patient condition declining or worsening    Shift Goals  Clinical Goals: Monitor and Contol Withdrawal S/S  Patient Goals: Rest and Sleep    Progress made toward(s) clinical / shift goals:  Yes    Problem: Knowledge Deficit - Standard  Goal: Patient and family/care givers will demonstrate understanding of plan of care, disease process/condition, diagnostic tests and medications  Outcome: Progressing  Note: Patient updated on the plan of care. Encouraged to voice feelings and to ask questions. Answered all questions and concerns. Agrees with the plan of care.      Problem: Fall Risk  Goal: Patient will remain free from falls  Outcome: Progressing  Note: Safety precautions in place. Bed alarm ON. Will continue to monitor patient.

## 2021-06-22 NOTE — PROGRESS NOTES
Received report from MILLER Prakash. Pt is alert and oriented x4, on RA, CIWA scores have been 0-1 overnight. Pt c/o of dry cough. Safety measures in place, care assumed.

## 2021-06-22 NOTE — DISCHARGE INSTRUCTIONS
Alcohol Abuse and Dependence Information, Adult  Alcohol is a widely available drug. People drink alcohol in different amounts. People who drink alcohol very often and in large amounts often have problems during and after drinking. They may develop what is called an alcohol use disorder. There are two main types of alcohol use disorders:  · Alcohol abuse. This is when you use alcohol too much or too often. You may use alcohol to make yourself feel happy or to reduce stress. You may have a hard time setting a limit on the amount you drink.  · Alcohol dependence. This is when you use alcohol consistently for a period of time, and your body changes as a result. This can make it hard to stop drinking because you may start to feel sick or feel different when you do not use alcohol. These symptoms are known as withdrawal.  How can alcohol abuse and dependence affect me?  Alcohol abuse and dependence can have a negative effect on your life. Drinking too much can lead to addiction. You may feel like you need alcohol to function normally. You may drink alcohol before work in the morning, during the day, or as soon as you get home from work in the evening. These actions can result in:  · Poor work performance.  · Job loss.  · Financial problems.  · Car crashes or criminal charges from driving after drinking alcohol.  · Problems in your relationships with friends and family.  · Losing the trust and respect of coworkers, friends, and family.  Drinking heavily over a long period of time can permanently damage your body and brain, and can cause lifelong health issues, such as:  · Damage to your liver or pancreas.  · Heart problems, high blood pressure, or stroke.  · Certain cancers.  · Decreased ability to fight infections.  · Brain or nerve damage.  · Depression.  · Early (premature) death.  If you are careless or you crave alcohol, it is easy to drink more than your body can handle (overdose). Alcohol overdose is a serious  situation that requires hospitalization. It may lead to permanent injuries or death.  What can increase my risk?  · Having a family history of alcohol abuse.  · Having depression or other mental health conditions.  · Beginning to drink at an early age.  · Binge drinking often.  · Experiencing trauma, stress, and an unstable home life during childhood.  · Spending time with people who drink often.  What actions can I take to prevent or manage alcohol abuse and dependence?  · Do not drink alcohol if:  ? Your health care provider tells you not to drink.  ? You are pregnant, may be pregnant, or are planning to become pregnant.  · If you drink alcohol:  ? Limit how much you use to:  § 0-1 drink a day for women.  § 0-2 drinks a day for men.  ? Be aware of how much alcohol is in your drink. In the [place] one drink equals one 12 oz bottle of beer (355 mL), one 5 oz glass of wine (148 mL), or one 1½ oz glass of hard liquor (44 mL).  · Stop drinking if you have been drinking too much. This can be very hard to do if you are used to abusing alcohol. If you begin to have withdrawal symptoms, talk with your health care provider or a person that you trust. These symptoms may include anxiety, shaky hands, headache, nausea, sweating, or not being able to sleep.  · Choose to drink nonalcoholic beverages in social gatherings and places where there may be alcohol.  Activity  · Spend more time on activities that you enjoy that do not involve alcohol, like hobbies or exercise.  · Find healthy ways to cope with stress, such as exercise, meditation, or spending time with people you care about.  General information  · Talk to your family, coworkers, and friends about supporting you in your efforts to stop drinking. If they drink, ask them not to drink around you. Spend more time with people who do not drink alcohol.  · If you think that you have an alcohol dependency problem:  ? Tell friends or family about your concerns.  ? Talk with your  health care provider or another health professional about where to get help.  ? Work with a therapist and a chemical dependency counselor.  ? Consider joining a support group for people who struggle with alcohol abuse and dependence.  Where to find support    · Your health care provider.  · SMART Recovery: www.smartrecovery.org  Therapy and support groups  · Local treatment centers or chemical dependency counselors.  · Local AA groups in your community: www.aa.org  Where to find more information  · Centers for Disease Control and Prevention: www.cdc.gov  · National [place] on Alcohol Abuse and Alcoholism: www.niaaa.nih.gov  · Alcoholics Anonymous (AA): www.aa.org  Contact a health care provider if:  · You drank more or for longer than you intended on more than one occasion.  · You tried to stop drinking or to cut back on how much you drink, but you were not able to.  · You often drink to the point of vomiting or passing out.  · You want to drink so badly that you cannot think about anything else.  · You have problems in your life due to drinking, but you continue to drink.  · You keep drinking even though you feel anxious, depressed, or have experienced memory loss.  · You have stopped doing the things you used to enjoy in order to drink.  · You have to drink more than you used to in order to get the effect you want.  · You experience anxiety, sweating, nausea, shakiness, and trouble sleeping when you try to stop drinking.  Get help right away if:  · You have thoughts about hurting yourself or others.  · You have serious withdrawal symptoms, including:  ? Confusion.  ? Racing heart.  ? High blood pressure.  ? Fever.  If you ever feel like you may hurt yourself or others, or have thoughts about taking your own life, get help right away. You can go to your nearest emergency department or call:  · Your local emergency services (911 in the [place]  · A suicide crisis helpline, such as the National Suicide Prevention  [facility] at 1-327.613.5524. This is open 24 hours a day.  Summary  · Alcohol abuse and dependence can have a negative effect on your life. Drinking too much or too often can lead to addiction.  · If you drink alcohol, limit how much you use.  · If you are having trouble keeping your drinking under control, find ways to change your behavior. Hobbies, calming activities, exercise, or support groups can help.  · If you feel you need help with changing your drinking habits, talk with your health care provider, a good friend, or a therapist, or go to an AA group.  This information is not intended to replace advice given to you by your health care provider. Make sure you discuss any questions you have with your health care provider.    Diet: Diet Order Diet: Regular  Activity: As tolerated  Follow Up: PCP this week  Disposition:None   Diagnosis: Alcohol dependence (HCC)    Follow up with your Primary Care Provider Deborah Moctezuma M.D. as scheduled or sooner if your symptoms persist or worsen.  Return to Emergency Room for severe chest pain, shortness of breath, signs of a stroke, or any other emergencies.      You have been started on daily Magnesium supplementation for low magnesium levels, as well as thiamine and folic acid supplementation as these can be depleted with alcohol usage.    Discharge Instructions    Discharged to home by car with relative. Discharged via wheelchair, hospital escort: Yes.  Special equipment needed: Not Applicable    Be sure to schedule a follow-up appointment with your primary care doctor or any specialists as instructed.     Discharge Plan:   Diet Plan: Discussed  Activity Level: Discussed  Confirmed Follow up Appointment: Patient to Call and Schedule Appointment  Confirmed Symptoms Management: Discussed  Medication Reconciliation Updated: Yes    I understand that a diet low in cholesterol, fat, and sodium is recommended for good health. Unless I have been given specific instructions below  for another diet, I accept this instruction as my diet prescription.   Other diet: regular    Special Instructions: None    · Is patient discharged on Warfarin / Coumadin?   No     Depression / Suicide Risk    As you are discharged from this [facility] facility, it is important to learn how to keep safe from harming yourself.    Recognize the warning signs:  · Abrupt changes in personality, positive or negative- including increase in energy   · Giving away possessions  · Change in eating patterns- significant weight changes-  positive or negative  · Change in sleeping patterns- unable to sleep or sleeping all the time   · Unwillingness or inability to communicate  · Depression  · Unusual sadness, discouragement and loneliness  · Talk of wanting to die  · Neglect of personal appearance   · Rebelliousness- reckless behavior  · Withdrawal from people/activities they love  · Confusion- inability to concentrate     If you or a loved one observes any of these behaviors or has concerns about self-harm, here's what you can do:  · Talk about it- your feelings and reasons for harming yourself  · Remove any means that you might use to hurt yourself (examples: pills, rope, extension cords, firearm)  · Get professional help from the community (Mental Health, Substance Abuse, psychological counseling)  · Do not be alone:Call your Safe Contact- someone whom you trust who will be there for you.  · Call your local CRISIS HOTLINE 678-1780 or 333-373-8509  · Call your local Children's Mobile Crisis Response Team [place] (195) 435-3005 or www.[facility]Zwan  · Call the toll free National Suicide Prevention Hotlines   · National Suicide Prevention Lifeline 618-296-WLKG (7214)  · National Hope Line Network 800-SUICIDE (417-5972)

## 2021-06-22 NOTE — DISCHARGE PLANNING
Anticipated Discharge Disposition:   Home with outpatient rehab resources     Action:   Chart review complete.     Discussed patient's plan of care and plans for discharge during rounds.   Per MD, patient to discharge to home today. No discharge needs identified.     RN CM will continue to follow and assist as needed.     Barriers to Discharge:   None     Plan:   Hospital care management will continue to assist with discharge planning needs.

## 2021-06-23 NOTE — PROGRESS NOTES
Telemetry Shift Summary    Rhythm SR  HR Range 67-82  Ectopy rPVC  Measurements 0.14/0.08/0.40        Normal Values  Rhythm SR  HR Range    Measurements 0.12-0.20 / 0.06-0.10  / 0.30-0.52    1645 Pt discharged to home. Discharge instructions provided to pt. Pt verbalizes understanding. Pt states all questions have been answered. Signed copy in chart. Prescriptions sent to pharmacy. Pt states that all personal belongings are in possession. Pt off unit via walking, escorted by nursing staff.

## 2021-07-10 ENCOUNTER — HOSPITAL ENCOUNTER (EMERGENCY)
Facility: MEDICAL CENTER | Age: 56
End: 2021-07-10
Attending: EMERGENCY MEDICINE
Payer: COMMERCIAL

## 2021-07-10 VITALS
OXYGEN SATURATION: 94 % | HEIGHT: 61 IN | RESPIRATION RATE: 20 BRPM | WEIGHT: 151.01 LBS | TEMPERATURE: 99.2 F | BODY MASS INDEX: 28.51 KG/M2 | HEART RATE: 112 BPM | SYSTOLIC BLOOD PRESSURE: 119 MMHG | DIASTOLIC BLOOD PRESSURE: 90 MMHG

## 2021-07-10 DIAGNOSIS — F10.929 ALCOHOLIC INTOXICATION WITH COMPLICATION (HCC): ICD-10-CM

## 2021-07-10 DIAGNOSIS — F10.10 ALCOHOL ABUSE: ICD-10-CM

## 2021-07-10 PROCEDURE — 99284 EMERGENCY DEPT VISIT MOD MDM: CPT

## 2021-07-10 ASSESSMENT — FIBROSIS 4 INDEX: FIB4 SCORE: 1.08

## 2021-07-10 NOTE — ED TRIAGE NOTES
"Chief Complaint   Patient presents with   • Alcohol Intoxication     Pt to ED via WC stated \"I am an alcoholic,\" stated here for help   • Headache     /92   Pulse (!) 115   Temp 37.3 °C (99.2 °F) (Temporal)   Resp 20   Ht 1.549 m (5' 1\")   Wt 68.5 kg (151 lb 0.2 oz)   LMP 08/15/2011   SpO2 90%   BMI 28.53 kg/m²     Pt to ED via WC w/ spouse, stated \"I am an alcoholic,\" asking for help.  Pt states drank vodka this am.  Pt c/o HA, denies c/o CP at this time.    Covid Screen Negative.      "

## 2021-07-10 NOTE — ED NOTES
Patient verbalized understanding to plan of care and discharge information. Patient in stable condition. No signs of distress. Patient ambulatory out of ED to personal vehicle with stable gait with family.

## 2021-07-10 NOTE — ED PROVIDER NOTES
"ED Provider Note    CHIEF COMPLAINT  Chief Complaint   Patient presents with   • Alcohol Intoxication     Pt to ED via WC stated \"I am an alcoholic,\" stated here for help   • Headache       HPI  Yohana Brandon is a 56 y.o. female who presents for help with her alcoholism. Specifically requesting to go to [facility]. The patient has a long-term history of alcohol abuse, she has gone through many inpatient programs and unfortunately has relapsed again. Typically drinks about a bottle of vodka a day. To me she denies any new physical complaints whatsoever. Accompanied by her  at the bedside who helps provide some additional history regarding her past attempts at sobriety.    REVIEW OF SYSTEMS  Negative for fever, rash, chest pain, dyspnea, abdominal pain, nausea, vomiting, diarrhea, back pain. All other systems are negative.     PAST MEDICAL HISTORY  Past Medical History:   Diagnosis Date   • Alcohol abuse    • ASTHMA    • Elevated LFTs    • Heart palpitations     w/ alcohol withdrawal   • Hypertension    • Psychiatric disorder     alcohol dependance       FAMILY HISTORY  Family History   Problem Relation Age of Onset   • Stroke Mother        SOCIAL HISTORY  Social History     Tobacco Use   • Smoking status: Never Smoker   • Smokeless tobacco: Never Used   Vaping Use   • Vaping Use: Never used   Substance Use Topics   • Alcohol use: Yes   • Drug use: No       SURGICAL HISTORY  Past Surgical History:   Procedure Laterality Date   • HEMORRHOIDECTOMY  2011    Performed by SONY MACDONALD at SURGERY [facility] ORS   • ANAL SPHINCTEROTOMY  2011    Performed by SONY MACDONALD at SURGERY [facility] ORS   • PB ENDOMETRIAL ABLATION, THERMAL     • APPENDECTOMY     • PB ANESTH,SURG BREAST RECONSTRUCTIVE      bilateral    • BUNIONECTOMY      bilateral   • PB REMOVE TONSILS/ADENOIDS,<13 Y/O     • PB  DELIVERY+POSTPARTUM CARE      x2       CURRENT " "MEDICATIONS  I personally reviewed the medication list in the charting documentation.     ALLERGIES  Allergies   Allergen Reactions   • Kiwi Extract Swelling   • Latex Anaphylaxis   • Morphine Hives   • Vicodin [Hydrocodone-Acetaminophen] Rash     Generalized rash, puritis    • Banana Shortness of Breath   • Other Food Hives and Shortness of Breath     Tropical fruit         MEDICAL RECORD  I have reviewed patient's medical record and pertinent results are listed above.      PHYSICAL EXAM  VITAL SIGNS: /92   Pulse (!) 115   Temp 37.3 °C (99.2 °F) (Temporal)   Resp 20   Ht 1.549 m (5' 1\")   Wt 68.5 kg (151 lb 0.2 oz)   LMP 08/15/2011   SpO2 90%   BMI 28.53 kg/m²    Constitutional: Sleeping but easily arousable, once awake she is alert, well appearing patient in no acute distress.    HENT: Normocephalic, no obvious evidence of acute trauma.  Eyes: No scleral icterus. Normal conjunctiva   Neck: Comfortable movement without any obvious restriction in the range of motion.  Cardiovascular: Upon ascultation I appreciate a regular heart rhythm and a mildly tachycardic rate.   Thorax & Lungs: Normal nonlabored respirations.  Upon application of the stethoscope for auscultation I find there to be no associated chest wall tenderness.  I appreciate no wheezing, rhonchi or rales. There is normal air movement.    Abdomen: The abdomen is not visibly distended. Upon palpation, I find it to be without tenderness.  No mass appreciated.  Skin: The exposed portions of skin reveal no obvious rash or other abnormalities.  Extremities/Musculoskeletal: No obvious sign of acute trauma. No asymmetric calf tenderness or edema.   Neurologic: Slurred speech otherwise alert & oriented. No focal deficits observed.     DIAGNOSTIC STUDIES / PROCEDURES    LABS/EKGs  Breathalyzer: 0.240      COURSE & MEDICAL DECISION MAKING  I have reviewed any medical record information, laboratory studies and radiographic results as noted " above.    Encounter Summary: This is a very pleasant 56 y.o. female who unfortunately required evaluation in the emergency department today with alcohol intoxication, requesting placement to [facility]. She has no physical complaints. Vital signs reveal some mild tachycardia, her exam she is clinically intoxicated but appears quite well otherwise with no acute findings. We contacted [facility], provided them with the patient's information, they will see the patient at their intake center.      DISPOSITION: Discharged to [facility] intake      FINAL IMPRESSION  1. Alcoholic intoxication with complication (HCC)    2. Alcohol abuse           This dictation was created using voice recognition software. The accuracy of the dictation is limited to the abilities of the software. I expect there may be some errors of grammar and possibly content. The nursing notes were reviewed and certain aspects of this information were incorporated into this note.    Electronically signed by: Dago Coleman M.D., 7/10/2021 4:27 PM

## 2021-07-11 ENCOUNTER — HOSPITAL ENCOUNTER (EMERGENCY)
Facility: MEDICAL CENTER | Age: 56
End: 2021-07-11
Attending: EMERGENCY MEDICINE
Payer: COMMERCIAL

## 2021-07-11 VITALS
OXYGEN SATURATION: 98 % | BODY MASS INDEX: 23.19 KG/M2 | RESPIRATION RATE: 18 BRPM | WEIGHT: 153 LBS | HEIGHT: 68 IN | TEMPERATURE: 98.8 F | DIASTOLIC BLOOD PRESSURE: 92 MMHG | SYSTOLIC BLOOD PRESSURE: 131 MMHG | HEART RATE: 100 BPM

## 2021-07-11 DIAGNOSIS — F10.20 ALCOHOLISM (HCC): ICD-10-CM

## 2021-07-11 PROCEDURE — 99284 EMERGENCY DEPT VISIT MOD MDM: CPT

## 2021-07-11 ASSESSMENT — FIBROSIS 4 INDEX: FIB4 SCORE: 1.08

## 2021-07-12 NOTE — DISCHARGE INSTRUCTIONS
Alcohol recovery only works when you are ready to commit to a program.  You have said that you are not ready to commit to alcohol recovery treatment.  When you are ready, use one of the programs listed here. You can go directly to these places at any time for an intake evaluation.

## 2021-07-12 NOTE — ED NOTES
1925 Assessment done  1935 D/C instn reviewed Pt advised to F/U at one of the [facility] Det[facility] programs when she is ready to commit D/C ambul  Unsteady on her feet 1 person assist Advised to not drive Pt calling family for a dide
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
## 2021-08-13 ENCOUNTER — HOSPITAL ENCOUNTER (OUTPATIENT)
Facility: MEDICAL CENTER | Age: 56
End: 2021-08-13
Attending: FAMILY MEDICINE
Payer: COMMERCIAL

## 2021-08-13 PROCEDURE — U0005 INFEC AGEN DETEC AMPLI PROBE: HCPCS

## 2021-08-13 PROCEDURE — U0003 INFECTIOUS AGENT DETECTION BY NUCLEIC ACID (DNA OR RNA); SEVERE ACUTE RESPIRATORY SYNDROME CORONAVIRUS 2 (SARS-COV-2) (CORONAVIRUS DISEASE [COVID-19]), AMPLIFIED PROBE TECHNIQUE, MAKING USE OF HIGH THROUGHPUT TECHNOLOGIES AS DESCRIBED BY CMS-2020-01-R: HCPCS

## 2021-08-14 LAB
COVID ORDER STATUS COVID19: NORMAL
SARS-COV-2 RNA RESP QL NAA+PROBE: NOTDETECTED
SPECIMEN SOURCE: NORMAL

## 2021-11-03 ENCOUNTER — HOSPITAL ENCOUNTER (EMERGENCY)
Facility: MEDICAL CENTER | Age: 56
End: 2021-11-03
Attending: EMERGENCY MEDICINE
Payer: COMMERCIAL

## 2021-11-03 VITALS
OXYGEN SATURATION: 100 % | HEART RATE: 78 BPM | HEIGHT: 67 IN | TEMPERATURE: 98.1 F | RESPIRATION RATE: 14 BRPM | DIASTOLIC BLOOD PRESSURE: 74 MMHG | SYSTOLIC BLOOD PRESSURE: 118 MMHG | WEIGHT: 148.15 LBS | BODY MASS INDEX: 23.25 KG/M2

## 2021-11-03 DIAGNOSIS — F41.9 SEVERE ANXIETY: ICD-10-CM

## 2021-11-03 DIAGNOSIS — F10.10 CHRONIC ALCOHOL ABUSE: ICD-10-CM

## 2021-11-03 DIAGNOSIS — R11.2 INTRACTABLE VOMITING WITH NAUSEA: ICD-10-CM

## 2021-11-03 DIAGNOSIS — F10.930 ALCOHOL WITHDRAWAL SYNDROME WITHOUT COMPLICATION (HCC): ICD-10-CM

## 2021-11-03 DIAGNOSIS — E86.0 DEHYDRATION: ICD-10-CM

## 2021-11-03 LAB
ALBUMIN SERPL BCP-MCNC: 4.9 G/DL (ref 3.2–4.9)
ALBUMIN/GLOB SERPL: 1.4 G/DL
ALP SERPL-CCNC: 73 U/L (ref 30–99)
ALT SERPL-CCNC: 31 U/L (ref 2–50)
ANION GAP SERPL CALC-SCNC: 31 MMOL/L (ref 7–16)
AST SERPL-CCNC: 43 U/L (ref 12–45)
BASOPHILS # BLD AUTO: 0.5 % (ref 0–1.8)
BASOPHILS # BLD: 0.05 K/UL (ref 0–0.12)
BILIRUB SERPL-MCNC: 0.5 MG/DL (ref 0.1–1.5)
BUN SERPL-MCNC: 26 MG/DL (ref 8–22)
CALCIUM SERPL-MCNC: 10.2 MG/DL (ref 8.4–10.2)
CHLORIDE SERPL-SCNC: 96 MMOL/L (ref 96–112)
CO2 SERPL-SCNC: 13 MMOL/L (ref 20–33)
CREAT SERPL-MCNC: 1.14 MG/DL (ref 0.5–1.4)
EKG IMPRESSION: NORMAL
EOSINOPHIL # BLD AUTO: 0 K/UL (ref 0–0.51)
EOSINOPHIL NFR BLD: 0 % (ref 0–6.9)
ERYTHROCYTE [DISTWIDTH] IN BLOOD BY AUTOMATED COUNT: 41.2 FL (ref 35.9–50)
GLOBULIN SER CALC-MCNC: 3.5 G/DL (ref 1.9–3.5)
GLUCOSE SERPL-MCNC: 114 MG/DL (ref 65–99)
HCT VFR BLD AUTO: 39.4 % (ref 37–47)
HGB BLD-MCNC: 13.6 G/DL (ref 12–16)
IMM GRANULOCYTES # BLD AUTO: 0.03 K/UL (ref 0–0.11)
IMM GRANULOCYTES NFR BLD AUTO: 0.3 % (ref 0–0.9)
LIPASE SERPL-CCNC: 18 U/L (ref 7–58)
LYMPHOCYTES # BLD AUTO: 0.91 K/UL (ref 1–4.8)
LYMPHOCYTES NFR BLD: 8.7 % (ref 22–41)
MCH RBC QN AUTO: 31.9 PG (ref 27–33)
MCHC RBC AUTO-ENTMCNC: 34.5 G/DL (ref 33.6–35)
MCV RBC AUTO: 92.5 FL (ref 81.4–97.8)
MONOCYTES # BLD AUTO: 0.46 K/UL (ref 0–0.85)
MONOCYTES NFR BLD AUTO: 4.4 % (ref 0–13.4)
NEUTROPHILS # BLD AUTO: 9 K/UL (ref 2–7.15)
NEUTROPHILS NFR BLD: 86.1 % (ref 44–72)
NRBC # BLD AUTO: 0 K/UL
NRBC BLD-RTO: 0 /100 WBC
PLATELET # BLD AUTO: 421 K/UL (ref 164–446)
PMV BLD AUTO: 9.9 FL (ref 9–12.9)
POTASSIUM SERPL-SCNC: 4.4 MMOL/L (ref 3.6–5.5)
PROT SERPL-MCNC: 8.4 G/DL (ref 6–8.2)
RBC # BLD AUTO: 4.26 M/UL (ref 4.2–5.4)
SODIUM SERPL-SCNC: 140 MMOL/L (ref 135–145)
WBC # BLD AUTO: 10.5 K/UL (ref 4.8–10.8)

## 2021-11-03 PROCEDURE — 80053 COMPREHEN METABOLIC PANEL: CPT

## 2021-11-03 PROCEDURE — 700111 HCHG RX REV CODE 636 W/ 250 OVERRIDE (IP): Performed by: EMERGENCY MEDICINE

## 2021-11-03 PROCEDURE — 96365 THER/PROPH/DIAG IV INF INIT: CPT

## 2021-11-03 PROCEDURE — 83690 ASSAY OF LIPASE: CPT

## 2021-11-03 PROCEDURE — 85025 COMPLETE CBC W/AUTO DIFF WBC: CPT

## 2021-11-03 PROCEDURE — 99284 EMERGENCY DEPT VISIT MOD MDM: CPT

## 2021-11-03 PROCEDURE — 93005 ELECTROCARDIOGRAM TRACING: CPT | Performed by: EMERGENCY MEDICINE

## 2021-11-03 PROCEDURE — 700101 HCHG RX REV CODE 250: Performed by: EMERGENCY MEDICINE

## 2021-11-03 PROCEDURE — 96375 TX/PRO/DX INJ NEW DRUG ADDON: CPT

## 2021-11-03 RX ORDER — LORAZEPAM 2 MG/ML
1 INJECTION INTRAMUSCULAR ONCE
Status: DISCONTINUED | OUTPATIENT
Start: 2021-11-03 | End: 2021-11-03

## 2021-11-03 RX ORDER — ONDANSETRON 2 MG/ML
4 INJECTION INTRAMUSCULAR; INTRAVENOUS ONCE
Status: COMPLETED | OUTPATIENT
Start: 2021-11-03 | End: 2021-11-03

## 2021-11-03 RX ORDER — LORAZEPAM 2 MG/ML
1 INJECTION INTRAMUSCULAR ONCE
Status: COMPLETED | OUTPATIENT
Start: 2021-11-03 | End: 2021-11-03

## 2021-11-03 RX ADMIN — ONDANSETRON 4 MG: 2 INJECTION INTRAMUSCULAR; INTRAVENOUS at 01:42

## 2021-11-03 RX ADMIN — THIAMINE HYDROCHLORIDE: 100 INJECTION, SOLUTION INTRAMUSCULAR; INTRAVENOUS at 01:43

## 2021-11-03 RX ADMIN — LORAZEPAM 1 MG: 2 INJECTION INTRAMUSCULAR; INTRAVENOUS at 01:42

## 2021-11-03 ASSESSMENT — FIBROSIS 4 INDEX: FIB4 SCORE: 1.08

## 2021-11-03 NOTE — ED TRIAGE NOTES
Pt here for detox from ETOH   N/V  CP  EKG  being done in triage now  Has had withdrawal sz in past  Last drink was 10 hours ago

## 2021-11-03 NOTE — ED PROVIDER NOTES
ED Provider Note     Scribed for Barbara Mills D.O. by Jillian Steiner. 11/3/2021, 12:33 AM.     Primary care provider: Deborah Moctezuma M.D.  Means of arrival: Walk-in         History obtained from: Patient  History limited by: None    CHIEF COMPLAINT  Chief Complaint   Patient presents with   • Detox     here for detox        HPI  Yohana Brandon is a 56 y.o. female who presents to the emergency Department with alcohol withdrawal. She has been drinking a fifth of vodka daily, and she relapsed five days ago. She has not been in rehab for a while, and the longest she has been sober was 26 months. She started drinking again over a year ago, and has intermittently been sober. She last drank about 24 hours ago and feels as if she is withdrawing. The patient states that she stopped drinking so that she could take her  to surgery in the morning. He possibly has thyroid cancer, which has been contributing to her drinking. She has been having vomiting, tremors, and chest pain. She has a history of DTs but no withdrawal seizures. No fevers.    REVIEW OF SYSTEMS  Pertinent positives include alcohol withdrawal, vomiting, tremors, and chest pain. Pertinent negatives include no fevers.   See HPI for further details. All other systems are negative.    PAST MEDICAL HISTORY  Past Medical History:   Diagnosis Date   • Alcohol abuse    • Alcohol abuse    • ASTHMA    • Elevated LFTs    • Heart palpitations     w/ alcohol withdrawal   • Hypertension    • Psychiatric disorder     alcohol dependance       FAMILY HISTORY  Family History   Problem Relation Age of Onset   • Stroke Mother        SOCIAL HISTORY  Social History     Tobacco Use   • Smoking status: Never Smoker   • Smokeless tobacco: Never Used   Vaping Use   • Vaping Use: Never used   Substance Use Topics   • Alcohol use: Yes     Comment: every day    • Drug use: No      Social History     Substance and Sexual Activity   Drug Use No       SURGICAL HISTORY  Past  "Surgical History:   Procedure Laterality Date   • HEMORRHOIDECTOMY  2011    Performed by SONY MACDONALD at SURGERY [facility] ORS   • ANAL SPHINCTEROTOMY  2011    Performed by SONY MACDONALD at SURGERY [facility] ORS   • PB ENDOMETRIAL ABLATION, THERMAL     • APPENDECTOMY     • PB ANESTH,SURG BREAST RECONSTRUCTIVE      bilateral    • BUNIONECTOMY      bilateral   • PB REMOVE TONSILS/ADENOIDS,<13 Y/O     • PB  DELIVERY+POSTPARTUM CARE      x2       CURRENT MEDICATIONS  Current Outpatient Medications   Medication Instructions   • ALPRAZolam (XANAX) 0.5 mg, Oral, 2 TIMES DAILY PRN   • Belsomra 15 mg, Oral, EVERY BEDTIME   • Cholecalciferol (VITAMIN D3 PO) 1 Capsule, Oral, DAILY   • fish oil 2,000 mg, Oral, DAILY   • folic acid (FOLVITE) 1 mg, Oral, DAILY   • magnesium oxide (MAG-OX) 400 mg, Oral, DAILY   • multivitamin (THERAGRAN) Tab 1 Tablet, Oral, DAILY   • Nutritional Supplements (DHEA PO) 2 Tablets, Oral, DAILY   • Potassium 99 mg, Oral, DAILY   • rosuvastatin (CRESTOR) 20 mg, Oral, EVERY EVENING   • telmisartan-hydrochlorothiazide (MICARDIS HCT) 80-25 MG per tablet 1 Tablet, Oral, DAILY   • thiamine (THIAMINE) 100 mg, Oral, DAILY   • thyroid (ARMOUR THYROID) 60 mg, Oral, DAILY       ALLERGIES  Allergies   Allergen Reactions   • Kiwi Extract Swelling   • Latex Anaphylaxis   • Morphine Hives   • Vicodin [Hydrocodone-Acetaminophen] Rash     Generalized rash, puritis    • Banana Shortness of Breath   • Other Food Hives and Shortness of Breath     Tropical fruit         PHYSICAL EXAM  VITAL SIGNS: /99   Pulse 95   Temp 36.7 °C (98.1 °F) (Temporal)   Resp 18   Ht 1.702 m (5' 7\")   Wt 67.2 kg (148 lb 2.4 oz)   LMP 08/15/2011   SpO2 97%   BMI 23.20 kg/m²     Constitutional: Patient is well developed, well nourished. Ill-appearing, Severe distress, very tremulous  HENT: Normocephalic, atraumatic.  Dry oral mucosa  Eyes: PERRL, EOMI, Conjunctiva without erythema or " exudates.   Cardiovascular: Tachycardic and Regular rhythm. No murmur  Thorax & Lungs: Clear and equal breath sounds with good excursion.  No rhonchi, wheezing or rales.  Abdomen: Actively retching. Soft,nontender, no rebound , guarding, palpable masses.   Skin: Pale, Warm, Dry  Back: No cervical, thoracic, or lumbosacral tenderness.   Extremities: Peripheral pulses 4/4 No edema, No tenderness  Musculoskeletal: Normal range of motion in all major joints. No tenderness to palpation or major deformities noted.   Neurologic: Alert & oriented x 3, Normal motor function, Normal sensory function, tremulous   Psychiatric:  Extremely anxious and tremulous, patient cannot sit still, pacing the room.      DIAGNOSTICS/PROCEDURES    LABS  Results for orders placed or performed during the hospital encounter of 11/03/21   CBC WITH DIFFERENTIAL   Result Value Ref Range    WBC 10.5 4.8 - 10.8 K/uL    RBC 4.26 4.20 - 5.40 M/uL    Hemoglobin 13.6 12.0 - 16.0 g/dL    Hematocrit 39.4 37.0 - 47.0 %    MCV 92.5 81.4 - 97.8 fL    MCH 31.9 27.0 - 33.0 pg    MCHC 34.5 33.6 - 35.0 g/dL    RDW 41.2 35.9 - 50.0 fL    Platelet Count 421 164 - 446 K/uL    MPV 9.9 9.0 - 12.9 fL    Neutrophils-Polys 86.10 (H) 44.00 - 72.00 %    Lymphocytes 8.70 (L) 22.00 - 41.00 %    Monocytes 4.40 0.00 - 13.40 %    Eosinophils 0.00 0.00 - 6.90 %    Basophils 0.50 0.00 - 1.80 %    Immature Granulocytes 0.30 0.00 - 0.90 %    Nucleated RBC 0.00 /100 WBC    Neutrophils (Absolute) 9.00 (H) 2.00 - 7.15 K/uL    Lymphs (Absolute) 0.91 (L) 1.00 - 4.80 K/uL    Monos (Absolute) 0.46 0.00 - 0.85 K/uL    Eos (Absolute) 0.00 0.00 - 0.51 K/uL    Baso (Absolute) 0.05 0.00 - 0.12 K/uL    Immature Granulocytes (abs) 0.03 0.00 - 0.11 K/uL    NRBC (Absolute) 0.00 K/uL   COMP METABOLIC PANEL   Result Value Ref Range    Sodium 140 135 - 145 mmol/L    Potassium 4.4 3.6 - 5.5 mmol/L    Chloride 96 96 - 112 mmol/L    Co2 13 (L) 20 - 33 mmol/L    Anion Gap 31.0 (H) 7.0 - 16.0    Glucose  114 (H) 65 - 99 mg/dL    Bun 26 (H) 8 - 22 mg/dL    Creatinine 1.14 0.50 - 1.40 mg/dL    Calcium 10.2 8.4 - 10.2 mg/dL    AST(SGOT) 43 12 - 45 U/L    ALT(SGPT) 31 2 - 50 U/L    Alkaline Phosphatase 73 30 - 99 U/L    Total Bilirubin 0.5 0.1 - 1.5 mg/dL    Albumin 4.9 3.2 - 4.9 g/dL    Total Protein 8.4 (H) 6.0 - 8.2 g/dL    Globulin 3.5 1.9 - 3.5 g/dL    A-G Ratio 1.4 g/dL   LIPASE   Result Value Ref Range    Lipase 18 7 - 58 U/L   ESTIMATED GFR   Result Value Ref Range    GFR If African American >60 >60 mL/min/1.73 m 2    GFR If Non African American 50 (A) >60 mL/min/1.73 m 2   EKG   Result Value Ref Range    Report       [facility] Emergency Dept.    Test Date:  2021  Pt Name:    SONIA DAMON          Department: [facility]  MRN:        [id number]                      Room:       -ROOM 11  Gender:     Female                       Technician: IGNACIO  :        [id number]                   Requested By:ER TRIAGE PROTOCOL  Order #:    [id number]                    Reading MD:    Measurements  Intervals                                Axis  Rate:       82                           P:          74  OH:         132                          QRS:        52  QRSD:       83                           T:          81  QT:         417  QTc:        487    Interpretive Statements  Sinus rhythm  Left atrial enlargement  Nonspecific T abnrm, anterolateral leads  Borderline prolonged QT interval  Compared to ECG 2021 13:43:29  ST (T wave) deviation no longer present       Labs reviewed by me    EKG  12 lead EKG interpreted by myself, see above.    COURSE & MEDICAL DECISION MAKING  Pertinent Labs & Imaging studies reviewed. (See chart for details)    12:33 AM - Patient seen and evaluated at bedside. Ordered for EKG, CMP, Lipase, and CBC with differential to evaluate. Patient will be treated with detox IV, Ativan, and Zofran for her symptoms. Differential diagnoses include, but are not limited to,  alcohol withdrawal, dehydration    Labs were grossly abnormal showing metabolic acidosis.  Her anion gap is 31, glucose 114, BUN 26, creatinine 1.14, white count is normal H&H is stable.  Lipase is 18.  Alcohol level is pending.  EKG shows normal sinus rhythm at 82 bpm with no acute changes.    3:32 AM - Patient is resting comfortably at this time.  Patient states that she feels better after the Zofran and Ativan.  Because of her clinical exam and her history she will be admitted in the hospital for alcohol withdrawal.  She is in guarded condition.    3:41 AM - I discussed the patient's case and the above findings with Dr. Noguera (Hospitalist) who agrees to evaluate the patient for hospitalization.    HYDRATION: Based on the patient's presentation of Acute Vomiting the patient was given IV fluids. IV Hydration was used because oral hydration was not adequate alone. Upon recheck following hydration, the patient was improved.     Patient has a CIWA score of 20.      DISPOSITION:  Patient will be hospitalized by Dr. Noguera in guarded condition.    FINAL IMPRESSION  1. Chronic alcohol abuse    2. Alcohol withdrawal syndrome without complication (HCC)    3. Dehydration    4. Intractable vomiting with nausea    5. Severe anxiety         Jillian GARCIA (Scribe), am scribing for, and in the presence of, Barbara Mills D.O..    Electronically signed by: Jillian Steiner (Scribe), 11/3/2021    IBarbara D.O. personally performed the services described in this documentation, as scribed by Jillian Steiner in my presence, and it is both accurate and complete.    The note accurately reflects work and decisions made by me.  Barbara Mills D.O.  11/3/2021  4:31 AM    4:45am patient requested to talk to me again stating that my  needs me to be there for him for his surgery so I want to leave the hospital.  Patient agrees to sign out AMA as I do not feel that she is stable to be discharged as she is still extremely  tremulous.  She states she feels better and now that the vomiting has been controlled with the Zofran she thinks that she can handle it.  She states that she has Xanax at home and will take it as needed.  She states that she has been through this so many times she can handle it.  I urged her to stay but she states that her  is in the parking lot waiting for her.

## 2021-11-03 NOTE — ED NOTES
Pt decided to leave the facility AMA. Form signed. IV removed. Pt ambulated out without difficulty.

## 2021-11-03 NOTE — ED NOTES
"Pt states that she wants to \"really quit drinking\" this time \"my  was diagnosed with cancer and its my turn to take care of him\". PIV in place, labs drawn, pt to be medicated per MAR.   "

## 2021-11-20 ENCOUNTER — HOSPITAL ENCOUNTER (EMERGENCY)
Facility: MEDICAL CENTER | Age: 56
End: 2021-11-20
Attending: EMERGENCY MEDICINE
Payer: COMMERCIAL

## 2021-11-20 VITALS
SYSTOLIC BLOOD PRESSURE: 100 MMHG | RESPIRATION RATE: 16 BRPM | OXYGEN SATURATION: 97 % | BODY MASS INDEX: 23.18 KG/M2 | DIASTOLIC BLOOD PRESSURE: 69 MMHG | HEART RATE: 94 BPM | TEMPERATURE: 97 F | WEIGHT: 148 LBS

## 2021-11-20 DIAGNOSIS — F10.10 ALCOHOL ABUSE: ICD-10-CM

## 2021-11-20 DIAGNOSIS — F10.920 ALCOHOLIC INTOXICATION WITHOUT COMPLICATION (HCC): ICD-10-CM

## 2021-11-20 LAB
ALBUMIN SERPL BCP-MCNC: 3.9 G/DL (ref 3.2–4.9)
ALBUMIN/GLOB SERPL: 1.7 G/DL
ALP SERPL-CCNC: 72 U/L (ref 30–99)
ALT SERPL-CCNC: 35 U/L (ref 2–50)
ANION GAP SERPL CALC-SCNC: 20 MMOL/L (ref 7–16)
AST SERPL-CCNC: 37 U/L (ref 12–45)
BASOPHILS # BLD AUTO: 1.2 % (ref 0–1.8)
BASOPHILS # BLD: 0.07 K/UL (ref 0–0.12)
BILIRUB SERPL-MCNC: 0.3 MG/DL (ref 0.1–1.5)
BUN SERPL-MCNC: 20 MG/DL (ref 8–22)
CALCIUM SERPL-MCNC: 8.6 MG/DL (ref 8.4–10.2)
CHLORIDE SERPL-SCNC: 101 MMOL/L (ref 96–112)
CO2 SERPL-SCNC: 18 MMOL/L (ref 20–33)
CREAT SERPL-MCNC: 0.94 MG/DL (ref 0.5–1.4)
EKG IMPRESSION: NORMAL
EOSINOPHIL # BLD AUTO: 0 K/UL (ref 0–0.51)
EOSINOPHIL NFR BLD: 0 % (ref 0–6.9)
ERYTHROCYTE [DISTWIDTH] IN BLOOD BY AUTOMATED COUNT: 43.6 FL (ref 35.9–50)
ETHANOL BLD-MCNC: 327.3 MG/DL (ref 0–10)
GLOBULIN SER CALC-MCNC: 2.3 G/DL (ref 1.9–3.5)
GLUCOSE SERPL-MCNC: 75 MG/DL (ref 65–99)
HCT VFR BLD AUTO: 35.8 % (ref 37–47)
HGB BLD-MCNC: 12.1 G/DL (ref 12–16)
IMM GRANULOCYTES # BLD AUTO: 0.03 K/UL (ref 0–0.11)
IMM GRANULOCYTES NFR BLD AUTO: 0.5 % (ref 0–0.9)
LIPASE SERPL-CCNC: 22 U/L (ref 7–58)
LYMPHOCYTES # BLD AUTO: 2.02 K/UL (ref 1–4.8)
LYMPHOCYTES NFR BLD: 35.4 % (ref 22–41)
MCH RBC QN AUTO: 32.3 PG (ref 27–33)
MCHC RBC AUTO-ENTMCNC: 33.8 G/DL (ref 33.6–35)
MCV RBC AUTO: 95.5 FL (ref 81.4–97.8)
MONOCYTES # BLD AUTO: 0.28 K/UL (ref 0–0.85)
MONOCYTES NFR BLD AUTO: 4.9 % (ref 0–13.4)
NEUTROPHILS # BLD AUTO: 3.31 K/UL (ref 2–7.15)
NEUTROPHILS NFR BLD: 58 % (ref 44–72)
NRBC # BLD AUTO: 0 K/UL
NRBC BLD-RTO: 0 /100 WBC
PLATELET # BLD AUTO: 254 K/UL (ref 164–446)
PMV BLD AUTO: 9.2 FL (ref 9–12.9)
POTASSIUM SERPL-SCNC: 4.1 MMOL/L (ref 3.6–5.5)
PROT SERPL-MCNC: 6.2 G/DL (ref 6–8.2)
RBC # BLD AUTO: 3.75 M/UL (ref 4.2–5.4)
SODIUM SERPL-SCNC: 139 MMOL/L (ref 135–145)
WBC # BLD AUTO: 5.7 K/UL (ref 4.8–10.8)

## 2021-11-20 PROCEDURE — 99285 EMERGENCY DEPT VISIT HI MDM: CPT

## 2021-11-20 PROCEDURE — 83690 ASSAY OF LIPASE: CPT

## 2021-11-20 PROCEDURE — 93005 ELECTROCARDIOGRAM TRACING: CPT

## 2021-11-20 PROCEDURE — 80053 COMPREHEN METABOLIC PANEL: CPT

## 2021-11-20 PROCEDURE — 82077 ASSAY SPEC XCP UR&BREATH IA: CPT

## 2021-11-20 PROCEDURE — 700105 HCHG RX REV CODE 258: Performed by: EMERGENCY MEDICINE

## 2021-11-20 PROCEDURE — 85025 COMPLETE CBC W/AUTO DIFF WBC: CPT

## 2021-11-20 RX ORDER — SODIUM CHLORIDE 9 MG/ML
1000 INJECTION, SOLUTION INTRAVENOUS ONCE
Status: COMPLETED | OUTPATIENT
Start: 2021-11-20 | End: 2021-11-20

## 2021-11-20 RX ORDER — SODIUM CHLORIDE 9 MG/ML
INJECTION, SOLUTION INTRAVENOUS ONCE
Status: COMPLETED | OUTPATIENT
Start: 2021-11-20 | End: 2021-11-20

## 2021-11-20 RX ADMIN — SODIUM CHLORIDE 1000 ML: 9 INJECTION, SOLUTION INTRAVENOUS at 10:40

## 2021-11-20 RX ADMIN — SODIUM CHLORIDE: 9 INJECTION, SOLUTION INTRAVENOUS at 11:04

## 2021-11-20 ASSESSMENT — FIBROSIS 4 INDEX: FIB4 SCORE: 1.64

## 2021-11-20 NOTE — ED PROVIDER NOTES
ED Provider Note    CHIEF COMPLAINT  Chief Complaint   Patient presents with   • Hypotension   • Alcohol Intoxication       HPI  Yohana Brandon is a 56 y.o. female who presents via ambulance for evaluation of generalized weakness and lightheadedness, states that she took 4 clonidine tablets this morning because her blood pressure was high, she drank some alcohol and also took Ativan.  History of alcoholism.  The patient states that she was very concerned with her high blood pressure this morning, she has a family history of strokes and was concerned that if she did not fix her blood pressure that she would also have a stroke.  No chest pain.  No shortness of breath.  She has been having some generalized mild abdominal discomfort with a sensation of distention over the past 3 to 4 days as well.  No vomiting.  No diarrhea.  No fever.    REVIEW OF SYSTEMS  Negative for fever, rash, chest pain, dyspnea, vomiting, diarrhea, headache, focal weakness, focal numbness, focal tingling, back pain. All other systems are negative.     PAST MEDICAL HISTORY  Past Medical History:   Diagnosis Date   • Alcohol abuse    • Alcohol abuse    • ASTHMA    • Elevated LFTs    • Heart palpitations     w/ alcohol withdrawal   • Hypertension    • Psychiatric disorder     alcohol dependance       FAMILY HISTORY  Family History   Problem Relation Age of Onset   • Stroke Mother        SOCIAL HISTORY  Social History     Tobacco Use   • Smoking status: Never Smoker   • Smokeless tobacco: Never Used   Vaping Use   • Vaping Use: Never used   Substance Use Topics   • Alcohol use: Yes     Comment: every day    • Drug use: No       SURGICAL HISTORY  Past Surgical History:   Procedure Laterality Date   • HEMORRHOIDECTOMY  8/24/2011    Performed by SONY MACDONALD at SURGERY [facility] ORS   • ANAL SPHINCTEROTOMY  8/24/2011    Performed by SONY MACDONALD at SURGERY [facility] ORS   • PB ENDOMETRIAL ABLATION, THERMAL  1999   • APPENDECTOMY  1997   •  PB ANESTH,SURG BREAST RECONSTRUCTIVE      bilateral    • BUNIONECTOMY      bilateral   • PB REMOVE TONSILS/ADENOIDS,<13 Y/O     • PB  DELIVERY+POSTPARTUM CARE      x2       CURRENT MEDICATIONS  I personally reviewed the medication list in the charting documentation.     ALLERGIES  Allergies   Allergen Reactions   • Kiwi Extract Swelling   • Latex Anaphylaxis   • Morphine Hives   • Vicodin [Hydrocodone-Acetaminophen] Rash     Generalized rash, puritis    • Banana Shortness of Breath   • Other Food Hives and Shortness of Breath     Tropical fruit         MEDICAL RECORD  I have reviewed patient's medical record and pertinent results are listed above.      PHYSICAL EXAM  VITAL SIGNS: BP 79/40  Pulse 72  Temp 36.1 °C (97 °F) (Temporal)   Resp 16   Wt 67.1 kg (148 lb)   LMP 08/15/2011   SpO2 97%   BMI 23.18 kg/m²    Constitutional: Sleepy but easily arousable to verbal stimulation.  Does not appear acutely ill or toxic.  HENT: Normocephalic, no obvious evidence of acute trauma.  Eyes: No scleral icterus. Normal conjunctiva   Neck: Comfortable movement without any obvious restriction in the range of motion.  Cardiovascular: Upon ascultation I appreciate a regular heart rhythm and a normal rate.   Thorax & Lungs: Normal nonlabored respirations.  Upon application of the stethoscope for auscultation I find there to be no associated chest wall tenderness.  I appreciate no wheezing, rhonchi or rales. There is normal air movement.    Abdomen: The abdomen is not visibly distended. Upon palpation, mild generalized tenderness but no rebound, no guarding  Skin: The exposed portions of skin reveal no obvious rash or other abnormalities.  Extremities/Musculoskeletal: No obvious sign of acute trauma. No asymmetric calf tenderness or edema.   Neurologic: Sleepy but arousable, once awake she is oriented, slurred speech otherwise no obvious focal deficits    DIAGNOSTIC STUDIES / PROCEDURES    LABS/EKGs  Results  for orders placed or performed during the hospital encounter of 11/20/21   CBC WITH DIFFERENTIAL   Result Value Ref Range    WBC 5.7 4.8 - 10.8 K/uL    RBC 3.75 (L) 4.20 - 5.40 M/uL    Hemoglobin 12.1 12.0 - 16.0 g/dL    Hematocrit 35.8 (L) 37.0 - 47.0 %    MCV 95.5 81.4 - 97.8 fL    MCH 32.3 27.0 - 33.0 pg    MCHC 33.8 33.6 - 35.0 g/dL    RDW 43.6 35.9 - 50.0 fL    Platelet Count 254 164 - 446 K/uL    MPV 9.2 9.0 - 12.9 fL    Neutrophils-Polys 58.00 44.00 - 72.00 %    Lymphocytes 35.40 22.00 - 41.00 %    Monocytes 4.90 0.00 - 13.40 %    Eosinophils 0.00 0.00 - 6.90 %    Basophils 1.20 0.00 - 1.80 %    Immature Granulocytes 0.50 0.00 - 0.90 %    Nucleated RBC 0.00 /100 WBC    Neutrophils (Absolute) 3.31 2.00 - 7.15 K/uL    Lymphs (Absolute) 2.02 1.00 - 4.80 K/uL    Monos (Absolute) 0.28 0.00 - 0.85 K/uL    Eos (Absolute) 0.00 0.00 - 0.51 K/uL    Baso (Absolute) 0.07 0.00 - 0.12 K/uL    Immature Granulocytes (abs) 0.03 0.00 - 0.11 K/uL    NRBC (Absolute) 0.00 K/uL   COMP METABOLIC PANEL   Result Value Ref Range    Sodium 139 135 - 145 mmol/L    Potassium 4.1 3.6 - 5.5 mmol/L    Chloride 101 96 - 112 mmol/L    Co2 18 (L) 20 - 33 mmol/L    Anion Gap 20.0 (H) 7.0 - 16.0    Glucose 75 65 - 99 mg/dL    Bun 20 8 - 22 mg/dL    Creatinine 0.94 0.50 - 1.40 mg/dL    Calcium 8.6 8.4 - 10.2 mg/dL    AST(SGOT) 37 12 - 45 U/L    ALT(SGPT) 35 2 - 50 U/L    Alkaline Phosphatase 72 30 - 99 U/L    Total Bilirubin 0.3 0.1 - 1.5 mg/dL    Albumin 3.9 3.2 - 4.9 g/dL    Total Protein 6.2 6.0 - 8.2 g/dL    Globulin 2.3 1.9 - 3.5 g/dL    A-G Ratio 1.7 g/dL   LIPASE   Result Value Ref Range    Lipase 22 7 - 58 U/L   ETHYL ALCOHOL (BLOOD)   Result Value Ref Range    Diagnostic Alcohol 327.3 (H) 0.0 - 10.0 mg/dL   ESTIMATED GFR   Result Value Ref Range    GFR If African American >60 >60 mL/min/1.73 m 2    GFR If Non African American >60 >60 mL/min/1.73 m 2   EKG   Result Value Ref Range    Report       [facility]  Emergency Dept.    Test Date:  2021  Pt Name:    SONIA DAMON          Department: [facility]  MRN:        [id number]                      Room:       -ROOM 2  Gender:     Female                       Technician: GAURI  :        [id number]                   Requested By:ER TRIAGE PROTOCOL  Order #:    [id number]                    Reading MD: KADY FERRELL MD    Measurements  Intervals                                Axis  Rate:       74                           P:          73  NE:         155                          QRS:        47  QRSD:       85                           T:          63  QT:         441  QTc:        490    Interpretive Statements  12 Lead EKG interpreted by me to show: -- Rate 74-- Rhythm: Normal sinus  rhythm  -- Axis: Normal -- NE and QRS Intervals: Normal -- T waves: No acute changes  --  ST segments: No acute changes -- Ectopy: None. My impression of this EKG:  Does  not indicate acute ischemia at this time.  No signif icant change compared to  11/3/2021  Electronically Signed On 2021 16:54:36 PST by KADY FERRELL MD          COURSE & MEDICAL DECISION MAKING  I have reviewed any medical record information, laboratory studies and radiographic results as noted above.  Differential diagnoses includes: Alcohol intoxication, dehydration, medication reaction, anemia, electrolyte abnormality, medication reaction    Encounter Summary: This is a very pleasant 56 y.o. female who unfortunately required evaluation in the emergency department today with complaints of generalized weakness and lightheadedness, comes in by ambulance when she is found to be profoundly hypotensive.  Upon arrival she is about 70/40, normal heart rate.  The patient admits to taking 4 clonidine tablets today because of hypertension.  She also admits to using some Ativan and drinking some alcohol.  No other focal findings on exam other than some mild generalized abdominal tenderness.  Will administer IV fluids.   Will check blood work.  She will be reevaluated -------- blood work is reassuring other than alcohol level of 327.  The patient blood pressure improved significantly here in the emergency department, when I reevaluated her blood pressure is 118/72.  She is awake and alert and sitting up and talking.  Her  has since arrived to the emergency department.  I spent a considerable amount of time at the bedside trying to convince the patient that she needs inpatient alcohol detox, the  is in complete agreement but unfortunately we were unable to convince the patient to do so.  I offered to call the detox facility [facility] in [place], the patient has had a good experience there and the  states that it would be no problem to fly her there right now so she can get treatment but ultimately the patient was not in agreement.  At this point there is no indication for acute hospitalization or further evaluation the emergency department.  Despite my best efforts and the best efforts of the  we have been on successful in convincing her to seek help at this point.  She is being discharged with her  in stable condition      DISPOSITION: Discharged in stable condition      FINAL IMPRESSION  1. Alcoholic intoxication without complication (HCC)    2. Alcohol abuse           This dictation was created using voice recognition software. The accuracy of the dictation is limited to the abilities of the software. I expect there may be some errors of grammar and possibly content. The nursing notes were reviewed and certain aspects of this information were incorporated into this note.    Electronically signed by: Dago Coleman M.D., 11/20/2021 10:40 AM

## 2021-11-20 NOTE — ED NOTES
erp aware of persistent hypotension. Second Liter of NS ordered. Pt resting, no signs of distress. Wakes to voice.

## 2021-11-20 NOTE — ED TRIAGE NOTES
Chief Complaint   Patient presents with   • Hypotension   • Alcohol Intoxication      pt BIB [facility] from home. Pt was found to be hypotensive upon EMS arrival. Was given 250ml of IVF in route. Pt is a daily ETOH drinker and also states she took ativan and clonidine this morning. Pt denies SI/ HI. Pt states last drink was this AM. Drink 1 bottle of vodka  Daily, per pt. ERP called to bedside for hypotension. IL of IVF initiated upon arrival to ER.

## 2021-12-26 ENCOUNTER — APPOINTMENT (OUTPATIENT)
Dept: RADIOLOGY | Facility: MEDICAL CENTER | Age: 56
DRG: 522 | End: 2021-12-26
Attending: EMERGENCY MEDICINE
Payer: COMMERCIAL

## 2021-12-26 ENCOUNTER — HOSPITAL ENCOUNTER (INPATIENT)
Facility: MEDICAL CENTER | Age: 56
LOS: 1 days | DRG: 522 | End: 2021-12-28
Attending: EMERGENCY MEDICINE | Admitting: INTERNAL MEDICINE
Payer: COMMERCIAL

## 2021-12-26 DIAGNOSIS — S72.001A CLOSED FRACTURE OF NECK OF RIGHT FEMUR, INITIAL ENCOUNTER (HCC): ICD-10-CM
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
## 2021-12-26 DIAGNOSIS — S72.001A CLOSED DISPLACED FRACTURE OF RIGHT FEMORAL NECK (HCC): ICD-10-CM

## 2021-12-26 DIAGNOSIS — F10.11 HISTORY OF ALCOHOL ABUSE: ICD-10-CM

## 2021-12-26 PROCEDURE — 72170 X-RAY EXAM OF PELVIS: CPT

## 2021-12-26 PROCEDURE — 90471 IMMUNIZATION ADMIN: CPT

## 2021-12-26 PROCEDURE — 82077 ASSAY SPEC XCP UR&BREATH IA: CPT

## 2021-12-26 PROCEDURE — 85025 COMPLETE CBC W/AUTO DIFF WBC: CPT

## 2021-12-26 PROCEDURE — 99285 EMERGENCY DEPT VISIT HI MDM: CPT

## 2021-12-26 PROCEDURE — 73552 X-RAY EXAM OF FEMUR 2/>: CPT | Mod: RT

## 2021-12-26 PROCEDURE — 87426 SARSCOV CORONAVIRUS AG IA: CPT

## 2021-12-26 PROCEDURE — 90715 TDAP VACCINE 7 YRS/> IM: CPT | Performed by: EMERGENCY MEDICINE

## 2021-12-26 PROCEDURE — 700111 HCHG RX REV CODE 636 W/ 250 OVERRIDE (IP): Performed by: EMERGENCY MEDICINE

## 2021-12-26 PROCEDURE — 71045 X-RAY EXAM CHEST 1 VIEW: CPT

## 2021-12-26 PROCEDURE — 80053 COMPREHEN METABOLIC PANEL: CPT

## 2021-12-26 RX ORDER — OXYCODONE HYDROCHLORIDE AND ACETAMINOPHEN 5; 325 MG/1; MG/1
1 TABLET ORAL EVERY 4 HOURS PRN
Status: COMPLETED | OUTPATIENT
Start: 2021-12-26 | End: 2021-12-27

## 2021-12-26 RX ORDER — OXYCODONE HYDROCHLORIDE AND ACETAMINOPHEN 5; 325 MG/1; MG/1
1 TABLET ORAL ONCE
Status: DISCONTINUED | OUTPATIENT
Start: 2021-12-27 | End: 2021-12-26

## 2021-12-26 RX ADMIN — CLOSTRIDIUM TETANI TOXOID ANTIGEN (FORMALDEHYDE INACTIVATED), CORYNEBACTERIUM DIPHTHERIAE TOXOID ANTIGEN (FORMALDEHYDE INACTIVATED), BORDETELLA PERTUSSIS TOXOID ANTIGEN (GLUTARALDEHYDE INACTIVATED), BORDETELLA PERTUSSIS FILAMENTOUS HEMAGGLUTININ ANTIGEN (FORMALDEHYDE INACTIVATED), BORDETELLA PERTUSSIS PERTACTIN ANTIGEN, AND BORDETELLA PERTUSSIS FIMBRIAE 2/3 ANTIGEN 0.5 ML: 5; 2; 2.5; 5; 3; 5 INJECTION, SUSPENSION INTRAMUSCULAR at 23:45

## 2021-12-26 ASSESSMENT — FIBROSIS 4 INDEX: FIB4 SCORE: 1.33

## 2021-12-27 ENCOUNTER — APPOINTMENT (OUTPATIENT)
Dept: RADIOLOGY | Facility: MEDICAL CENTER | Age: 56
DRG: 522 | End: 2021-12-27
Attending: STUDENT IN AN ORGANIZED HEALTH CARE EDUCATION/TRAINING PROGRAM
Payer: COMMERCIAL

## 2021-12-27 ENCOUNTER — ANESTHESIA (OUTPATIENT)
Dept: SURGERY | Facility: MEDICAL CENTER | Age: 56
DRG: 522 | End: 2021-12-27
Payer: COMMERCIAL

## 2021-12-27 ENCOUNTER — ANESTHESIA EVENT (OUTPATIENT)
Dept: SURGERY | Facility: MEDICAL CENTER | Age: 56
DRG: 522 | End: 2021-12-27
Payer: COMMERCIAL

## 2021-12-27 PROBLEM — S72.001A CLOSED DISPLACED FRACTURE OF RIGHT FEMORAL NECK (HCC): Status: ACTIVE | Noted: 2021-12-27

## 2021-12-27 PROBLEM — S72.001S CLOSED RIGHT HIP FRACTURE, SEQUELA: Status: ACTIVE | Noted: 2021-12-27

## 2021-12-27 LAB
ALBUMIN SERPL BCP-MCNC: 3.5 G/DL (ref 3.2–4.9)
ALBUMIN SERPL BCP-MCNC: 3.5 G/DL (ref 3.2–4.9)
ALBUMIN/GLOB SERPL: 1.3 G/DL
ALBUMIN/GLOB SERPL: 1.5 G/DL
ALP SERPL-CCNC: 81 U/L (ref 30–99)
ALP SERPL-CCNC: 84 U/L (ref 30–99)
ALT SERPL-CCNC: 58 U/L (ref 2–50)
ALT SERPL-CCNC: 62 U/L (ref 2–50)
ANION GAP SERPL CALC-SCNC: 11 MMOL/L (ref 7–16)
ANION GAP SERPL CALC-SCNC: 8 MMOL/L (ref 7–16)
AST SERPL-CCNC: 44 U/L (ref 12–45)
AST SERPL-CCNC: 49 U/L (ref 12–45)
BASOPHILS # BLD AUTO: 0.8 % (ref 0–1.8)
BASOPHILS # BLD: 0.08 K/UL (ref 0–0.12)
BILIRUB SERPL-MCNC: 0.2 MG/DL (ref 0.1–1.5)
BILIRUB SERPL-MCNC: 0.3 MG/DL (ref 0.1–1.5)
BUN SERPL-MCNC: 16 MG/DL (ref 8–22)
BUN SERPL-MCNC: 17 MG/DL (ref 8–22)
CALCIUM SERPL-MCNC: 8.7 MG/DL (ref 8.4–10.2)
CALCIUM SERPL-MCNC: 8.8 MG/DL (ref 8.4–10.2)
CHLORIDE SERPL-SCNC: 105 MMOL/L (ref 96–112)
CHLORIDE SERPL-SCNC: 106 MMOL/L (ref 96–112)
CO2 SERPL-SCNC: 25 MMOL/L (ref 20–33)
CO2 SERPL-SCNC: 27 MMOL/L (ref 20–33)
CREAT SERPL-MCNC: 0.79 MG/DL (ref 0.5–1.4)
CREAT SERPL-MCNC: 0.9 MG/DL (ref 0.5–1.4)
EOSINOPHIL # BLD AUTO: 0.33 K/UL (ref 0–0.51)
EOSINOPHIL NFR BLD: 3.5 % (ref 0–6.9)
ERYTHROCYTE [DISTWIDTH] IN BLOOD BY AUTOMATED COUNT: 40.7 FL (ref 35.9–50)
ETHANOL BLD-MCNC: <10.1 MG/DL (ref 0–10)
GLOBULIN SER CALC-MCNC: 2.4 G/DL (ref 1.9–3.5)
GLOBULIN SER CALC-MCNC: 2.6 G/DL (ref 1.9–3.5)
GLUCOSE SERPL-MCNC: 100 MG/DL (ref 65–99)
GLUCOSE SERPL-MCNC: 97 MG/DL (ref 65–99)
HCT VFR BLD AUTO: 37 % (ref 37–47)
HGB BLD-MCNC: 12.4 G/DL (ref 12–16)
IMM GRANULOCYTES # BLD AUTO: 0.13 K/UL (ref 0–0.11)
IMM GRANULOCYTES NFR BLD AUTO: 1.4 % (ref 0–0.9)
LYMPHOCYTES # BLD AUTO: 1.48 K/UL (ref 1–4.8)
LYMPHOCYTES NFR BLD: 15.6 % (ref 22–41)
MAGNESIUM SERPL-MCNC: 1.6 MG/DL (ref 1.5–2.5)
MCH RBC QN AUTO: 31.6 PG (ref 27–33)
MCHC RBC AUTO-ENTMCNC: 33.5 G/DL (ref 33.6–35)
MCV RBC AUTO: 94.4 FL (ref 81.4–97.8)
MONOCYTES # BLD AUTO: 0.7 K/UL (ref 0–0.85)
MONOCYTES NFR BLD AUTO: 7.4 % (ref 0–13.4)
NEUTROPHILS # BLD AUTO: 6.78 K/UL (ref 2–7.15)
NEUTROPHILS NFR BLD: 71.3 % (ref 44–72)
NRBC # BLD AUTO: 0 K/UL
NRBC BLD-RTO: 0 /100 WBC
PHOSPHATE SERPL-MCNC: 4.1 MG/DL (ref 2.5–4.5)
PLATELET # BLD AUTO: 216 K/UL (ref 164–446)
PMV BLD AUTO: 10.8 FL (ref 9–12.9)
POTASSIUM SERPL-SCNC: 3.9 MMOL/L (ref 3.6–5.5)
POTASSIUM SERPL-SCNC: 4.4 MMOL/L (ref 3.6–5.5)
PROT SERPL-MCNC: 5.9 G/DL (ref 6–8.2)
PROT SERPL-MCNC: 6.1 G/DL (ref 6–8.2)
RBC # BLD AUTO: 3.92 M/UL (ref 4.2–5.4)
SARS-COV+SARS-COV-2 AG RESP QL IA.RAPID: NOTDETECTED
SODIUM SERPL-SCNC: 139 MMOL/L (ref 135–145)
SODIUM SERPL-SCNC: 143 MMOL/L (ref 135–145)
SPECIMEN SOURCE: NORMAL
WBC # BLD AUTO: 9.5 K/UL (ref 4.8–10.8)

## 2021-12-27 PROCEDURE — 99220 PR INITIAL OBSERVATION CARE,LEVL III: CPT | Performed by: STUDENT IN AN ORGANIZED HEALTH CARE EDUCATION/TRAINING PROGRAM

## 2021-12-27 PROCEDURE — 502000 HCHG MISC OR IMPLANTS RC 0278: Performed by: STUDENT IN AN ORGANIZED HEALTH CARE EDUCATION/TRAINING PROGRAM

## 2021-12-27 PROCEDURE — 700102 HCHG RX REV CODE 250 W/ 637 OVERRIDE(OP): Performed by: STUDENT IN AN ORGANIZED HEALTH CARE EDUCATION/TRAINING PROGRAM

## 2021-12-27 PROCEDURE — 80053 COMPREHEN METABOLIC PANEL: CPT

## 2021-12-27 PROCEDURE — 700105 HCHG RX REV CODE 258: Performed by: STUDENT IN AN ORGANIZED HEALTH CARE EDUCATION/TRAINING PROGRAM

## 2021-12-27 PROCEDURE — A9270 NON-COVERED ITEM OR SERVICE: HCPCS | Performed by: STUDENT IN AN ORGANIZED HEALTH CARE EDUCATION/TRAINING PROGRAM

## 2021-12-27 PROCEDURE — 770006 HCHG ROOM/CARE - MED/SURG/GYN SEMI*

## 2021-12-27 PROCEDURE — 160009 HCHG ANES TIME/MIN: Performed by: STUDENT IN AN ORGANIZED HEALTH CARE EDUCATION/TRAINING PROGRAM

## 2021-12-27 PROCEDURE — 84100 ASSAY OF PHOSPHORUS: CPT

## 2021-12-27 PROCEDURE — 83735 ASSAY OF MAGNESIUM: CPT

## 2021-12-27 PROCEDURE — 0SR90JZ REPLACEMENT OF RIGHT HIP JOINT WITH SYNTHETIC SUBSTITUTE, OPEN APPROACH: ICD-10-PCS | Performed by: STUDENT IN AN ORGANIZED HEALTH CARE EDUCATION/TRAINING PROGRAM

## 2021-12-27 PROCEDURE — 160048 HCHG OR STATISTICAL LEVEL 1-5: Performed by: STUDENT IN AN ORGANIZED HEALTH CARE EDUCATION/TRAINING PROGRAM

## 2021-12-27 PROCEDURE — C1776 JOINT DEVICE (IMPLANTABLE): HCPCS | Performed by: STUDENT IN AN ORGANIZED HEALTH CARE EDUCATION/TRAINING PROGRAM

## 2021-12-27 PROCEDURE — 700101 HCHG RX REV CODE 250: Performed by: STUDENT IN AN ORGANIZED HEALTH CARE EDUCATION/TRAINING PROGRAM

## 2021-12-27 PROCEDURE — 501838 HCHG SUTURE GENERAL: Performed by: STUDENT IN AN ORGANIZED HEALTH CARE EDUCATION/TRAINING PROGRAM

## 2021-12-27 PROCEDURE — 160035 HCHG PACU - 1ST 60 MINS PHASE I: Performed by: STUDENT IN AN ORGANIZED HEALTH CARE EDUCATION/TRAINING PROGRAM

## 2021-12-27 PROCEDURE — 160002 HCHG RECOVERY MINUTES (STAT): Performed by: STUDENT IN AN ORGANIZED HEALTH CARE EDUCATION/TRAINING PROGRAM

## 2021-12-27 PROCEDURE — 160042 HCHG SURGERY MINUTES - EA ADDL 1 MIN LEVEL 5: Performed by: STUDENT IN AN ORGANIZED HEALTH CARE EDUCATION/TRAINING PROGRAM

## 2021-12-27 PROCEDURE — 700102 HCHG RX REV CODE 250 W/ 637 OVERRIDE(OP): Performed by: INTERNAL MEDICINE

## 2021-12-27 PROCEDURE — 502578 HCHG PACK, TOTAL HIP: Performed by: STUDENT IN AN ORGANIZED HEALTH CARE EDUCATION/TRAINING PROGRAM

## 2021-12-27 PROCEDURE — 700101 HCHG RX REV CODE 250: Performed by: ANESTHESIOLOGY

## 2021-12-27 PROCEDURE — 700111 HCHG RX REV CODE 636 W/ 250 OVERRIDE (IP): Performed by: ANESTHESIOLOGY

## 2021-12-27 PROCEDURE — 700102 HCHG RX REV CODE 250 W/ 637 OVERRIDE(OP): Performed by: EMERGENCY MEDICINE

## 2021-12-27 PROCEDURE — 73501 X-RAY EXAM HIP UNI 1 VIEW: CPT | Mod: RT

## 2021-12-27 PROCEDURE — 700111 HCHG RX REV CODE 636 W/ 250 OVERRIDE (IP): Performed by: STUDENT IN AN ORGANIZED HEALTH CARE EDUCATION/TRAINING PROGRAM

## 2021-12-27 PROCEDURE — A9270 NON-COVERED ITEM OR SERVICE: HCPCS | Performed by: INTERNAL MEDICINE

## 2021-12-27 PROCEDURE — 700101 HCHG RX REV CODE 250: Performed by: INTERNAL MEDICINE

## 2021-12-27 PROCEDURE — A9270 NON-COVERED ITEM OR SERVICE: HCPCS | Performed by: EMERGENCY MEDICINE

## 2021-12-27 PROCEDURE — 160031 HCHG SURGERY MINUTES - 1ST 30 MINS LEVEL 5: Performed by: STUDENT IN AN ORGANIZED HEALTH CARE EDUCATION/TRAINING PROGRAM

## 2021-12-27 PROCEDURE — 72170 X-RAY EXAM OF PELVIS: CPT

## 2021-12-27 DEVICE — IMPLANTABLE DEVICE: Type: IMPLANTABLE DEVICE | Site: HIP | Status: FUNCTIONAL

## 2021-12-27 RX ORDER — HYDROMORPHONE HYDROCHLORIDE 1 MG/ML
0.4 INJECTION, SOLUTION INTRAMUSCULAR; INTRAVENOUS; SUBCUTANEOUS
Status: DISCONTINUED | OUTPATIENT
Start: 2021-12-27 | End: 2021-12-27 | Stop reason: HOSPADM

## 2021-12-27 RX ORDER — OXYCODONE HYDROCHLORIDE 5 MG/1
5 TABLET ORAL EVERY 4 HOURS PRN
Status: DISCONTINUED | OUTPATIENT
Start: 2021-12-27 | End: 2021-12-27

## 2021-12-27 RX ORDER — BUPIVACAINE HYDROCHLORIDE 5 MG/ML
INJECTION, SOLUTION EPIDURAL; INTRACAUDAL
Status: DISCONTINUED | OUTPATIENT
Start: 2021-12-27 | End: 2021-12-27 | Stop reason: HOSPADM

## 2021-12-27 RX ORDER — PHENYLEPHRINE HCL IN 0.9% NACL 0.5 MG/5ML
SYRINGE (ML) INTRAVENOUS PRN
Status: DISCONTINUED | OUTPATIENT
Start: 2021-12-27 | End: 2021-12-27 | Stop reason: SURG

## 2021-12-27 RX ORDER — ONDANSETRON 2 MG/ML
4 INJECTION INTRAMUSCULAR; INTRAVENOUS
Status: DISCONTINUED | OUTPATIENT
Start: 2021-12-27 | End: 2021-12-27 | Stop reason: HOSPADM

## 2021-12-27 RX ORDER — MIDAZOLAM HYDROCHLORIDE 1 MG/ML
1 INJECTION INTRAMUSCULAR; INTRAVENOUS
Status: DISCONTINUED | OUTPATIENT
Start: 2021-12-27 | End: 2021-12-27 | Stop reason: HOSPADM

## 2021-12-27 RX ORDER — OXYCODONE HYDROCHLORIDE 10 MG/1
10 TABLET ORAL
Status: DISCONTINUED | OUTPATIENT
Start: 2021-12-27 | End: 2021-12-28 | Stop reason: HOSPADM

## 2021-12-27 RX ORDER — LORAZEPAM 1 MG/1
3 TABLET ORAL
Status: DISCONTINUED | OUTPATIENT
Start: 2021-12-27 | End: 2021-12-28 | Stop reason: HOSPADM

## 2021-12-27 RX ORDER — MORPHINE SULFATE 4 MG/ML
4 INJECTION INTRAVENOUS EVERY 4 HOURS PRN
Status: DISCONTINUED | OUTPATIENT
Start: 2021-12-27 | End: 2021-12-27

## 2021-12-27 RX ORDER — SODIUM CHLORIDE, SODIUM LACTATE, POTASSIUM CHLORIDE, CALCIUM CHLORIDE 600; 310; 30; 20 MG/100ML; MG/100ML; MG/100ML; MG/100ML
INJECTION, SOLUTION INTRAVENOUS CONTINUOUS
Status: ACTIVE | OUTPATIENT
Start: 2021-12-27 | End: 2021-12-27

## 2021-12-27 RX ORDER — POLYETHYLENE GLYCOL 3350 17 G/17G
1 POWDER, FOR SOLUTION ORAL
Status: DISCONTINUED | OUTPATIENT
Start: 2021-12-27 | End: 2021-12-28 | Stop reason: HOSPADM

## 2021-12-27 RX ORDER — OXYCODONE HCL 5 MG/5 ML
5 SOLUTION, ORAL ORAL
Status: DISCONTINUED | OUTPATIENT
Start: 2021-12-27 | End: 2021-12-27 | Stop reason: HOSPADM

## 2021-12-27 RX ORDER — GAUZE BANDAGE 2" X 2"
100 BANDAGE TOPICAL DAILY
Status: DISCONTINUED | OUTPATIENT
Start: 2021-12-27 | End: 2021-12-28 | Stop reason: HOSPADM

## 2021-12-27 RX ORDER — HYDROMORPHONE HYDROCHLORIDE 1 MG/ML
0.2 INJECTION, SOLUTION INTRAMUSCULAR; INTRAVENOUS; SUBCUTANEOUS
Status: DISCONTINUED | OUTPATIENT
Start: 2021-12-27 | End: 2021-12-27 | Stop reason: HOSPADM

## 2021-12-27 RX ORDER — TRANEXAMIC ACID 100 MG/ML
INJECTION, SOLUTION INTRAVENOUS PRN
Status: DISCONTINUED | OUTPATIENT
Start: 2021-12-27 | End: 2021-12-27 | Stop reason: SURG

## 2021-12-27 RX ORDER — HALOPERIDOL 5 MG/ML
1 INJECTION INTRAMUSCULAR
Status: DISCONTINUED | OUTPATIENT
Start: 2021-12-27 | End: 2021-12-27 | Stop reason: HOSPADM

## 2021-12-27 RX ORDER — CEFAZOLIN SODIUM 1 G/3ML
INJECTION, POWDER, FOR SOLUTION INTRAMUSCULAR; INTRAVENOUS PRN
Status: DISCONTINUED | OUTPATIENT
Start: 2021-12-27 | End: 2021-12-27 | Stop reason: SURG

## 2021-12-27 RX ORDER — LABETALOL HYDROCHLORIDE 5 MG/ML
5 INJECTION, SOLUTION INTRAVENOUS
Status: DISCONTINUED | OUTPATIENT
Start: 2021-12-27 | End: 2021-12-27 | Stop reason: HOSPADM

## 2021-12-27 RX ORDER — LORAZEPAM 1 MG/1
4 TABLET ORAL
Status: DISCONTINUED | OUTPATIENT
Start: 2021-12-27 | End: 2021-12-28 | Stop reason: HOSPADM

## 2021-12-27 RX ORDER — ONDANSETRON 2 MG/ML
INJECTION INTRAMUSCULAR; INTRAVENOUS PRN
Status: DISCONTINUED | OUTPATIENT
Start: 2021-12-27 | End: 2021-12-27 | Stop reason: SURG

## 2021-12-27 RX ORDER — M-VIT,TX,IRON,MINS/CALC/FOLIC 27MG-0.4MG
1 TABLET ORAL DAILY
Status: DISCONTINUED | OUTPATIENT
Start: 2021-12-27 | End: 2021-12-28 | Stop reason: HOSPADM

## 2021-12-27 RX ORDER — LORAZEPAM 2 MG/ML
2 INJECTION INTRAMUSCULAR
Status: DISCONTINUED | OUTPATIENT
Start: 2021-12-27 | End: 2021-12-28 | Stop reason: HOSPADM

## 2021-12-27 RX ORDER — HYDROMORPHONE HYDROCHLORIDE 1 MG/ML
0.5 INJECTION, SOLUTION INTRAMUSCULAR; INTRAVENOUS; SUBCUTANEOUS
Status: DISCONTINUED | OUTPATIENT
Start: 2021-12-27 | End: 2021-12-28 | Stop reason: HOSPADM

## 2021-12-27 RX ORDER — AMOXICILLIN 250 MG
2 CAPSULE ORAL 2 TIMES DAILY
Status: DISCONTINUED | OUTPATIENT
Start: 2021-12-27 | End: 2021-12-28 | Stop reason: HOSPADM

## 2021-12-27 RX ORDER — OXYCODONE HCL 5 MG/5 ML
10 SOLUTION, ORAL ORAL
Status: DISCONTINUED | OUTPATIENT
Start: 2021-12-27 | End: 2021-12-27 | Stop reason: HOSPADM

## 2021-12-27 RX ORDER — MIDAZOLAM HYDROCHLORIDE 1 MG/ML
INJECTION INTRAMUSCULAR; INTRAVENOUS PRN
Status: DISCONTINUED | OUTPATIENT
Start: 2021-12-27 | End: 2021-12-27 | Stop reason: SURG

## 2021-12-27 RX ORDER — LORAZEPAM 2 MG/ML
0.5 INJECTION INTRAMUSCULAR EVERY 4 HOURS PRN
Status: DISCONTINUED | OUTPATIENT
Start: 2021-12-27 | End: 2021-12-28 | Stop reason: HOSPADM

## 2021-12-27 RX ORDER — VITAMIN B COMPLEX
2000 TABLET ORAL DAILY
Status: DISCONTINUED | OUTPATIENT
Start: 2021-12-27 | End: 2021-12-28 | Stop reason: HOSPADM

## 2021-12-27 RX ORDER — LORAZEPAM 1 MG/1
2 TABLET ORAL
Status: DISCONTINUED | OUTPATIENT
Start: 2021-12-27 | End: 2021-12-28 | Stop reason: HOSPADM

## 2021-12-27 RX ORDER — ACETAMINOPHEN 325 MG/1
650 TABLET ORAL EVERY 6 HOURS PRN
Status: DISCONTINUED | OUTPATIENT
Start: 2021-12-27 | End: 2021-12-28 | Stop reason: HOSPADM

## 2021-12-27 RX ORDER — ROSUVASTATIN CALCIUM 10 MG/1
20 TABLET, COATED ORAL EVERY EVENING
Status: DISCONTINUED | OUTPATIENT
Start: 2021-12-27 | End: 2021-12-28 | Stop reason: HOSPADM

## 2021-12-27 RX ORDER — LORAZEPAM 2 MG/ML
1.5 INJECTION INTRAMUSCULAR
Status: DISCONTINUED | OUTPATIENT
Start: 2021-12-27 | End: 2021-12-28 | Stop reason: HOSPADM

## 2021-12-27 RX ORDER — DIPHENHYDRAMINE HYDROCHLORIDE 50 MG/ML
12.5 INJECTION INTRAMUSCULAR; INTRAVENOUS
Status: DISCONTINUED | OUTPATIENT
Start: 2021-12-27 | End: 2021-12-27 | Stop reason: HOSPADM

## 2021-12-27 RX ORDER — LIDOCAINE HYDROCHLORIDE 20 MG/ML
INJECTION, SOLUTION EPIDURAL; INFILTRATION; INTRACAUDAL; PERINEURAL PRN
Status: DISCONTINUED | OUTPATIENT
Start: 2021-12-27 | End: 2021-12-27 | Stop reason: SURG

## 2021-12-27 RX ORDER — LORAZEPAM 1 MG/1
1 TABLET ORAL EVERY 4 HOURS PRN
Status: DISCONTINUED | OUTPATIENT
Start: 2021-12-27 | End: 2021-12-28 | Stop reason: HOSPADM

## 2021-12-27 RX ORDER — FOLIC ACID 1 MG/1
1 TABLET ORAL DAILY
Status: DISCONTINUED | OUTPATIENT
Start: 2021-12-27 | End: 2021-12-28 | Stop reason: HOSPADM

## 2021-12-27 RX ORDER — LORAZEPAM 2 MG/ML
1 INJECTION INTRAMUSCULAR
Status: DISCONTINUED | OUTPATIENT
Start: 2021-12-27 | End: 2021-12-28 | Stop reason: HOSPADM

## 2021-12-27 RX ORDER — CALCIUM CARBONATE 500 MG/1
500 TABLET, CHEWABLE ORAL DAILY
Status: DISCONTINUED | OUTPATIENT
Start: 2021-12-27 | End: 2021-12-28 | Stop reason: HOSPADM

## 2021-12-27 RX ORDER — HYDROMORPHONE HYDROCHLORIDE 1 MG/ML
0.1 INJECTION, SOLUTION INTRAMUSCULAR; INTRAVENOUS; SUBCUTANEOUS
Status: DISCONTINUED | OUTPATIENT
Start: 2021-12-27 | End: 2021-12-27 | Stop reason: HOSPADM

## 2021-12-27 RX ORDER — SODIUM CHLORIDE, SODIUM GLUCONATE, SODIUM ACETATE, POTASSIUM CHLORIDE AND MAGNESIUM CHLORIDE 526; 502; 368; 37; 30 MG/100ML; MG/100ML; MG/100ML; MG/100ML; MG/100ML
500 INJECTION, SOLUTION INTRAVENOUS CONTINUOUS
Status: DISCONTINUED | OUTPATIENT
Start: 2021-12-27 | End: 2021-12-27 | Stop reason: HOSPADM

## 2021-12-27 RX ORDER — OXYCODONE HYDROCHLORIDE 5 MG/1
5 TABLET ORAL
Status: DISCONTINUED | OUTPATIENT
Start: 2021-12-27 | End: 2021-12-28 | Stop reason: HOSPADM

## 2021-12-27 RX ORDER — LORAZEPAM 0.5 MG/1
0.5 TABLET ORAL EVERY 4 HOURS PRN
Status: DISCONTINUED | OUTPATIENT
Start: 2021-12-27 | End: 2021-12-28 | Stop reason: HOSPADM

## 2021-12-27 RX ORDER — CEFAZOLIN SODIUM IN 0.9 % NACL 2 G/100 ML
2 PLASTIC BAG, INJECTION (ML) INTRAVENOUS ONCE
Status: COMPLETED | OUTPATIENT
Start: 2021-12-27 | End: 2021-12-27

## 2021-12-27 RX ORDER — THYROID 30 MG/1
60 TABLET ORAL DAILY
Status: DISCONTINUED | OUTPATIENT
Start: 2021-12-27 | End: 2021-12-28 | Stop reason: HOSPADM

## 2021-12-27 RX ORDER — DEXAMETHASONE SODIUM PHOSPHATE 4 MG/ML
INJECTION, SOLUTION INTRA-ARTICULAR; INTRALESIONAL; INTRAMUSCULAR; INTRAVENOUS; SOFT TISSUE PRN
Status: DISCONTINUED | OUTPATIENT
Start: 2021-12-27 | End: 2021-12-27 | Stop reason: SURG

## 2021-12-27 RX ORDER — LABETALOL HYDROCHLORIDE 5 MG/ML
10 INJECTION, SOLUTION INTRAVENOUS EVERY 4 HOURS PRN
Status: DISCONTINUED | OUTPATIENT
Start: 2021-12-27 | End: 2021-12-28 | Stop reason: HOSPADM

## 2021-12-27 RX ORDER — HYDROMORPHONE HYDROCHLORIDE 2 MG/ML
INJECTION, SOLUTION INTRAMUSCULAR; INTRAVENOUS; SUBCUTANEOUS PRN
Status: DISCONTINUED | OUTPATIENT
Start: 2021-12-27 | End: 2021-12-27 | Stop reason: SURG

## 2021-12-27 RX ORDER — BISACODYL 10 MG
10 SUPPOSITORY, RECTAL RECTAL
Status: DISCONTINUED | OUTPATIENT
Start: 2021-12-27 | End: 2021-12-28 | Stop reason: HOSPADM

## 2021-12-27 RX ORDER — MEPERIDINE HYDROCHLORIDE 25 MG/ML
12.5 INJECTION INTRAMUSCULAR; INTRAVENOUS; SUBCUTANEOUS
Status: DISCONTINUED | OUTPATIENT
Start: 2021-12-27 | End: 2021-12-27 | Stop reason: HOSPADM

## 2021-12-27 RX ADMIN — SENNOSIDES AND DOCUSATE SODIUM 2 TABLET: 8.6; 5 TABLET ORAL at 05:54

## 2021-12-27 RX ADMIN — PROPOFOL 50 MG: 10 INJECTION, EMULSION INTRAVENOUS at 17:34

## 2021-12-27 RX ADMIN — Medication 1 LOZENGE: at 12:04

## 2021-12-27 RX ADMIN — MULTIPLE VITAMINS W/ MINERALS TAB 1 TABLET: TAB at 05:55

## 2021-12-27 RX ADMIN — SODIUM CHLORIDE, POTASSIUM CHLORIDE, SODIUM LACTATE AND CALCIUM CHLORIDE: 600; 310; 30; 20 INJECTION, SOLUTION INTRAVENOUS at 17:11

## 2021-12-27 RX ADMIN — OXYCODONE 5 MG: 5 TABLET ORAL at 12:06

## 2021-12-27 RX ADMIN — OXYCODONE 5 MG: 5 TABLET ORAL at 05:56

## 2021-12-27 RX ADMIN — CALCIUM CARBONATE (ANTACID) CHEW TAB 500 MG 500 MG: 500 CHEW TAB at 05:55

## 2021-12-27 RX ADMIN — ONDANSETRON 4 MG: 2 INJECTION INTRAMUSCULAR; INTRAVENOUS at 17:27

## 2021-12-27 RX ADMIN — TRANEXAMIC ACID 1000 MG: 100 INJECTION, SOLUTION INTRAVENOUS at 17:29

## 2021-12-27 RX ADMIN — FOLIC ACID 1 MG: 1 TABLET ORAL at 05:55

## 2021-12-27 RX ADMIN — SODIUM CHLORIDE, POTASSIUM CHLORIDE, SODIUM LACTATE AND CALCIUM CHLORIDE: 600; 310; 30; 20 INJECTION, SOLUTION INTRAVENOUS at 17:21

## 2021-12-27 RX ADMIN — HYDROMORPHONE HYDROCHLORIDE 0.2 MG: 2 INJECTION, SOLUTION INTRAMUSCULAR; INTRAVENOUS; SUBCUTANEOUS at 18:00

## 2021-12-27 RX ADMIN — CEFAZOLIN 2 G: 1 INJECTION, POWDER, FOR SOLUTION INTRAVENOUS at 21:42

## 2021-12-27 RX ADMIN — MIDAZOLAM HYDROCHLORIDE 2 MG: 1 INJECTION, SOLUTION INTRAMUSCULAR; INTRAVENOUS at 17:21

## 2021-12-27 RX ADMIN — THIAMINE HYDROCHLORIDE: 100 INJECTION, SOLUTION INTRAMUSCULAR; INTRAVENOUS at 09:27

## 2021-12-27 RX ADMIN — Medication 100 MCG: at 19:03

## 2021-12-27 RX ADMIN — CEFAZOLIN 2 G: 330 INJECTION, POWDER, FOR SOLUTION INTRAMUSCULAR; INTRAVENOUS at 17:29

## 2021-12-27 RX ADMIN — Medication 100 MCG: at 18:58

## 2021-12-27 RX ADMIN — OXYCODONE HYDROCHLORIDE 5 MG: 5 TABLET ORAL at 22:45

## 2021-12-27 RX ADMIN — PROPOFOL 150 MG: 10 INJECTION, EMULSION INTRAVENOUS at 17:27

## 2021-12-27 RX ADMIN — OXYCODONE AND ACETAMINOPHEN 1 TABLET: 5; 325 TABLET ORAL at 00:25

## 2021-12-27 RX ADMIN — DEXAMETHASONE SODIUM PHOSPHATE 8 MG: 4 INJECTION, SOLUTION INTRAMUSCULAR; INTRAVENOUS at 17:27

## 2021-12-27 RX ADMIN — LIDOCAINE HYDROCHLORIDE 100 MG: 20 INJECTION, SOLUTION EPIDURAL; INFILTRATION; INTRACAUDAL; PERINEURAL at 17:27

## 2021-12-27 RX ADMIN — THYROID, PORCINE 60 MG: 30 TABLET ORAL at 05:54

## 2021-12-27 RX ADMIN — HYDROMORPHONE HYDROCHLORIDE 0.3 MG: 2 INJECTION, SOLUTION INTRAMUSCULAR; INTRAVENOUS; SUBCUTANEOUS at 17:52

## 2021-12-27 RX ADMIN — HYDROMORPHONE HYDROCHLORIDE 0.5 MG: 2 INJECTION, SOLUTION INTRAMUSCULAR; INTRAVENOUS; SUBCUTANEOUS at 18:25

## 2021-12-27 RX ADMIN — TRANEXAMIC ACID 1000 MG: 100 INJECTION, SOLUTION INTRAVENOUS at 19:16

## 2021-12-27 RX ADMIN — THIAMINE HCL TAB 100 MG 100 MG: 100 TAB at 05:55

## 2021-12-27 RX ADMIN — Medication 2000 UNITS: at 05:54

## 2021-12-27 ASSESSMENT — ENCOUNTER SYMPTOMS
CHILLS: 0
NAUSEA: 0
COUGH: 0
PALPITATIONS: 0
FALLS: 1
BACK PAIN: 0
HEADACHES: 0
ABDOMINAL PAIN: 0
DIARRHEA: 0
SHORTNESS OF BREATH: 0
VOMITING: 0
CONSTIPATION: 0
SORE THROAT: 0
FEVER: 0
DIZZINESS: 0

## 2021-12-27 ASSESSMENT — LIFESTYLE VARIABLES
ANXIETY: NO ANXIETY (AT EASE)
HAVE YOU EVER FELT YOU SHOULD CUT DOWN ON YOUR DRINKING: YES
PAROXYSMAL SWEATS: NO SWEAT VISIBLE
TOTAL SCORE: 4
ALCOHOL_USE: YES
AGITATION: NORMAL ACTIVITY
ORIENTATION AND CLOUDING OF SENSORIUM: ORIENTED AND CAN DO SERIAL ADDITIONS
ANXIETY: NO ANXIETY (AT EASE)
AGITATION: NORMAL ACTIVITY
TOTAL SCORE: 1
TREMOR: NO TREMOR
TREMOR: NO TREMOR
HEADACHE, FULLNESS IN HEAD: MILD
PAROXYSMAL SWEATS: NO SWEAT VISIBLE
ANXIETY: MILDLY ANXIOUS
NAUSEA AND VOMITING: NO NAUSEA AND NO VOMITING
AGITATION: NORMAL ACTIVITY
HEADACHE, FULLNESS IN HEAD: NOT PRESENT
AGITATION: NORMAL ACTIVITY
NAUSEA AND VOMITING: NO NAUSEA AND NO VOMITING
PAROXYSMAL SWEATS: NO SWEAT VISIBLE
ANXIETY: NO ANXIETY (AT EASE)
CONSUMPTION TOTAL: POSITIVE
AGITATION: NORMAL ACTIVITY
AVERAGE NUMBER OF DAYS PER WEEK YOU HAVE A DRINK CONTAINING ALCOHOL: 2
VISUAL DISTURBANCES: NOT PRESENT
ORIENTATION AND CLOUDING OF SENSORIUM: ORIENTED AND CAN DO SERIAL ADDITIONS
NAUSEA AND VOMITING: NO NAUSEA AND NO VOMITING
VISUAL DISTURBANCES: NOT PRESENT
ORIENTATION AND CLOUDING OF SENSORIUM: ORIENTED AND CAN DO SERIAL ADDITIONS
VISUAL DISTURBANCES: NOT PRESENT
EVER FELT BAD OR GUILTY ABOUT YOUR DRINKING: YES
TOTAL SCORE: 0
TREMOR: NO TREMOR
AUDITORY DISTURBANCES: NOT PRESENT
ANXIETY: MILDLY ANXIOUS
HEADACHE, FULLNESS IN HEAD: NOT PRESENT
VISUAL DISTURBANCES: NOT PRESENT
EVER HAD A DRINK FIRST THING IN THE MORNING TO STEADY YOUR NERVES TO GET RID OF A HANGOVER: YES
PAROXYSMAL SWEATS: NO SWEAT VISIBLE
VISUAL DISTURBANCES: NOT PRESENT
ORIENTATION AND CLOUDING OF SENSORIUM: ORIENTED AND CAN DO SERIAL ADDITIONS
ANXIETY: NO ANXIETY (AT EASE)
NAUSEA AND VOMITING: NO NAUSEA AND NO VOMITING
VISUAL DISTURBANCES: NOT PRESENT
AUDITORY DISTURBANCES: NOT PRESENT
PAROXYSMAL SWEATS: NO SWEAT VISIBLE
TREMOR: TREMOR NOT VISIBLE BUT CAN BE FELT, FINGERTIP TO FINGERTIP
AUDITORY DISTURBANCES: NOT PRESENT
AUDITORY DISTURBANCES: NOT PRESENT
TREMOR: TREMOR NOT VISIBLE BUT CAN BE FELT, FINGERTIP TO FINGERTIP
TOTAL SCORE: 4
TOTAL SCORE: 0
TREMOR: NO TREMOR
PAROXYSMAL SWEATS: NO SWEAT VISIBLE
ANXIETY: NO ANXIETY (AT EASE)
HAVE PEOPLE ANNOYED YOU BY CRITICIZING YOUR DRINKING: YES
ORIENTATION AND CLOUDING OF SENSORIUM: ORIENTED AND CAN DO SERIAL ADDITIONS
TOTAL SCORE: 2
HEADACHE, FULLNESS IN HEAD: NOT PRESENT
PAROXYSMAL SWEATS: NO SWEAT VISIBLE
HOW MANY TIMES IN THE PAST YEAR HAVE YOU HAD 5 OR MORE DRINKS IN A DAY: 0
AUDITORY DISTURBANCES: NOT PRESENT
HEADACHE, FULLNESS IN HEAD: NOT PRESENT
AUDITORY DISTURBANCES: NOT PRESENT
NAUSEA AND VOMITING: NO NAUSEA AND NO VOMITING
ORIENTATION AND CLOUDING OF SENSORIUM: ORIENTED AND CAN DO SERIAL ADDITIONS
TOTAL SCORE: 2
NAUSEA AND VOMITING: NO NAUSEA AND NO VOMITING
AUDITORY DISTURBANCES: NOT PRESENT
ON A TYPICAL DAY WHEN YOU DRINK ALCOHOL HOW MANY DRINKS DO YOU HAVE: 2
TREMOR: NO TREMOR
HEADACHE, FULLNESS IN HEAD: NOT PRESENT
ORIENTATION AND CLOUDING OF SENSORIUM: ORIENTED AND CAN DO SERIAL ADDITIONS
TOTAL SCORE: 1
TOTAL SCORE: 0
HEADACHE, FULLNESS IN HEAD: NOT PRESENT
VISUAL DISTURBANCES: NOT PRESENT
TOTAL SCORE: 4
NAUSEA AND VOMITING: NO NAUSEA AND NO VOMITING

## 2021-12-27 ASSESSMENT — COGNITIVE AND FUNCTIONAL STATUS - GENERAL
MOVING FROM LYING ON BACK TO SITTING ON SIDE OF FLAT BED: A LITTLE
STANDING UP FROM CHAIR USING ARMS: A LITTLE
CLIMB 3 TO 5 STEPS WITH RAILING: A LITTLE
MOBILITY SCORE: 18
DAILY ACTIVITIY SCORE: 18
WALKING IN HOSPITAL ROOM: A LITTLE
HELP NEEDED FOR BATHING: A LITTLE
DRESSING REGULAR UPPER BODY CLOTHING: A LITTLE
SUGGESTED CMS G CODE MODIFIER DAILY ACTIVITY: CK
EATING MEALS: A LITTLE
TURNING FROM BACK TO SIDE WHILE IN FLAT BAD: A LITTLE
MOVING TO AND FROM BED TO CHAIR: A LITTLE
PERSONAL GROOMING: A LITTLE
TOILETING: A LITTLE
DRESSING REGULAR LOWER BODY CLOTHING: A LITTLE
SUGGESTED CMS G CODE MODIFIER MOBILITY: CK

## 2021-12-27 ASSESSMENT — PATIENT HEALTH QUESTIONNAIRE - PHQ9
SUM OF ALL RESPONSES TO PHQ9 QUESTIONS 1 AND 2: 0
SUM OF ALL RESPONSES TO PHQ9 QUESTIONS 1 AND 2: 0
2. FEELING DOWN, DEPRESSED, IRRITABLE, OR HOPELESS: NOT AT ALL
1. LITTLE INTEREST OR PLEASURE IN DOING THINGS: NOT AT ALL
2. FEELING DOWN, DEPRESSED, IRRITABLE, OR HOPELESS: NOT AT ALL
1. LITTLE INTEREST OR PLEASURE IN DOING THINGS: NOT AT ALL

## 2021-12-27 ASSESSMENT — PAIN SCALES - GENERAL: PAIN_LEVEL: 0

## 2021-12-27 ASSESSMENT — FIBROSIS 4 INDEX: FIB4 SCORE: 1.56

## 2021-12-27 NOTE — CARE PLAN
The patient is          Progress made toward(s) clinical / shift goals:      Patient is not progressing towards the following goals:

## 2021-12-27 NOTE — PROGRESS NOTES
Pt came in early this am and has received one dose of pain med,  She is pleasant and very cooperative,  ciwa is 0 at this time.

## 2021-12-27 NOTE — ED PROVIDER NOTES
ED Provider Note    Scribed for Cb Jensen M.D. by Jillian Steiner. 12/26/2021  10:51 PM    Primary care provider: Deborah Moctezuma M.D.  Means of arrival: EMS  History obtained from: Patient  History limited by: None    CHIEF COMPLAINT  Chief Complaint   Patient presents with   • Hip Pain     Pt fell about 4' while trying to fix her outside lights, PT denies pain other than in her right hip. 100 fent and 4 zofran given prior to transport        HPI  Yohana Brandon is a 56 y.o. female who presents to the Emergency Department via EMS for evaluation following a fall that occurred prior to arrival. She was attempting to put the outside lights back up on the trellis this evening when she fell about 4 feet, landing on her right hip. Since then she has been having lateral right hip pain that radiates into her groin. She was able to bear weight enough to get into the house but did fall down again once inside. She denies any head injury, neck pain, back pain, abdominal pain, or loss of consciousness. Fire treated her with 100 mcg Fentanyl and 4 mg Zofran prior to transport. She is allergic to percocet and morphine. She has a history of alcohol abuse and has been drinking today, with her last drink around dinner time. She does go into withdrawal when she stops drinking. No blood thinners. She is unsure if her tetanus is up to date.    REVIEW OF SYSTEMS  Pertinent positives include: fall and right hip pain. Pertinent negatives include: no head injury, neck pain, back pain, abdominal pain, or loss of consciousness. See history of present illness. All other systems are negative.     PAST MEDICAL HISTORY   has a past medical history of Alcohol abuse, Alcohol abuse, ASTHMA, Elevated LFTs, Heart palpitations, Hypertension, and Psychiatric disorder.    SURGICAL HISTORY   has a past surgical history that includes appendectomy (1997); endometrial ablation, thermal (1999); remove tonsils/adenoids,<13 y/o (1973); anesth,surg  "breast reconstructive (); hemorrhoidectomy (2011); anal sphincterotomy (2011); bunionectomy (); and  delivery+postpartum care.    SOCIAL HISTORY  Social History     Tobacco Use   • Smoking status: Never Smoker   • Smokeless tobacco: Never Used   Vaping Use   • Vaping Use: Never used   Substance Use Topics   • Alcohol use: Yes     Comment: every day    • Drug use: No      Social History     Substance and Sexual Activity   Drug Use No       FAMILY HISTORY  Family History   Problem Relation Age of Onset   • Stroke Mother        CURRENT MEDICATIONS  Current Outpatient Medications   Medication Instructions   • ALPRAZolam (XANAX) 0.5 mg, Oral, 2 TIMES DAILY PRN   • Belsomra 15 mg, Oral, EVERY BEDTIME   • Cholecalciferol (VITAMIN D3 PO) 1 Capsule, Oral, DAILY   • fish oil 2,000 mg, Oral, DAILY   • folic acid (FOLVITE) 1 mg, Oral, DAILY   • magnesium oxide (MAG-OX) 400 mg, Oral, DAILY   • multivitamin (THERAGRAN) Tab 1 Tablet, Oral, DAILY   • Nutritional Supplements (DHEA PO) 2 Tablets, Oral, DAILY   • Potassium 99 mg, Oral, DAILY   • rosuvastatin (CRESTOR) 20 mg, Oral, EVERY EVENING   • telmisartan-hydrochlorothiazide (MICARDIS HCT) 80-25 MG per tablet 1 Tablet, Oral, DAILY   • thiamine (THIAMINE) 100 mg, Oral, DAILY   • thyroid (ARMOUR THYROID) 60 mg, Oral, DAILY       ALLERGIES  Allergies   Allergen Reactions   • Kiwi Extract Swelling   • Latex Anaphylaxis   • Morphine Hives   • Vicodin [Hydrocodone-Acetaminophen] Rash     Generalized rash, puritis    • Banana Shortness of Breath   • Other Food Hives and Shortness of Breath     Tropical fruit         PHYSICAL EXAM  VITAL SIGNS: /77   Pulse 85   Temp 36.8 °C (98.3 °F) (Temporal)   Resp 14   Ht 1.702 m (5' 7\")   Wt 68 kg (150 lb)   LMP 08/15/2011   SpO2 93%   BMI 23.49 kg/m²     Constitutional: Alert in no apparent distress.  HENT: No signs of trauma, Bilateral external ears normal, Nose normal. Uvula midline.   Eyes: Pupils are " equal and reactive, Conjunctiva normal, Non-icteric.   Neck: Normal range of motion, No tenderness, Supple, No stridor.   Lymphatic: No lymphadenopathy noted.   Cardiovascular: Regular rate and rhythm, no murmurs.   Thorax & Lungs: Normal breath sounds, No respiratory distress, No wheezing, No chest tenderness.   Abdomen:  Soft, No tenderness, No peritoneal signs, No masses, No pulsatile masses.   Skin: Warm, Dry, No erythema, No rash.   Back: No bony tenderness, No CVA tenderness.   Extremities: Intact distal pulses, No edema, No tenderness, No cyanosis.  Musculoskeletal: Full range of motion of left lower extremity with 2+ peripheral pulses. Right hip tenderness to palpation, full range of motion without pain, abrasion to lateral aspect of proximal leg,  Neurologic: Alert , Normal motor function, Normal sensory function, No focal deficits noted.   Psychiatric: Affect normal, Judgment normal, Mood normal.     DIAGNOSTIC STUDIES / PROCEDURES    LABS  Labs Reviewed   CBC WITH DIFFERENTIAL - Abnormal; Notable for the following components:       Result Value    RBC 3.92 (*)     MCHC 33.5 (*)     Lymphocytes 15.60 (*)     Immature Granulocytes 1.40 (*)     Immature Granulocytes (abs) 0.13 (*)     All other components within normal limits    Narrative:     Collected By:   COMP METABOLIC PANEL - Abnormal; Notable for the following components:    Glucose 100 (*)     AST(SGOT) 49 (*)     ALT(SGPT) 62 (*)     Total Protein 5.9 (*)     All other components within normal limits    Narrative:     Collected By:   SARS-COV ANTIGEN LYNDA    Narrative:     Collected By:  Have you been in close contact with a person who is suspected  or known to be positive for COVID-19 within the last 30 days  (e.g. last seen that person < 30 days ago)->Unknown   ETHYL ALCOHOL (BLOOD)    Narrative:     Collected By:   ESTIMATED GFR    Narrative:     Collected By:   DIAGNOSTIC ALCOHOL      All labs reviewed by me.    RADIOLOGY  DX-FEMUR-2+ RIGHT    Final Result      Right femoral neck fracture      DX-PELVIS-1 OR 2 VIEWS   Final Result      Right femoral neck fracture.      DX-CHEST-PORTABLE (1 VIEW)   Final Result      No evidence of acute cardiopulmonary process.        The radiologist's interpretation of all radiological studies have been reviewed by me.    COURSE & MEDICAL DECISION MAKING  Nursing notes, ANJUM RODRÍGUEZx reviewed in chart.    56 y.o. female p/w chief complaint of acute right hip pain onset after a fall.    10:51 PM Patient seen and examined at bedside.      I verified that the patient was wearing a mask and I was wearing appropriate PPE every time I entered the room. The patient's mask was on the patient at all times during my encounter except for a brief view of the oropharynx.     The differential diagnoses include but are not limited to:     #fall and right hip pain  No hx or PE e/o ICH, pt is [place] head CT neg therefor elected to not obtain imaging at this time  Pt w/ no midline c/s pain and [place] c/s rule neg  No C/T/L spine TTP, doubt fx  No intrathoracic or abd pain to suggest PTX, rib fx or BAT  XR ordered to r/o fx  Tetanus will be updated    11:46 PM - Patient was reevaluated at bedside. Patient's abrasions irrigated with normal saline.    12:30 AM I discussed the patient's case and the above findings with Dr. Bell (Ortho) who agrees to evaluate the patient. He would like her kept NPO.    12:38 AM I discussed the patient's case and the above findings with Dr. Lau (Hospitalist) who agrees to evaluate the patient for hospitalization given hx of ETOH w/d and abnormal lft    DISPOSITION:  Patient will be hospitalized by Dr. Lau in guarded condition.    FINAL IMPRESSION  1. Closed fracture of neck of right femur, initial encounter ([facility])    2. Fall, initial encounter    3. History of alcohol abuse        I, Jillian Steiner (Scribe), am scribing for, and in the presence of, Cb Jensen M.D..    Electronically signed by:  Jillian Steiner (Scribe), 12/26/2021    ICb M.D. personally performed the services described in this documentation, as scribed by Jillian Steiner in my presence, and it is both accurate and complete.    The note accurately reflects work and decisions made by me.  Cb Jensen M.D.  12/27/2021  12:48 AM

## 2021-12-27 NOTE — CARE PLAN
The patient is Watcher - Medium risk of patient condition declining or worsening    Shift Goals  Clinical Goals: manage symptoms of withdrawal, pain control, keep NPO for surgery at 1700    Progress made toward(s) clinical / shift goals:  progressing

## 2021-12-27 NOTE — HOSPITAL COURSE
56-year-old female with PMH alcohol use disorder, psychiatric disorder, hypertension, admitted on 12/26/2021 for ongoing hip pain.  Patient continues to drink, was trying to fix lites outside when fell off a ladder about 4 feet landing on right hip.  In the ED patient's x-ray showed right femoral neck fracture.  Ortho pending evaluation.  
Have Your Skin Lesions Been Treated?: not been treated
Is This A New Presentation, Or A Follow-Up?: Skin Lesions
How Severe Is Your Skin Lesion?: mild

## 2021-12-27 NOTE — THERAPY
Missed Therapy     Patient Name: Yohana Brandon  Age:  56 y.o., Sex:  female  Medical Record #: [id number]  Today's Date: 12/27/2021    Discussed missed therapy with RN       12/27/21 1130   Interdisciplinary Plan of Care Collaboration   Collaboration Comments OT orders rec'd- hold pending surgery for R hip fracture.  Please update mobility orders post surgery.  Thank you

## 2021-12-27 NOTE — ASSESSMENT & PLAN NOTE
Patient magnesium 1.6.  Has been a chronic problem for patient.  Replacing via IV with detox bag.  Recheck labs in the morning.

## 2021-12-27 NOTE — ANESTHESIA PREPROCEDURE EVALUATION
Case: [id number] Date/Time: 12/27/21 4715    Procedure: ARTHROPLASTY, HIP, TOTAL, ANTERIOR APPROACH (Right Hip)    Location: [facility] OR 03 / SURGERY [facility]    Surgeons: Jose G Bell M.D.      56yoF with PMHx of COPD, CAD, HTN, asthma    ETOH dependence, hx of withdrawal  Hx of cocaine abuse    Allergies to kiwi, latex, morphine, vicodin, banana  NPO  No AC  Covid neg        Relevant Problems   PULMONARY   (positive) COPD (chronic obstructive pulmonary disease) (HCC)      CARDIAC   (positive) Coronary artery calcification seen on CT scan   (positive) Primary hypertension       Physical Exam    Airway   Mallampati: II       Cardiovascular - normal exam     Dental - normal exam           Pulmonary - normal exam     Abdominal - normal exam     Neurological - normal exam                 Anesthesia Plan    ASA 3   ASA physical status 3 criteria: alcohol and/or substance dependence or abuse    Plan - general       Airway plan will be LMA          Induction: intravenous    Postoperative Plan: Postoperative administration of opioids is intended.    Pertinent diagnostic labs and testing reviewed    Informed Consent:    Anesthetic plan and risks discussed with patient.

## 2021-12-27 NOTE — ASSESSMENT & PLAN NOTE
Patient's blood pressure controlled at this time, holding home medication.  Patient requires pain medications for right hip.

## 2021-12-27 NOTE — H&P
Hospital Medicine History & Physical Note    Date of Service  12/27/2021    Primary Care Physician  Deborah Moctezuma M.D.    Consultants  orthopedics    Code Status  Full Code    Chief Complaint  Chief Complaint   Patient presents with   • Hip Pain     Pt fell about 4' while trying to fix her outside lights, PT denies pain other than in her right hip. 100 fent and 4 zofran given prior to transport        History of Presenting Illness  Yohana Brandon is a 56 y.o. female who presented 12/26/2021 with fall and right lower extremity pain.  PMH of alcohol abuse, dyslipidemia.  She was attempting to fix some lights that were blown off by the wind, slipped and fell about 4 feet landing on her right hip.  She denied hitting her head or LOC.  She continues to drink daily, last drink was today.  History of withdrawal, denies withdrawal seizures or DTs.    Imaging in the ED showed right femoral neck fracture.  EDP spoke with Ortho who will evaluate tomorrow.    I discussed the plan of care with patient, family and bedside RN.    Review of Systems  Review of Systems   Constitutional: Negative for chills and fever.   HENT: Negative for sore throat.    Respiratory: Negative for cough and shortness of breath.    Cardiovascular: Negative for chest pain.   Gastrointestinal: Negative for abdominal pain, diarrhea, nausea and vomiting.   Genitourinary: Negative for dysuria and urgency.   Musculoskeletal: Positive for falls and joint pain.   Neurological: Negative for dizziness and headaches.   All other systems reviewed and are negative.      Past Medical History   has a past medical history of Alcohol abuse, Alcohol abuse, ASTHMA, Elevated LFTs, Heart palpitations, Hypertension, and Psychiatric disorder.    Surgical History   has a past surgical history that includes appendectomy (1997); pr endometrial ablation, thermal (1999); pr remove tonsils/adenoids,<11 y/o (1973); pr anesth,surg breast reconstructive (1994); hemorrhoidectomy  (2011); anal sphincterotomy (2011); bunionectomy (); and pr  delivery+postpartum care.     Family History  family history includes Stroke in her mother.   Family history reviewed with patient. There is no family history that is pertinent to the chief complaint.     Social History   reports that she has never smoked. She has never used smokeless tobacco. She reports current alcohol use. She reports that she does not use drugs.    Allergies  Allergies   Allergen Reactions   • Kiwi Extract Swelling   • Latex Anaphylaxis   • Morphine Hives   • Vicodin [Hydrocodone-Acetaminophen] Rash     Generalized rash, puritis    • Banana Shortness of Breath   • Other Food Hives and Shortness of Breath     Tropical fruit         Medications  Prior to Admission Medications   Prescriptions Last Dose Informant Patient Reported? Taking?   ALPRAZolam (XANAX) 0.5 MG Tab  Patient Yes No   Sig: Take 0.5 mg by mouth 2 times a day as needed for Anxiety.   Cholecalciferol (VITAMIN D3 PO)  Patient Yes No   Sig: Take 1 Cap by mouth every day.   Nutritional Supplements (DHEA PO)  Patient Yes No   Sig: Take 2 Tabs by mouth every day.   Omega-3 Fatty Acids (FISH OIL) 1000 MG Cap capsule  Patient Yes No   Sig: Take 2,000 mg by mouth every day.   Potassium 99 MG Tab  Patient Yes No   Sig: Take 99 mg by mouth every day. Indications: OTC   Suvorexant (BELSOMRA) 15 MG Tab  Patient Yes No   Sig: Take 15 mg by mouth every bedtime.   folic acid (FOLVITE) 1 MG Tab   No No   Sig: Take 1 tablet by mouth every day.   magnesium oxide (MAG-OX) 400 MG Tab tablet   No No   Sig: Take 1 tablet by mouth every day.   multivitamin (THERAGRAN) Tab  Patient Yes No   Sig: Take 1 Tab by mouth every day.   rosuvastatin (CRESTOR) 20 MG Tab  Patient Yes No   Sig: Take 20 mg by mouth every evening.   telmisartan-hydrochlorothiazide (MICARDIS HCT) 80-25 MG per tablet  Patient Yes No   Sig: Take 1 tablet by mouth every day.   thiamine (THIAMINE) 100 MG  tablet   No No   Sig: Take 1 tablet by mouth every day.   thyroid (ARMOUR THYROID) 60 MG Tab  Patient Yes No   Sig: Take 60 mg by mouth every day.      Facility-Administered Medications: None       Physical Exam  Temp:  [36.8 °C (98.3 °F)] 36.8 °C (98.3 °F)  Pulse:  [73-85] 75  Resp:  [14] 14  BP: (111-124)/(69-79) 111/69  SpO2:  [87 %-96 %] 92 %  Blood Pressure: 121/79   Temperature: 36.8 °C (98.3 °F)   Pulse: 73   Respiration: 14   Pulse Oximetry: 96 %       Physical Exam  Constitutional:       Appearance: Normal appearance.   HENT:      Head: Normocephalic and atraumatic.      Mouth/Throat:      Mouth: Mucous membranes are moist.      Pharynx: No oropharyngeal exudate or posterior oropharyngeal erythema.   Eyes:      General: No scleral icterus.  Cardiovascular:      Rate and Rhythm: Normal rate and regular rhythm.      Pulses: Normal pulses.      Heart sounds: Normal heart sounds. No murmur heard.      Pulmonary:      Effort: Pulmonary effort is normal. No respiratory distress.      Breath sounds: Normal breath sounds.   Abdominal:      General: There is no distension.      Palpations: Abdomen is soft.      Tenderness: There is no abdominal tenderness.   Musculoskeletal:         General: No swelling.      Cervical back: Normal range of motion and neck supple.      Comments: RLE pain   Skin:     General: Skin is warm and dry.   Neurological:      General: No focal deficit present.      Mental Status: She is alert and oriented to person, place, and time.   Psychiatric:         Mood and Affect: Mood normal.         Laboratory:  Recent Labs     12/26/21 2340   WBC 9.5   RBC 3.92*   HEMOGLOBIN 12.4   HEMATOCRIT 37.0   MCV 94.4   MCH 31.6   MCHC 33.5*   RDW 40.7   PLATELETCT 216   MPV 10.8     Recent Labs     12/26/21 2340   SODIUM 139   POTASSIUM 3.9   CHLORIDE 106   CO2 25   GLUCOSE 100*   BUN 16   CREATININE 0.79   CALCIUM 8.7     Recent Labs     12/26/21 2340   ALTSGPT 62*   ASTSGOT 49*   ALKPHOSPHAT 84    TBILIRUBIN 0.2   GLUCOSE 100*         No results for input(s): NTPROBNP in the last 72 hours.      No results for input(s): TROPONINT in the last 72 hours.    Imaging:  DX-FEMUR-2+ RIGHT   Final Result      Right femoral neck fracture      DX-PELVIS-1 OR 2 VIEWS   Final Result      Right femoral neck fracture.      DX-CHEST-PORTABLE (1 VIEW)   Final Result      No evidence of acute cardiopulmonary process.          X-Ray:  I have personally reviewed the images and compared with prior images.    Assessment/Plan:  I anticipate this patient is appropriate for observation status at this time.    * Closed displaced fracture of right femoral neck (HCC)- (present on admission)  Assessment & Plan  Following a fall from about 4 feet  EDP spoke with Ortho who will evaluate patient tomorrow  N.p.o., pain management, PT, OT  Started on calcium and vitamin D.  Recommend outpatient work-up for osteoporosis    Alcohol withdrawal (HCC)- (present on admission)  Assessment & Plan  History of alcohol abuse, daily with history of withdrawals.  Denies withdrawal seizures or DTs  Not currently withdrawing but due to history of, and previous admissions for withdrawal we will start on CIWA protocol  Continue thiamine, folate, multivitamin  Counseled on cessation      VTE prophylaxis: pharmacologic prophylaxis contraindicated due to surgery

## 2021-12-27 NOTE — CONSULTS
Orthopedic consult note    Chief complaint: Right hip pain    HPI: 56-year-old female with past medical history of alcohol use disorder with withdrawals, psychiatric disorders, hypertension.  She was admitted overnight for right hip pain after a fall off a ladder while she was fixing [employer] lights.  She had a femoral neck fracture was admitted to the hospitalist and placed on CIWA protocol.  Is consulted to evaluate the hip fracture.  She currently has right hip pain.  Pain with any motion.    Past Medical History:   Diagnosis Date   • Alcohol abuse    • Alcohol abuse    • ASTHMA    • Elevated LFTs    • Heart palpitations     w/ alcohol withdrawal   • Hypertension    • Psychiatric disorder     alcohol dependance     Past Surgical History:   Procedure Laterality Date   • HEMORRHOIDECTOMY  2011    Performed by SONY MACDONALD at SURGERY [facility]S   • ANAL SPHINCTEROTOMY  2011    Performed by SONY MACDONALD at SURGERY [facility]   • PB ENDOMETRIAL ABLATION, THERMAL     • APPENDECTOMY     • PB ANESTH,SURG BREAST RECONSTRUCTIVE      bilateral    • BUNIONECTOMY      bilateral   • PB REMOVE TONSILS/ADENOIDS,<11 Y/O     • PB  DELIVERY+POSTPARTUM CARE      x2     Social History     Socioeconomic History   • Marital status:      Spouse name: Not on file   • Number of children: Not on file   • Years of education: Not on file   • Highest education level: Not on file   Occupational History   • Not on file   Tobacco Use   • Smoking status: Never Smoker   • Smokeless tobacco: Never Used   Vaping Use   • Vaping Use: Never used   Substance and Sexual Activity   • Alcohol use: Yes     Comment: every day    • Drug use: No   • Sexual activity: Not on file   Other Topics Concern   • Not on file   Social History Narrative   • Not on file     Social Determinants of Health     Financial Resource Strain:    • Difficulty of Paying Living Expenses: Not on file   Food Insecurity:    •  Worried About Running Out of Food in the Last Year: Not on file   • Ran Out of Food in the Last Year: Not on file   Transportation Needs:    • Lack of Transportation (Medical): Not on file   • Lack of Transportation (Non-Medical): Not on file   Physical Activity:    • Days of Exercise per Week: Not on file   • Minutes of Exercise per Session: Not on file   Stress:    • Feeling of Stress : Not on file   Social Connections:    • Frequency of Communication with Friends and Family: Not on file   • Frequency of Social Gatherings with Friends and Family: Not on file   • Attends Mandaeism Services: Not on file   • Active Member of Clubs or Organizations: Not on file   • Attends Club or Organization Meetings: Not on file   • Marital Status: Not on file   Intimate Partner Violence:    • Fear of Current or Ex-Partner: Not on file   • Emotionally Abused: Not on file   • Physically Abused: Not on file   • Sexually Abused: Not on file   Housing Stability:    • Unable to Pay for Housing in the Last Year: Not on file   • Number of Places Lived in the Last Year: Not on file   • Unstable Housing in the Last Year: Not on file       Review of systems negative otherwise noted in the HPI    Physical exam:  General she is in bed in mild distress due to the pain in her right hip pain  Right lower extremity  She is neurovascularly intact throughout the leg  Tenderness palpation over hip  Logroll deferred  No dislocations noted    Imagin views of the right hip personally reviewed demonstrate femoral neck fracture almost into the head with a very vertical fracture pattern    2 views of the right femur personally reviewed demonstrate right femoral neck fracture as well no distal fractures    Assessment/plan: 56-year-old female with right femoral neck fracture vertical fracture pattern was sent to the head    A long discussion with the patient and her  regarding exam findings imaging findings pathophysiology.  I discussed that  based on her age and the fracture pattern I do not think an ORIF would benefit her as it may not heal and may not give her a long-term solution to this.  Discussed the role of hemiarthroplasty versus total hip arthroplasty and after discussion risk benefits alternatives of this as well she was elected for total hip arthroplasty.  I did discuss risks which include but were not limited to bleeding scar infection pain stiffness demonstrating structures including nerves blood vessels muscle-tendon bone.  Discussed the potential for infection and the need for two-stage revision if this were to occur.  I also discussed that in particular in fractures people are less satisfied with total hip than that of osteoarthritis in the also with a slightly higher dislocation rate and as result I will prioritize hip stability over rule equal leg lengths which they understand as well.  After all this discussion she would like to proceed with surgery.

## 2021-12-27 NOTE — ED TRIAGE NOTES
"Chief Complaint   Patient presents with   • Hip Pain     Pt fell about 4' while trying to fix her outside lights, PT denies pain other than in her right hip. 100 fent and 4 zofran given prior to transport      /77   Pulse 85   Temp 36.8 °C (98.3 °F) (Temporal)   Resp 14   Ht 1.702 m (5' 7\")   Wt 68 kg (150 lb)   LMP 08/15/2011   SpO2 93%   BMI 23.49 kg/m²     Has this patient been vaccinated for COVID yes  If not, would they like to be vaccinated while in the ER if eligible?  no  Would the patient like to speak with the ERP about the possibility of receiving the COVID vaccine today before making a decision? no      Pt requiring oxygen due fent that was given prior to transfer by NEYDA.   "

## 2021-12-27 NOTE — ASSESSMENT & PLAN NOTE
History of alcohol abuse, daily with history of withdrawals.  Denies withdrawal seizures or DTs  Not currently withdrawing but due to history of, and previous admissions for withdrawal we will start on CIWA protocol  Continue thiamine, folate, multivitamin  Counseled on cessation

## 2021-12-27 NOTE — ASSESSMENT & PLAN NOTE
Following a fall from about 4 feet  EDP spoke with Ortho.  Orthopedic surgery placed order for patient surgery 5 PM.  N.p.o.  pain management  PT, OT for postop recommendations  Started on calcium and vitamin D.  Recommend outpatient work-up for osteoporosis

## 2021-12-27 NOTE — PROGRESS NOTES
[facility] Medicine Daily Progress Note    Date of Service  12/27/2021    Chief Complaint  Yohana Brandon is a 56 y.o. female admitted 12/26/2021 with right hip pain.    Hospital Course  56-year-old female with PMH alcohol use disorder, psychiatric disorder, hypertension, admitted on 12/26/2021 for ongoing hip pain.  Patient continues to drink, was trying to fix lites outside when fell off a ladder about 4 feet landing on right hip.  In the ED patient's x-ray showed right femoral neck fracture.  Ortho pending evaluation.    Interval Problem Update  Patient stated she had continued right hip pain.  Patient did admit to frequent alcohol use but stated her last drink was 33 days ago.  -Continue pain control, orthopedic surgery scheduled patient for 5 PM surgery.  Will need PT OT afterwards.    I have personally seen and examined the patient at bedside. I discussed the plan of care with patient, bedside RN, charge RN,  and pharmacy.    Consultants/Specialty  orthopedics    Code Status  Full Code    Disposition  Patient is not medically cleared for discharge.   Anticipate discharge to TBD.  I have placed the appropriate orders for post-discharge needs.    Review of Systems  Review of Systems   Constitutional: Negative for chills, fever and malaise/fatigue.   Respiratory: Negative for cough and shortness of breath.    Cardiovascular: Negative for chest pain and palpitations.   Gastrointestinal: Negative for abdominal pain, constipation, diarrhea, nausea and vomiting.   Musculoskeletal: Positive for joint pain (Right hip). Negative for back pain.   Neurological: Negative for dizziness and headaches.   All other systems reviewed and are negative.       Physical Exam  Temp:  [36.3 °C (97.3 °F)-36.8 °C (98.3 °F)] 36.3 °C (97.4 °F)  Pulse:  [70-85] 70  Resp:  [14-16] 16  BP: (111-133)/(9-79) 115/74  SpO2:  [87 %-96 %] 95 %    Physical Exam  Vitals and nursing note reviewed.   Constitutional:       General: She  is not in acute distress.     Appearance: Normal appearance. She is not ill-appearing.   HENT:      Head: Normocephalic and atraumatic.      Right Ear: External ear normal.      Left Ear: External ear normal.      Nose: No congestion or rhinorrhea.      Mouth/Throat:      Pharynx: No oropharyngeal exudate or posterior oropharyngeal erythema.   Eyes:      Extraocular Movements: Extraocular movements intact.      Conjunctiva/sclera: Conjunctivae normal.      Pupils: Pupils are equal, round, and reactive to light.   Cardiovascular:      Rate and Rhythm: Normal rate.      Pulses: Normal pulses.      Heart sounds: Normal heart sounds. No murmur heard.      Pulmonary:      Effort: Pulmonary effort is normal. No respiratory distress.      Breath sounds: Normal breath sounds. No wheezing.   Abdominal:      General: Abdomen is flat. Bowel sounds are normal. There is no distension.      Palpations: Abdomen is soft.      Tenderness: There is no abdominal tenderness.   Musculoskeletal:         General: Tenderness (Right hip) and signs of injury (Right hip) present. No swelling.      Cervical back: Normal range of motion and neck supple. No rigidity.      Right lower leg: No edema.      Left lower leg: No edema.   Skin:     General: Skin is warm.      Capillary Refill: Capillary refill takes less than 2 seconds.      Coloration: Skin is not jaundiced.      Findings: No erythema.   Neurological:      General: No focal deficit present.      Mental Status: She is alert and oriented to person, place, and time. Mental status is at baseline.      Sensory: No sensory deficit.      Motor: No weakness.      Comments: Right leg/foot no motor dysfunction   Psychiatric:         Mood and Affect: Mood normal.         Behavior: Behavior normal.         Thought Content: Thought content normal.         Judgment: Judgment normal.         Fluids    Intake/Output Summary (Last 24 hours) at 12/27/2021 1322  Last data filed at 12/27/2021 0600  Gross  per 24 hour   Intake 60 ml   Output --   Net 60 ml       Laboratory  Recent Labs     12/26/21  2340   WBC 9.5   RBC 3.92*   HEMOGLOBIN 12.4   HEMATOCRIT 37.0   MCV 94.4   MCH 31.6   MCHC 33.5*   RDW 40.7   PLATELETCT 216   MPV 10.8     Recent Labs     12/26/21  2340 12/27/21  0428   SODIUM 139 143   POTASSIUM 3.9 4.4   CHLORIDE 106 105   CO2 25 27   GLUCOSE 100* 97   BUN 16 17   CREATININE 0.79 0.90   CALCIUM 8.7 8.8                   Imaging  DX-FEMUR-2+ RIGHT   Final Result      Right femoral neck fracture      DX-PELVIS-1 OR 2 VIEWS   Final Result      Right femoral neck fracture.      DX-CHEST-PORTABLE (1 VIEW)   Final Result      No evidence of acute cardiopulmonary process.      DX-PORTABLE FLUOROSCOPY < 1 HOUR Is the patient pregnant? No    (Results Pending)   DX-HIP-UNILATERAL-WITH PELVIS-1 VIEW RIGHT    (Results Pending)        Assessment/Plan  * Closed displaced fracture of right femoral neck (HCC)- (present on admission)  Assessment & Plan  Following a fall from about 4 feet  EDP spoke with Ortho.  Orthopedic surgery placed order for patient surgery 5 PM.  N.p.o.  pain management  PT, OT for postop recommendations  Started on calcium and vitamin D.  Recommend outpatient work-up for osteoporosis    Hypomagnesemia- (present on admission)  Assessment & Plan  Patient magnesium 1.6.  Has been a chronic problem for patient.  Replacing via IV with detox bag.  Recheck labs in the morning.    Primary hypertension- (present on admission)  Assessment & Plan  Patient's blood pressure controlled at this time, holding home medication.  Patient requires pain medications for right hip.    Alcohol withdrawal (HCC)- (present on admission)  Assessment & Plan  History of alcohol abuse, daily with history of withdrawals.  Denies withdrawal seizures or DTs  Not currently withdrawing but due to history of, and previous admissions for withdrawal we will start on CIWA protocol  Continue thiamine, folate, multivitamin  Counseled  on cessation     VTE prophylaxis: SCDs/TEDs and pharmacologic prophylaxis contraindicated due to Pending possible surgery    I have performed a physical exam and reviewed and updated ROS and Plan today (12/27/2021). In review of yesterday's note (12/26/2021), there are no changes except as documented above.

## 2021-12-28 VITALS
OXYGEN SATURATION: 90 % | HEIGHT: 67 IN | SYSTOLIC BLOOD PRESSURE: 114 MMHG | HEART RATE: 103 BPM | TEMPERATURE: 97.4 F | DIASTOLIC BLOOD PRESSURE: 67 MMHG | BODY MASS INDEX: 25.43 KG/M2 | WEIGHT: 162.04 LBS | RESPIRATION RATE: 16 BRPM

## 2021-12-28 LAB
ALBUMIN SERPL BCP-MCNC: 3.4 G/DL (ref 3.2–4.9)
ALBUMIN/GLOB SERPL: 1.3 G/DL
ALP SERPL-CCNC: 73 U/L (ref 30–99)
ALT SERPL-CCNC: 44 U/L (ref 2–50)
ANION GAP SERPL CALC-SCNC: 11 MMOL/L (ref 7–16)
AST SERPL-CCNC: 30 U/L (ref 12–45)
BILIRUB SERPL-MCNC: 0.2 MG/DL (ref 0.1–1.5)
BUN SERPL-MCNC: 15 MG/DL (ref 8–22)
CALCIUM SERPL-MCNC: 8.9 MG/DL (ref 8.4–10.2)
CHLORIDE SERPL-SCNC: 102 MMOL/L (ref 96–112)
CO2 SERPL-SCNC: 27 MMOL/L (ref 20–33)
CREAT SERPL-MCNC: 0.94 MG/DL (ref 0.5–1.4)
GLOBULIN SER CALC-MCNC: 2.6 G/DL (ref 1.9–3.5)
GLUCOSE SERPL-MCNC: 145 MG/DL (ref 65–99)
MAGNESIUM SERPL-MCNC: 1.6 MG/DL (ref 1.5–2.5)
PHOSPHATE SERPL-MCNC: 4.2 MG/DL (ref 2.5–4.5)
POTASSIUM SERPL-SCNC: 4.7 MMOL/L (ref 3.6–5.5)
PROT SERPL-MCNC: 6 G/DL (ref 6–8.2)
SODIUM SERPL-SCNC: 140 MMOL/L (ref 135–145)

## 2021-12-28 PROCEDURE — 97161 PT EVAL LOW COMPLEX 20 MIN: CPT

## 2021-12-28 PROCEDURE — 700102 HCHG RX REV CODE 250 W/ 637 OVERRIDE(OP): Performed by: STUDENT IN AN ORGANIZED HEALTH CARE EDUCATION/TRAINING PROGRAM

## 2021-12-28 PROCEDURE — A9270 NON-COVERED ITEM OR SERVICE: HCPCS | Performed by: INTERNAL MEDICINE

## 2021-12-28 PROCEDURE — 99239 HOSP IP/OBS DSCHRG MGMT >30: CPT | Performed by: INTERNAL MEDICINE

## 2021-12-28 PROCEDURE — 97165 OT EVAL LOW COMPLEX 30 MIN: CPT

## 2021-12-28 PROCEDURE — A9270 NON-COVERED ITEM OR SERVICE: HCPCS | Performed by: STUDENT IN AN ORGANIZED HEALTH CARE EDUCATION/TRAINING PROGRAM

## 2021-12-28 PROCEDURE — 80053 COMPREHEN METABOLIC PANEL: CPT

## 2021-12-28 PROCEDURE — 700102 HCHG RX REV CODE 250 W/ 637 OVERRIDE(OP): Performed by: INTERNAL MEDICINE

## 2021-12-28 PROCEDURE — 84100 ASSAY OF PHOSPHORUS: CPT

## 2021-12-28 PROCEDURE — 83735 ASSAY OF MAGNESIUM: CPT

## 2021-12-28 RX ORDER — AZITHROMYCIN 250 MG/1
250-500 TABLET, FILM COATED ORAL DAILY
COMMUNITY
End: 2022-02-09

## 2021-12-28 RX ORDER — CLONIDINE HYDROCHLORIDE 0.1 MG/1
0.1 TABLET ORAL 2 TIMES DAILY PRN
COMMUNITY
End: 2023-06-15

## 2021-12-28 RX ORDER — CELECOXIB 200 MG/1
200 CAPSULE ORAL 2 TIMES DAILY
Qty: 60 CAPSULE | Refills: 1 | Status: SHIPPED | OUTPATIENT
Start: 2021-12-28 | End: 2023-06-15

## 2021-12-28 RX ORDER — ASPIRIN 81 MG/1
81 TABLET ORAL 2 TIMES DAILY
Qty: 28 TABLET | Refills: 0 | COMMUNITY
Start: 2021-12-28 | End: 2023-06-15

## 2021-12-28 RX ORDER — CELECOXIB 200 MG/1
200 CAPSULE ORAL 2 TIMES DAILY PRN
Status: DISCONTINUED | OUTPATIENT
Start: 2022-01-02 | End: 2021-12-28 | Stop reason: HOSPADM

## 2021-12-28 RX ORDER — OXYCODONE HYDROCHLORIDE 5 MG/1
5-10 TABLET ORAL
Qty: 30 TABLET | Refills: 0 | Status: SHIPPED | OUTPATIENT
Start: 2021-12-28 | End: 2022-01-04

## 2021-12-28 RX ORDER — CELECOXIB 200 MG/1
200 CAPSULE ORAL 2 TIMES DAILY
Status: DISCONTINUED | OUTPATIENT
Start: 2021-12-28 | End: 2021-12-28 | Stop reason: HOSPADM

## 2021-12-28 RX ADMIN — CELECOXIB 200 MG: 200 CAPSULE ORAL at 09:32

## 2021-12-28 RX ADMIN — OXYCODONE HYDROCHLORIDE 10 MG: 10 TABLET ORAL at 01:32

## 2021-12-28 RX ADMIN — MULTIPLE VITAMINS W/ MINERALS TAB 1 TABLET: TAB at 05:08

## 2021-12-28 RX ADMIN — FOLIC ACID 1 MG: 1 TABLET ORAL at 05:08

## 2021-12-28 RX ADMIN — OXYCODONE HYDROCHLORIDE 10 MG: 10 TABLET ORAL at 15:10

## 2021-12-28 RX ADMIN — OXYCODONE HYDROCHLORIDE 10 MG: 10 TABLET ORAL at 05:08

## 2021-12-28 RX ADMIN — SENNOSIDES AND DOCUSATE SODIUM 2 TABLET: 8.6; 5 TABLET ORAL at 05:07

## 2021-12-28 RX ADMIN — THIAMINE HCL TAB 100 MG 100 MG: 100 TAB at 05:08

## 2021-12-28 RX ADMIN — ASPIRIN 81 MG: 81 TABLET, COATED ORAL at 09:31

## 2021-12-28 RX ADMIN — OXYCODONE HYDROCHLORIDE 10 MG: 10 TABLET ORAL at 09:38

## 2021-12-28 RX ADMIN — THYROID, PORCINE 60 MG: 30 TABLET ORAL at 05:08

## 2021-12-28 RX ADMIN — Medication 2000 UNITS: at 05:08

## 2021-12-28 ASSESSMENT — LIFESTYLE VARIABLES
TREMOR: NO TREMOR
AGITATION: NORMAL ACTIVITY
ANXIETY: NO ANXIETY (AT EASE)
ORIENTATION AND CLOUDING OF SENSORIUM: ORIENTED AND CAN DO SERIAL ADDITIONS
HEADACHE, FULLNESS IN HEAD: MILD
AUDITORY DISTURBANCES: NOT PRESENT
NAUSEA AND VOMITING: NO NAUSEA AND NO VOMITING
TOTAL SCORE: 2
PAROXYSMAL SWEATS: NO SWEAT VISIBLE
PAROXYSMAL SWEATS: NO SWEAT VISIBLE
AGITATION: NORMAL ACTIVITY
AUDITORY DISTURBANCES: NOT PRESENT
TOTAL SCORE: 0
ORIENTATION AND CLOUDING OF SENSORIUM: ORIENTED AND CAN DO SERIAL ADDITIONS
HEADACHE, FULLNESS IN HEAD: NOT PRESENT
TREMOR: NO TREMOR
NAUSEA AND VOMITING: NO NAUSEA AND NO VOMITING
ANXIETY: NO ANXIETY (AT EASE)
VISUAL DISTURBANCES: NOT PRESENT
VISUAL DISTURBANCES: NOT PRESENT

## 2021-12-28 ASSESSMENT — COGNITIVE AND FUNCTIONAL STATUS - GENERAL
MOBILITY SCORE: 19
CLIMB 3 TO 5 STEPS WITH RAILING: A LITTLE
TOILETING: A LITTLE
DRESSING REGULAR LOWER BODY CLOTHING: A LITTLE
SUGGESTED CMS G CODE MODIFIER DAILY ACTIVITY: CJ
STANDING UP FROM CHAIR USING ARMS: A LITTLE
HELP NEEDED FOR BATHING: A LITTLE
PERSONAL GROOMING: A LITTLE
WALKING IN HOSPITAL ROOM: A LITTLE
DAILY ACTIVITIY SCORE: 20
MOVING TO AND FROM BED TO CHAIR: A LITTLE
SUGGESTED CMS G CODE MODIFIER MOBILITY: CK
TURNING FROM BACK TO SIDE WHILE IN FLAT BAD: A LITTLE

## 2021-12-28 ASSESSMENT — GAIT ASSESSMENTS
GAIT LEVEL OF ASSIST: SUPERVISED
DEVIATION: BRADYKINETIC
DISTANCE (FEET): 125
ASSISTIVE DEVICE: FRONT WHEEL WALKER
DISTANCE (FEET): 5

## 2021-12-28 ASSESSMENT — ACTIVITIES OF DAILY LIVING (ADL): TOILETING: INDEPENDENT

## 2021-12-28 ASSESSMENT — PAIN DESCRIPTION - PAIN TYPE: TYPE: SURGICAL PAIN

## 2021-12-28 NOTE — ANESTHESIA TIME REPORT
Anesthesia Start and Stop Event Times     Date Time Event    12/27/2021 1647 Ready for Procedure     1721 Anesthesia Start     1944 Anesthesia Stop        Responsible Staff  12/27/21    Name Role Begin End    Kolton Mak M.D. Anesth 1721 1944        Preop Diagnosis (Free Text):  Pre-op Diagnosis     right femoral neck fracture vertical fracture pattern         Preop Diagnosis (Codes):    Premium Reason  A. 3PM - 7AM    Comments:

## 2021-12-28 NOTE — PROGRESS NOTES
Orthopedic progress note    CC: POD #1 s/p R JANEE for femoral neck fracture    S: DOing well, MInimal pain.    O:  Dressing C/D/I  Normal post op swelling over thigh  NVI distal    A/P: 55 yo F s/p the above doing well    Ok for DC from my perspective  WBAT  Anterior hip precautions  Cont 81mg ASA BID x 4 weeks  Call to follow up in my clinic in 2 weeks

## 2021-12-28 NOTE — THERAPY
Physical Therapy   Initial Evaluation     Patient Name: Yohana Brandon  Age:  56 y.o., Sex:  female  Medical Record #: [id number]  Today's Date: 12/28/2021     Precautions  Precautions: (P) Anterior Hip Precautions;Weight Bearing As Tolerated Right Lower Extremity    Assessment  Patient is 56 y.o. female with a diagnosis of fall off a ladder at home sustained a R femoral Nk fx. Pt is s/p Ant hip replacement. Hx ETOH, psychiatric disorder HTN. Pt doing very well, good pain control. junior hua, no LOB good strength overall. Pt does piliates and is active prior to admit.   Anticipate Pt will do well and return home with spouse to assist. Pt will need FWW for home.   Plan    Recommend Physical Therapy daily for 3 visits for the following treatments:  Bed Mobility, Gait Training, Self Care/Home Evaluation, Therapeutic Activities and Therapeutic Exercises    DC Equipment Recommendations: (P) Front-Wheel Walker  Discharge Recommendations: (P) Anticipate that the patient will have no further physical therapy needs after discharge from the hospital        12/28/21 1306   Total Time Spent   Total Time Spent (Mins) 38   Charge Group   PT Evaluation PT Evaluation Low   Initial Contact Note    Initial Contact Note Order Received and Verified, Physical Therapy Evaluation in Progress with Full Report to Follow.   Precautions   Precautions Anterior Hip Precautions;Weight Bearing As Tolerated Right Lower Extremity   Vitals   O2 Delivery Device None - Room Air   Pain 0 - 10 Group   Location Hip   Location Orientation Right   Therapist Pain Assessment Nurse Notified;Post Activity Pain Same as Prior to Activity  (per med prior to tx)   Prior Living Situation   Prior Services Home-Independent;None   Housing / Facility 1 Story House   Steps Into Home 0   Steps In Home 0   Lives with - Patient's Self Care Capacity Spouse   Prior Level of Functional Mobility   Bed Mobility Independent   Transfer Status Independent   Ambulation  Independent   Assistive Devices Used None   History of Falls   History of Falls Yes   Date of Last Fall 12/26/21  (admit)   Cognition    Cognition / Consciousness WDL   Passive ROM Lower Body   Passive ROM Lower Body X   Comments R LE within ant hip prec   Active ROM Lower Body    Active ROM Lower Body  X   Comments as above   Strength Lower Body   Lower Body Strength  X   Comments RLE (-) SLR, 4/5 quad. Pt able to get BLE's off and on bed with SBA   Sensation Lower Body   Lower Extremity Sensation   WDL   Lower Body Muscle Tone   Lower Body Muscle Tone  WDL   Coordination Lower Body    Coordination Lower Body  WDL   Vision   Vision Comments denies   Balance Assessment   Sitting Balance (Static) Fair +   Sitting Balance (Dynamic) Fair   Standing Balance (Static) Fair   Standing Balance (Dynamic) Fair   Weight Shift Sitting Good   Weight Shift Standing Fair   Comments with FWW no LOB   Gait Analysis   Gait Level Of Assist Supervised   Assistive Device Front Wheel Walker   Distance (Feet) 125   # of Times Distance was Traveled 1   Deviation Bradykinetic   # of Stairs Climbed 0   Weight Bearing Status WBAT   Bed Mobility    Supine to Sit Supervised   Sit to Supine Standby Assist   Scooting Supervised   Rolling Supervised   Functional Mobility   Sit to Stand Supervised   Bed, Chair, Wheelchair Transfer Supervised   How much difficulty does the patient currently have...   Turning over in bed (including adjusting bedclothes, sheets and blankets)? 3   Sitting down on and standing up from a chair with arms (e.g., wheelchair, bedside commode, etc.) 4   Moving from lying on back to sitting on the side of the bed? 3   How much help from another person does the patient currently need...   Moving to and from a bed to a chair (including a wheelchair)? 3   Need to walk in a hospital room? 3   Climbing 3-5 steps with a railing? 3   6 clicks Mobility Score 19   Activity Tolerance   Sitting Edge of Bed 10 mins   Standing 10 mins    Edema / Skin Assessment   Edema / Skin  Not Assessed   Short Term Goals    Short Term Goal # 1 in 2-3 visits pt will perform bed mob indep flat bed and no rail   Short Term Goal # 2 in 2-3 visits pt will transfer surfaces  with FWW and mod indep inorder to return home indep   Short Term Goal # 3 in 2-3 visits pt will amb using FWW x 200 feet with mod indep inorder to return home indep   Short Term Goal # 4 in 2-3 visits pt will be indep wlith ant hip prec's and HEP   Education Group   Education Provided Role of Physical Therapist;Exercises - Supine;Weight Bearing Status;Hip Precautions Anterior   Hip Precautions Anterior Patient Response Patient;Acceptance;Explanation;Verbal Demonstration   Role of Physical Therapist Patient Response Patient;Acceptance;Explanation;Verbal Demonstration   Exercises - Supine Patient Response Patient;Acceptance;Explanation;Action Demonstration   Weight Bearing Status Patient Response Patient;Acceptance;Explanation;Verbal Demonstration   Problem List    Problems Impaired Ambulation;Impaired Transfers;Impaired Bed Mobility;Pain;Limited Knowledge of Post-Op Precautions;Decreased Activity Tolerance   Anticipated Discharge Equipment and Recommendations   DC Equipment Recommendations Front-Wheel Walker   Discharge Recommendations Anticipate that the patient will have no further physical therapy needs after discharge from the hospital   Interdisciplinary Plan of Care Collaboration   IDT Collaboration with  Nursing   Patient Position at End of Therapy Tray Table within Reach;Call Light within Reach   Session Information   Date / Session Number  12/28/21-1 ( 1/3, 1/3)

## 2021-12-28 NOTE — DISCHARGE PLANNING
Received Choice form at [phone]  Agency/Facility Name: [facility]  Referral sent per Choice form @ [phone]

## 2021-12-28 NOTE — CARE PLAN
The patient is Stable - Low risk of patient condition declining or worsening    Shift Goals  Clinical Goals: ciwa manage pain  Patient Goals: rest    Progress made toward(s) clinical / shift goals:  yes      Patient is not progressing towards the following goals:

## 2021-12-28 NOTE — THERAPY
"Occupational Therapy   Initial Evaluation     Patient Name: Yohana Brandon  Age:  56 y.o., Sex:  female  Medical Record #: [id number]  Today's Date: 12/28/2021     Precautions  Precautions: Anterior Hip Precautions,Weight Bearing As Tolerated Right Lower Extremity    Assessment  Patient is 56 y.o. female with a diagnosis of fall from ladder while trying to fix [employer] lights.  Landed on Q pit, sustained R hip fracture.  Pt with h/o ETOH and psych disorder.  Pt is s/p Anterior JANEE to repair fracture.  Pt tolerating bed mobility, short walk in room with FWW, and simple ADL's at this time.  She verbalized understanding of Anterior hip precautions and will have assist from spouse as needed at home.   No further OT needs in this setting, appears she will ok for home with assist/supervision from spouse.     Plan    Recommend Occupational Therapy for Evaluation only     DC Equipment Recommendations: Other (Comments) (reacher if needed)  Discharge Recommendations: Anticipate that the patient will have no further occupational therapy needs after discharge from the hospital     Subjective    \"I pretty much know how to do all this.\"     Objective       12/28/21 1519   Prior Living Situation   Prior Services Home-Independent   Housing / Facility 1 Story House   Steps Into Home 0   Steps In Home 0   Bathroom Set up Walk In Shower;Shower Chair  (built in seat in shower)   Equipment Owned Tub / Shower Seat;Raised Toilet Seat Without Arms  (LH shoehorn)   Lives with - Patient's Self Care Capacity Spouse   Comments Spouse home and able to assist   Prior Level of ADL Function   Self Feeding Independent   Grooming / Hygiene Independent   Bathing Independent   Dressing Independent   Toileting Independent   Prior Level of IADL Function   Medication Management Independent   Laundry Independent   Kitchen Mobility Independent   Finances Independent   Home Management Independent   Shopping Independent   Prior Level Of Mobility " Independent Without Device in Community   Driving / Transportation Driving Independent   Occupation (Pre-Hospital Vocational) Retired Due To Age;Other (Comments)  (Retired Occupational Therapist, 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  Needs cues to slow down for safety   Active ROM Upper Body   Active ROM Upper Body  WDL   Dominant Hand Right   Strength Upper Body   Upper Body Strength  WDL   Balance Assessment   Sitting Balance (Static) Fair +   Sitting Balance (Dynamic) Fair +   Standing Balance (Static) Fair +   Standing Balance (Dynamic) Fair   Weight Shift Sitting Good   Weight Shift Standing Fair   Comments with FWW at bedside, no LOB   Bed Mobility    Supine to Sit Supervised   Sit to Supine Supervised   Scooting Modified Independent   ADL Assessment   Bathing   (educated)   Upper Body Dressing Modified Independent   Lower Body Dressing Contact Guard Assist  (for safety while hiking shorts)   Toileting   (reports supervised with nursing and PT)   Functional Mobility   Sit to Stand Supervised   Bed, Chair, Wheelchair Transfer Supervised   Toilet Transfers   (pt reports supervised, and has a taller toilet at home)   Mobility steps at bedside for dressing   Distance (Feet) 5   # of Times Distance was Traveled 2   Visual Perception   Visual Perception  WDL   Edema / Skin Assessment   Comments surgical dressing CDI   Activity Tolerance   Sitting Edge of Bed 15 min   Standing 3 min x2   Patient  / Family Goals   Patient / Family Goal #1 home with spouse   Education Group   Education Provided Hip Precautions;Home Safety;Role of Occupational Therapist;Activities of Daily Living;Adaptive Equipment   Role of Occupational Therapist Patient Response Patient;Acceptance;Explanation;Verbal Demonstration   Hip Precautions Patient Response Patient;Acceptance;Explanation;Verbal Demonstration   Home Safety Patient Response Patient;Acceptance;Explanation;Verbal Demonstration   ADL Patient Response Patient;Acceptance;Explanation;Verbal Demonstration   Adaptive Equipment Patient Response Patient;Acceptance;Explanation;Verbal Demonstration   Additional Comments Pt familiar with AE for LB dressing, will get reacher if needed.  Educ on showering when feeling stable on feet and more energetic, advised sponge bath for the next couple days

## 2021-12-28 NOTE — PROGRESS NOTES
Med Rec completed per patient and home pharmacy ([facility])   Allergies reviewed    Patient completed a Z-Tanner a few days ago

## 2021-12-28 NOTE — ANESTHESIA POSTPROCEDURE EVALUATION
Patient: Yohana Brandon    Procedure Summary     Date: 12/27/21 Room / Location:  OR  / SURGERY [facility]    Anesthesia Start: 1721 Anesthesia Stop: 1944    Procedure: ARTHROPLASTY, HIP, TOTAL, ANTERIOR APPROACH (Right Hip) Diagnosis: (right femoral neck fracture vertical fracture pattern )    Surgeons: Jose G Bell M.D. Responsible Provider: Kolton Mak M.D.    Anesthesia Type: general ASA Status: 3          Final Anesthesia Type: general  Last vitals  BP   Blood Pressure: 120/80    Temp   36.8 °C (98.2 °F)    Pulse   98   Resp   16    SpO2   93 %      Anesthesia Post Evaluation    Patient location during evaluation: PACU  Patient participation: complete - patient participated  Level of consciousness: awake and alert  Pain score: 0    Airway patency: patent  Anesthetic complications: no  Cardiovascular status: adequate  Respiratory status: acceptable  Hydration status: acceptable    PONV: none          No complications documented.     Nurse Pain Score: 0 (NPRS)

## 2021-12-28 NOTE — ANESTHESIA PROCEDURE NOTES
Airway    Date/Time: 12/27/2021 5:28 PM  Performed by: Kolton Mak M.D.  Authorized by: Kolton Mak M.D.     Location:  OR  Urgency:  Elective  Indications for Airway Management:  Anesthesia      Spontaneous Ventilation: absent    Sedation Level:  Deep  Preoxygenated: Yes    Mask Difficulty Assessment:  1 - vent by mask  Final Airway Type:  Supraglottic airway  Final Supraglottic Airway:  Standard LMA    SGA Size:  4  Number of Attempts at Approach:  1

## 2021-12-28 NOTE — OR NURSING
1942 To PACU from OR via bed. Pt sleeping, respirations spontaneous and non-labored via OPA. Surgical dressing to right hip. Toes to the affected extremity are pink and warm, brisk cap refill observed, pedal pulse palpable.    1957 No changes.    1958 Pt rouses spontaneously, OPA removed.     2000 X ray performed.     2012 Pt sleeping.    2018 Pt's  updated.     2027 Pt has no complaints.     2029 Report to med/tele MILLER Mejia.

## 2021-12-28 NOTE — CARE PLAN
The patient is Stable - Low risk of patient condition declining or worsening    Shift Goals  Clinical Goals: CIWAs, Manage pain, Assessment  Patient Goals: Sleep and Pain control    Progress made toward(s) clinical / shift goals:  Patient being scored on CIWA protocol, Pain management regiment In effect.     Patient is not progressing towards the following goals:      Problem: Knowledge Deficit - Standard  Goal: Patient and family/care givers will demonstrate understanding of plan of care, disease process/condition, diagnostic tests and medications  Outcome: Progressing     Problem: Optimal Care for Alcohol Withdrawal  Goal: Optimal Care for the alcohol withdrawal patient  Outcome: Progressing

## 2021-12-28 NOTE — DISCHARGE PLANNING
Anticipated Discharge Disposition: Home with HH and DME- FWW     Action: HH order and F2F and DME order for FWW in place. LSW met with pt at bedside to discuss HH CHOICE and DME CHOICE.     Pt provided a signature for DME CHOICE for FWW to get delivered to pt's bedside.   Pt provided a signature for HH CHOICE for [facility]. Only agency contracted with pt's insurance.     Pt's spouse available for transportation upon d/c.     Barriers to Discharge: None     Plan: LSW to continue to assist as needed     Addendum 1454  LSW faxed HH CHOICE and DME CHOICE to Makayla [facility].     Bedside RNRosmery aware that pt is okay to get a walker delivered from onsite storage. Rosmery also informed that HH referral has been placed.

## 2021-12-28 NOTE — FACE TO FACE
Face to Face Supporting Documentation - Home Health    The encounter with this patient was in whole or in part the primary reason for home health admission.    Date of encounter:   Patient:                    MRN:                       YOB: 2019  Yohana Brandon  [id number]  1965     Home health to see patient for:  Skilled Nursing care for assessment, interventions & education, Physical Therapy evaluation and treatment and Occupational therapy evaluation and treatment    Skilled need for:  Surgical Aftercare right hip fracture    Skilled nursing interventions to include:  Comment: .    Homebound status evidenced by:  Needs the assistance of another person in order to leave the home. Leaving home requires a considerable and taxing effort. There is a normal inability to leave the home.    Community Physician to provide follow up care: Deborah Moctezuma M.D.     Optional Interventions? No      I certify the face to face encounter for this home health care referral meets the CMS requirements and the encounter/clinical assessment with the patient was, in whole, or in part, for the medical condition(s) listed above, which is the primary reason for home health care. Based on my clinical findings: the service(s) are medically necessary, support the need for home health care, and the homebound criteria are met.  I certify that this patient has had a face to face encounter by myself.  Elvie Sarmiento D.O. - NPI: [id number]

## 2021-12-28 NOTE — OP REPORT
Pre-operative Dx: Right displaced femoral neck fracture  Post-operative Dx: same  Procedure:  Right total hip arthroplasty  Surgeon:  Dr. Jose G Bell MD  Assistants: None   Anesthesia: Kolton Mak M.D..  General anesthetic  EBL:  150 mL  Drains:  None  Complications:  None    Findings: Displaced femoral neck fracture almost in the femoral head on the superior surface.  Excellent press-fit of the acetabular and femoral components.  Leg lengths were equal when checked with intraoperative fluoroscopy as well as palpation at the knees.  Soft tissue tension and stability were appropriate.    Implants:  Depuy Stetson size 52 cup with a neutral polyethylene liner.  The stem was a size 3 high offset Actis cementless stem with a 36+ 8.5 ceramichead.    Indications: Yohana Brandon is a 56 y.o. female who has had a fall off a 4 foot ladder yesterday.  She was taken to the emergency room and found to have a displaced right femoral neck fracture.  She is also known to have psychiatric issues as well as alcohol withdrawals and ultimately was admitted to medicine for CIWA protocol.  We had a discussion about open reduction internal fixation versus arthroplasty including manolo and total.  After discussion she was ultimately indicated for a hip replacement.  We discussed the risk of bleeding, transfusion, pain, neurovascular injury, leg length discrepancy, dislocation, fracture, infection, wound complication, and medical complication.  We also talked about the decreased satisfaction rate and also high rate of dislocation when total hip was done for fracture versus osteoarthritis.  No guarantees were made.  She was in agreement and elected to proceed.    Description of Procedure:     She was identified in the preoperative holding area and informed consent and site marking were confirmed. she was then brought to the operating room. Anesthesia was administered. SHe was positioned supine on the operative table with  appropriate padding of the extremities. Sterile prepping and draping of the surgical field was completed. I performed a pre timeout to confirm we had the correct patient, side, site, presence of necessary personal, and equipment.  I confirmed that pre-operative antibiotics were administered including Ancef as well as tranexamic acid.    A direct anterior approach to the hip was completed. Anterior capsulotomy was completed for exposure of the hip joint.  The fracture was identified and a freshening neck cut was performed and the femoral head was extracted from the acetabulum. Circumferential exposure of the acetabulum was completed. Labrectomy was completed.  Acetabular reaming was completed under direct visualization. A hemispherical press fit component was inserted using fluoroscopic navigation for alignment and depth.  An acetabular liner was locked in place and checked to ensure proper locking.    Attention was then focused to the femur. The operative leg was placed in extension, external rotation, and adduction. A posterior capsulotomy was completed to assist with femoral mobilization. The femur was then sequentially broached under direct visualization to the desired broach size. The hip was reduced and evaluated using fluoroscopy for component alignment, component sizing, leg length, offset, external rotation to at 45 degrees to 80 degrees to assess stability make sure there was no posterior impingement or anterior subluxation. The hip was dislocated with traction and external rotation. Femoral exposure was repeated. The broach was removed and the final femoral stem was implanted. The final femoral head was placed on a clean and dry trunnion. The hip was reduced. Final fluoroscopic images were completed.    Irrigation of the operative field was followed by layered closure. The capsule was closed with 1 Vicryl and fascia were closed with a  Quill suture.  Skin closure was completed with subcuticular monocryl  with an overlying occlusive silver dressing. The patient tolerated the procedure well and was taken to the recovery room in stable condition. All counts were correct.      Postoperative plan:  WBAT, DVT ppx for 4 weeks including aspirin 81mg BID , PT/OT eval.  Followup in 2 weeks in clinic.

## 2021-12-28 NOTE — PROGRESS NOTES
Patient updated on plan of care in detail. CHG wipe completed. Patient tolerated well. Report given to Keila at pre op. Patient transported to pre op.

## 2021-12-28 NOTE — OR SURGEON
Immediate Post OP Note    PreOp Diagnosis: Displaced right femoral neck fracture      PostOp Diagnosis: Same      Procedure(s):  ARTHROPLASTY, HIP, TOTAL, ANTERIOR APPROACH - Wound Class: Clean    Surgeon(s):  Jose G Bell M.D.    Anesthesiologist/Type of Anesthesia:  Anesthesiologist: Kolton Mak M.D./General    Surgical Staff:  Circulator: Anthony Garrison R.N.  Limb Tesfaye: Saurabh Oates; Carlos Burrows  Relief Circulator: Fili Deluca R.N.  Relief Scrub: Caden Gaona  Scrub Person: Salome Nava  Radiology Technologist: Bettye Mccoy    Specimens removed if any:  * No specimens in log *    Estimated Blood Loss: 150 cc    Findings: Displaced femoral neck fracture    Complications: None        12/27/2021 7:51 PM Jose G Bell M.D.

## 2021-12-28 NOTE — DISCHARGE INSTRUCTIONS
Discharge Instructions    Discharged to home by car with relative. Discharged via wheelchair, hospital escort: Yes.  Special equipment needed: Walker    Be sure to schedule a follow-up appointment with your primary care doctor or any specialists as instructed.     Discharge Plan:   Diet Plan: Discussed  Activity Level: Discussed  Confirmed Follow up Appointment: Patient to Call and Schedule Appointment  Confirmed Symptoms Management: Discussed  Medication Reconciliation Updated: Yes  Influenza Vaccine Indication: Not indicated: Previously immunized this influenza season and > 8 years of age    I understand that a diet low in cholesterol, fat, and sodium is recommended for good health. Unless I have been given specific instructions below for another diet, I accept this instruction as my diet prescription.   Other diet: regular    Special Instructions: Discharge instructions for the Orthopedic Patient    Follow up with Primary Care Physician within 2 weeks of discharge to home, regarding:  Review of medications and diagnostic testing.  Surveillance for medical complications.  Workup and treatment of osteoporosis, if appropriate.     -Is this a Hip/Knee/Shoulder Joint Replacement patient? No      -Is this patient being discharged with medication to prevent blood clots?  Yes, Aspirin Aspirin, ASA oral tablets  What is this medicine?  ASPIRIN (AS pir in) is a pain reliever. It is used to treat mild pain and fever. This medicine is also used as directed by a doctor to prevent and to treat heart attacks, to prevent strokes and blood clots, and to treat arthritis or inflammation.  This medicine may be used for other purposes; ask your health care provider or pharmacist if you have questions.  COMMON BRAND NAME(S): Aspir-Low, Aspir-Seema, Aspirtab, Alice Advanced Aspirin, Alice Aspirin, Alice Aspirin Extra Strength, Alice Aspirin Plus, Alice Extra Strength, Alice Extra Strength Plus, Alice Genuine Aspirin, [place] Womens  Aspirin, Bufferin, Bufferin Extra Strength, Bufferin Low Dose  What should I tell my health care provider before I take this medicine?  They need to know if you have any of these conditions:  · anemia  · asthma  · bleeding problems  · child with chickenpox, the flu, or other viral infection  · diabetes  · gout  · if you frequently drink alcohol containing drinks  · kidney disease  · liver disease  · low level of vitamin K  · lupus  · smoke tobacco  · stomach ulcers or other problems  · an unusual or allergic reaction to aspirin, tartrazine dye, other medicines, dyes, or preservatives  · pregnant or trying to get pregnant  · breast-feeding  How should I use this medicine?  Take this medicine by mouth with a glass of water. Follow the directions on the package or prescription label. You can take this medicine with or without food. If it upsets your stomach, take it with food. Do not take your medicine more often than directed.  Talk to your pediatrician regarding the use of this medicine in children. While this drug may be prescribed for children as young as 12 years of age for selected conditions, precautions do apply. Children and teenagers should not use this medicine to treat chicken pox or flu symptoms unless directed by a doctor.  Patients over 65 years old may have a stronger reaction and need a smaller dose.  Overdosage: If you think you have taken too much of this medicine contact a poison control center or emergency room at once.  NOTE: This medicine is only for you. Do not share this medicine with others.  What if I miss a dose?  If you are taking this medicine on a regular schedule and miss a dose, take it as soon as you can. If it is almost time for your next dose, take only that dose. Do not take double or extra doses.  What may interact with this medicine?  Do not take this medicine with any of the following medications:  · cidofovir  · ketorolac  · probenecid  This medicine may also interact with the  following medications:  · alcohol  · alendronate  · bismuth subsalicylate  · flavocoxid  · herbal supplements like feverfew, garlic, leonardo, ginkgo biloba, horse chestnut  · medicines for diabetes or glaucoma like acetazolamide, methazolamide  · medicines for gout  · medicines that treat or prevent blood clots like enoxaparin, heparin, ticlopidine, warfarin  · other aspirin and aspirin-like medicines  · NSAIDs, medicines for pain and inflammation, like ibuprofen or naproxen  · pemetrexed  · sulfinpyrazone  · varicella live vaccine  This list may not describe all possible interactions. Give your health care provider a list of all the medicines, herbs, non-prescription drugs, or dietary supplements you use. Also tell them if you smoke, drink alcohol, or use illegal drugs. Some items may interact with your medicine.  What should I watch for while using this medicine?  If you are treating yourself for pain, tell your doctor or health care professional if the pain lasts more than 10 days, if it gets worse, or if there is a new or different kind of pain. Tell your doctor if you see redness or swelling. Also, check with your doctor if you have a fever that lasts for more than 3 days. Only take this medicine to prevent heart attacks or blood clotting if prescribed by your doctor or health care professional.  Do not take aspirin or aspirin-like medicines with this medicine. Too much aspirin can be dangerous. Always read the labels carefully.  This medicine can irritate your stomach or cause bleeding problems. Do not smoke cigarettes or drink alcohol while taking this medicine. Do not lie down for 30 minutes after taking this medicine to prevent irritation to your throat.  If you are scheduled for any medical or dental procedure, tell your healthcare provider that you are taking this medicine. You may need to stop taking this medicine before the procedure.  This medicine may be used to treat migraines. If you take migraine  medicines for 10 or more days a month, your migraines may get worse. Keep a diary of headache days and medicine use. Contact your healthcare professional if your migraine attacks occur more frequently.  What side effects may I notice from receiving this medicine?  Side effects that you should report to your doctor or health care professional as soon as possible:  · allergic reactions like skin rash, itching or hives, swelling of the face, lips, or tongue  · breathing problems  · changes in hearing, ringing in the ears  · confusion  · general ill feeling or flu-like symptoms  · pain on swallowing  · redness, blistering, peeling or loosening of the skin, including inside the mouth or nose  · signs and symptoms of bleeding such as bloody or black, tarry stools; red or dark-brown urine; spitting up blood or brown material that looks like coffee grounds; red spots on the skin; unusual bruising or bleeding from the eye, gums, or nose  · trouble passing urine or change in the amount of urine  · unusually weak or tired  · yellowing of the eyes or skin  Side effects that usually do not require medical attention (report to your doctor or health care professional if they continue or are bothersome):  · diarrhea or constipation  · headache  · nausea, vomiting  · stomach gas, heartburn  This list may not describe all possible side effects. Call your doctor for medical advice about side effects. You may report side effects to FDA at 4-357-FDA-2287.  Where should I keep my medicine?  Keep out of the reach of children.  Store at room temperature between 15 and 30 degrees C (59 and 86 degrees F). Protect from heat and moisture. Do not use this medicine if it has a strong vinegar smell. Throw away any unused medicine after the expiration date.  NOTE: This sheet is a summary. It may not cover all possible information. If you have questions about this medicine, talk to your doctor, pharmacist, or health care provider.  © 2020  [employer] (2018-01-30 10:42:13)      · Is patient discharged on Warfarin / Coumadin?   No     Depression / Suicide Risk    As you are discharged from this [facility] facility, it is important to learn how to keep safe from harming yourself.    Recognize the warning signs:  · Abrupt changes in personality, positive or negative- including increase in energy   · Giving away possessions  · Change in eating patterns- significant weight changes-  positive or negative  · Change in sleeping patterns- unable to sleep or sleeping all the time   · Unwillingness or inability to communicate  · Depression  · Unusual sadness, discouragement and loneliness  · Talk of wanting to die  · Neglect of personal appearance   · Rebelliousness- reckless behavior  · Withdrawal from people/activities they love  · Confusion- inability to concentrate     If you or a loved one observes any of these behaviors or has concerns about self-harm, here's what you can do:  · Talk about it- your feelings and reasons for harming yourself  · Remove any means that you might use to hurt yourself (examples: pills, rope, extension cords, firearm)  · Get professional help from the community (Mental Health, Substance Abuse, psychological counseling)  · Do not be alone:Call your Safe Contact- someone whom you trust who will be there for you.  · Call your local CRISIS HOTLINE 769-4404 or 520-903-6860  · Call your local Children's Mobile Crisis Response Team [place] (891) 638-6768 or www.Cerecor  · Call the toll free National Suicide Prevention Hotlines   · National Suicide Prevention Lifeline 032-241-NDAC (8847)  · National Hope Line Network 800-SUICIDE (783-8608)

## 2021-12-28 NOTE — DISCHARGE SUMMARY
Discharge Summary    CHIEF COMPLAINT ON ADMISSION  Chief Complaint   Patient presents with   • Hip Pain     Pt fell about 4' while trying to fix her outside lights, PT denies pain other than in her right hip. 100 fent and 4 zofran given prior to transport        Reason for Admission  EMS     Admission Date  12/26/2021    CODE STATUS  Full Code    HPI & HOSPITAL COURSE  56-year-old female with PMH alcohol use disorder, psychiatric disorder, hypertension, admitted on 12/26/2021 for ongoing hip pain.  Patient continues to drink, was trying to fix lites outside when fell off a ladder about 4 feet landing on right hip.  In the ED patient's x-ray showed right femoral neck fracture.  Ortho consult obtained with Dr Bell and she underwent ORIF right JANEE for femoral neck fracture.  She is ambulating with FWW well and appropriate for discharge home with the help of her  and therapies with home health.      Therefore, she is discharged in good and stable condition to home with close outpatient follow-up.    The patient met 2-midnight criteria for an inpatient stay at the time of discharge.    Discharge Date  12/28/2021    FOLLOW UP ITEMS POST DISCHARGE  PCP and ortho    DISCHARGE DIAGNOSES  Principal Problem:    Closed displaced fracture of right femoral neck (HCC) POA: Yes  Active Problems:    Alcohol withdrawal (HCC) POA: Yes    Primary hypertension (Chronic) POA: Yes    Hypomagnesemia POA: Yes  Resolved Problems:    * No resolved hospital problems. *      FOLLOW UP  No future appointments.  Jose G Bell M.D.  [id number] [facility] [address]  [place] [address]  [phone]    In 2 weeks      Deborah Moctezuma M.D.  [address] [place] [id number] [phone]    In 2 weeks        MEDICATIONS ON DISCHARGE     Medication List      START taking these medications      Instructions   aspirin 81 MG EC tablet   Take 1 Tablet by mouth 2 times a day.  Dose: 81 mg     celecoxib 200 MG Caps  Commonly known as: CELEBREX    Take 1 Capsule by mouth 2 times a day.  Dose: 200 mg     oxyCODONE immediate-release 5 MG Tabs  Commonly known as: ROXICODONE   Take 1-2 Tablets by mouth every 3 hours as needed for up to 7 days.  Dose: 5-10 mg        CONTINUE taking these medications      Instructions   ALPRAZolam 0.5 MG Tabs  Commonly known as: XANAX   Take 0.5 mg by mouth 2 times a day as needed for Anxiety.  Dose: 0.5 mg     [place] Thyroid 60 MG Tabs  Generic drug: thyroid   Take 60 mg by mouth every day.  Dose: 60 mg     azithromycin 250 MG Tabs  Commonly known as: ZITHROMAX   Take 250-500 mg by mouth every day. Z-Tanner  Dose: 250-500 mg     cloNIDine 0.1 MG Tabs  Commonly known as: CATAPRES   Take 0.1 mg by mouth 2 times a day as needed. Indications: High Blood Pressure Disorder  Dose: 0.1 mg     COENZYME Q10 PO   Take 1 Tablet by mouth every day.  Dose: 1 Tablet     fish oil 1000 MG Caps capsule   Take 2,000 mg by mouth every day.  Dose: 2,000 mg     magnesium oxide 400 MG Tabs tablet  Commonly known as: MAG-OX   Take 1 tablet by mouth every day.  Dose: 400 mg     Micardis HCT 80-25 MG per tablet  Generic drug: telmisartan-hydrochlorothiazide   Take 1 tablet by mouth every day.  Dose: 1 Tablet     multivitamin Tabs   Take 1 Tab by mouth every day.  Dose: 1 Tablet     Potassium 99 MG Tabs   Take 99 mg by mouth every day. Indications: OTC  Dose: 99 mg     rosuvastatin 10 MG Tabs  Commonly known as: CRESTOR   Take 10 mg by mouth every evening.  Dose: 10 mg     VITAMIN D3 PO   Take 1 Cap by mouth every day.  Dose: 1 Capsule            Allergies  Allergies   Allergen Reactions   • Kiwi Extract Swelling   • Latex Anaphylaxis   • Morphine Hives   • Vicodin [Hydrocodone-Acetaminophen] Rash     Generalized rash, puritis    • Banana Shortness of Breath   • Other Food Hives and Shortness of Breath     Tropical fruit         DIET  Orders Placed This Encounter   Procedures   • Diet Order Diet: Regular     Standing Status:   Standing     Number of  Occurrences:   1     Order Specific Question:   Diet:     Answer:   Regular [1]       ACTIVITY  As tolerated.  Weight bearing as tolerated    CONSULTATIONS  Jose Gjo ann Trujillos - ortho    PROCEDURES  12/27/2021 - Bell - Right JANEE for femoral neck fracture.    LABORATORY  Lab Results   Component Value Date    SODIUM 140 12/28/2021    POTASSIUM 4.7 12/28/2021    CHLORIDE 102 12/28/2021    CO2 27 12/28/2021    GLUCOSE 145 (H) 12/28/2021    BUN 15 12/28/2021    CREATININE 0.94 12/28/2021        Lab Results   Component Value Date    WBC 9.5 12/26/2021    HEMOGLOBIN 12.4 12/26/2021    HEMATOCRIT 37.0 12/26/2021    PLATELETCT 216 12/26/2021        Total time of the discharge process exceeds 35 minutes.

## 2021-12-29 NOTE — PROGRESS NOTES
Pt discharged home in stable condition via wheelchair. Stated understanding if discharge instructions and meds.  Pt.  left with a walker for home

## 2022-02-09 ENCOUNTER — APPOINTMENT (OUTPATIENT)
Dept: RADIOLOGY | Facility: MEDICAL CENTER | Age: 57
DRG: 561 | End: 2022-02-09
Attending: STUDENT IN AN ORGANIZED HEALTH CARE EDUCATION/TRAINING PROGRAM
Payer: COMMERCIAL

## 2022-02-09 ENCOUNTER — HOSPITAL ENCOUNTER (INPATIENT)
Facility: MEDICAL CENTER | Age: 57
LOS: 1 days | DRG: 561 | End: 2022-02-10
Attending: EMERGENCY MEDICINE | Admitting: INTERNAL MEDICINE
Payer: COMMERCIAL

## 2022-02-09 ENCOUNTER — ANESTHESIA (OUTPATIENT)
Dept: SURGERY | Facility: MEDICAL CENTER | Age: 57
DRG: 561 | End: 2022-02-09
Payer: COMMERCIAL

## 2022-02-09 ENCOUNTER — APPOINTMENT (OUTPATIENT)
Dept: RADIOLOGY | Facility: MEDICAL CENTER | Age: 57
DRG: 561 | End: 2022-02-09
Attending: EMERGENCY MEDICINE
Payer: COMMERCIAL

## 2022-02-09 ENCOUNTER — ANESTHESIA EVENT (OUTPATIENT)
Dept: SURGERY | Facility: MEDICAL CENTER | Age: 57
DRG: 561 | End: 2022-02-09
Payer: COMMERCIAL

## 2022-02-09 DIAGNOSIS — S73.004A HIP DISLOCATION, RIGHT, INITIAL ENCOUNTER (HCC): ICD-10-CM

## 2022-02-09 DIAGNOSIS — S73.004A DISLOCATION OF RIGHT HIP, INITIAL ENCOUNTER (HCC): ICD-10-CM

## 2022-02-09 PROBLEM — T84.020A DISLOCATION OF INTERNAL RIGHT HIP PROSTHESIS (HCC): Status: ACTIVE | Noted: 2022-02-09

## 2022-02-09 LAB
ANION GAP SERPL CALC-SCNC: 6 MMOL/L (ref 7–16)
BASOPHILS # BLD AUTO: 0.4 % (ref 0–1.8)
BASOPHILS # BLD: 0.03 K/UL (ref 0–0.12)
BUN SERPL-MCNC: 17 MG/DL (ref 8–22)
CALCIUM SERPL-MCNC: 8.6 MG/DL (ref 8.4–10.2)
CHLORIDE SERPL-SCNC: 113 MMOL/L (ref 96–112)
CO2 SERPL-SCNC: 24 MMOL/L (ref 20–33)
CREAT SERPL-MCNC: 0.66 MG/DL (ref 0.5–1.4)
EOSINOPHIL # BLD AUTO: 0.04 K/UL (ref 0–0.51)
EOSINOPHIL NFR BLD: 0.6 % (ref 0–6.9)
ERYTHROCYTE [DISTWIDTH] IN BLOOD BY AUTOMATED COUNT: 42.2 FL (ref 35.9–50)
GLUCOSE SERPL-MCNC: 113 MG/DL (ref 65–99)
HCT VFR BLD AUTO: 35.6 % (ref 37–47)
HGB BLD-MCNC: 11.4 G/DL (ref 12–16)
IMM GRANULOCYTES # BLD AUTO: 0.03 K/UL (ref 0–0.11)
IMM GRANULOCYTES NFR BLD AUTO: 0.4 % (ref 0–0.9)
LYMPHOCYTES # BLD AUTO: 0.62 K/UL (ref 1–4.8)
LYMPHOCYTES NFR BLD: 9 % (ref 22–41)
MCH RBC QN AUTO: 30.7 PG (ref 27–33)
MCHC RBC AUTO-ENTMCNC: 32 G/DL (ref 33.6–35)
MCV RBC AUTO: 96 FL (ref 81.4–97.8)
MONOCYTES # BLD AUTO: 0.28 K/UL (ref 0–0.85)
MONOCYTES NFR BLD AUTO: 4.1 % (ref 0–13.4)
NEUTROPHILS # BLD AUTO: 5.86 K/UL (ref 2–7.15)
NEUTROPHILS NFR BLD: 85.5 % (ref 44–72)
NRBC # BLD AUTO: 0 K/UL
NRBC BLD-RTO: 0 /100 WBC
PLATELET # BLD AUTO: 256 K/UL (ref 164–446)
PMV BLD AUTO: 10.2 FL (ref 9–12.9)
POTASSIUM SERPL-SCNC: 3.9 MMOL/L (ref 3.6–5.5)
RBC # BLD AUTO: 3.71 M/UL (ref 4.2–5.4)
SARS-COV+SARS-COV-2 AG RESP QL IA.RAPID: NOTDETECTED
SODIUM SERPL-SCNC: 143 MMOL/L (ref 135–145)
SPECIMEN SOURCE: NORMAL
WBC # BLD AUTO: 6.9 K/UL (ref 4.8–10.8)

## 2022-02-09 PROCEDURE — 96374 THER/PROPH/DIAG INJ IV PUSH: CPT

## 2022-02-09 PROCEDURE — 770006 HCHG ROOM/CARE - MED/SURG/GYN SEMI*

## 2022-02-09 PROCEDURE — A9270 NON-COVERED ITEM OR SERVICE: HCPCS | Performed by: ANESTHESIOLOGY

## 2022-02-09 PROCEDURE — 700102 HCHG RX REV CODE 250 W/ 637 OVERRIDE(OP): Performed by: ANESTHESIOLOGY

## 2022-02-09 PROCEDURE — 80048 BASIC METABOLIC PNL TOTAL CA: CPT

## 2022-02-09 PROCEDURE — 73502 X-RAY EXAM HIP UNI 2-3 VIEWS: CPT | Mod: RT

## 2022-02-09 PROCEDURE — 160002 HCHG RECOVERY MINUTES (STAT): Performed by: STUDENT IN AN ORGANIZED HEALTH CARE EDUCATION/TRAINING PROGRAM

## 2022-02-09 PROCEDURE — 99222 1ST HOSP IP/OBS MODERATE 55: CPT | Performed by: INTERNAL MEDICINE

## 2022-02-09 PROCEDURE — 160026 HCHG SURGERY MINUTES - 1ST 30 MINS LEVEL 1: Performed by: STUDENT IN AN ORGANIZED HEALTH CARE EDUCATION/TRAINING PROGRAM

## 2022-02-09 PROCEDURE — 502576 HCHG PACK, HAND: Performed by: STUDENT IN AN ORGANIZED HEALTH CARE EDUCATION/TRAINING PROGRAM

## 2022-02-09 PROCEDURE — 85025 COMPLETE CBC W/AUTO DIFF WBC: CPT

## 2022-02-09 PROCEDURE — 94799 UNLISTED PULMONARY SVC/PX: CPT

## 2022-02-09 PROCEDURE — 160048 HCHG OR STATISTICAL LEVEL 1-5: Performed by: STUDENT IN AN ORGANIZED HEALTH CARE EDUCATION/TRAINING PROGRAM

## 2022-02-09 PROCEDURE — 700102 HCHG RX REV CODE 250 W/ 637 OVERRIDE(OP): Performed by: INTERNAL MEDICINE

## 2022-02-09 PROCEDURE — A6454 SELF-ADHER BAND W>=3" <5"/YD: HCPCS | Performed by: STUDENT IN AN ORGANIZED HEALTH CARE EDUCATION/TRAINING PROGRAM

## 2022-02-09 PROCEDURE — 96375 TX/PRO/DX INJ NEW DRUG ADDON: CPT

## 2022-02-09 PROCEDURE — 500881 HCHG PACK, EXTREMITY: Performed by: STUDENT IN AN ORGANIZED HEALTH CARE EDUCATION/TRAINING PROGRAM

## 2022-02-09 PROCEDURE — 700102 HCHG RX REV CODE 250 W/ 637 OVERRIDE(OP): Performed by: STUDENT IN AN ORGANIZED HEALTH CARE EDUCATION/TRAINING PROGRAM

## 2022-02-09 PROCEDURE — 700111 HCHG RX REV CODE 636 W/ 250 OVERRIDE (IP): Performed by: ANESTHESIOLOGY

## 2022-02-09 PROCEDURE — 700101 HCHG RX REV CODE 250: Performed by: EMERGENCY MEDICINE

## 2022-02-09 PROCEDURE — 96376 TX/PRO/DX INJ SAME DRUG ADON: CPT

## 2022-02-09 PROCEDURE — 99291 CRITICAL CARE FIRST HOUR: CPT

## 2022-02-09 PROCEDURE — 72170 X-RAY EXAM OF PELVIS: CPT

## 2022-02-09 PROCEDURE — 700105 HCHG RX REV CODE 258: Performed by: INTERNAL MEDICINE

## 2022-02-09 PROCEDURE — 94760 N-INVAS EAR/PLS OXIMETRY 1: CPT

## 2022-02-09 PROCEDURE — 160035 HCHG PACU - 1ST 60 MINS PHASE I: Performed by: STUDENT IN AN ORGANIZED HEALTH CARE EDUCATION/TRAINING PROGRAM

## 2022-02-09 PROCEDURE — 700105 HCHG RX REV CODE 258: Performed by: STUDENT IN AN ORGANIZED HEALTH CARE EDUCATION/TRAINING PROGRAM

## 2022-02-09 PROCEDURE — 0SW9XJZ REVISION OF SYNTHETIC SUBSTITUTE IN RIGHT HIP JOINT, EXTERNAL APPROACH: ICD-10-PCS | Performed by: STUDENT IN AN ORGANIZED HEALTH CARE EDUCATION/TRAINING PROGRAM

## 2022-02-09 PROCEDURE — 700101 HCHG RX REV CODE 250: Performed by: ANESTHESIOLOGY

## 2022-02-09 PROCEDURE — A9270 NON-COVERED ITEM OR SERVICE: HCPCS | Performed by: STUDENT IN AN ORGANIZED HEALTH CARE EDUCATION/TRAINING PROGRAM

## 2022-02-09 PROCEDURE — 87426 SARSCOV CORONAVIRUS AG IA: CPT

## 2022-02-09 PROCEDURE — 27265 TREAT HIP DISLOCATION: CPT

## 2022-02-09 PROCEDURE — 700111 HCHG RX REV CODE 636 W/ 250 OVERRIDE (IP): Performed by: EMERGENCY MEDICINE

## 2022-02-09 PROCEDURE — 160009 HCHG ANES TIME/MIN: Performed by: STUDENT IN AN ORGANIZED HEALTH CARE EDUCATION/TRAINING PROGRAM

## 2022-02-09 PROCEDURE — A9270 NON-COVERED ITEM OR SERVICE: HCPCS | Performed by: INTERNAL MEDICINE

## 2022-02-09 PROCEDURE — 700111 HCHG RX REV CODE 636 W/ 250 OVERRIDE (IP): Performed by: INTERNAL MEDICINE

## 2022-02-09 RX ORDER — SODIUM CHLORIDE, SODIUM LACTATE, POTASSIUM CHLORIDE, CALCIUM CHLORIDE 600; 310; 30; 20 MG/100ML; MG/100ML; MG/100ML; MG/100ML
INJECTION, SOLUTION INTRAVENOUS CONTINUOUS
Status: DISCONTINUED | OUTPATIENT
Start: 2022-02-09 | End: 2022-02-09 | Stop reason: HOSPADM

## 2022-02-09 RX ORDER — HYDROMORPHONE HYDROCHLORIDE 1 MG/ML
1-2 INJECTION, SOLUTION INTRAMUSCULAR; INTRAVENOUS; SUBCUTANEOUS
Status: DISCONTINUED | OUTPATIENT
Start: 2022-02-09 | End: 2022-02-10 | Stop reason: HOSPADM

## 2022-02-09 RX ORDER — KETOROLAC TROMETHAMINE 30 MG/ML
INJECTION, SOLUTION INTRAMUSCULAR; INTRAVENOUS PRN
Status: DISCONTINUED | OUTPATIENT
Start: 2022-02-09 | End: 2022-02-09 | Stop reason: SURG

## 2022-02-09 RX ORDER — LIDOCAINE HYDROCHLORIDE 20 MG/ML
INJECTION, SOLUTION EPIDURAL; INFILTRATION; INTRACAUDAL; PERINEURAL PRN
Status: DISCONTINUED | OUTPATIENT
Start: 2022-02-09 | End: 2022-02-09 | Stop reason: SURG

## 2022-02-09 RX ORDER — ACETAMINOPHEN 500 MG
1000 TABLET ORAL ONCE
Status: COMPLETED | OUTPATIENT
Start: 2022-02-09 | End: 2022-02-09

## 2022-02-09 RX ORDER — TELMISARTAN 40 MG/1
80 TABLET ORAL
Status: DISCONTINUED | OUTPATIENT
Start: 2022-02-10 | End: 2022-02-10 | Stop reason: HOSPADM

## 2022-02-09 RX ORDER — OXYCODONE HCL 5 MG/5 ML
10 SOLUTION, ORAL ORAL
Status: COMPLETED | OUTPATIENT
Start: 2022-02-09 | End: 2022-02-09

## 2022-02-09 RX ORDER — ACETAMINOPHEN 500 MG
1000 TABLET ORAL EVERY 6 HOURS PRN
Status: DISCONTINUED | OUTPATIENT
Start: 2022-02-15 | End: 2022-02-10 | Stop reason: HOSPADM

## 2022-02-09 RX ORDER — ACETAMINOPHEN 500 MG
1000 TABLET ORAL EVERY 6 HOURS
Status: DISCONTINUED | OUTPATIENT
Start: 2022-02-10 | End: 2022-02-10 | Stop reason: HOSPADM

## 2022-02-09 RX ORDER — CELECOXIB 200 MG/1
200 CAPSULE ORAL 2 TIMES DAILY PRN
Status: DISCONTINUED | OUTPATIENT
Start: 2022-02-14 | End: 2022-02-10 | Stop reason: HOSPADM

## 2022-02-09 RX ORDER — ROCURONIUM BROMIDE 10 MG/ML
INJECTION, SOLUTION INTRAVENOUS PRN
Status: DISCONTINUED | OUTPATIENT
Start: 2022-02-09 | End: 2022-02-09 | Stop reason: SURG

## 2022-02-09 RX ORDER — KETOROLAC TROMETHAMINE 30 MG/ML
15 INJECTION, SOLUTION INTRAMUSCULAR; INTRAVENOUS ONCE
Status: DISCONTINUED | OUTPATIENT
Start: 2022-02-09 | End: 2022-02-09

## 2022-02-09 RX ORDER — ALPRAZOLAM 0.5 MG/1
0.5 TABLET ORAL 2 TIMES DAILY PRN
Status: DISCONTINUED | OUTPATIENT
Start: 2022-02-09 | End: 2022-02-10 | Stop reason: HOSPADM

## 2022-02-09 RX ORDER — ACETAMINOPHEN 325 MG/1
650 TABLET ORAL EVERY 6 HOURS PRN
Status: DISCONTINUED | OUTPATIENT
Start: 2022-02-09 | End: 2022-02-09

## 2022-02-09 RX ORDER — THYROID 30 MG/1
60 TABLET ORAL DAILY
Status: DISCONTINUED | OUTPATIENT
Start: 2022-02-09 | End: 2022-02-10 | Stop reason: HOSPADM

## 2022-02-09 RX ORDER — OXYCODONE HYDROCHLORIDE 5 MG/1
5 TABLET ORAL
Status: DISCONTINUED | OUTPATIENT
Start: 2022-02-09 | End: 2022-02-09

## 2022-02-09 RX ORDER — PROMETHAZINE HYDROCHLORIDE 25 MG/1
12.5-25 SUPPOSITORY RECTAL EVERY 4 HOURS PRN
Status: DISCONTINUED | OUTPATIENT
Start: 2022-02-09 | End: 2022-02-10 | Stop reason: HOSPADM

## 2022-02-09 RX ORDER — HYDROMORPHONE HYDROCHLORIDE 1 MG/ML
1 INJECTION, SOLUTION INTRAMUSCULAR; INTRAVENOUS; SUBCUTANEOUS
Status: DISCONTINUED | OUTPATIENT
Start: 2022-02-09 | End: 2022-02-09

## 2022-02-09 RX ORDER — ONDANSETRON 2 MG/ML
INJECTION INTRAMUSCULAR; INTRAVENOUS PRN
Status: DISCONTINUED | OUTPATIENT
Start: 2022-02-09 | End: 2022-02-09 | Stop reason: SURG

## 2022-02-09 RX ORDER — ONDANSETRON 2 MG/ML
4 INJECTION INTRAMUSCULAR; INTRAVENOUS EVERY 4 HOURS PRN
Status: DISCONTINUED | OUTPATIENT
Start: 2022-02-09 | End: 2022-02-10 | Stop reason: HOSPADM

## 2022-02-09 RX ORDER — ONDANSETRON 2 MG/ML
4 INJECTION INTRAMUSCULAR; INTRAVENOUS ONCE
Status: COMPLETED | OUTPATIENT
Start: 2022-02-09 | End: 2022-02-09

## 2022-02-09 RX ORDER — HALOPERIDOL 5 MG/ML
1 INJECTION INTRAMUSCULAR
Status: DISCONTINUED | OUTPATIENT
Start: 2022-02-09 | End: 2022-02-09 | Stop reason: HOSPADM

## 2022-02-09 RX ORDER — AMOXICILLIN 250 MG
2 CAPSULE ORAL 2 TIMES DAILY
Status: DISCONTINUED | OUTPATIENT
Start: 2022-02-09 | End: 2022-02-10 | Stop reason: HOSPADM

## 2022-02-09 RX ORDER — KETAMINE HYDROCHLORIDE 50 MG/ML
INJECTION, SOLUTION INTRAMUSCULAR; INTRAVENOUS
Status: COMPLETED | OUTPATIENT
Start: 2022-02-09 | End: 2022-02-09

## 2022-02-09 RX ORDER — ROSUVASTATIN CALCIUM 10 MG/1
10 TABLET, COATED ORAL EVERY EVENING
Status: DISCONTINUED | OUTPATIENT
Start: 2022-02-09 | End: 2022-02-10 | Stop reason: HOSPADM

## 2022-02-09 RX ORDER — DIPHENHYDRAMINE HYDROCHLORIDE 50 MG/ML
12.5 INJECTION INTRAMUSCULAR; INTRAVENOUS
Status: DISCONTINUED | OUTPATIENT
Start: 2022-02-09 | End: 2022-02-09 | Stop reason: HOSPADM

## 2022-02-09 RX ORDER — BISACODYL 10 MG
10 SUPPOSITORY, RECTAL RECTAL
Status: DISCONTINUED | OUTPATIENT
Start: 2022-02-09 | End: 2022-02-10 | Stop reason: HOSPADM

## 2022-02-09 RX ORDER — SODIUM CHLORIDE, SODIUM LACTATE, POTASSIUM CHLORIDE, CALCIUM CHLORIDE 600; 310; 30; 20 MG/100ML; MG/100ML; MG/100ML; MG/100ML
INJECTION, SOLUTION INTRAVENOUS CONTINUOUS
Status: ACTIVE | OUTPATIENT
Start: 2022-02-09 | End: 2022-02-09

## 2022-02-09 RX ORDER — ONDANSETRON 4 MG/1
4 TABLET, ORALLY DISINTEGRATING ORAL EVERY 4 HOURS PRN
Status: DISCONTINUED | OUTPATIENT
Start: 2022-02-09 | End: 2022-02-10 | Stop reason: HOSPADM

## 2022-02-09 RX ORDER — OXYCODONE HYDROCHLORIDE 5 MG/1
5 TABLET ORAL
Status: DISCONTINUED | OUTPATIENT
Start: 2022-02-09 | End: 2022-02-10 | Stop reason: HOSPADM

## 2022-02-09 RX ORDER — CYANOCOBALAMIN 1000 UG/ML
1000 INJECTION, SOLUTION INTRAMUSCULAR; SUBCUTANEOUS
COMMUNITY
Start: 2022-01-04 | End: 2023-01-19

## 2022-02-09 RX ORDER — SODIUM CHLORIDE 9 MG/ML
INJECTION, SOLUTION INTRAVENOUS CONTINUOUS
Status: DISCONTINUED | OUTPATIENT
Start: 2022-02-09 | End: 2022-02-10 | Stop reason: HOSPADM

## 2022-02-09 RX ORDER — MIDAZOLAM HYDROCHLORIDE 1 MG/ML
1 INJECTION INTRAMUSCULAR; INTRAVENOUS
Status: DISCONTINUED | OUTPATIENT
Start: 2022-02-09 | End: 2022-02-09 | Stop reason: HOSPADM

## 2022-02-09 RX ORDER — PROPOFOL 10 MG/ML
200 INJECTION, EMULSION INTRAVENOUS ONCE
Status: COMPLETED | OUTPATIENT
Start: 2022-02-09 | End: 2022-02-09

## 2022-02-09 RX ORDER — DEXAMETHASONE SODIUM PHOSPHATE 4 MG/ML
INJECTION, SOLUTION INTRA-ARTICULAR; INTRALESIONAL; INTRAMUSCULAR; INTRAVENOUS; SOFT TISSUE PRN
Status: DISCONTINUED | OUTPATIENT
Start: 2022-02-09 | End: 2022-02-09 | Stop reason: SURG

## 2022-02-09 RX ORDER — OXYCODONE HYDROCHLORIDE 10 MG/1
10 TABLET ORAL
Status: DISCONTINUED | OUTPATIENT
Start: 2022-02-09 | End: 2022-02-10 | Stop reason: HOSPADM

## 2022-02-09 RX ORDER — KETAMINE HYDROCHLORIDE 50 MG/ML
INJECTION, SOLUTION INTRAMUSCULAR; INTRAVENOUS
Status: DISPENSED
Start: 2022-02-09 | End: 2022-02-09

## 2022-02-09 RX ORDER — OXYCODONE HCL 5 MG/5 ML
5 SOLUTION, ORAL ORAL
Status: COMPLETED | OUTPATIENT
Start: 2022-02-09 | End: 2022-02-09

## 2022-02-09 RX ORDER — HYDROMORPHONE HYDROCHLORIDE 1 MG/ML
0.5 INJECTION, SOLUTION INTRAMUSCULAR; INTRAVENOUS; SUBCUTANEOUS
Status: DISCONTINUED | OUTPATIENT
Start: 2022-02-09 | End: 2022-02-09

## 2022-02-09 RX ORDER — POLYETHYLENE GLYCOL 3350 17 G/17G
1 POWDER, FOR SOLUTION ORAL
Status: DISCONTINUED | OUTPATIENT
Start: 2022-02-09 | End: 2022-02-10 | Stop reason: HOSPADM

## 2022-02-09 RX ORDER — MIDAZOLAM HYDROCHLORIDE 1 MG/ML
INJECTION INTRAMUSCULAR; INTRAVENOUS PRN
Status: DISCONTINUED | OUTPATIENT
Start: 2022-02-09 | End: 2022-02-09 | Stop reason: SURG

## 2022-02-09 RX ORDER — TELMISARTAN AND HYDROCHLORTHIAZIDE 80; 25 MG/1; MG/1
1 TABLET ORAL DAILY
Status: DISCONTINUED | OUTPATIENT
Start: 2022-02-09 | End: 2022-02-09

## 2022-02-09 RX ORDER — PROPOFOL 10 MG/ML
INJECTION, EMULSION INTRAVENOUS
Status: COMPLETED | OUTPATIENT
Start: 2022-02-09 | End: 2022-02-09

## 2022-02-09 RX ORDER — PROGESTERONE 100 MG/1
100 CAPSULE ORAL EVERY EVENING
COMMUNITY

## 2022-02-09 RX ORDER — PROCHLORPERAZINE EDISYLATE 5 MG/ML
5-10 INJECTION INTRAMUSCULAR; INTRAVENOUS EVERY 4 HOURS PRN
Status: DISCONTINUED | OUTPATIENT
Start: 2022-02-09 | End: 2022-02-10 | Stop reason: HOSPADM

## 2022-02-09 RX ORDER — OXYCODONE HYDROCHLORIDE 10 MG/1
10 TABLET ORAL
Status: DISCONTINUED | OUTPATIENT
Start: 2022-02-09 | End: 2022-02-09

## 2022-02-09 RX ORDER — CELECOXIB 200 MG/1
200 CAPSULE ORAL 2 TIMES DAILY
Status: DISCONTINUED | OUTPATIENT
Start: 2022-02-09 | End: 2022-02-10 | Stop reason: HOSPADM

## 2022-02-09 RX ORDER — HYDROMORPHONE HYDROCHLORIDE 1 MG/ML
1 INJECTION, SOLUTION INTRAMUSCULAR; INTRAVENOUS; SUBCUTANEOUS ONCE
Status: COMPLETED | OUTPATIENT
Start: 2022-02-09 | End: 2022-02-09

## 2022-02-09 RX ORDER — PROMETHAZINE HYDROCHLORIDE 25 MG/1
12.5-25 TABLET ORAL EVERY 4 HOURS PRN
Status: DISCONTINUED | OUTPATIENT
Start: 2022-02-09 | End: 2022-02-10 | Stop reason: HOSPADM

## 2022-02-09 RX ADMIN — KETOROLAC TROMETHAMINE 30 MG: 30 INJECTION, SOLUTION INTRAMUSCULAR at 19:16

## 2022-02-09 RX ADMIN — HYDROMORPHONE HYDROCHLORIDE 1 MG: 1 INJECTION, SOLUTION INTRAMUSCULAR; INTRAVENOUS; SUBCUTANEOUS at 08:13

## 2022-02-09 RX ADMIN — FENTANYL CITRATE 50 MCG: 50 INJECTION, SOLUTION INTRAMUSCULAR; INTRAVENOUS at 19:32

## 2022-02-09 RX ADMIN — OXYCODONE HYDROCHLORIDE 10 MG: 5 SOLUTION ORAL at 19:32

## 2022-02-09 RX ADMIN — SODIUM CHLORIDE: 9 INJECTION, SOLUTION INTRAVENOUS at 21:24

## 2022-02-09 RX ADMIN — ACETAMINOPHEN 1000 MG: 500 TABLET, FILM COATED ORAL at 19:32

## 2022-02-09 RX ADMIN — ONDANSETRON 4 MG: 2 INJECTION INTRAMUSCULAR; INTRAVENOUS at 18:56

## 2022-02-09 RX ADMIN — FENTANYL CITRATE 50 MCG: 50 INJECTION, SOLUTION INTRAMUSCULAR; INTRAVENOUS at 19:54

## 2022-02-09 RX ADMIN — HYDROMORPHONE HYDROCHLORIDE 1 MG: 1 INJECTION, SOLUTION INTRAMUSCULAR; INTRAVENOUS; SUBCUTANEOUS at 09:31

## 2022-02-09 RX ADMIN — PROPOFOL 150 MG: 10 INJECTION, EMULSION INTRAVENOUS at 18:56

## 2022-02-09 RX ADMIN — PROPOFOL 80 MG: 10 INJECTION, EMULSION INTRAVENOUS at 07:46

## 2022-02-09 RX ADMIN — KETAMINE HYDROCHLORIDE 36.75 MG: 50 INJECTION INTRAMUSCULAR; INTRAVENOUS at 07:55

## 2022-02-09 RX ADMIN — OXYCODONE HYDROCHLORIDE 10 MG: 10 TABLET ORAL at 21:19

## 2022-02-09 RX ADMIN — PROPOFOL 60 MG: 10 INJECTION, EMULSION INTRAVENOUS at 07:44

## 2022-02-09 RX ADMIN — OXYCODONE HYDROCHLORIDE 10 MG: 10 TABLET ORAL at 16:17

## 2022-02-09 RX ADMIN — HYDROMORPHONE HYDROCHLORIDE 1 MG: 1 INJECTION, SOLUTION INTRAMUSCULAR; INTRAVENOUS; SUBCUTANEOUS at 13:46

## 2022-02-09 RX ADMIN — MIDAZOLAM HYDROCHLORIDE 2 MG: 1 INJECTION, SOLUTION INTRAMUSCULAR; INTRAVENOUS at 18:56

## 2022-02-09 RX ADMIN — SUGAMMADEX 200 MG: 100 INJECTION, SOLUTION INTRAVENOUS at 19:12

## 2022-02-09 RX ADMIN — CELECOXIB 200 MG: 200 CAPSULE ORAL at 21:18

## 2022-02-09 RX ADMIN — SODIUM CHLORIDE: 9 INJECTION, SOLUTION INTRAVENOUS at 08:14

## 2022-02-09 RX ADMIN — ONDANSETRON 4 MG: 2 INJECTION INTRAMUSCULAR; INTRAVENOUS at 08:13

## 2022-02-09 RX ADMIN — SODIUM CHLORIDE, POTASSIUM CHLORIDE, SODIUM LACTATE AND CALCIUM CHLORIDE: 600; 310; 30; 20 INJECTION, SOLUTION INTRAVENOUS at 18:54

## 2022-02-09 RX ADMIN — FENTANYL CITRATE 100 MCG: 50 INJECTION, SOLUTION INTRAMUSCULAR; INTRAVENOUS at 18:56

## 2022-02-09 RX ADMIN — ROCURONIUM BROMIDE 30 MG: 10 INJECTION, SOLUTION INTRAVENOUS at 19:07

## 2022-02-09 RX ADMIN — PROPOFOL 40 MG: 10 INJECTION, EMULSION INTRAVENOUS at 07:45

## 2022-02-09 RX ADMIN — LIDOCAINE HYDROCHLORIDE 100 MG: 20 INJECTION, SOLUTION EPIDURAL; INFILTRATION; INTRACAUDAL; PERINEURAL at 18:56

## 2022-02-09 RX ADMIN — DEXAMETHASONE SODIUM PHOSPHATE 4 MG: 4 INJECTION, SOLUTION INTRAMUSCULAR; INTRAVENOUS at 18:56

## 2022-02-09 ASSESSMENT — ENCOUNTER SYMPTOMS
WEAKNESS: 0
INSOMNIA: 0
COUGH: 0
CHILLS: 0
SENSORY CHANGE: 0
PHOTOPHOBIA: 0
SPEECH CHANGE: 0
FEVER: 0
DIZZINESS: 0
MYALGIAS: 0
BLURRED VISION: 0
CONSTIPATION: 0
HEADACHES: 0
DEPRESSION: 0
SHORTNESS OF BREATH: 0
HEARTBURN: 0
DIARRHEA: 0
ABDOMINAL PAIN: 0
NERVOUS/ANXIOUS: 0
VOMITING: 0
CLAUDICATION: 0

## 2022-02-09 ASSESSMENT — LIFESTYLE VARIABLES
AVERAGE NUMBER OF DAYS PER WEEK YOU HAVE A DRINK CONTAINING ALCOHOL: 0
HAVE YOU EVER FELT YOU SHOULD CUT DOWN ON YOUR DRINKING: YES
TOTAL SCORE: 2
ALCOHOL_USE: NO
HAVE PEOPLE ANNOYED YOU BY CRITICIZING YOUR DRINKING: NO
TOTAL SCORE: 2
ON A TYPICAL DAY WHEN YOU DRINK ALCOHOL HOW MANY DRINKS DO YOU HAVE: 0
EVER HAD A DRINK FIRST THING IN THE MORNING TO STEADY YOUR NERVES TO GET RID OF A HANGOVER: NO
EVER FELT BAD OR GUILTY ABOUT YOUR DRINKING: YES
HOW MANY TIMES IN THE PAST YEAR HAVE YOU HAD 5 OR MORE DRINKS IN A DAY: 0
TOTAL SCORE: 2
CONSUMPTION TOTAL: POSITIVE

## 2022-02-09 ASSESSMENT — PATIENT HEALTH QUESTIONNAIRE - PHQ9
SUM OF ALL RESPONSES TO PHQ9 QUESTIONS 1 AND 2: 0
1. LITTLE INTEREST OR PLEASURE IN DOING THINGS: NOT AT ALL
2. FEELING DOWN, DEPRESSED, IRRITABLE, OR HOPELESS: NOT AT ALL

## 2022-02-09 ASSESSMENT — COGNITIVE AND FUNCTIONAL STATUS - GENERAL
DAILY ACTIVITIY SCORE: 23
MOBILITY SCORE: 19
MOVING TO AND FROM BED TO CHAIR: A LITTLE
MOVING FROM LYING ON BACK TO SITTING ON SIDE OF FLAT BED: A LITTLE
CLIMB 3 TO 5 STEPS WITH RAILING: A LITTLE
SUGGESTED CMS G CODE MODIFIER MOBILITY: CK
TOILETING: A LITTLE
STANDING UP FROM CHAIR USING ARMS: A LITTLE
WALKING IN HOSPITAL ROOM: A LITTLE
SUGGESTED CMS G CODE MODIFIER DAILY ACTIVITY: CI

## 2022-02-09 ASSESSMENT — PAIN DESCRIPTION - PAIN TYPE
TYPE: ACUTE PAIN
TYPE: SURGICAL PAIN
TYPE: ACUTE PAIN;SURGICAL PAIN

## 2022-02-09 ASSESSMENT — FIBROSIS 4 INDEX
FIB4 SCORE: 1.130667542166613585
FIB4 SCORE: 0.95

## 2022-02-09 ASSESSMENT — PAIN DESCRIPTION - DESCRIPTORS: DESCRIPTORS: ACHING;MISERABLE

## 2022-02-09 ASSESSMENT — PAIN SCALES - GENERAL: PAIN_LEVEL: 4

## 2022-02-09 NOTE — ED PROVIDER NOTES
"ED Provider Note    CHIEF COMPLAINT  Chief Complaint   Patient presents with   • Hip Pain     Pt BIB [facility] for Rt Hip Pn - woke up this morning and it \"popped out\";  Pt had same hip Sx about 5-wks ago; [facility] gave a total of 60 of ketamine enroute to facility - Pt currently unable to answer questions at this time;       HPI  Yohana Brandon is a 56 y.o. female who presents to the Emergency Department with a chief complaint of right hip pain.  She status post right hip arthroplasty 1 month ago after a fall with a fracture, she was scheduled to see her orthopedic Dr. Bell today, she slept on her stomach and woke up with her hip over the bed and in severe pain unable to move it.  She denies any other acute symptoms at this time, she is in severe right hip pain, received 60 of ketamine in route to our facility    REVIEW OF SYSTEMS  As above, all other systems negative.  PAST MEDICAL HISTORY   has a past medical history of Alcohol abuse, Alcohol abuse, ASTHMA, Elevated LFTs, Heart palpitations, Hypertension, and Psychiatric disorder.    SOCIAL HISTORY  Social History     Tobacco Use   • Smoking status: Never Smoker   • Smokeless tobacco: Never Used   Vaping Use   • Vaping Use: Never used   Substance and Sexual Activity   • Alcohol use: Yes     Comment: every day    • Drug use: No   • Sexual activity: Not on file       SURGICAL HISTORY   has a past surgical history that includes appendectomy (); endometrial ablation, thermal (); remove tonsils/adenoids,<11 y/o (); anesth,surg breast reconstructive (); hemorrhoidectomy (2011); anal sphincterotomy (2011); bunionectomy ();  delivery+postpartum care; and total hip arthroplasty (Right, 2021).    CURRENT MEDICATIONS  Reviewed.  See Encounter Summary.  Include   Home Medications    Medication Sig Taking? Last Dose Authorizing Provider   COENZYME Q10 PO Take 1 Tablet by mouth every day.   Physician Outpatient   cloNIDine " "(CATAPRES) 0.1 MG Tab Take 0.1 mg by mouth 2 times a day as needed. Indications: High Blood Pressure Disorder   Physician Outpatient   azithromycin (ZITHROMAX) 250 MG Tab Take 250-500 mg by mouth every day. Z-Tanner   Physician Outpatient   aspirin EC 81 MG EC tablet Take 1 Tablet by mouth 2 times a day.   Elvie Sarmiento D.O.   celecoxib (CELEBREX) 200 MG Cap Take 1 Capsule by mouth 2 times a day.   Elvie Sarmiento D.O.   magnesium oxide (MAG-OX) 400 MG Tab tablet Take 1 tablet by mouth every day.   Janeth Gonzalez M.D.   telmisartan-hydrochlorothiazide (MICARDIS HCT) 80-25 MG per tablet Take 1 tablet by mouth every day.   Physician Outpatient   rosuvastatin (CRESTOR) 10 MG Tab Take 10 mg by mouth every evening.   Physician Outpatient   ALPRAZolam (XANAX) 0.5 MG Tab Take 0.5 mg by mouth 2 times a day as needed for Anxiety.   Physician Outpatient   thyroid (ARMOUR THYROID) 60 MG Tab Take 60 mg by mouth every day.   Physician Outpatient   Cholecalciferol (VITAMIN D3 PO) Take 1 Cap by mouth every day.   Physician Outpatient   Potassium 99 MG Tab Take 99 mg by mouth every day. Indications: OTC   Physician Outpatient   Omega-3 Fatty Acids (FISH OIL) 1000 MG Cap capsule Take 2,000 mg by mouth every day.   Physician Outpatient   multivitamin (THERAGRAN) Tab Take 1 Tab by mouth every day.   Physician Outpatient         ALLERGIES  Allergies   Allergen Reactions   • Kiwi Extract Swelling   • Latex Anaphylaxis   • Morphine Hives   • Vicodin [Hydrocodone-Acetaminophen] Rash     Generalized rash, puritis    • Banana Shortness of Breath   • Other Food Hives and Shortness of Breath     Tropical fruit         PHYSICAL EXAM  VITAL SIGNS: /77   Pulse 84   Temp 36.4 °C (97.5 °F) (Temporal)   Resp 12   Ht 1.702 m (5' 7\")   Wt 73.5 kg (162 lb)   LMP 08/15/2011   SpO2 100%   BMI 25.37 kg/m²   Constitutional: Pain, in apparent distress.  HENT: Normocephalic, Bilateral external ears normal. Nose normal.   Eyes: Pupils are equal " and reactive. Conjunctiva normal, non-icteric.   Thorax & Lungs: Easy unlabored respirations  Abdomen:  No gross signs of peritonitis, no pain with movement   Skin: Visualized skin is  Dry, No erythema, No rash.   Extremities:   Right hip shortened, intact pulse  Neurologic: Alert, Grossly non-focal.   Psychiatric: Affect and Mood normal      COURSE & MEDICAL DECISION MAKING  Nursing notes and vital signs were reviewed. (See chart for details)    The patient presents to the Emergency Department with what clinically appears to be a right hip fracture, I am going to x-ray her right hip, preoxygenate and hydrate her for presumed conscious sedation, I will contact orthopedics secondary to her recent surgery to see if they want me to attempt reduction or if they have another plan.  We will give her propofol after consent    Conscious Sedation Procedure Note    Indication: hip dislocation    Consent: Consent was obtained due to patient's condition.    Physician Involvement: The attending physician was present and supervising this procedure.    Pre-Sedation Documentation and Exam: I have personally completed a history, physical exam & review of systems for this patient (see notes).  Airway Assessment: Mallampati Class I - (soft palate, fauces, uvula & anterior/posterior tonsillar pillars are visible)    Prior History of Anesthesia Complications: none    ASA Classification: Class 1 - A normal healthy patient    Sedation/ Anesthesia Plan: intravenous sedation    Medications Used: ketamine intravenously and propofol intravenously    Monitoring and Safety: The patient was placed on a cardiac monitor and vital signs, pulse oximetry and level of consciousness were continuously evaluated throughout the procedure. The patient was closely monitored until recovery from the medications was complete and the patient had returned to baseline status. Respiratory therapy was on standby at all times during the procedure.    (The following  sections must be completed)  Post-Sedation Vital Signs: Vital signs were reviewed and were stable after the procedure (see flow sheet for vitals)            Post-Sedation Exam: Cardiovascular: normal           Complications: none    REDUCTION PROCEDURE NOTE:  Patient identification was confirmed, consent was obtained .  This procedure was performed at 8:05 by Dr. Carr  Site righthip  Anesthetic used (type and amt): IV ketamine/propofol  Pre-procedure N/V exam intact  # of attempts: 4    Pt anesthetized, dislocation was not reduced successfully. Patient tolerated procedure well without complications. Patient splinted. Post-procedure exam indicates patient is n/v intact distal to the injury site.     Dr. Bell was notified, he will reduce under general anesthesia has asked that the patient be admitted to the hospitalist service, IV pain medicine has been ordered as well as baseline labs.  We will watch for possible alcohol withdrawal secondary to her history  .  DISPOSITION:    Admit.      FINAL IMPRESSION   Hip Dislocation (prosthetic)  Attempted hip reduction  \

## 2022-02-09 NOTE — PROGRESS NOTES
Called Dr. Lopez Jackson office @ [phone], left message again    Per Dr. Jackson office called back with list of medications that pt is taking.

## 2022-02-09 NOTE — ED NOTES
"Chief Complaint   Patient presents with   • Hip Pain     Pt BIB [facility] for Rt Hip Pn - woke up this morning and it \"popped out\";  Pt had same hip Sx about 5-wks ago; [facility] gave a total of 60 ketamine enroute to facility;     Rt leg shorter than Lt.    ED Triage Vitals   Enc Vitals Group      Blood Pressure 02/09/22 0544 142/77      Pulse 02/09/22 0544 84      Respiration 02/09/22 0544 12      Temperature 02/09/22 0544 36.4 °C (97.5 °F)      Temp src 02/09/22 0544 Temporal      Pulse Oximetry 02/09/22 0544 100 %      Weight 02/09/22 0545 73.5 kg (162 lb)      Height 02/09/22 0545 1.702 m (5' 7\")      Head Circumference --       Peak Flow --       Pain Score --       Pain Loc --       Pain Edu? --       Excl. in GC? --        "

## 2022-02-09 NOTE — ED NOTES
Med rec updated and complete, per pt  Allergies reviewed, per pt  Pt receives hormone shots from Dr. Lopez Jackson ([phone]), called doctors office and left a message.

## 2022-02-09 NOTE — ASSESSMENT & PLAN NOTE
Unable to relocate in the ED  Dr Bell to consult and take patient to OR for relocation once room available.

## 2022-02-09 NOTE — H&P
"Hospital Medicine History & Physical Note    Date of Service  2/9/2022    Primary Care Physician  Deborah Moctezuma M.D.    Consultants  orthopedics    Specialist Names: Ray    Code Status  Full Code    Chief Complaint  Chief Complaint   Patient presents with   • Hip Pain     Pt BIB [facility] for Rt Hip Pn - woke up this morning and it \"popped out\";  Pt had same hip Sx about 5-wks ago; [facility] gave a total of 60 of ketamine enroute to facility - Pt currently unable to answer questions at this time;       History of Presenting Illness  Yohana Brandon is a 56 y.o. female who presented 2/9/2022 with extreme pain of the right hip.  She had a right total hip surgery after a fall with Dr Bell on 12/27 and tolerated the procedure well.  She was discharged from the hospital by me on 12/28 and had been doing well.  She was sleeping on her stomach and woke in extreme pain and unable to move her leg.  She dislocated her hip in her sleep and attempt in the ED was made to relocate it but unfortunately was not successful.  Ortho, Dr Bell consulted and she will be taken to the OR later today for relocation.  She will be kept NPO and medicated with pain meds and IV hydration until after OR.    I discussed the plan of care with patient.    Review of Systems  Review of Systems   Constitutional: Negative for chills and fever.   HENT: Negative for congestion.    Eyes: Negative for blurred vision and photophobia.   Respiratory: Negative for cough and shortness of breath.    Cardiovascular: Negative for chest pain, claudication and leg swelling.   Gastrointestinal: Negative for abdominal pain, constipation, diarrhea, heartburn and vomiting.   Genitourinary: Negative for dysuria and hematuria.   Musculoskeletal: Positive for joint pain (right hip). Negative for myalgias.   Skin: Negative for itching and rash.   Neurological: Negative for dizziness, sensory change, speech change, weakness and headaches.   Psychiatric/Behavioral: " Negative for depression. The patient is not nervous/anxious and does not have insomnia.        Past Medical History   has a past medical history of Alcohol abuse, Alcohol abuse, ASTHMA, Elevated LFTs, Heart palpitations, Hypertension, and Psychiatric disorder.    Surgical History   has a past surgical history that includes appendectomy (); pr endometrial ablation, thermal (); pr remove tonsils/adenoids,<15 y/o (); pr anesth,surg breast reconstructive (); hemorrhoidectomy (2011); anal sphincterotomy (2011); bunionectomy (); pr  delivery+postpartum care; and pr total hip arthroplasty (Right, 2021).     Family History  family history includes Stroke in her mother.   Family history reviewed with patient. There is no family history that is pertinent to the chief complaint.     Social History   reports that she has never smoked. She has never used smokeless tobacco. She reports current alcohol use. She reports that she does not use drugs.    Allergies  Allergies   Allergen Reactions   • Banana Shortness of Breath   • Kiwi Extract Swelling   • Latex Anaphylaxis   • Morphine Hives   • Other Food Hives and Shortness of Breath     Tropical fruit     • Vicodin [Hydrocodone-Acetaminophen] Rash     Generalized rash, puritis        Medications  Prior to Admission Medications   Prescriptions Last Dose Informant Patient Reported? Taking?   ALPRAZolam (XANAX) 0.5 MG Tab 2022 at 1900 Patient Yes No   Sig: Take 0.5 mg by mouth 2 times a day as needed for Anxiety.   COENZYME Q10 PO 2022 at 0700 Patient Yes No   Sig: Take 1 Tablet by mouth every day.   Cholecalciferol (VITAMIN D3 PO) 2022 at 0700 Patient Yes No   Sig: Take 1 Cap by mouth every day.   Non Formulary Request 2022 at 0700 Patient Yes Yes   Sig: Inject 1 Each as directed every day. Hormone shots @@ Dr. Lopez [facility] office   Omega-3 Fatty Acids (FISH OIL) 1000 MG Cap capsule 2022 at 0700 Patient Yes No   Sig:  Take 1,000 mg by mouth every day.   Potassium 99 MG Tab 2/8/2022 at 0700 Patient Yes No   Sig: Take 99 mg by mouth every day. Indications: OTC   aspirin EC 81 MG EC tablet 2/8/2022 at 0700 Patient Yes No   Sig: Take 1 Tablet by mouth 2 times a day.   celecoxib (CELEBREX) 200 MG Cap 2/6/2022 at Ran out Patient No No   Sig: Take 1 Capsule by mouth 2 times a day.   cloNIDine (CATAPRES) 0.1 MG Tab > 2 days at Unknown Patient Yes No   Sig: Take 0.1 mg by mouth 2 times a day as needed (Per pt if blood pressure is > 160/80). Indications: High Blood Pressure Disorder   cyanocobalamin (VITAMIN B-12) 1000 MCG/ML Solution 2/1/2022 at AM Patient Yes No   Sig: Inject 1,000 mcg into the shoulder, thigh, or buttocks every 7 days. On Tue   magnesium oxide (MAG-OX) 400 MG Tab tablet 2/8/2022 at 0700 Patient No No   Sig: Take 1 tablet by mouth every day.   multivitamin (THERAGRAN) Tab 2/8/2022 at 0700 Patient Yes No   Sig: Take 1 Tab by mouth every day.   rosuvastatin (CRESTOR) 10 MG Tab 2/7/2022 at PM Patient Yes No   Sig: Take 10 mg by mouth every evening.   telmisartan-hydrochlorothiazide (MICARDIS HCT) 80-25 MG per tablet 2/8/2022 at 0700 Patient Yes No   Sig: Take 1 tablet by mouth every day.   thyroid (ARMOUR THYROID) 60 MG Tab 2/8/2022 at 0700 Patient Yes No   Sig: Take 60 mg by mouth every day.      Facility-Administered Medications: None       Physical Exam  Temp:  [36.4 °C (97.5 °F)] 36.4 °C (97.5 °F)  Pulse:  [75-84] 75  Resp:  [12-26] 26  BP: (115-142)/(70-77) 115/70  SpO2:  [99 %-100 %] 99 %  Blood Pressure: 115/70   Temperature: 36.4 °C (97.5 °F)   Pulse: 75   Respiration: (!) 26   Pulse Oximetry: 99 %       Physical Exam  Vitals and nursing note reviewed.   Constitutional:       General: She is not in acute distress.     Appearance: Normal appearance. She is not ill-appearing.   HENT:      Head: Normocephalic and atraumatic.      Nose: Nose normal.   Cardiovascular:      Rate and Rhythm: Normal rate and regular rhythm.       Heart sounds: Normal heart sounds. No murmur heard.      Pulmonary:      Effort: Pulmonary effort is normal.      Breath sounds: Normal breath sounds.   Abdominal:      General: Bowel sounds are normal. There is no distension.      Palpations: Abdomen is soft.   Musculoskeletal:         General: No swelling or tenderness.      Cervical back: Neck supple.      Comments: Right hip tender  Right leg short and external rotated.     Skin:     General: Skin is warm and dry.   Neurological:      General: No focal deficit present.      Mental Status: She is alert and oriented to person, place, and time.   Psychiatric:         Mood and Affect: Mood normal.         Laboratory:          No results for input(s): ALTSGPT, ASTSGOT, ALKPHOSPHAT, TBILIRUBIN, DBILIRUBIN, GAMMAGT, AMYLASE, LIPASE, ALB, PREALBUMIN, GLUCOSE in the last 72 hours.      No results for input(s): NTPROBNP in the last 72 hours.      No results for input(s): TROPONINT in the last 72 hours.    Imaging:  DX-HIP-COMPLETE - UNILATERAL 2+ RIGHT   Final Result         1.  No radiographic evidence of acute traumatic injury.          X-Ray:  I have personally reviewed the images and compared with prior images.    Assessment/Plan:  I anticipate this patient is appropriate for observation status at this time.    * Dislocation of internal right hip prosthesis (HCC)  Assessment & Plan  Unable to relocate in the ED  Dr Bell to consult and take patient to OR for relocation once room available.      COPD (chronic obstructive pulmonary disease) (HCC)- (present on admission)  Assessment & Plan  No acute exacerbation  On room air      Alcohol dependence (HCC)- (present on admission)  Assessment & Plan  History of alcohol withdrawal  Monitor for signs.          VTE prophylaxis: SCDs/TEDs

## 2022-02-10 VITALS
HEIGHT: 67 IN | BODY MASS INDEX: 25.67 KG/M2 | SYSTOLIC BLOOD PRESSURE: 101 MMHG | DIASTOLIC BLOOD PRESSURE: 56 MMHG | OXYGEN SATURATION: 94 % | TEMPERATURE: 98 F | WEIGHT: 163.58 LBS | RESPIRATION RATE: 16 BRPM | HEART RATE: 87 BPM

## 2022-02-10 PROBLEM — J96.01 ACUTE RESPIRATORY FAILURE WITH HYPOXIA (HCC): Status: ACTIVE | Noted: 2022-02-10

## 2022-02-10 PROBLEM — D64.9 NORMOCYTIC ANEMIA: Status: ACTIVE | Noted: 2022-02-10

## 2022-02-10 LAB
ANION GAP SERPL CALC-SCNC: 7 MMOL/L (ref 7–16)
BUN SERPL-MCNC: 17 MG/DL (ref 8–22)
CALCIUM SERPL-MCNC: 9 MG/DL (ref 8.4–10.2)
CHLORIDE SERPL-SCNC: 109 MMOL/L (ref 96–112)
CO2 SERPL-SCNC: 23 MMOL/L (ref 20–33)
CREAT SERPL-MCNC: 0.77 MG/DL (ref 0.5–1.4)
ERYTHROCYTE [DISTWIDTH] IN BLOOD BY AUTOMATED COUNT: 43.6 FL (ref 35.9–50)
GLUCOSE SERPL-MCNC: 136 MG/DL (ref 65–99)
HCT VFR BLD AUTO: 36.5 % (ref 37–47)
HGB BLD-MCNC: 11.6 G/DL (ref 12–16)
MCH RBC QN AUTO: 31 PG (ref 27–33)
MCHC RBC AUTO-ENTMCNC: 31.8 G/DL (ref 33.6–35)
MCV RBC AUTO: 97.6 FL (ref 81.4–97.8)
PLATELET # BLD AUTO: 252 K/UL (ref 164–446)
PMV BLD AUTO: 10.1 FL (ref 9–12.9)
POTASSIUM SERPL-SCNC: 4.5 MMOL/L (ref 3.6–5.5)
RBC # BLD AUTO: 3.74 M/UL (ref 4.2–5.4)
SODIUM SERPL-SCNC: 139 MMOL/L (ref 135–145)
WBC # BLD AUTO: 5.6 K/UL (ref 4.8–10.8)

## 2022-02-10 PROCEDURE — 36415 COLL VENOUS BLD VENIPUNCTURE: CPT

## 2022-02-10 PROCEDURE — A9270 NON-COVERED ITEM OR SERVICE: HCPCS | Performed by: INTERNAL MEDICINE

## 2022-02-10 PROCEDURE — A9270 NON-COVERED ITEM OR SERVICE: HCPCS | Performed by: STUDENT IN AN ORGANIZED HEALTH CARE EDUCATION/TRAINING PROGRAM

## 2022-02-10 PROCEDURE — 97161 PT EVAL LOW COMPLEX 20 MIN: CPT

## 2022-02-10 PROCEDURE — 700102 HCHG RX REV CODE 250 W/ 637 OVERRIDE(OP): Performed by: INTERNAL MEDICINE

## 2022-02-10 PROCEDURE — 80048 BASIC METABOLIC PNL TOTAL CA: CPT

## 2022-02-10 PROCEDURE — 700102 HCHG RX REV CODE 250 W/ 637 OVERRIDE(OP): Performed by: STUDENT IN AN ORGANIZED HEALTH CARE EDUCATION/TRAINING PROGRAM

## 2022-02-10 PROCEDURE — 97165 OT EVAL LOW COMPLEX 30 MIN: CPT

## 2022-02-10 PROCEDURE — 99239 HOSP IP/OBS DSCHRG MGMT >30: CPT | Performed by: INTERNAL MEDICINE

## 2022-02-10 PROCEDURE — 85027 COMPLETE CBC AUTOMATED: CPT

## 2022-02-10 PROCEDURE — 97535 SELF CARE MNGMENT TRAINING: CPT

## 2022-02-10 RX ORDER — POLYETHYLENE GLYCOL 3350 17 G/17G
17 POWDER, FOR SOLUTION ORAL DAILY
COMMUNITY
Start: 2022-02-10 | End: 2023-06-15

## 2022-02-10 RX ORDER — OXYCODONE HYDROCHLORIDE 10 MG/1
10 TABLET ORAL EVERY 4 HOURS PRN
Qty: 40 TABLET | Refills: 0 | Status: SHIPPED | OUTPATIENT
Start: 2022-02-10 | End: 2022-02-17

## 2022-02-10 RX ADMIN — ALPRAZOLAM 0.5 MG: 0.5 TABLET ORAL at 01:37

## 2022-02-10 RX ADMIN — OXYCODONE HYDROCHLORIDE 10 MG: 10 TABLET ORAL at 00:27

## 2022-02-10 RX ADMIN — CELECOXIB 200 MG: 200 CAPSULE ORAL at 05:45

## 2022-02-10 RX ADMIN — THYROID, PORCINE 60 MG: 30 TABLET ORAL at 05:46

## 2022-02-10 RX ADMIN — ACETAMINOPHEN 1000 MG: 500 TABLET, FILM COATED ORAL at 00:26

## 2022-02-10 RX ADMIN — OXYCODONE HYDROCHLORIDE 5 MG: 5 TABLET ORAL at 12:01

## 2022-02-10 RX ADMIN — OXYCODONE HYDROCHLORIDE 10 MG: 10 TABLET ORAL at 06:47

## 2022-02-10 RX ADMIN — ACETAMINOPHEN 1000 MG: 500 TABLET, FILM COATED ORAL at 11:51

## 2022-02-10 RX ADMIN — ACETAMINOPHEN 1000 MG: 500 TABLET, FILM COATED ORAL at 05:45

## 2022-02-10 ASSESSMENT — GAIT ASSESSMENTS
DISTANCE (FEET): 150
DEVIATION: ANTALGIC;STEP TO
GAIT LEVEL OF ASSIST: MODIFIED INDEPENDENT
ASSISTIVE DEVICE: FRONT WHEEL WALKER
DISTANCE (FEET): 75

## 2022-02-10 ASSESSMENT — PAIN DESCRIPTION - PAIN TYPE
TYPE: ACUTE PAIN

## 2022-02-10 ASSESSMENT — COGNITIVE AND FUNCTIONAL STATUS - GENERAL
DRESSING REGULAR LOWER BODY CLOTHING: A LITTLE
DRESSING REGULAR UPPER BODY CLOTHING: A LITTLE
SUGGESTED CMS G CODE MODIFIER DAILY ACTIVITY: CK
WALKING IN HOSPITAL ROOM: A LITTLE
TOILETING: A LITTLE
PERSONAL GROOMING: A LITTLE
MOBILITY SCORE: 22
HELP NEEDED FOR BATHING: A LITTLE
DAILY ACTIVITIY SCORE: 19
SUGGESTED CMS G CODE MODIFIER MOBILITY: CJ
CLIMB 3 TO 5 STEPS WITH RAILING: A LITTLE

## 2022-02-10 ASSESSMENT — ACTIVITIES OF DAILY LIVING (ADL): TOILETING: INDEPENDENT

## 2022-02-10 NOTE — ANESTHESIA TIME REPORT
Anesthesia Start and Stop Event Times     Date Time Event    2/9/2022 1804 Ready for Procedure     1854 Anesthesia Start     1925 Anesthesia Stop        Responsible Staff  02/09/22    Name Role Begin End    Francisco Jimenez M.D. Anesth 1854 1925        Preop Diagnosis (Free Text):  Pre-op Diagnosis     RIGHT DISLOCATED HIP PROSTHESIS        Preop Diagnosis (Codes):    Premium Reason  A. 3PM - 7AM    Comments:

## 2022-02-10 NOTE — THERAPY
Physical Therapy   Initial Evaluation     Patient Name: Yohana Brandon  Age:  56 y.o., Sex:  female  Medical Record #: [id number]  Today's Date: 2/10/2022     Precautions  Precautions: (P) Anterior Hip Precautions  Comments: (P) hip abduction brace 20 deg flexion and 15 deg hip abduction locked    Assessment  Patient is 56 y.o. female who was recently admitted for R hip dislocation and is now s/p closed reduction of R JANEE. Pt has orders to be WBAT for R LE and strict anterior hip precautions with hip abduction brace. Pt was able to demonstrate safe upright functional mobility with no cherri LOB and FWW use. Pt was able to demo Mod I to SPV for all upright mobility. Pt was provided with education with brace donning/doffing, hip precautions with bed mobility, elevation, icing, and positioning. Pt was able to go up/down steps safely with correct mechanics and demonstrated with step to pattern during ambulation as she was limited due to hip abduction brace. Pt is in no acute skilled PT needs at this time, anticipate pt to d/c home once medically clear with recs for OP therapy services.       Plan    Recommend Physical Therapy for Evaluation only     DC Equipment Recommendations: (P) Front-Wheel Walker  Discharge Recommendations: (P) Recommend outpatient physical therapy services to address higher level deficits     Objective       02/10/22 1150   Initial Contact Note    Initial Contact Note Order Received and Verified, Evaluation Only - Patient Does Not Require Further Acute Physical Therapy at this Time.  However, May Benefit from Post Acute Therapy for Higher Level Functional Deficits.   Precautions   Precautions Anterior Hip Precautions   Comments hip abduction brace 20 deg flexion and 15 deg hip abduction locked   Pain 0 - 10 Group   Location Hip   Location Orientation Right   Description Aching;Constant   Therapist Pain Assessment Prior to Activity;During Activity;Post Activity;Nurse Notified;2   Prior Living  Situation   Housing / Facility 1 Story House   Steps Into Home 1   Steps In Home 0   Equipment Owned Front-Wheel Walker   Lives with - Patient's Self Care Capacity Spouse   Prior Level of Functional Mobility   Bed Mobility Independent   Transfer Status Independent   Ambulation Independent   Distance Ambulation (Feet)   (community )   Assistive Devices Used None   Stairs Independent   Cognition    Cognition / Consciousness WDL   Level of Consciousness Alert   Comments pleasant/cooperative   Passive ROM Lower Body   Passive ROM Lower Body X   Comments limited due to anterior hip precautions; per chart hip abduction brace set to 20 deg flex lock and 15 hip abduction lock    Active ROM Lower Body    Active ROM Lower Body  X   Comments same as above   Strength Lower Body   Lower Body Strength  X   Comments limited due to pain; able to demo functional strength for B LE   Sensation Lower Body   Lower Extremity Sensation   WDL   Lower Body Muscle Tone   Lower Body Muscle Tone  WDL   Strength Upper Body   Upper Body Strength  WDL   Upper Body Muscle Tone   Upper Body Muscle Tone  WDL   Neurological Concerns   Neurological Concerns No   Coordination Lower Body    Coordination Lower Body  WDL   Balance Assessment   Sitting Balance (Static) Good   Sitting Balance (Dynamic) Good   Standing Balance (Static) Good   Standing Balance (Dynamic) Fair +   Weight Shift Sitting Good   Weight Shift Standing Fair   Comments w/fww; no cherri LOB noted   Gait Analysis   Gait Level Of Assist Modified Independent   Assistive Device Front Wheel Walker   Distance (Feet) 150   # of Times Distance was Traveled 1   Deviation Antalgic;Step To   # of Stairs Climbed 1   Level of Assist with Stairs Supervised   Weight Bearing Status WBAT R LE    Bed Mobility    Supine to Sit Supervised   Scooting Supervised   Comments HOB elevated and rails up; states of having a bed that can go up/down at HOB   Functional Mobility   Sit to Stand Supervised   Bed,  Chair, Wheelchair Transfer Supervised   Transfer Method Stand Step   Mobility EOB, sit<>stand, ambulation, stairs, back to bed    Comments left up with OT in hallway    How much difficulty does the patient currently have...   Turning over in bed (including adjusting bedclothes, sheets and blankets)? 4   Sitting down on and standing up from a chair with arms (e.g., wheelchair, bedside commode, etc.) 4   Moving from lying on back to sitting on the side of the bed? 4   How much help from another person does the patient currently need...   Moving to and from a bed to a chair (including a wheelchair)? 4   Need to walk in a hospital room? 3   Climbing 3-5 steps with a railing? 3   6 clicks Mobility Score 22   Activity Tolerance   Sitting in Chair NT   Sitting Edge of Bed 5 mins   Standing 10 mins   Comments no adverse events noted   Edema / Skin Assessment   Edema / Skin  Not Assessed   Education Group   Education Provided Role of Physical Therapist;Gait Training;Stair Training;Use of Assistive Device;Hip Precautions Anterior   Hip Precautions Anterior Patient Response Patient;Acceptance;Explanation;Demonstration;Action Demonstration;Verbal Demonstration;Handout   Role of Physical Therapist Patient Response Patient;Acceptance;Explanation;Demonstration;Verbal Demonstration;Action Demonstration   Gait Training Patient Response Patient;Acceptance;Explanation;Demonstration;Verbal Demonstration;Action Demonstration   Stair Training Patient Response Patient;Acceptance;Explanation;Demonstration;Verbal Demonstration;Action Demonstration   Use of Assistive Device Patient Response Patient;Acceptance;Explanation;Demonstration;Verbal Demonstration;Action Demonstration   Anticipated Discharge Equipment and Recommendations   DC Equipment Recommendations Front-Wheel Walker   Discharge Recommendations Recommend outpatient physical therapy services to address higher level deficits   Interdisciplinary Plan of Care Collaboration   IDT  Collaboration with  Nursing   Patient Position at End of Therapy Other (Comments)  (Left up with OT in hallway )   Collaboration Comments aware of visit and recs    Session Information   Date / Session Number  2/10 eval only    Priority 0

## 2022-02-10 NOTE — DISCHARGE PLANNING
Anticipated Discharge Disposition:  Home with home health     Action:  Chart review completed.  Per MD, patient is able to discharge.  Met with patient, she verified demos are correct on the face sheet.  She signed  choice letter for [facility] [phone] as she is currently on service with them for skilled nursing.  Choice letter faxed to [facility].  Her  will transport home.      Barriers to Discharge:   None identified.     Plan:   Hospital care management will continue to assist with discharge planning needs.

## 2022-02-10 NOTE — OR NURSING
1640: Brought patient up to pre-op holding via gurney. Pt is tolerable, no nausea, awake and alert, on 2 LPM of supplemental O2.  1702: Patient allergies and NPO status verified, home medication reconciliation completed and belongings secured. Patient verbalizes understanding of pain scale, expected course of stay and plan of care. Surgical site verified with patient. PIV patency verified.

## 2022-02-10 NOTE — OP REPORT
Operative report    PreOp Diagnosis: right dislocated total hip arthroplasty        PostOp Diagnosis: same        Procedure(s):  CLOSED REDUCTION Anterior right total hip dislocation  Surgeon(s):  Jose G Bell M.D.     Anesthesiologist/Type of Anesthesia:  Anesthesiologist: Francisco Jimenez M.D./* No anesthesia type entered *     Surgical Staff:  Circulator: Fili Deluca R.N.; Cat Eldridge R.N.  Scrub Person: Alona Pisano  Radiology Technologist: Emanuel Magana  Anesthesia Assistant: Suzanne Julio     Specimens removed if any:  * No specimens in log *     Estimated Blood Loss: 0cc     Findings: difficult to close reduce right total hip dislocation, Stable at 0 degrees extension and 90 degrees external rotation with complete muscle paralysis,     Complications: none      Indications for surgery:  This is a 56-year-old female who 6 weeks ago who sustained a right femoral neck fracture that was displaced.  She underwent a right total hip arthroplasty.  She had been doing excellent and then last night she states in her sleep she was sleeping on her side woke up with her hip dislocated.  She was taken to the emergency room after calling 911 hip dislocation was confirmed it was anterior.  She had attempted closed reduction in the emergency room and was unable to perform so we talked about operative closed reduction versus open reduction headliner exchange for dual mobility.  Discussed that the biggest risk would be potential redislocation.  The implants are in excellent spot and there may be a soft tissue basting or just a one-off situation.  We would need to continue to monitor after make sure she does well.  She understood all this and wanted to proceed with surgery.    Procedure in detail:  The patient met in the preoperative holding area the right lower extremity was marked.  The risk benefits alternatives were discussed as above.  Right lower extremity was marked and she was induced  under general anesthesia after being taken back to the operative suite.  Timeout was performed verifying surgical site procedure perioperative antibiotics and available equipment.  Began by attempted closed reduction propofol only were unable to reduce the hip.  Relaxation had to be done and on the [place] table through traction external rotation and internal rotation release of traction were finally able to get the hip to reduce.  There is a large audible clunk.  We tested her stability and 0 degrees of extension and external rotation 85 she still did not dislocate and it was very tight on the hip this was with full muscle relaxation.  We did drop her into 45 of extension and externally rotated and it did start to have some instability but this was again with full muscle relaxation.  This point was determined that she was stable we would wake her up from general anesthesia and taken to the PACU without complications and an abduction pillow.    Postoperative plan the patient should be educated in follow strict anterior hip precautions.  We need to order her a hip abduction brace placing her in about 15 degrees of abduction 20 degrees of flexion to allow her to scar and and prevent further redislocation.

## 2022-02-10 NOTE — ANESTHESIA PREPROCEDURE EVALUATION
Case: [id number] Date/Time: 02/09/22 1805    Procedure: CLOSED REDUCTION, HIP,  POSSIBLE OPEN , POSSIBLE REVISION (Right )    Location:  OR  / [facility]    Surgeons: Jose G Bell M.D.        PONV    Relevant Problems   PULMONARY   (positive) COPD (chronic obstructive pulmonary disease) (HCC)      CARDIAC   (positive) Coronary artery calcification seen on CT scan   (positive) Primary hypertension      Other   (positive) Alcohol dependence (HCC)   (positive) Alcohol withdrawal ([facility])   (positive) Alcoholic ketoacidosis   (positive) Cocaine abuse ([facility])   (positive) Dislocation of internal right hip prosthesis ([facility])   (positive) Hip dislocation, right, initial encounter ([facility])       Physical Exam    Airway   Mallampati: II  TM distance: >3 FB  Neck ROM: full       Cardiovascular - normal exam  Rhythm: regular  Rate: normal  (-) murmur     Dental - normal exam           Pulmonary - normal exam  Breath sounds clear to auscultation     Abdominal    Neurological - normal exam                 Anesthesia Plan    ASA 3   ASA physical status 3 criteria: alcohol and/or substance dependence or abuse    Plan - general       Airway plan will be LMA          Induction: intravenous      Pertinent diagnostic labs and testing reviewed    Informed Consent:    Anesthetic plan and risks discussed with patient.

## 2022-02-10 NOTE — PROGRESS NOTES
Report received from PACU RN.Pt has stable vitals with complains of pain in Right hip medicated per MAR.POC discussed. Pt understands the plan to ambulate 50ft by midnight and urinate within 6hrs.Pt voided but unable to walk due to pain.    Pt refused to have abductor pillow placed after getting up. Educated pt the risks and benefits of the abductor pillow to be in place.        Call light in place.Bed in low position.Rounding in place

## 2022-02-10 NOTE — H&P
Surgery Orthopedic History & Physical Note    Date  2022    Primary Care Physician  Deborah Moctezuma M.D.    CC  Right hip dislocation    HPI  This is a 56 y.o. female who presented with right hip pain.  She states early this morning around 4 AM she woke up with intense pain in her right hip.  She states she was just sleeping on her stomach and rolled over to her back.  She called 911.  She was taken to the ER and found to have a dislocation of her right total hip.  This was about 6 weeks out from a fracture and she had been doing well previously until today.  She had x-rays showing an anterior hip dislocation in the ER attempted to do a closed reduction but was unable to after giving her 200 of propofol and she was still fairly awake.  She currently has pain no numbness tingling.    Past Medical History:   Diagnosis Date   • Alcohol abuse    • Alcohol abuse    • ASTHMA    • Elevated LFTs    • Heart palpitations     w/ alcohol withdrawal   • Hypertension    • Psychiatric disorder     alcohol dependance       Past Surgical History:   Procedure Laterality Date   • PB TOTAL HIP ARTHROPLASTY Right 2021    Procedure: ARTHROPLASTY, HIP, TOTAL, ANTERIOR APPROACH;  Surgeon: Jose G Bell M.D.;  Location: SURGERY [facility];  Service: Orthopedics   • HEMORRHOIDECTOMY  2011    Performed by SONY MACDONALD at SURGERY [facility] ORS   • ANAL SPHINCTEROTOMY  2011    Performed by SONY MACDONALD at SURGERY [facility] ORS   • MN ENDOMETRIAL ABLATION, THERMAL     • APPENDECTOMY     • MN ANESTH,SURG BREAST RECONSTRUCTIVE      bilateral    • BUNIONECTOMY      bilateral   • MN REMOVE TONSILS/ADENOIDS,<11 Y/O     • MN  DELIVERY+POSTPARTUM CARE      x2       Current Facility-Administered Medications   Medication Dose Route Frequency Provider Last Rate Last Admin   • KETAMINE HCL 50 MG/ML INJ SOLN            • [MAR Hold] ALPRAZolam (XANAX) tablet 0.5 mg  0.5 mg Oral BID PRN Elvie Sarmiento,  D.O.       • [MAR Hold] rosuvastatin (CRESTOR) tablet 10 mg  10 mg Oral Q EVENING Elvie Sarmiento D.O.       • [MAR Hold] thyroid (ARMOUR THYROID) tablet 60 mg  60 mg Oral DAILY ABDELRAHMAN PorterO.       • [MAR Hold] senna-docusate (PERICOLACE or SENOKOT S) 8.6-50 MG per tablet 2 Tablet  2 Tablet Oral BID Elvie Sarmiento D.O.        And   • [MAR Hold] polyethylene glycol/lytes (MIRALAX) PACKET 1 Packet  1 Packet Oral QDAY PRN RUBY Porter.O.        And   • [MAR Hold] magnesium hydroxide (MILK OF MAGNESIA) suspension 30 mL  30 mL Oral QDAY PRN RUBY Porter.O.        And   • [MAR Hold] bisacodyl (DULCOLAX) suppository 10 mg  10 mg Rectal QDAY PRN RUBY Porter.O.       • NS infusion   Intravenous Continuous Elvie Sarmiento D.O. 83 mL/hr at 02/09/22 0814 New Bag at 02/09/22 0814   • [MAR Hold] acetaminophen (Tylenol) tablet 650 mg  650 mg Oral Q6HRS PRN RUBY Porter.O.       • [MAR Hold] Pharmacy Consult Request ...Pain Management Review 1 Each  1 Each Other PHARMACY TO DOSE RUBY Porter.CABRERA.       • [MAR Hold] ondansetron (ZOFRAN) syringe/vial injection 4 mg  4 mg Intravenous Q4HRS PRN RUBY Porter.O.       • [MAR Hold] ondansetron (ZOFRAN ODT) dispertab 4 mg  4 mg Oral Q4HRS PRN RUBY Porter.O.       • [MAR Hold] promethazine (PHENERGAN) tablet 12.5-25 mg  12.5-25 mg Oral Q4HRS PRN RUBY Porter.O.       • [MAR Hold] promethazine (PHENERGAN) suppository 12.5-25 mg  12.5-25 mg Rectal Q4HRS PRN RUBY Porter.O.       • [MAR Hold] prochlorperazine (COMPAZINE) injection 5-10 mg  5-10 mg Intravenous Q4HRS PRN RUBY Porter.O.       • [MAR Hold] HYDROmorphone (Dilaudid) injection 1-2 mg  1-2 mg Intravenous Q3HRS PRN RUBY Porter.O.        Or   • [MAR Hold] oxyCODONE immediate-release (ROXICODONE) tablet 5 mg  5 mg Oral Q3HRS PRN RUBY Porter.CABRERA.        Or   • [MAR Hold] oxyCODONE immediate release (ROXICODONE) tablet 10 mg  10 mg Oral Q3HRS PRN RUBY Porter.O.   10 mg at 02/09/22  1617   • [MAR Hold] telmisartan (MICARDIS) tablet 80 mg  80 mg Oral Q DAY Elvie Sarmiento D.O.       • lactated ringers infusion   Intravenous Continuous Jose G Bell M.D.           Social History     Socioeconomic History   • Marital status:      Spouse name: Not on file   • Number of children: Not on file   • Years of education: Not on file   • Highest education level: Not on file   Occupational History   • Not on file   Tobacco Use   • Smoking status: Never Smoker   • Smokeless tobacco: Never Used   Vaping Use   • Vaping Use: Never used   Substance and Sexual Activity   • Alcohol use: Yes     Comment: every day    • Drug use: No   • Sexual activity: Not on file   Other Topics Concern   • Not on file   Social History Narrative   • Not on file     Social Determinants of Health     Financial Resource Strain:    • Difficulty of Paying Living Expenses: Not on file   Food Insecurity:    • Worried About Running Out of Food in the Last Year: Not on file   • Ran Out of Food in the Last Year: Not on file   Transportation Needs:    • Lack of Transportation (Medical): Not on file   • Lack of Transportation (Non-Medical): Not on file   Physical Activity:    • Days of Exercise per Week: Not on file   • Minutes of Exercise per Session: Not on file   Stress:    • Feeling of Stress : Not on file   Social Connections:    • Frequency of Communication with Friends and Family: Not on file   • Frequency of Social Gatherings with Friends and Family: Not on file   • Attends Congregation Services: Not on file   • Active Member of Clubs or Organizations: Not on file   • Attends Club or Organization Meetings: Not on file   • Marital Status: Not on file   Intimate Partner Violence:    • Fear of Current or Ex-Partner: Not on file   • Emotionally Abused: Not on file   • Physically Abused: Not on file   • Sexually Abused: Not on file   Housing Stability:    • Unable to Pay for Housing in the Last Year: Not on file   • Number of Places  Lived in the Last Year: Not on file   • Unstable Housing in the Last Year: Not on file       Family History   Problem Relation Age of Onset   • Stroke Mother        Allergies  Banana, Kiwi extract, Latex, Morphine, Other food, and Vicodin [hydrocodone-acetaminophen]    Review of Systems  Negative except for Those mentioned in the HPI    Physical Exam  General she is in no acute distress  Right leg is sitting shortened externally rotated    Vital Signs  Blood Pressure: 100/56   Temperature: 36.4 °C (97.5 °F)   Pulse: 95   Respiration: 13   Pulse Oximetry: 97 %       Labs:  Recent Labs     02/09/22  0940   WBC 6.9   RBC 3.71*   HEMOGLOBIN 11.4*   HEMATOCRIT 35.6*   MCV 96.0   MCH 30.7   MCHC 32.0*   RDW 42.2   PLATELETCT 256   MPV 10.2     Recent Labs     02/09/22  0940   SODIUM 143   POTASSIUM 3.9   CHLORIDE 113*   CO2 24   GLUCOSE 113*   BUN 17   CREATININE 0.66   CALCIUM 8.6               Radiology:  X-rays 2 views of the right hip taken today showing dislocation of the right total hip prosthesis  Assessment/Plan:  56-year-old female 6 weeks status post right total hip arthroplasty for fracture who had a dislocation anterior unable to be closed reduced in the ER    Had a discussion with the patient regarding exam findings imaging findings pathophysiology.  Discussed that we will try a closed reduction in the OR after we are able to stated later little deeper.  Discussed that if we are able to do this we will treat her conservatively if she has another dislocation she may need a surgery for a dual mobility cup.  If I am unable to get her closed reduced today we will treat her surgically and placed the dual mobility today.  She understands and wishes to proceed with surgery.  Risk benefits alternatives were all discussed.

## 2022-02-10 NOTE — ANESTHESIA POSTPROCEDURE EVALUATION
Patient: Yohana Brandon    Procedure Summary     Date: 02/09/22 Room / Location:  OR  / SURGERY [facility]    Anesthesia Start: 1854 Anesthesia Stop: 1925    Procedure: CLOSED REDUCTION, HIP,  POSSIBLE OPEN , POSSIBLE REVISION (Right ) Diagnosis: (RIGHT DISLOCATED HIP PROSTHESIS)    Surgeons: Jose G Bell M.D. Responsible Provider: Francisco Jimenez M.D.    Anesthesia Type: general ASA Status: 3          Final Anesthesia Type: general  Last vitals  BP   Blood Pressure: 100/56    Temp   36.4 °C (97.5 °F)    Pulse   95   Resp   13    SpO2   97 %      Anesthesia Post Evaluation    Patient location during evaluation: PACU  Patient participation: complete - patient participated  Level of consciousness: awake and alert  Pain score: 4    Airway patency: patent  Anesthetic complications: no  Cardiovascular status: hemodynamically stable  Respiratory status: acceptable  Hydration status: euvolemic  Comments: NPRS from preop    PONV: none          No complications documented.     Nurse Pain Score: 8 (NPRS)

## 2022-02-10 NOTE — ED NOTES
CHIEF COMPLAINT: Shortness of breath    Interval Events: Pt's bronchoscopy was delayed due to labile FS. No dyspnea.     REVIEW OF SYSTEMS:  Constitutional: [ ] negative [ ] fevers [ ] chills [ ] weight loss [ ] weight gain  HEENT: [ ] negative [ ] dry eyes [ ] eye irritation [ ] postnasal drip [ ] nasal congestion  CV: [ ] negative  [ ] chest pain [ ] orthopnea [ ] palpitations [ ] murmur  Resp: [ ] negative [ ] cough [ ] shortness of breath [ ] dyspnea [ ] wheezing [ ] sputum [ ] hemoptysis  GI: [ ] negative [ ] nausea [ ] vomiting [ ] diarrhea [ ] constipation [ ] abd pain [ ] dysphagia   : [ ] negative [ ] dysuria [ ] nocturia [ ] hematuria [ ] increased urinary frequency  Musculoskeletal: [ ] negative [ ] back pain [ ] myalgias [ ] arthralgias [ ] fracture  Skin: [ ] negative [ ] rash [ ] itch  Neurological: [ ] negative [ ] headache [ ] dizziness [ ] syncope [ ] weakness [ ] numbness  Psychiatric: [ ] negative [ ] anxiety [ ] depression  Endocrine: [ ] negative [ ] diabetes [ ] thyroid problem  Hematologic/Lymphatic: [ ] negative [ ] anemia [ ] bleeding problem  Allergic/Immunologic: [ ] negative [ ] itchy eyes [ ] nasal discharge [ ] hives [ ] angioedema  [x] All other systems negative  [ ] Unable to assess ROS because ________    OBJECTIVE:  ICU Vital Signs Last 24 Hrs  T(C): 36.7 (10 Feb 2022 18:00), Max: 37.1 (10 Feb 2022 05:05)  T(F): 98 (10 Feb 2022 18:00), Max: 98.8 (10 Feb 2022 05:05)  HR: 66 (10 Feb 2022 18:00) (65 - 73)  BP: 134/70 (10 Feb 2022 18:00) (134/70 - 144/75)  BP(mean): 89 (09 Feb 2022 18:30) (89 - 89)  ABP: --  ABP(mean): --  RR: 17 (10 Feb 2022 18:00) (17 - 18)  SpO2: 99% (10 Feb 2022 18:00) (97% - 100%)      02-09 @ 07:01  -  02-10 @ 07:00  --------------------------------------------------------  IN: 400 mL / OUT: 900 mL / NET: -500 mL      CAPILLARY BLOOD GLUCOSE      POCT Blood Glucose.: 135 mg/dL (10 Feb 2022 16:52)      PHYSICAL EXAM:  General: NAD, appears comfortable  Skin: Warm and dry, no rashes  Neuro: AAOx3, nonfocal  HEENT: PERRL, EOMI, no oral lesions  Neck: Full range of motion, no JVD  Lungs: Clear to ascultation bilatereally, no wheezes, rales, or rhonchi   Heart: Regular rate and rhythm, no murmurs  Abdomen: Normoactive bowl sounds, soft, nontender, nondistended  Extremities: No lower extremity tenderness, erythema, or edema   Psych: normal mood and affect        HOSPITAL MEDICATIONS:  MEDICATIONS  (STANDING):  amLODIPine   Tablet 10 milliGRAM(s) Oral daily  aspirin enteric coated 81 milliGRAM(s) Oral daily  dextrose 40% Gel 15 Gram(s) Oral once  dextrose 5%. 1000 milliLiter(s) (50 mL/Hr) IV Continuous <Continuous>  dextrose 5%. 1000 milliLiter(s) (50 mL/Hr) IV Continuous <Continuous>  dextrose 5%. 1000 milliLiter(s) (100 mL/Hr) IV Continuous <Continuous>  dextrose 50% Injectable 25 Gram(s) IV Push once  dextrose 50% Injectable 12.5 Gram(s) IV Push once  dextrose 50% Injectable 25 Gram(s) IV Push once  glucagon  Injectable 1 milliGRAM(s) IntraMuscular once  hydrALAZINE 25 milliGRAM(s) Oral three times a day  influenza  Vaccine (HIGH DOSE) 0.7 milliLiter(s) IntraMuscular once  insulin glargine Injectable (LANTUS) 20 Unit(s) SubCutaneous at bedtime  insulin lispro (ADMELOG) corrective regimen sliding scale   SubCutaneous three times a day before meals  insulin lispro (ADMELOG) corrective regimen sliding scale   SubCutaneous at bedtime  metoprolol tartrate 25 milliGRAM(s) Oral two times a day  pantoprazole  Injectable 40 milliGRAM(s) IV Push two times a day  thiamine 100 milliGRAM(s) Oral daily    MEDICATIONS  (PRN):  albuterol/ipratropium for Nebulization 3 milliLiter(s) Nebulizer every 6 hours PRN Shortness of Breath and/or Wheezing  guaiFENesin Oral Liquid (Sugar-Free) 100 milliGRAM(s) Oral every 6 hours PRN Cough      LABS:                        9.5    5.48  )-----------( 356      ( 10 Feb 2022 06:39 )             29.4     Hgb Trend: 9.5<--, 8.7<--, 9.0<--, 9.3<--, 9.4<--  02-10    141  |  107  |  41<H>  ----------------------------<  53<LL>  4.4   |  22  |  1.44<H>    Ca    9.2      10 Feb 2022 06:39  Phos  4.4     02-10  Mg     1.70     02-10      Creatinine Trend: 1.44<--, 1.30<--, 1.26<--, 1.28<--, 1.24<--, 1.50<--         Pt discharged with written instructions. Pt verbalized understanding. Pt with ride home. Pt states she has out-patient detox arrangements made.

## 2022-02-10 NOTE — PROGRESS NOTES
4 Eyes Skin Assessment Completed by Nati RN and Carolyn RN.    Head WDL  Ears WDL  Nose WDL  Mouth WDL  Neck WDL  Breast/Chest WDL  Shoulder Blades WDL  Spine WDL  (R) Arm/Elbow/Hand WDL  (L) Arm/Elbow/Hand WDL  Abdomen WDL  Groin WDL  Scrotum/Coccyx/Buttocks WDL  (R) Leg Incision  (L) Leg WDL  (R) Heel/Foot/Toe WDL  (L) Heel/Foot/Toe WDL          Devices In Places Nasal Cannula     Abductor pillow in place      Interventions In Place Gray Ear Foams    Possible Skin Injury No    Pictures Uploaded Into Epic N/A  Wound Consult Placed N/A  RN Wound Prevention Protocol Ordered No

## 2022-02-10 NOTE — FACE TO FACE
Face to Face Supporting Documentation - Home Health    The encounter with this patient was in whole or in part the primary reason for home health admission.    Date of encounter:   Patient:                    MRN:                       YOB: 2022  Yohana Brandon  [id number]  1965     Home health to see patient for:  Skilled Nursing care for assessment, interventions & education and Physical Therapy evaluation and treatment    Skilled need for:  Surgical Aftercare R hip dislocation after total hip    Skilled nursing interventions to include:  Comment: None    Homebound status evidenced by:  Need the aid of supportive devices such as crutches, canes, wheelchairs or walkers. Leaving home requires a considerable and taxing effort. There is a normal inability to leave the home.    Community Physician to provide follow up care: Deborah Moctezuma M.D.     Optional Interventions? No      I certify the face to face encounter for this home health care referral meets the CMS requirements and the encounter/clinical assessment with the patient was, in whole, or in part, for the medical condition(s) listed above, which is the primary reason for home health care. Based on my clinical findings: the service(s) are medically necessary, support the need for home health care, and the homebound criteria are met.  I certify that this patient has had a face to face encounter by myself.  Miguelina Iniguez D.O. - NPI: [id number]

## 2022-02-10 NOTE — PROGRESS NOTES
Report received from night shift RN. Assume care. Pt. AAOx4 pt is bed,  Assessment completed. VSS. Denies pain, able to wiggle toes and dorsi/plantar flex feet, good CMS and pulses to daniel LE, denies numbness and tingling. Pt was update for the care for the day. White board updated, All question answered. Pt has call light within reach,  bed is in the lowest position. Pt has no other needs at this time.

## 2022-02-10 NOTE — OR NURSING
1540: Rcvd report from MILLER Diaz. Will get pt and bring to pre-op holding in time for her surgery.

## 2022-02-10 NOTE — THERAPY
"Occupational Therapy   Initial Evaluation     Patient Name: Yohana Brandon  Age:  56 y.o., Sex:  female  Medical Record #: [id number]  Today's Date: 2/10/2022     Precautions  Precautions: Anterior Hip Precautions  Comments: hip abduction brace 20 deg flexion and 15 deg hip abduction locked    Assessment    Patient is 56 y.o. female with a diagnosis of R JANEE dislocation, now POD #1 closed reduction of anterior JANEE. She is on strict anterior hip precautions, with hip abduction brace locked to 20deg flexion and 15deg abduction. Additional factors influencing patient status / progress: EtOH abuse, substance abuse (meth), psych d/o, COPD, coronary artery calcification, HTN. 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 Pt was I with all ADLs/IADLs, ambulates without AD and drives at PLOF. She was provided education re: donning of hip abduction brace, orthotic fit and adjustment, and dressing techniques. Pt presents today with post op pain, though was able to tolerate most ADLs and functional txfs with FWW use at spv level. Pt's home is adequately equipped with DMEs to aid her during recovery, and will receive good support from family. No acute OT needs at this time.    Plan    Recommend Occupational Therapy for evaluation and treatment only.     DC Equipment Recommendations: None  Discharge Recommendations: Anticipate that the patient will have no further occupational therapy needs after discharge from the hospital     Subjective    \"That was my  calling, wants to find out if I can go home today.\"     Objective       02/10/22 1042   Prior Living Situation   Prior Services None   Housing / Facility 1 Story House   Steps Into Home 1   Steps In Home 0   Bathroom Set up Walk In Shower  (built in bench)   Equipment Owned Front-Wheel Walker;Tub / Shower Seat;Raised Toilet Seat Without Arms;Hand Held Shower  (adjustable bed)   Lives with - Patient's Self Care Capacity Spouse "   Comments Pt lives with supportive spouse   Prior Level of ADL Function   Self Feeding Independent   Grooming / Hygiene Independent   Bathing Independent   Dressing Independent   Toileting Independent   Prior Level of IADL Function   Medication Management Independent   Laundry Independent   Kitchen Mobility Independent   Finances Independent   Home Management Independent   Shopping Independent   Prior Level Of Mobility Independent Without Device in Community;Independent Without Device in Home   Driving / Transportation Driving Independent   Occupation (Pre-Hospital Vocational) Homemaker;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   History of Falls   History of Falls No   Date of Last Fall 12/26/21   Precautions   Precautions Anterior Hip Precautions   Comments STRICT ANTERIOR HIP PRECAUTIONS; hip abduction brace 20 deg flexion and 15 deg hip abduction locked   Pain   Pain Scales 0 to 10 Scale    Intervention Medication (see MAR)   Pain 0 - 10 Group   Location Hip   Location Orientation Right   Pain Rating Scale (NPRS) 5   Description Aching   Therapist Pain Assessment Post Activity Pain Same as Prior to Activity   Cognition    Cognition / Consciousness WDL   Level of Consciousness Alert   Comments pleasant and cooperative   Active ROM Upper Body   Active ROM Upper Body  WDL   Strength Upper Body   Upper Body Strength  WDL   Balance Assessment   Sitting Balance (Static) Good   Sitting Balance (Dynamic) Good   Standing Balance (Static) Good   Standing Balance (Dynamic) Fair +   Weight Shift Sitting Good   Weight Shift Standing Fair   Comments with FWW   Bed Mobility    Supine to Sit Supervised   Scooting Supervised   Comments HOB elevated, rails upt (has adjustable bed at home)   ADL Assessment   Grooming Standing;Supervision   Upper Body Dressing Supervision   Lower Body Dressing Moderate Assist  (donning brace initially)   Toileting Supervision  (BSC)   How much help from another person does the patient currently need...   Putting on and taking  off regular lower body clothing? 3   Bathing (including washing, rinsing, and drying)? 3   Toileting, which includes using a toilet, bedpan, or urinal? 3   Putting on and taking off regular upper body clothing? 3   Taking care of personal grooming such as brushing teeth? 3   Eating meals? 4   6 Clicks Daily Activity Score 19   Functional Mobility   Sit to Stand Supervised   Bed, Chair, Wheelchair Transfer Supervised   Toilet Transfers Supervised   Transfer Method Stand Step   Mobility in room with FWW   Distance (Feet) 75   # of Times Distance was Traveled 1   Activity Tolerance   Sitting in Chair NT   Sitting Edge of Bed 5 mins   Standing 15 mins   Education Group   Education Provided Role of Occupational Therapist;Hip Precautions;Home Safety;Adaptive Equipment;Activities of Daily Living   Role of Occupational Therapist Patient Response Patient;Acceptance;Explanation   Hip Precautions Patient Response Patient;Acceptance;Explanation;Verbal Demonstration;Demonstration;Handout   Home Safety Patient Response Patient;Acceptance;Explanation;Verbal Demonstration   ADL Patient Response Patient;Acceptance;Explanation;Demonstration;Verbal Demonstration   Adaptive Equipment Patient Response Patient;Acceptance;Explanation;Demonstration;Verbal Demonstration   Anticipated Discharge Equipment and Recommendations   DC Equipment Recommendations None   Discharge Recommendations Anticipate that the patient will have no further occupational therapy needs after discharge from the hospital   Interdisciplinary Plan of Care Collaboration   IDT Collaboration with  Nursing;Physical Therapist   Patient Position at End of Therapy Seated;Edge of Bed   Collaboration Comments Handoff to RN post session   Session Information   Date / Session Number  2/10 #1 (one time only)   Priority 0

## 2022-02-10 NOTE — ANESTHESIA PROCEDURE NOTES
Airway    Date/Time: 2/9/2022 6:59 PM  Performed by: Francisco Jimenez M.D.  Authorized by: Francisco Jimenez M.D.     Location:  OR  Urgency:  Elective  Indications for Airway Management:  Anesthesia      Spontaneous Ventilation: absent    Sedation Level:  Deep  Preoxygenated: Yes    Mask Difficulty Assessment:  0 - not attempted  Final Airway Type:  Supraglottic airway  Final Supraglottic Airway:  Standard LMA    SGA Size:  3  Number of Attempts at Approach:  1

## 2022-02-10 NOTE — OR SURGEON
Immediate Post OP Note    PreOp Diagnosis: right dislocated total hip arthroplasty      PostOp Diagnosis: same      Procedure(s):  CLOSED REDUCTION Anterior right total hip dislocation  Surgeon(s):  Jose G Bell M.D.    Anesthesiologist/Type of Anesthesia:  Anesthesiologist: Francisco Jimenez M.D./* No anesthesia type entered *    Surgical Staff:  Circulator: Fili Deluca R.N.; Cat Eldridge R.N.  Scrub Person: Alona Pisano  Radiology Technologist: Emanuel Magana  Anesthesia Assistant: Suzanne Julio    Specimens removed if any:  * No specimens in log *    Estimated Blood Loss: 0cc    Findings: difficult to close reduce right total hip dislocation, Stable at 0 degrees extension and 90 degrees external rotation with complete muscle paralysis,    Complications: none        2/9/2022 7:25 PM Jose G Bell M.D.

## 2022-02-10 NOTE — DISCHARGE PLANNING
Received Choice form at [id number]  Agency/Facility Name: [facility]  Referral sent per Choice form @ [phone]

## 2022-02-10 NOTE — DISCHARGE INSTRUCTIONS
Closed Reduction for Prosthetic Hip Joint Dislocation    A closed reduction is a procedure to align bones that have moved out of place (been dislocated). An artificial (prosthetic) hip joint dislocation occurs when the ball of the hip implant (prosthetic) slips out of the socket. A prosthetic hip bone is more likely to become dislocated than a natural hip bone because a prosthetic hip does not have all the structures that normally hold the hip joint in place.  A closed reduction is not a surgery. It is done without cutting into your skin. During a closed reduction, a health care provider will rotate the bone and apply pressure to put the bone back into the socket.  Tell a health care provider about:  · Any allergies you have.  · All medicines you are taking, including vitamins, herbs, eye drops, creams, and over-the-counter medicines.  · Any problems you or family members have had with anesthetic medicines.  · Any blood disorders you have.  · Any surgeries you have had.  · Any medical conditions you have.  · Whether you are pregnant or may be pregnant.  What are the risks?  Generally, this is a safe procedure. However, problems may occur, including:  · Allergic reactions to medicines or dyes.  · Damage to surrounding blood vessels, tissue, or nerves.  · A blood clot.  · A break (fracture) in the thigh bone or pelvis.  · An unsuccessful procedure, meaning that the prosthetic could not be moved back into place. If this problem occurs, surgery may be necessary to move the prosthetic joint into place (surgical reduction).  What happens before the procedure?  · You may have imaging tests, such as X-rays or CT scan.  · You may have a physical exam. During the exam, the health care provider may check for damage to nerves and blood vessels. This may include checking the pulse in your foot, checking for numbness, and checking your ability to move your leg.  · Plan to have someone take you home from the hospital or  clinic.  · Plan to have a responsible adult care for you for at least 24 hours after you leave the hospital or clinic. This is important.  What happens during the procedure?  · An IV will be inserted into one of your veins.  · You will be given one or more of the following:  ? A medicine to help you relax (sedative).  ? A medicine to numb the area (local anesthetic).  ? A medicine to make you fall asleep (general anesthetic).  · Your thigh bone (femur) will be rotated into the correct position, and pressure will be applied to pop it back into the hip socket.  · Your hip joint will be moved around to make sure it is in the right position.  · Your health care provider will check for damage to nerves and blood vessels. This may include checking the pulse in your foot.  · Imaging tests, such as X-rays, will be done to make sure:  ? The femur is securely in the hip socket.  ? There are no fractures or pieces of bone in the joint.  · A cast or splint may be placed on your leg to keep your hip in the correct position (alignment) while your body heals.  The procedure may vary among health care providers and hospitals.  What happens after the procedure?  · Your blood pressure, heart rate, breathing rate, and blood oxygen level will be monitored until the medicines you were given have worn off.  · Imaging tests will be done to check whether there is damage to blood vessels or nerves around your hip. This may include a test that involves injecting a dye into your blood vessels and looking at the dye through a CT scan (angiogram). An MRI or Doppler ultrasound may also be done.  · You may have a pillow put between your legs to keep them stable and comfortable.  · You will be given medicine for pain.  · You may need to wear a cast or splint. You may be given crutches to help you move around.  · You will be given instructions about how much body weight you may safely support on your leg (weight-bearing restrictions).  · Do not  drive until your health care provider approves.  Summary  · A closed reduction is a procedure to align bones that have moved out of place (been dislocated). A health care provider will rotate the bone and apply pressure to put the bone back into place. This is not a surgery.  · A prosthetic hip bone is more likely to become dislocated than a natural hip bone because a prosthetic hip does not have all the structures that normally hold the joint in place.  · After reduction, you will be given instructions about how much body weight you may safely support on your leg (weight-bearing restrictions).  · Your health care provider will check to make sure there is no damage to blood vessels and nerves around your hip.  This information is not intended to replace advice given to you by your health care provider. Make sure you discuss any questions you have with your health care provider.  Document Released: 09/12/2002 Document Revised: 11/30/2018 Document Reviewed: 08/27/2018  [employer] Patient Education © 2020 [employer]          Discharge Instructions    Discharged to home by car with relative. Discharged via wheelchair, hospital escort: Yes.  Special equipment needed: Not Applicable    Be sure to schedule a follow-up appointment with your primary care doctor or any specialists as instructed.     Discharge Plan:   Diet Plan: Discussed  Activity Level: Discussed  Confirmed Follow up Appointment: Patient to Call and Schedule Appointment  Confirmed Symptoms Management: Discussed  Medication Reconciliation Updated: Yes  Influenza Vaccine Indication: Patient Refuses    I understand that a diet low in cholesterol, fat, and sodium is recommended for good health. Unless I have been given specific instructions below for another diet, I accept this instruction as my diet prescription.   Other diet: Regular    Special Instructions: None    · Is patient discharged on Warfarin / Coumadin?   No     Depression / Suicide Risk    As you  are discharged from this [facility] facility, it is important to learn how to keep safe from harming yourself.    Recognize the warning signs:  · Abrupt changes in personality, positive or negative- including increase in energy   · Giving away possessions  · Change in eating patterns- significant weight changes-  positive or negative  · Change in sleeping patterns- unable to sleep or sleeping all the time   · Unwillingness or inability to communicate  · Depression  · Unusual sadness, discouragement and loneliness  · Talk of wanting to die  · Neglect of personal appearance   · Rebelliousness- reckless behavior  · Withdrawal from people/activities they love  · Confusion- inability to concentrate     If you or a loved one observes any of these behaviors or has concerns about self-harm, here's what you can do:  · Talk about it- your feelings and reasons for harming yourself  · Remove any means that you might use to hurt yourself (examples: pills, rope, extension cords, firearm)  · Get professional help from the community (Mental Health, Substance Abuse, psychological counseling)  · Do not be alone:Call your Safe Contact- someone whom you trust who will be there for you.  · Call your local CRISIS HOTLINE 668-5032 or 783-209-3254  · Call your local Children's Mobile Crisis Response Team [place] (942) 595-8702 or www.[employer]  · Call the toll free National Suicide Prevention Hotlines   · National Suicide Prevention Lifeline 652-363-DAIE (6730)  · National Hope Line Network 800-SUICIDE (793-0135)

## 2022-02-10 NOTE — OR NURSING
1922 To PACU from OR via guralen s/p closed reduction to right hip. Pt awake, respirations spontaneous and non-labored. Abduction pillow in place. Toes to the affected extremity are pink and warm, brisk cap refill observed, pedal pulse palpable. Pt reports 8/10 intolerable pain to her right hip. Pt denies nausea. Oral and IV pain medications administered.     1937 Pt reports 6-7/10 tolerable pain. Pt denies nausea.     1945 X ray performed.     1952 Pt reports 7/10 intolerable pain. Pt denies nausea. IV pain medication administered.     2007 Pt reports 3/10 tolerable pain. Pt denies nausea. Pt's  updated.    2018 Report to [facility] Damian.

## 2022-02-10 NOTE — CARE PLAN
The patient is Stable - Low risk of patient condition declining or worsening    Shift Goals  Clinical Goals: pt will walk and void  Patient Goals: pt will rest    Progress made toward(s) clinical / shift goals:  Pt voided and unable to    Patient is not progressing towards the following goals:        Problem: Pain - Standard  Goal: Alleviation of pain or a reduction in pain to the patient’s comfort goal  Outcome: Progressing  Flowsheets (Taken 2/9/2022 2119)  Pain Rating Scale (NPRS): 9  Note: Medicated per MAR     Problem: Fall Risk  Goal: Patient will remain free from falls  Outcome: Progressing  Note: Pt educated to call before getting.     Problem: Knowledge Deficit - Standard  Goal: Patient and family/care givers will demonstrate understanding of plan of care, disease process/condition, diagnostic tests and medications  Outcome: Progressing  Note: POC discussed. Pt understands the plan to ambulate 50ft by midnight and urinate within 6hrs.

## 2022-02-10 NOTE — DISCHARGE SUMMARY
"Discharge Summary    CHIEF COMPLAINT ON ADMISSION  Chief Complaint   Patient presents with   • Hip Pain     Pt BIB [facility] for Rt Hip Pn - woke up this morning and it \"popped out\";  Pt had same hip Sx about 5-wks ago; [facility] gave a total of 60 of ketamine enroute to facility - Pt currently unable to answer questions at this time;       Reason for Admission  EMS     Admission Date  2/9/2022    CODE STATUS  Full Code    HPI & HOSPITAL COURSE  This is a 56 y.o. female with a history of anxiety who is status post right total hip replacement 6 weeks ago, admitted for right hip pain which occurred suddenly after rolling over in bed while sleeping.  In the ER she was found to have a dislocation of her right total hip replacement.  Orthopedic surgery was consulted as attempt at reduction was unsuccessful in the emergency department.  Dr. Bell took her to the OR and performed a successful reduction under general anesthesia.    Postoperatively, she had significant pain and was requiring oxycodone 10 mg every 3 hours for pain control in addition to scheduled Tylenol.  She worked with PT and OT while using a brace and was given specific anterior hip precaution instructions.    She has a walker left over from previous surgery however was recommended home health physical therapy.  This was ordered for her and she was discharged in stable condition.      Therefore, she is discharged in fair and stable condition to home with organized home healthcare and close outpatient follow-up.    The patient recovered much more quickly than anticipated on admission.    Discharge Date  2/10/2022    FOLLOW UP ITEMS POST DISCHARGE  Follow-up with Dr. 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 *      FOLLOW UP  No future appointments.  No follow-up provider specified.    MEDICATIONS ON DISCHARGE     Medication List      START taking these medications      Instructions   oxyCODONE immediate release 10 MG immediate release tablet  Commonly known as: ROXICODONE   Take 1 Tablet by mouth every four hours as needed for up to 7 days.  Dose: 10 mg     polyethylene glycol/lytes 17 g Pack  Commonly known as: MIRALAX   Take 1 Packet by mouth every day.  Dose: 17 g        CONTINUE taking these medications      Instructions   ALPRAZolam 0.5 MG Tabs  Commonly known as: XANAX   Take 0.5 mg by mouth 2 times a day as needed for Anxiety.  Dose: 0.5 mg     [place] Thyroid 60 MG Tabs  Generic drug: thyroid   Take 60 mg by mouth every day.  Dose: 60 mg     aspirin 81 MG EC tablet   Take 1 Tablet by mouth 2 times a day.  Dose: 81 mg     celecoxib 200 MG Caps  Commonly known as: CELEBREX   Take 1 Capsule by mouth 2 times a day.  Dose: 200 mg     cloNIDine 0.1 MG Tabs  Commonly known as: CATAPRES   Take 0.1 mg by mouth 2 times a day as needed (Per pt if blood pressure is > 160/80). Indications: High Blood Pressure Disorder  Dose: 0.1 mg     COENZYME Q10 PO   Take 1 Tablet by mouth every day.  Dose: 1 Tablet     cyanocobalamin 1000 MCG/ML Soln  Commonly known as: VITAMIN B-12   Inject 1,000 mcg into the shoulder, thigh, or buttocks every 7 days. On Tue  Dose: 1,000 mcg     fish oil 1000 MG Caps capsule   Take 1,000 mg by mouth every day.  Dose: 1,000 mg     magnesium oxide 400 MG Tabs tablet  Commonly known as: MAG-OX   Take 1 tablet by mouth every day.  Dose: 400 mg     Micardis HCT 80-25 MG per tablet  Generic drug: telmisartan-hydrochlorothiazide   Take 1 tablet by mouth every day.  Dose: 1 Tablet     multivitamin Tabs   Take 1 Tab by mouth  every day.  Dose: 1 Tablet     Non Formulary Request   Inject 1 Each as directed every day. Hormone shots @ Dr. Lopez Jackson office (Pregnyl 250 units)  Dose: 1 Each     Non Formulary Request   Apply 1.5 g topically every day. Testosterone 5% cream  Dose: 1.5 g     Non Formulary Request   Apply 0.5-1 mg topically every day. Bi-est cream E2 E3 80-20 ratio to 5MG  Dose: 0.5-1 mg     Potassium 99 MG Tabs   Take 99 mg by mouth every day. Indications: OTC  Dose: 99 mg     progesterone 100 MG Caps  Commonly known as: Prometrium   Take 100 mg by mouth every evening.  Dose: 100 mg     rosuvastatin 10 MG Tabs  Commonly known as: CRESTOR   Take 10 mg by mouth every evening.  Dose: 10 mg     VITAMIN D3 PO   Take 1 Cap by mouth every day.  Dose: 1 Capsule            Allergies  Allergies   Allergen Reactions   • Banana Shortness of Breath   • Kiwi Extract Swelling   • Latex Anaphylaxis   • Morphine Hives   • Other Food Hives and Shortness of Breath     Tropical fruit     • Vicodin [Hydrocodone-Acetaminophen] Rash     Generalized rash, puritis        DIET  Orders Placed This Encounter   Procedures   • Diet Order Diet: Regular     Standing Status:   Standing     Number of Occurrences:   1     Order Specific Question:   Diet:     Answer:   Regular [1]       ACTIVITY  As tolerated.  Anterior hip precautions given    CONSULTATIONS  Dr. Chery, Ortho    PROCEDURES  2/9/2022: Closed reduction right prosthetic hip dislocation    LABORATORY  Lab Results   Component Value Date    SODIUM 139 02/10/2022    POTASSIUM 4.5 02/10/2022    CHLORIDE 109 02/10/2022    CO2 23 02/10/2022    GLUCOSE 136 (H) 02/10/2022    BUN 17 02/10/2022    CREATININE 0.77 02/10/2022        Lab Results   Component Value Date    WBC 5.6 02/10/2022    HEMOGLOBIN 11.6 (L) 02/10/2022    HEMATOCRIT 36.5 (L) 02/10/2022    PLATELETCT 252 02/10/2022        Total time of the discharge process exceeds 38 minutes.

## 2022-03-03 ENCOUNTER — HOSPITAL ENCOUNTER (OUTPATIENT)
Dept: LAB | Facility: MEDICAL CENTER | Age: 57
End: 2022-03-03
Attending: EMERGENCY MEDICINE
Payer: COMMERCIAL

## 2022-03-03 LAB
25(OH)D3 SERPL-MCNC: 77 NG/ML (ref 30–100)
ALBUMIN SERPL BCP-MCNC: 5 G/DL (ref 3.2–4.9)
ALBUMIN/GLOB SERPL: 1.5 G/DL
ALP SERPL-CCNC: 100 U/L (ref 30–99)
ALT SERPL-CCNC: 32 U/L (ref 2–50)
ANION GAP SERPL CALC-SCNC: 11 MMOL/L (ref 7–16)
APPEARANCE UR: CLEAR
AST SERPL-CCNC: 32 U/L (ref 12–45)
BACTERIA #/AREA URNS HPF: NEGATIVE /HPF
BASOPHILS # BLD AUTO: 1.6 % (ref 0–1.8)
BASOPHILS # BLD: 0.1 K/UL (ref 0–0.12)
BILIRUB SERPL-MCNC: 0.3 MG/DL (ref 0.1–1.5)
BILIRUB UR QL STRIP.AUTO: NEGATIVE
BUN SERPL-MCNC: 24 MG/DL (ref 8–22)
CALCIUM SERPL-MCNC: 10.5 MG/DL (ref 8.5–10.5)
CHLORIDE SERPL-SCNC: 100 MMOL/L (ref 96–112)
CHOLEST SERPL-MCNC: 189 MG/DL (ref 100–199)
CO2 SERPL-SCNC: 26 MMOL/L (ref 20–33)
COLOR UR: YELLOW
CREAT SERPL-MCNC: 0.79 MG/DL (ref 0.5–1.4)
CRP SERPL HS-MCNC: 8 MG/L (ref 0–3)
DHEA-S SERPL-MCNC: 543 UG/DL (ref 18.9–205)
EOSINOPHIL # BLD AUTO: 0.3 K/UL (ref 0–0.51)
EOSINOPHIL NFR BLD: 4.8 % (ref 0–6.9)
EPI CELLS #/AREA URNS HPF: NORMAL /HPF
ERYTHROCYTE [DISTWIDTH] IN BLOOD BY AUTOMATED COUNT: 43 FL (ref 35.9–50)
EST. AVERAGE GLUCOSE BLD GHB EST-MCNC: 100 MG/DL
ESTRADIOL SERPL-MCNC: 22.7 PG/ML
GLOBULIN SER CALC-MCNC: 3.3 G/DL (ref 1.9–3.5)
GLUCOSE SERPL-MCNC: 89 MG/DL (ref 65–99)
GLUCOSE UR STRIP.AUTO-MCNC: NEGATIVE MG/DL
HBA1C MFR BLD: 5.1 % (ref 4–5.6)
HCT VFR BLD AUTO: 43.9 % (ref 37–47)
HCYS SERPL-SCNC: 11.95 UMOL/L
HDLC SERPL-MCNC: 72 MG/DL
HGB BLD-MCNC: 14 G/DL (ref 12–16)
HYALINE CASTS #/AREA URNS LPF: NORMAL /LPF
IMM GRANULOCYTES # BLD AUTO: 0.02 K/UL (ref 0–0.11)
IMM GRANULOCYTES NFR BLD AUTO: 0.3 % (ref 0–0.9)
KETONES UR STRIP.AUTO-MCNC: NEGATIVE MG/DL
LDLC SERPL CALC-MCNC: 87 MG/DL
LEUKOCYTE ESTERASE UR QL STRIP.AUTO: ABNORMAL
LYMPHOCYTES # BLD AUTO: 1.88 K/UL (ref 1–4.8)
LYMPHOCYTES NFR BLD: 30.4 % (ref 22–41)
MCH RBC QN AUTO: 29.7 PG (ref 27–33)
MCHC RBC AUTO-ENTMCNC: 31.9 G/DL (ref 33.6–35)
MCV RBC AUTO: 93 FL (ref 81.4–97.8)
MICRO URNS: ABNORMAL
MONOCYTES # BLD AUTO: 0.58 K/UL (ref 0–0.85)
MONOCYTES NFR BLD AUTO: 9.4 % (ref 0–13.4)
NEUTROPHILS # BLD AUTO: 3.31 K/UL (ref 2–7.15)
NEUTROPHILS NFR BLD: 53.5 % (ref 44–72)
NITRITE UR QL STRIP.AUTO: NEGATIVE
NRBC # BLD AUTO: 0 K/UL
NRBC BLD-RTO: 0 /100 WBC
PH UR STRIP.AUTO: 6.5 [PH] (ref 5–8)
PLATELET # BLD AUTO: 316 K/UL (ref 164–446)
PMV BLD AUTO: 11.7 FL (ref 9–12.9)
POTASSIUM SERPL-SCNC: 4.3 MMOL/L (ref 3.6–5.5)
PROGEST SERPL-MCNC: 0.49 NG/ML
PROT SERPL-MCNC: 8.3 G/DL (ref 6–8.2)
PROT UR QL STRIP: NEGATIVE MG/DL
RBC # BLD AUTO: 4.72 M/UL (ref 4.2–5.4)
RBC # URNS HPF: NORMAL /HPF
RBC UR QL AUTO: NEGATIVE
SODIUM SERPL-SCNC: 137 MMOL/L (ref 135–145)
SP GR UR STRIP.AUTO: 1.01
T3FREE SERPL-MCNC: 6.56 PG/ML (ref 2–4.4)
TESTOST SERPL-MCNC: 59 NG/DL (ref 9–75)
TRIGL SERPL-MCNC: 149 MG/DL (ref 0–149)
TSH SERPL DL<=0.005 MIU/L-ACNC: 0.13 UIU/ML (ref 0.38–5.33)
UROBILINOGEN UR STRIP.AUTO-MCNC: 0.2 MG/DL
WBC # BLD AUTO: 6.2 K/UL (ref 4.8–10.8)
WBC #/AREA URNS HPF: NORMAL /HPF

## 2022-03-03 PROCEDURE — 82627 DEHYDROEPIANDROSTERONE: CPT

## 2022-03-03 PROCEDURE — 83090 ASSAY OF HOMOCYSTEINE: CPT

## 2022-03-03 PROCEDURE — 82670 ASSAY OF TOTAL ESTRADIOL: CPT

## 2022-03-03 PROCEDURE — 84305 ASSAY OF SOMATOMEDIN: CPT

## 2022-03-03 PROCEDURE — 86141 C-REACTIVE PROTEIN HS: CPT

## 2022-03-03 PROCEDURE — 84439 ASSAY OF FREE THYROXINE: CPT

## 2022-03-03 PROCEDURE — 84443 ASSAY THYROID STIM HORMONE: CPT

## 2022-03-03 PROCEDURE — 80061 LIPID PANEL: CPT

## 2022-03-03 PROCEDURE — 84481 FREE ASSAY (FT-3): CPT

## 2022-03-03 PROCEDURE — 84403 ASSAY OF TOTAL TESTOSTERONE: CPT

## 2022-03-03 PROCEDURE — 83525 ASSAY OF INSULIN: CPT

## 2022-03-03 PROCEDURE — 84144 ASSAY OF PROGESTERONE: CPT

## 2022-03-03 PROCEDURE — 82306 VITAMIN D 25 HYDROXY: CPT

## 2022-03-03 PROCEDURE — 81001 URINALYSIS AUTO W/SCOPE: CPT

## 2022-03-03 PROCEDURE — 83036 HEMOGLOBIN GLYCOSYLATED A1C: CPT

## 2022-03-03 PROCEDURE — 85025 COMPLETE CBC W/AUTO DIFF WBC: CPT

## 2022-03-03 PROCEDURE — 80053 COMPREHEN METABOLIC PANEL: CPT

## 2022-03-03 PROCEDURE — 36415 COLL VENOUS BLD VENIPUNCTURE: CPT

## 2022-03-07 LAB
IGF-I SERPL-MCNC: 171 NG/ML (ref 51–233)
IGF-I Z-SCORE SERPL: 1
INSULIN P FAST SERPL-ACNC: 6 UIU/ML (ref 3–25)
T4 FREE SERPL DIALY-MCNC: 1.5 NG/DL (ref 1.1–2.4)

## 2022-03-24 ENCOUNTER — HOSPITAL ENCOUNTER (OUTPATIENT)
Dept: RADIOLOGY | Facility: MEDICAL CENTER | Age: 57
End: 2022-03-24
Payer: COMMERCIAL

## 2022-03-24 ENCOUNTER — APPOINTMENT (RX ONLY)
Dept: URBAN - METROPOLITAN AREA CLINIC 6 | Facility: CLINIC | Age: 57
Setting detail: DERMATOLOGY
End: 2022-03-24

## 2022-03-24 DIAGNOSIS — D18.0 HEMANGIOMA: ICD-10-CM

## 2022-03-24 DIAGNOSIS — L82.1 OTHER SEBORRHEIC KERATOSIS: ICD-10-CM

## 2022-03-24 DIAGNOSIS — Z71.89 OTHER SPECIFIED COUNSELING: ICD-10-CM

## 2022-03-24 DIAGNOSIS — D22 MELANOCYTIC NEVI: ICD-10-CM

## 2022-03-24 DIAGNOSIS — Q82.5 CONGENITAL NON-NEOPLASTIC NEVUS: ICD-10-CM

## 2022-03-24 DIAGNOSIS — L81.4 OTHER MELANIN HYPERPIGMENTATION: ICD-10-CM

## 2022-03-24 PROBLEM — D18.01 HEMANGIOMA OF SKIN AND SUBCUTANEOUS TISSUE: Status: ACTIVE | Noted: 2022-03-24

## 2022-03-24 PROBLEM — D22.5 MELANOCYTIC NEVI OF TRUNK: Status: ACTIVE | Noted: 2022-03-24

## 2022-03-24 PROBLEM — D48.5 NEOPLASM OF UNCERTAIN BEHAVIOR OF SKIN: Status: ACTIVE | Noted: 2022-03-24

## 2022-03-24 PROCEDURE — ? BIOPSY BY SHAVE METHOD

## 2022-03-24 PROCEDURE — ? COUNSELING

## 2022-03-24 PROCEDURE — 99203 OFFICE O/P NEW LOW 30 MIN: CPT | Mod: 25

## 2022-03-24 PROCEDURE — ? SUNSCREEN RECOMMENDATIONS

## 2022-03-24 PROCEDURE — 11102 TANGNTL BX SKIN SINGLE LES: CPT

## 2022-03-24 PROCEDURE — ? SUNSCREEN TREATMENT REGIMEN

## 2022-03-24 PROCEDURE — 11103 TANGNTL BX SKIN EA SEP/ADDL: CPT

## 2022-03-24 ASSESSMENT — LOCATION ZONE DERM
LOCATION ZONE: HAND
LOCATION ZONE: FACE
LOCATION ZONE: LEG
LOCATION ZONE: TRUNK

## 2022-03-24 ASSESSMENT — LOCATION SIMPLE DESCRIPTION DERM
LOCATION SIMPLE: LEFT KNEE
LOCATION SIMPLE: LEFT CHEEK
LOCATION SIMPLE: RIGHT BREAST
LOCATION SIMPLE: LEFT HAND
LOCATION SIMPLE: RIGHT HAND
LOCATION SIMPLE: ABDOMEN
LOCATION SIMPLE: CHEST
LOCATION SIMPLE: LEFT UPPER BACK

## 2022-03-24 ASSESSMENT — LOCATION DETAILED DESCRIPTION DERM
LOCATION DETAILED: LEFT INFERIOR MEDIAL MALAR CHEEK
LOCATION DETAILED: PERIUMBILICAL SKIN
LOCATION DETAILED: RIGHT MEDIAL BREAST 4-5:00 REGION
LOCATION DETAILED: LEFT SUPERIOR LATERAL UPPER BACK
LOCATION DETAILED: LEFT KNEE
LOCATION DETAILED: LEFT SUPERIOR UPPER BACK
LOCATION DETAILED: RIGHT RADIAL DORSAL HAND
LOCATION DETAILED: EPIGASTRIC SKIN
LOCATION DETAILED: RIGHT MEDIAL SUPERIOR CHEST
LOCATION DETAILED: LEFT ULNAR DORSAL HAND

## 2022-03-24 NOTE — PROCEDURE: BIOPSY BY SHAVE METHOD
Detail Level: Zone
Detail Level: Detailed
Depth Of Biopsy: dermis
Was A Bandage Applied: Yes
Size Of Lesion In Cm: 0
Biopsy Type: H and E
Biopsy Method: Dermablade
Anesthesia Type: 1% lidocaine with epinephrine and a 1:10 solution of 8.4% sodium bicarbonate
Anesthesia Volume In Cc: 0.5
Hemostasis: Electrocautery
Wound Care: Petrolatum
Dressing: bandage
Destruction After The Procedure: No
Type Of Destruction Used: Curettage
Curettage Text: The wound bed was treated with curettage after the biopsy was performed.
Cryotherapy Text: The wound bed was treated with cryotherapy after the biopsy was performed.
Electrodesiccation Text: The wound bed was treated with electrodesiccation after the biopsy was performed.
Electrodesiccation And Curettage Text: The wound bed was treated with electrodesiccation and curettage after the biopsy was performed.
Silver Nitrate Text: The wound bed was treated with silver nitrate after the biopsy was performed.
Lab: [id number]
Lab Facility: 
Consent: Written consent was obtained and risks were reviewed including but not limited to scarring, infection, bleeding, scabbing, incomplete removal, nerve damage and allergy to anesthesia.
Post-Care Instructions: I reviewed with the patient in detail post-care instructions. Patient is to keep the biopsy site dry overnight, and then apply bacitracin twice daily until healed. Patient may apply hydrogen peroxide soaks to remove any crusting.
Notification Instructions: Patient will be notified of biopsy results. However, patient instructed to call the office if not contacted within 2 weeks.
Billing Type: Third-Party Bill
Information: Selecting Yes will display possible errors in your note based on the variables you have selected. This validation is only offered as a suggestion for you. PLEASE NOTE THAT THE VALIDATION TEXT WILL BE REMOVED WHEN YOU FINALIZE YOUR NOTE. IF YOU WANT TO FAX A PRELIMINARY NOTE YOU WILL NEED TO TOGGLE THIS TO 'NO' IF YOU DO NOT WANT IT IN YOUR FAXED NOTE.

## 2022-03-31 ENCOUNTER — HOSPITAL ENCOUNTER (OUTPATIENT)
Dept: RADIOLOGY | Facility: MEDICAL CENTER | Age: 57
End: 2022-03-31
Attending: FAMILY MEDICINE
Payer: COMMERCIAL

## 2022-03-31 DIAGNOSIS — E78.5 HYPERLIPIDEMIA, UNSPECIFIED HYPERLIPIDEMIA TYPE: ICD-10-CM

## 2022-03-31 DIAGNOSIS — Z12.31 ENCOUNTER FOR MAMMOGRAM TO ESTABLISH BASELINE MAMMOGRAM: ICD-10-CM

## 2022-03-31 PROCEDURE — 77063 BREAST TOMOSYNTHESIS BI: CPT

## 2022-04-06 ENCOUNTER — APPOINTMENT (OUTPATIENT)
Dept: RADIOLOGY | Facility: MEDICAL CENTER | Age: 57
End: 2022-04-06
Attending: FAMILY MEDICINE
Payer: COMMERCIAL

## 2022-04-08 ENCOUNTER — HOSPITAL ENCOUNTER (OUTPATIENT)
Dept: RADIOLOGY | Facility: MEDICAL CENTER | Age: 57
End: 2022-04-08
Attending: FAMILY MEDICINE
Payer: COMMERCIAL

## 2022-04-08 DIAGNOSIS — R92.8 ABNORMAL MAMMOGRAM: ICD-10-CM

## 2022-04-08 PROCEDURE — 77065 DX MAMMO INCL CAD UNI: CPT | Mod: LT

## 2022-05-03 ENCOUNTER — HOSPITAL ENCOUNTER (OUTPATIENT)
Dept: LAB | Facility: MEDICAL CENTER | Age: 57
End: 2022-05-03
Attending: EMERGENCY MEDICINE
Payer: COMMERCIAL

## 2022-05-03 LAB
25(OH)D3 SERPL-MCNC: 82 NG/ML (ref 30–100)
ALBUMIN SERPL BCP-MCNC: 4.5 G/DL (ref 3.2–4.9)
ALBUMIN/GLOB SERPL: 1.4 G/DL
ALP SERPL-CCNC: 94 U/L (ref 30–99)
ALT SERPL-CCNC: 40 U/L (ref 2–50)
ANION GAP SERPL CALC-SCNC: 13 MMOL/L (ref 7–16)
APPEARANCE UR: CLEAR
AST SERPL-CCNC: 23 U/L (ref 12–45)
BACTERIA #/AREA URNS HPF: NEGATIVE /HPF
BASOPHILS # BLD AUTO: 1.2 % (ref 0–1.8)
BASOPHILS # BLD: 0.07 K/UL (ref 0–0.12)
BILIRUB SERPL-MCNC: 0.3 MG/DL (ref 0.1–1.5)
BILIRUB UR QL STRIP.AUTO: NEGATIVE
BUN SERPL-MCNC: 22 MG/DL (ref 8–22)
CALCIUM SERPL-MCNC: 10.3 MG/DL (ref 8.5–10.5)
CHLORIDE SERPL-SCNC: 99 MMOL/L (ref 96–112)
CHOLEST SERPL-MCNC: 169 MG/DL (ref 100–199)
CO2 SERPL-SCNC: 25 MMOL/L (ref 20–33)
COLOR UR: YELLOW
CORTIS SERPL-MCNC: 9.3 UG/DL (ref 0–23)
CREAT SERPL-MCNC: 1.02 MG/DL (ref 0.5–1.4)
CRP SERPL HS-MCNC: 2.9 MG/L (ref 0–3)
DHEA-S SERPL-MCNC: 105 UG/DL (ref 18.9–205)
EOSINOPHIL # BLD AUTO: 0.28 K/UL (ref 0–0.51)
EOSINOPHIL NFR BLD: 4.8 % (ref 0–6.9)
EPI CELLS #/AREA URNS HPF: NEGATIVE /HPF
ERYTHROCYTE [DISTWIDTH] IN BLOOD BY AUTOMATED COUNT: 43.5 FL (ref 35.9–50)
EST. AVERAGE GLUCOSE BLD GHB EST-MCNC: 105 MG/DL
ESTRADIOL SERPL-MCNC: <5 PG/ML
GFR SERPLBLD CREATININE-BSD FMLA CKD-EPI: 65 ML/MIN/1.73 M 2
GLOBULIN SER CALC-MCNC: 3.3 G/DL (ref 1.9–3.5)
GLUCOSE SERPL-MCNC: 86 MG/DL (ref 65–99)
GLUCOSE UR STRIP.AUTO-MCNC: NEGATIVE MG/DL
HBA1C MFR BLD: 5.3 % (ref 4–5.6)
HCT VFR BLD AUTO: 43.2 % (ref 37–47)
HCYS SERPL-SCNC: 11.2 UMOL/L
HDLC SERPL-MCNC: 62 MG/DL
HGB BLD-MCNC: 14.1 G/DL (ref 12–16)
HYALINE CASTS #/AREA URNS LPF: ABNORMAL /LPF
IMM GRANULOCYTES # BLD AUTO: 0.01 K/UL (ref 0–0.11)
IMM GRANULOCYTES NFR BLD AUTO: 0.2 % (ref 0–0.9)
KETONES UR STRIP.AUTO-MCNC: NEGATIVE MG/DL
LDLC SERPL CALC-MCNC: 54 MG/DL
LEUKOCYTE ESTERASE UR QL STRIP.AUTO: ABNORMAL
LYMPHOCYTES # BLD AUTO: 1.77 K/UL (ref 1–4.8)
LYMPHOCYTES NFR BLD: 30.1 % (ref 22–41)
MCH RBC QN AUTO: 29.1 PG (ref 27–33)
MCHC RBC AUTO-ENTMCNC: 32.6 G/DL (ref 33.6–35)
MCV RBC AUTO: 89.3 FL (ref 81.4–97.8)
MICRO URNS: ABNORMAL
MONOCYTES # BLD AUTO: 0.54 K/UL (ref 0–0.85)
MONOCYTES NFR BLD AUTO: 9.2 % (ref 0–13.4)
NEUTROPHILS # BLD AUTO: 3.22 K/UL (ref 2–7.15)
NEUTROPHILS NFR BLD: 54.5 % (ref 44–72)
NITRITE UR QL STRIP.AUTO: NEGATIVE
NRBC # BLD AUTO: 0 K/UL
NRBC BLD-RTO: 0 /100 WBC
PH UR STRIP.AUTO: 6 [PH] (ref 5–8)
PLATELET # BLD AUTO: 208 K/UL (ref 164–446)
PMV BLD AUTO: 13 FL (ref 9–12.9)
POTASSIUM SERPL-SCNC: 3.8 MMOL/L (ref 3.6–5.5)
PROGEST SERPL-MCNC: 0.14 NG/ML
PROT SERPL-MCNC: 7.8 G/DL (ref 6–8.2)
PROT UR QL STRIP: NEGATIVE MG/DL
RBC # BLD AUTO: 4.84 M/UL (ref 4.2–5.4)
RBC # URNS HPF: ABNORMAL /HPF
RBC UR QL AUTO: NEGATIVE
SODIUM SERPL-SCNC: 137 MMOL/L (ref 135–145)
SP GR UR STRIP.AUTO: 1.01
T3FREE SERPL-MCNC: 3.48 PG/ML (ref 2–4.4)
TESTOST SERPL-MCNC: 27 NG/DL (ref 9–75)
TRIGL SERPL-MCNC: 264 MG/DL (ref 0–149)
TSH SERPL DL<=0.005 MIU/L-ACNC: 2.71 UIU/ML (ref 0.38–5.33)
UROBILINOGEN UR STRIP.AUTO-MCNC: 0.2 MG/DL
WBC # BLD AUTO: 5.9 K/UL (ref 4.8–10.8)
WBC #/AREA URNS HPF: ABNORMAL /HPF

## 2022-05-03 PROCEDURE — 86141 C-REACTIVE PROTEIN HS: CPT

## 2022-05-03 PROCEDURE — 84305 ASSAY OF SOMATOMEDIN: CPT

## 2022-05-03 PROCEDURE — 83090 ASSAY OF HOMOCYSTEINE: CPT

## 2022-05-03 PROCEDURE — 83525 ASSAY OF INSULIN: CPT

## 2022-05-03 PROCEDURE — 84144 ASSAY OF PROGESTERONE: CPT

## 2022-05-03 PROCEDURE — 84481 FREE ASSAY (FT-3): CPT

## 2022-05-03 PROCEDURE — 82306 VITAMIN D 25 HYDROXY: CPT

## 2022-05-03 PROCEDURE — 84403 ASSAY OF TOTAL TESTOSTERONE: CPT

## 2022-05-03 PROCEDURE — 80053 COMPREHEN METABOLIC PANEL: CPT

## 2022-05-03 PROCEDURE — 83036 HEMOGLOBIN GLYCOSYLATED A1C: CPT

## 2022-05-03 PROCEDURE — 81001 URINALYSIS AUTO W/SCOPE: CPT

## 2022-05-03 PROCEDURE — 82627 DEHYDROEPIANDROSTERONE: CPT

## 2022-05-03 PROCEDURE — 84439 ASSAY OF FREE THYROXINE: CPT

## 2022-05-03 PROCEDURE — 82670 ASSAY OF TOTAL ESTRADIOL: CPT

## 2022-05-03 PROCEDURE — 80061 LIPID PANEL: CPT

## 2022-05-03 PROCEDURE — 84443 ASSAY THYROID STIM HORMONE: CPT

## 2022-05-03 PROCEDURE — 85025 COMPLETE CBC W/AUTO DIFF WBC: CPT

## 2022-05-03 PROCEDURE — 36415 COLL VENOUS BLD VENIPUNCTURE: CPT

## 2022-05-03 PROCEDURE — 82533 TOTAL CORTISOL: CPT

## 2022-05-05 LAB
IGF-I SERPL-MCNC: 263 NG/ML (ref 51–233)
IGF-I Z-SCORE SERPL: 2.1
INSULIN P FAST SERPL-ACNC: 18 UIU/ML (ref 3–25)

## 2022-05-07 LAB — T4 FREE SERPL DIALY-MCNC: 1.1 NG/DL (ref 1.1–2.4)

## 2022-06-23 ENCOUNTER — HOSPITAL ENCOUNTER (OUTPATIENT)
Dept: RADIOLOGY | Facility: MEDICAL CENTER | Age: 57
End: 2022-06-23
Attending: INTERNAL MEDICINE
Payer: COMMERCIAL

## 2022-06-23 DIAGNOSIS — R07.9 CHEST PAIN, UNSPECIFIED TYPE: ICD-10-CM

## 2022-06-23 PROCEDURE — A9502 TC99M TETROFOSMIN: HCPCS

## 2022-06-23 PROCEDURE — 78452 HT MUSCLE IMAGE SPECT MULT: CPT | Mod: 26 | Performed by: INTERNAL MEDICINE

## 2022-06-23 PROCEDURE — 93018 CV STRESS TEST I&R ONLY: CPT | Performed by: INTERNAL MEDICINE

## 2022-08-02 ENCOUNTER — HOSPITAL ENCOUNTER (OUTPATIENT)
Dept: CARDIOLOGY | Facility: MEDICAL CENTER | Age: 57
End: 2022-08-02
Attending: INTERNAL MEDICINE
Payer: COMMERCIAL

## 2022-08-02 DIAGNOSIS — R07.9 CHEST PAIN, UNSPECIFIED TYPE: ICD-10-CM

## 2022-08-02 LAB
LV EJECT FRACT MOD 2C 99903: 69.97
LV EJECT FRACT MOD 4C 99902: 65.89
LV EJECT FRACT MOD BP 99901: 68.4

## 2022-08-02 PROCEDURE — 93306 TTE W/DOPPLER COMPLETE: CPT

## 2022-12-29 ENCOUNTER — HOSPITAL ENCOUNTER (EMERGENCY)
Facility: MEDICAL CENTER | Age: 57
End: 2022-12-29
Attending: EMERGENCY MEDICINE
Payer: COMMERCIAL

## 2022-12-29 ENCOUNTER — APPOINTMENT (OUTPATIENT)
Dept: RADIOLOGY | Facility: MEDICAL CENTER | Age: 57
End: 2022-12-29
Attending: EMERGENCY MEDICINE
Payer: COMMERCIAL

## 2022-12-29 VITALS
RESPIRATION RATE: 18 BRPM | OXYGEN SATURATION: 95 % | TEMPERATURE: 98 F | DIASTOLIC BLOOD PRESSURE: 80 MMHG | HEIGHT: 67 IN | BODY MASS INDEX: 24.74 KG/M2 | HEART RATE: 83 BPM | SYSTOLIC BLOOD PRESSURE: 111 MMHG | WEIGHT: 157.63 LBS

## 2022-12-29 DIAGNOSIS — E87.1 HYPONATREMIA: ICD-10-CM

## 2022-12-29 DIAGNOSIS — E83.42 HYPOMAGNESEMIA: ICD-10-CM

## 2022-12-29 DIAGNOSIS — R07.9 CHEST PAIN, UNSPECIFIED TYPE: ICD-10-CM

## 2022-12-29 DIAGNOSIS — F10.930 ALCOHOL WITHDRAWAL SYNDROME WITHOUT COMPLICATION (HCC): ICD-10-CM

## 2022-12-29 LAB
ALBUMIN SERPL BCP-MCNC: 4.7 G/DL (ref 3.2–4.9)
ALBUMIN/GLOB SERPL: 1.5 G/DL
ALP SERPL-CCNC: 79 U/L (ref 30–99)
ALT SERPL-CCNC: 43 U/L (ref 2–50)
ANION GAP SERPL CALC-SCNC: 15 MMOL/L (ref 7–16)
AST SERPL-CCNC: 43 U/L (ref 12–45)
BASOPHILS # BLD AUTO: 0.9 % (ref 0–1.8)
BASOPHILS # BLD: 0.04 K/UL (ref 0–0.12)
BILIRUB SERPL-MCNC: 0.7 MG/DL (ref 0.1–1.5)
BUN SERPL-MCNC: 11 MG/DL (ref 8–22)
CALCIUM ALBUM COR SERPL-MCNC: 8.9 MG/DL (ref 8.5–10.5)
CALCIUM SERPL-MCNC: 9.5 MG/DL (ref 8.4–10.2)
CHLORIDE SERPL-SCNC: 85 MMOL/L (ref 96–112)
CO2 SERPL-SCNC: 26 MMOL/L (ref 20–33)
CREAT SERPL-MCNC: 0.75 MG/DL (ref 0.5–1.4)
EKG IMPRESSION: NORMAL
EOSINOPHIL # BLD AUTO: 0.07 K/UL (ref 0–0.51)
EOSINOPHIL NFR BLD: 1.5 % (ref 0–6.9)
ERYTHROCYTE [DISTWIDTH] IN BLOOD BY AUTOMATED COUNT: 42.5 FL (ref 35.9–50)
GFR SERPLBLD CREATININE-BSD FMLA CKD-EPI: 94 ML/MIN/1.73 M 2
GLOBULIN SER CALC-MCNC: 3.1 G/DL (ref 1.9–3.5)
GLUCOSE SERPL-MCNC: 105 MG/DL (ref 65–99)
HCG SERPL QL: NEGATIVE
HCT VFR BLD AUTO: 39.5 % (ref 37–47)
HGB BLD-MCNC: 14.2 G/DL (ref 12–16)
IMM GRANULOCYTES # BLD AUTO: 0.01 K/UL (ref 0–0.11)
IMM GRANULOCYTES NFR BLD AUTO: 0.2 % (ref 0–0.9)
LIPASE SERPL-CCNC: 34 U/L (ref 7–58)
LYMPHOCYTES # BLD AUTO: 1.41 K/UL (ref 1–4.8)
LYMPHOCYTES NFR BLD: 30.7 % (ref 22–41)
MAGNESIUM SERPL-MCNC: 1.4 MG/DL (ref 1.5–2.5)
MCH RBC QN AUTO: 32.3 PG (ref 27–33)
MCHC RBC AUTO-ENTMCNC: 35.9 G/DL (ref 33.6–35)
MCV RBC AUTO: 90 FL (ref 81.4–97.8)
MONOCYTES # BLD AUTO: 0.58 K/UL (ref 0–0.85)
MONOCYTES NFR BLD AUTO: 12.6 % (ref 0–13.4)
NEUTROPHILS # BLD AUTO: 2.49 K/UL (ref 2–7.15)
NEUTROPHILS NFR BLD: 54.1 % (ref 44–72)
NRBC # BLD AUTO: 0 K/UL
NRBC BLD-RTO: 0 /100 WBC
PLATELET # BLD AUTO: 164 K/UL (ref 164–446)
PMV BLD AUTO: 9.2 FL (ref 9–12.9)
POTASSIUM SERPL-SCNC: 4 MMOL/L (ref 3.6–5.5)
PROT SERPL-MCNC: 7.8 G/DL (ref 6–8.2)
RBC # BLD AUTO: 4.39 M/UL (ref 4.2–5.4)
SODIUM SERPL-SCNC: 126 MMOL/L (ref 135–145)
TROPONIN T SERPL-MCNC: 13 NG/L (ref 6–19)
WBC # BLD AUTO: 4.6 K/UL (ref 4.8–10.8)

## 2022-12-29 PROCEDURE — 99285 EMERGENCY DEPT VISIT HI MDM: CPT

## 2022-12-29 PROCEDURE — 80053 COMPREHEN METABOLIC PANEL: CPT

## 2022-12-29 PROCEDURE — 71045 X-RAY EXAM CHEST 1 VIEW: CPT

## 2022-12-29 PROCEDURE — 93005 ELECTROCARDIOGRAM TRACING: CPT | Performed by: EMERGENCY MEDICINE

## 2022-12-29 PROCEDURE — 36415 COLL VENOUS BLD VENIPUNCTURE: CPT

## 2022-12-29 PROCEDURE — 96365 THER/PROPH/DIAG IV INF INIT: CPT

## 2022-12-29 PROCEDURE — 700111 HCHG RX REV CODE 636 W/ 250 OVERRIDE (IP): Performed by: EMERGENCY MEDICINE

## 2022-12-29 PROCEDURE — 93005 ELECTROCARDIOGRAM TRACING: CPT

## 2022-12-29 PROCEDURE — 96375 TX/PRO/DX INJ NEW DRUG ADDON: CPT

## 2022-12-29 PROCEDURE — 85025 COMPLETE CBC W/AUTO DIFF WBC: CPT

## 2022-12-29 PROCEDURE — 84703 CHORIONIC GONADOTROPIN ASSAY: CPT

## 2022-12-29 PROCEDURE — 84484 ASSAY OF TROPONIN QUANT: CPT

## 2022-12-29 PROCEDURE — 83735 ASSAY OF MAGNESIUM: CPT

## 2022-12-29 PROCEDURE — 83690 ASSAY OF LIPASE: CPT

## 2022-12-29 RX ORDER — CHLORDIAZEPOXIDE HYDROCHLORIDE 25 MG/1
CAPSULE, GELATIN COATED ORAL
Qty: 11 CAPSULE | Refills: 0 | Status: SHIPPED | OUTPATIENT
Start: 2022-12-29 | End: 2023-01-03

## 2022-12-29 RX ORDER — DIAZEPAM 5 MG/ML
5 INJECTION, SOLUTION INTRAMUSCULAR; INTRAVENOUS ONCE
Status: COMPLETED | OUTPATIENT
Start: 2022-12-29 | End: 2022-12-29

## 2022-12-29 RX ORDER — MAGNESIUM SULFATE 1 G/100ML
1 INJECTION INTRAVENOUS ONCE
Status: COMPLETED | OUTPATIENT
Start: 2022-12-29 | End: 2022-12-29

## 2022-12-29 RX ORDER — ONDANSETRON 2 MG/ML
4 INJECTION INTRAMUSCULAR; INTRAVENOUS ONCE
Status: COMPLETED | OUTPATIENT
Start: 2022-12-29 | End: 2022-12-29

## 2022-12-29 RX ADMIN — MAGNESIUM SULFATE 1 G: 1 INJECTION INTRAVENOUS at 08:56

## 2022-12-29 RX ADMIN — DIAZEPAM 5 MG: 5 INJECTION, SOLUTION INTRAMUSCULAR; INTRAVENOUS at 08:43

## 2022-12-29 RX ADMIN — ONDANSETRON 4 MG: 2 INJECTION INTRAMUSCULAR; INTRAVENOUS at 08:43

## 2022-12-29 ASSESSMENT — HEART SCORE
ECG: NORMAL
AGE: 45-64
TROPONIN: LESS THAN OR EQUAL TO NORMAL LIMIT
RISK FACTORS: 1-2 RISK FACTORS
HISTORY: SLIGHTLY SUSPICIOUS
HEART SCORE: 2

## 2022-12-29 ASSESSMENT — FIBROSIS 4 INDEX: FIB4 SCORE: 0.96

## 2022-12-29 NOTE — ED NOTES
Patient is stable for discharge at this time, anticipatory guidance provided, close follow-up is encouraged, and ED return instructions have been detailed. Patient and spouse are both agreeable to the disposition and plan, and Pt is discharged home in ambulatory state and in good condition.     Rx education provided, Pt verbalized understanding;     Pt spouse driving Pt home;

## 2022-12-29 NOTE — ED PROVIDER NOTES
ED Provider Note    CHIEF COMPLAINT  Chief Complaint   Patient presents with    Chest Pain     Started this am around 0100, states she was already awake because she doesn't sleep for days, describes pain as shap, left sided, reports some SOB, denies cough and sweating, reports hx of being am alcoholic and reports she drank a 1/2 bottle of wine to help with the chest pain       LIMITATION TO HISTORY   Select: : None    HPI  Yohana Brandon is a 57 y.o. female who presents with tightness in her chest in the setting of trying to stop drinking alcohol.  Patient reports she drinks heavily, usually a bottle of wine and some vodka every day.  She is trying to stop, yesterday only had half a bottle of wine and over the night began feeling nauseated as well as some tightness in her upper chest.  She is also feeling shaky.  There is no real pain to the chest or radiation to her symptoms.  No shortness of breath.  No vomiting, diarrhea or abdominal pain.  No history of seizures.  Leg pain or swelling.  No recent travel.  No fevers or chills or cough or congestion.  She has had other symptoms with withdrawal in the past    OUTSIDE HISTORIAN(S):  Select: none    EXTERNAL RECORDS REVIEWED  Select: Outpatient Notes seen by cardiology at [place] August 30 for chest pain and cardiac screening.  Unremarkable echocardiogram myocardial perfusion stress test which was negative felt to be chest pain unlikely to be cardiac    REVIEW OF SYSTEMS  See HPI for further details. All other systems are negative.     PAST MEDICAL HISTORY   has a past medical history of Alcohol abuse, Alcohol abuse, ASTHMA, Elevated LFTs, Heart palpitations, Hypertension, and Psychiatric disorder.    SURGICAL HISTORY   has a past surgical history that includes appendectomy (1997); endometrial ablation, thermal (1999); remove tonsils/adenoids,<11 y/o (1973); anesth,surg breast reconstructive (1994); hemorrhoidectomy (8/24/2011); anal sphincterotomy  "(2011); bunionectomy ();  delivery+postpartum care; total hip arthroplasty (Right, 2021); and closed reduction (Right, 2022).    FAMILY HISTORY  Family History   Problem Relation Age of Onset    Stroke Mother        SOCIAL HISTORY  Social History     Tobacco Use    Smoking status: Never    Smokeless tobacco: Never   Vaping Use    Vaping Use: Never used   Substance and Sexual Activity    Alcohol use: Yes     Comment: every day     Drug use: No    Sexual activity: Not on file       CURRENT MEDICATIONS  Home Medications    **Home medications have not yet been reviewed for this encounter**         ALLERGIES  Allergies   Allergen Reactions    Banana Shortness of Breath    Kiwi Extract Swelling    Latex Anaphylaxis    Morphine Hives    Other Food Hives and Shortness of Breath     Tropical fruit      Vicodin [Hydrocodone-Acetaminophen] Rash     Generalized rash, puritis        PHYSICAL EXAM  VITAL SIGNS: /83   Pulse 77   Temp 36.7 °C (98.1 °F) (Temporal)   Resp 18   Ht 1.702 m (5' 7\")   Wt 71.5 kg (157 lb 10.1 oz)   LMP 08/15/2011   SpO2 95%   BMI 24.69 kg/m²      Pulse ox interpretation: I interpret this pulse ox as normal.  Constitutional: Alert in no apparent distress.  HENT: No signs of trauma, Bilateral external ears normal, Nose normal.   Eyes: Pupils are equal and reactive, Conjunctiva normal, Non-icteric.   Neck: Normal range of motion, No tenderness, Supple, No stridor.   Cardiovascular: Regular rate and rhythm, no murmurs.   Thorax & Lungs: Normal breath sounds, No respiratory distress, No wheezing, No chest tenderness.   Abdomen: Bowel sounds normal, Soft, No tenderness, No masses, No pulsatile masses. No peritoneal signs.  Skin: Warm, Dry, No erythema, No rash.   Back: No bony tenderness, No CVA tenderness.   Extremities: Intact distal pulses, No edema, No tenderness, No cyanosis,  Negative Joseph's sign.   Musculoskeletal: Good range of motion in all major joints. No " tenderness to palpation or major deformities noted.   Neurologic: Alert , Normal motor function, Normal sensory function, No focal deficits noted.   Psychiatric: Affect normal, Judgment normal, Mood normal.         DIAGNOSTIC STUDIES / PROCEDURES  Results for orders placed or performed during the hospital encounter of 12/29/22   CBC with Differential   Result Value Ref Range    WBC 4.6 (L) 4.8 - 10.8 K/uL    RBC 4.39 4.20 - 5.40 M/uL    Hemoglobin 14.2 12.0 - 16.0 g/dL    Hematocrit 39.5 37.0 - 47.0 %    MCV 90.0 81.4 - 97.8 fL    MCH 32.3 27.0 - 33.0 pg    MCHC 35.9 (H) 33.6 - 35.0 g/dL    RDW 42.5 35.9 - 50.0 fL    Platelet Count 164 164 - 446 K/uL    MPV 9.2 9.0 - 12.9 fL    Neutrophils-Polys 54.10 44.00 - 72.00 %    Lymphocytes 30.70 22.00 - 41.00 %    Monocytes 12.60 0.00 - 13.40 %    Eosinophils 1.50 0.00 - 6.90 %    Basophils 0.90 0.00 - 1.80 %    Immature Granulocytes 0.20 0.00 - 0.90 %    Nucleated RBC 0.00 /100 WBC    Neutrophils (Absolute) 2.49 2.00 - 7.15 K/uL    Lymphs (Absolute) 1.41 1.00 - 4.80 K/uL    Monos (Absolute) 0.58 0.00 - 0.85 K/uL    Eos (Absolute) 0.07 0.00 - 0.51 K/uL    Baso (Absolute) 0.04 0.00 - 0.12 K/uL    Immature Granulocytes (abs) 0.01 0.00 - 0.11 K/uL    NRBC (Absolute) 0.00 K/uL   Complete Metabolic Panel (CMP)   Result Value Ref Range    Sodium 126 (L) 135 - 145 mmol/L    Potassium 4.0 3.6 - 5.5 mmol/L    Chloride 85 (L) 96 - 112 mmol/L    Co2 26 20 - 33 mmol/L    Anion Gap 15.0 7.0 - 16.0    Glucose 105 (H) 65 - 99 mg/dL    Bun 11 8 - 22 mg/dL    Creatinine 0.75 0.50 - 1.40 mg/dL    Calcium 9.5 8.4 - 10.2 mg/dL    AST(SGOT) 43 12 - 45 U/L    ALT(SGPT) 43 2 - 50 U/L    Alkaline Phosphatase 79 30 - 99 U/L    Total Bilirubin 0.7 0.1 - 1.5 mg/dL    Albumin 4.7 3.2 - 4.9 g/dL    Total Protein 7.8 6.0 - 8.2 g/dL    Globulin 3.1 1.9 - 3.5 g/dL    A-G Ratio 1.5 g/dL   Troponins NOW   Result Value Ref Range    Troponin T 13 6 - 19 ng/L   HCG Qual Serum   Result Value Ref Range     Beta-Hcg Qualitative Serum Negative Negative   LIPASE   Result Value Ref Range    Lipase 34 7 - 58 U/L   MAGNESIUM   Result Value Ref Range    Magnesium 1.4 (L) 1.5 - 2.5 mg/dL   CORRECTED CALCIUM   Result Value Ref Range    Correct Calcium 8.9 8.5 - 10.5 mg/dL   ESTIMATED GFR   Result Value Ref Range    GFR (CKD-EPI) 94 >60 mL/min/1.73 m 2   EKG   Result Value Ref Range    Report       [facility]    Test Date:  2022  Pt Name:    SONIA DAMON          Department: [facility]  MRN:        [id number]                      Room:  Gender:     Female                       Technician: [id number]  :        1965                   Requested By:ER TRIAGE PROTOCOL  Order #:    [id number]                    Reading MD: GREG MORGAN MD    Measurements  Intervals                                Axis  Rate:       76                           P:          72  SD:         142                          QRS:        30  QRSD:       81                           T:          71  QT:         386  QTc:        435    Interpretive Statements  Sinus rhythm  Probable left atrial enlargement  Compared to ECG 2021 10:39:34  Possible ischemia no longer present  Electronically Signed On 2022 8:15:15 PST by GREG MORGAN MD           RADIOLOGY  I have independently interpreted the diagnostic imaging associated with this visit and am waiting the final reading from the radiologist.   DX-CHEST-PORTABLE (1 VIEW)   Final Result         1. No acute cardiopulmonary abnormalities are identified.            COURSE & MEDICAL DECISION MAKING  Pertinent Labs & Imaging studies reviewed. (See chart for details)  820   Patient is evaluated bedside, plan for CBC, CMP, magnesium, lipase, x-ray and EKG    Differential Diagnosis Considered  Alcohol withdrawal, pancreatitis, arrhythmia, ACS, metabolic disturbance    9:46 AM  Patient is reevaluated, she is feeling much improved, no more chest discomfort, no  shakiness, tremors observed.  At this point we will plan for discharge  Escalation of care considered, and ultimately not performed: acute inpatient care management, however at this time, the patient is most appropriate for outpatient management given her alcohol withdrawal symptoms are mild and well controlled.     Barriers to care at this time, including but not limited to: Select: none .     Diagnostic tests and prescription drugs considered including, but not limited to: Select: HEART Score 2 .    In addition to the chief complaint, the following problems were addressed: Discussed follow-up for her alcohol abuse    I have discussed management of the patient with the following physicians and TONJA's:  none    Discussion of management with other [facility] [place] or appropriate source(s): Select: Patient has declined evaluation by behavioral health and substance abuse peer recovery team for further resources and outpatient follow-up          This is a 57-year-old female presenting with some chest tightness and shakiness in the setting of trying to stop drinking alcohol.  She does appear to have some mild alcohol withdrawal here, has been treated with Valium initially and she has improved greatly with no persistent tachycardia tremors or other findings to suggest severe withdrawal at this time.  Feel she is appropriate for continued outpatient management prescribe a Librium taper, substance abuse resources as above.  Patient was mildly hyponatremia, hypomagnesemic and given magnesium here.  No arrhythmia or proarrhythmia genic state on her ECG.  No other significant metabolic disturbance.  Regarding her chest tightness I think this is more related to her alcohol withdrawal.  Troponin is negative with 8 hours of symptoms, unremarkable ECG and the nature of her symptoms do not suggest ACS.  Additionally she has had recent cardiac evaluation with unremarkable echo and nuclear stress.  No persistent tachycardia or hypoxemia to suggest  pulmonary embolism and chest pain does not suggest pulmonary embolism either    @HTN/IDDM FOLLOW UP:  The patient is referred to a primary physician for blood pressure management, diabetic screening, and for all other preventive health concerns@     The patient will not drink alcohol nor drive with prescribed medications. The patient will return for worsening symptoms and is stable at the time of discharge. The patient verbalizes understanding and will comply.    FINAL IMPRESSION  1. Alcohol withdrawal syndrome without complication (HCC)    2. Chest pain, unspecified type    3. Hypomagnesemia    4. Hyponatremia           Electronically signed by: Kaden Engel M.D., 12/29/2022 8:13 AM

## 2022-12-29 NOTE — ED TRIAGE NOTES
Pt ambulates to triage by self  Chief Complaint   Patient presents with    Chest Pain     Started this am around 0100, states she was already awake because she doesn't sleep for days, describes pain as shap, left sided, reports some SOB, denies cough and sweating, reports hx of being am alcoholic and reports she drank a 1/2 bottle of wine to help with the chest pain     Pt straight back for EKG    Pt A & 0 x 4, speech clear, ambulates well

## 2022-12-29 NOTE — ED NOTES
Pt medicated, denied having any other needs at this time, no distress noted;    Pt put on 2LNC - SpO2% decreased after medication administration (88%), ERP notified;

## 2023-01-19 ENCOUNTER — APPOINTMENT (OUTPATIENT)
Dept: RADIOLOGY | Facility: MEDICAL CENTER | Age: 58
End: 2023-01-19
Attending: EMERGENCY MEDICINE
Payer: COMMERCIAL

## 2023-01-19 ENCOUNTER — HOSPITAL ENCOUNTER (EMERGENCY)
Facility: MEDICAL CENTER | Age: 58
End: 2023-01-19
Attending: EMERGENCY MEDICINE
Payer: COMMERCIAL

## 2023-01-19 VITALS
WEIGHT: 157.41 LBS | TEMPERATURE: 99 F | SYSTOLIC BLOOD PRESSURE: 121 MMHG | HEART RATE: 88 BPM | DIASTOLIC BLOOD PRESSURE: 77 MMHG | BODY MASS INDEX: 24.71 KG/M2 | RESPIRATION RATE: 16 BRPM | OXYGEN SATURATION: 92 % | HEIGHT: 67 IN

## 2023-01-19 DIAGNOSIS — F10.920 ALCOHOLIC INTOXICATION WITHOUT COMPLICATION (HCC): ICD-10-CM

## 2023-01-19 DIAGNOSIS — F10.29 ALCOHOL DEPENDENCE WITH UNSPECIFIED ALCOHOL-INDUCED DISORDER (HCC): ICD-10-CM

## 2023-01-19 LAB
ALBUMIN SERPL BCP-MCNC: 4.2 G/DL (ref 3.2–4.9)
ALBUMIN/GLOB SERPL: 1.6 G/DL
ALP SERPL-CCNC: 88 U/L (ref 30–99)
ALT SERPL-CCNC: 29 U/L (ref 2–50)
AMPHET UR QL SCN: NEGATIVE
ANION GAP SERPL CALC-SCNC: 14 MMOL/L (ref 7–16)
AST SERPL-CCNC: 32 U/L (ref 12–45)
BARBITURATES UR QL SCN: NEGATIVE
BASOPHILS # BLD AUTO: 1.2 % (ref 0–1.8)
BASOPHILS # BLD: 0.05 K/UL (ref 0–0.12)
BENZODIAZ UR QL SCN: POSITIVE
BILIRUB SERPL-MCNC: 0.2 MG/DL (ref 0.1–1.5)
BUN SERPL-MCNC: 9 MG/DL (ref 8–22)
BZE UR QL SCN: NEGATIVE
CALCIUM ALBUM COR SERPL-MCNC: 8.7 MG/DL (ref 8.5–10.5)
CALCIUM SERPL-MCNC: 8.9 MG/DL (ref 8.4–10.2)
CANNABINOIDS UR QL SCN: NEGATIVE
CHLORIDE SERPL-SCNC: 102 MMOL/L (ref 96–112)
CO2 SERPL-SCNC: 28 MMOL/L (ref 20–33)
CREAT SERPL-MCNC: 0.88 MG/DL (ref 0.5–1.4)
EKG IMPRESSION: NORMAL
EOSINOPHIL # BLD AUTO: 0.14 K/UL (ref 0–0.51)
EOSINOPHIL NFR BLD: 3.4 % (ref 0–6.9)
ERYTHROCYTE [DISTWIDTH] IN BLOOD BY AUTOMATED COUNT: 47.9 FL (ref 35.9–50)
ETHANOL BLD-MCNC: 283.2 MG/DL
GFR SERPLBLD CREATININE-BSD FMLA CKD-EPI: 77 ML/MIN/1.73 M 2
GLOBULIN SER CALC-MCNC: 2.6 G/DL (ref 1.9–3.5)
GLUCOSE SERPL-MCNC: 93 MG/DL (ref 65–99)
HCT VFR BLD AUTO: 39.6 % (ref 37–47)
HGB BLD-MCNC: 13.6 G/DL (ref 12–16)
IMM GRANULOCYTES # BLD AUTO: 0.04 K/UL (ref 0–0.11)
IMM GRANULOCYTES NFR BLD AUTO: 1 % (ref 0–0.9)
LIPASE SERPL-CCNC: 36 U/L (ref 7–58)
LYMPHOCYTES # BLD AUTO: 1.8 K/UL (ref 1–4.8)
LYMPHOCYTES NFR BLD: 44 % (ref 22–41)
MAGNESIUM SERPL-MCNC: 1.6 MG/DL (ref 1.5–2.5)
MCH RBC QN AUTO: 32.3 PG (ref 27–33)
MCHC RBC AUTO-ENTMCNC: 34.3 G/DL (ref 33.6–35)
MCV RBC AUTO: 94.1 FL (ref 81.4–97.8)
METHADONE UR QL SCN: NEGATIVE
MONOCYTES # BLD AUTO: 0.37 K/UL (ref 0–0.85)
MONOCYTES NFR BLD AUTO: 9 % (ref 0–13.4)
NEUTROPHILS # BLD AUTO: 1.69 K/UL (ref 2–7.15)
NEUTROPHILS NFR BLD: 41.4 % (ref 44–72)
NRBC # BLD AUTO: 0 K/UL
NRBC BLD-RTO: 0 /100 WBC
OPIATES UR QL SCN: NEGATIVE
OXYCODONE UR QL SCN: NEGATIVE
PCP UR QL SCN: NEGATIVE
PLATELET # BLD AUTO: 283 K/UL (ref 164–446)
PMV BLD AUTO: 9.1 FL (ref 9–12.9)
POTASSIUM SERPL-SCNC: 4.1 MMOL/L (ref 3.6–5.5)
PROPOXYPH UR QL SCN: NEGATIVE
PROT SERPL-MCNC: 6.8 G/DL (ref 6–8.2)
RBC # BLD AUTO: 4.21 M/UL (ref 4.2–5.4)
SODIUM SERPL-SCNC: 144 MMOL/L (ref 135–145)
TROPONIN T SERPL-MCNC: 15 NG/L (ref 6–19)
WBC # BLD AUTO: 4.1 K/UL (ref 4.8–10.8)

## 2023-01-19 PROCEDURE — 94760 N-INVAS EAR/PLS OXIMETRY 1: CPT

## 2023-01-19 PROCEDURE — 82077 ASSAY SPEC XCP UR&BREATH IA: CPT

## 2023-01-19 PROCEDURE — 83690 ASSAY OF LIPASE: CPT

## 2023-01-19 PROCEDURE — 99285 EMERGENCY DEPT VISIT HI MDM: CPT

## 2023-01-19 PROCEDURE — 71045 X-RAY EXAM CHEST 1 VIEW: CPT

## 2023-01-19 PROCEDURE — A9270 NON-COVERED ITEM OR SERVICE: HCPCS | Performed by: EMERGENCY MEDICINE

## 2023-01-19 PROCEDURE — 85025 COMPLETE CBC W/AUTO DIFF WBC: CPT

## 2023-01-19 PROCEDURE — 80053 COMPREHEN METABOLIC PANEL: CPT

## 2023-01-19 PROCEDURE — 36415 COLL VENOUS BLD VENIPUNCTURE: CPT

## 2023-01-19 PROCEDURE — 84484 ASSAY OF TROPONIN QUANT: CPT

## 2023-01-19 PROCEDURE — 80307 DRUG TEST PRSMV CHEM ANLYZR: CPT

## 2023-01-19 PROCEDURE — 93005 ELECTROCARDIOGRAM TRACING: CPT | Performed by: EMERGENCY MEDICINE

## 2023-01-19 PROCEDURE — 700111 HCHG RX REV CODE 636 W/ 250 OVERRIDE (IP): Performed by: EMERGENCY MEDICINE

## 2023-01-19 PROCEDURE — 96374 THER/PROPH/DIAG INJ IV PUSH: CPT

## 2023-01-19 PROCEDURE — 700102 HCHG RX REV CODE 250 W/ 637 OVERRIDE(OP): Performed by: EMERGENCY MEDICINE

## 2023-01-19 PROCEDURE — 83735 ASSAY OF MAGNESIUM: CPT

## 2023-01-19 RX ORDER — LORAZEPAM 2 MG/ML
1 INJECTION INTRAMUSCULAR ONCE
Status: COMPLETED | OUTPATIENT
Start: 2023-01-19 | End: 2023-01-19

## 2023-01-19 RX ORDER — GAUZE BANDAGE 2" X 2"
100 BANDAGE TOPICAL DAILY
Status: DISCONTINUED | OUTPATIENT
Start: 2023-01-19 | End: 2023-01-19 | Stop reason: HOSPADM

## 2023-01-19 RX ORDER — FOLIC ACID 1 MG/1
1 TABLET ORAL DAILY
Status: DISCONTINUED | OUTPATIENT
Start: 2023-01-19 | End: 2023-01-19 | Stop reason: HOSPADM

## 2023-01-19 RX ADMIN — THIAMINE HCL TAB 100 MG 100 MG: 100 TAB at 14:04

## 2023-01-19 RX ADMIN — LORAZEPAM 1 MG: 2 INJECTION INTRAMUSCULAR; INTRAVENOUS at 14:04

## 2023-01-19 RX ADMIN — THERA TABS 1 TABLET: TAB at 14:04

## 2023-01-19 RX ADMIN — FOLIC ACID 1 MG: 1 TABLET ORAL at 14:04

## 2023-01-19 ASSESSMENT — FIBROSIS 4 INDEX: FIB4 SCORE: 2.2

## 2023-01-19 NOTE — ED PROVIDER NOTES
ED Provider Note    CHIEF COMPLAINT  Chief Complaint   Patient presents with    Alcohol Intoxication    ETOH Withdrawal     Reports chronic alcoholism, feels like she is wd and in pain.  Reports heart palpitations.  Denies any n/v/shakiness.  Took zofran 4mg earlier today w/o relief.  She last took a drink about 3 hours ago.  Normally takes about 1 bottle of wine per day.  No SI/HI.      Palpitations       EXTERNAL RECORDS REVIEWED  Other previous emergency department notes    HPI/ROS  LIMITATION TO HISTORY   Select: : None  OUTSIDE HISTORIAN(S):  Significant other  at bedside who provides additional details of recent hospitalization    Yohana Brandon is a 57 y.o. female who presents to the ER with chief complaint of alcohol withdrawal.  Patient states she is chronic alcoholic states that she has relapsed recently after her sister came into town and that over the last few weeks she has been drinking 1 bottle of wine daily.  She states that she is gone through detox several times before she denies ever having alcohol withdrawal seizure or delirium tremens.  She reports that she got scared for her life this morning because she was having profound palpitations and also having some accompanying shortness of breath and chest pain.  She has some minimal upper abdominal pain she reports no changes urination or bowel movements no altered mental status no other drug use.  Dates that she has done detox at home previously with the Xanax prescription from her primary care physician.  She also reports that she has been having fluctuations of her blood pressure.  She takes lisinopril/HCTZ she also takes clonidine anytime her diastolic blood pressure is above 90.    PAST MEDICAL HISTORY   has a past medical history of Alcohol abuse, Alcohol abuse, ASTHMA, Elevated LFTs, Heart palpitations, Hypertension, and Psychiatric disorder.    SURGICAL HISTORY   has a past surgical history that includes appendectomy (1997);  endometrial ablation, thermal (); remove tonsils/adenoids,<13 y/o (); anesth,surg breast reconstructive (); hemorrhoidectomy (2011); anal sphincterotomy (2011); bunionectomy ();  delivery+postpartum care; total hip arthroplasty (Right, 2021); and closed reduction (Right, 2022).    FAMILY HISTORY  Family History   Problem Relation Age of Onset    Stroke Mother        SOCIAL HISTORY  Social History     Tobacco Use    Smoking status: Never    Smokeless tobacco: Never   Vaping Use    Vaping Use: Never used   Substance and Sexual Activity    Alcohol use: Yes     Comment: every day     Drug use: No    Sexual activity: Not on file       CURRENT MEDICATIONS  Home Medications       Reviewed by Dee Dhillon (Pharmacy Tech) on 23 at 1335  Med List Status: Complete     Medication Last Dose Status   ALPRAZolam (XANAX) 0.5 MG Tab PRN Active   aspirin EC 81 MG EC tablet 2023 Active   celecoxib (CELEBREX) 200 MG Cap 2023 Active   Cholecalciferol (VITAMIN D3 PO) 2023 Active   cloNIDine (CATAPRES) 0.1 MG Tab PRN Active   COENZYME Q10 PO 2023 Active   magnesium oxide (MAG-OX) 400 MG Tab tablet 2023 Active   multivitamin (THERAGRAN) Tab 2023 Active   Non Formulary Request 2023 Active   Non Formulary Request 2023 Active   Non Formulary Request 2023 Active   Omega-3 Fatty Acids (FISH OIL) 1000 MG Cap capsule 2023 Active   polyethylene glycol/lytes (MIRALAX) 17 g Pack 2023 Active   Potassium 99 MG Tab 2023 Active   progesterone (PROMETRIUM) 100 MG Cap 2023 Active   rosuvastatin (CRESTOR) 10 MG Tab 2023 Active   telmisartan-hydrochlorothiazide (MICARDIS HCT) 80-25 MG per tablet 2023 Active   thyroid (ARMOUR THYROID) 60 MG Tab 2023 Active                    ALLERGIES  Allergies   Allergen Reactions    Banana Shortness of Breath    Kiwi Extract Swelling    Latex Anaphylaxis    Morphine Hives    Other Food  "Hives and Shortness of Breath     Tropical fruit      Vicodin [Hydrocodone-Acetaminophen] Rash     Generalized rash, puritis        PHYSICAL EXAM  VITAL SIGNS: /80   Pulse 93   Temp 37.2 °C (98.9 °F) (Temporal)   Resp 18   Ht 1.702 m (5' 7\")   Wt 71.4 kg (157 lb 6.5 oz)   LMP 08/15/2011   SpO2 89%   BMI 24.65 kg/m²    Pulse ox interpretation: I interpret this pulse ox as normal.  Constitutional: Alert and oriented x 3, mild distress  HEENT: Atraumatic normocephalic, pupils are equal round reactive to light extraocular movements are intact. The nares is clear, external ears are normal, mouth shows moist mucous membranes normal dentition for age  Neck: Supple, no JVD no tracheal deviation  Cardiovascular: Borderline tachycardic rub or gallop 2+ pulses peripherally x4  Thorax & Lungs: No respiratory distress, no wheezes rales or rhonchi, No chest tenderness.   GI: Soft nontender nondistended positive bowel sounds, no peritoneal signs  Skin: Warm dry no acute rash or lesion  Musculoskeletal: Moving all extremities with full range and 5 of 5 strength no acute  deformity  Neurologic: Cranial nerves III through XII are grossly intact no sensory deficit no cerebellar dysfunction no tremor  Psychiatric: Appropriate affect for situation at this time        DIAGNOSTIC STUDIES / PROCEDURES  Results for orders placed or performed during the hospital encounter of 01/19/23   CBC w/ Differential   Result Value Ref Range    WBC 4.1 (L) 4.8 - 10.8 K/uL    RBC 4.21 4.20 - 5.40 M/uL    Hemoglobin 13.6 12.0 - 16.0 g/dL    Hematocrit 39.6 37.0 - 47.0 %    MCV 94.1 81.4 - 97.8 fL    MCH 32.3 27.0 - 33.0 pg    MCHC 34.3 33.6 - 35.0 g/dL    RDW 47.9 35.9 - 50.0 fL    Platelet Count 283 164 - 446 K/uL    MPV 9.1 9.0 - 12.9 fL    Neutrophils-Polys 41.40 (L) 44.00 - 72.00 %    Lymphocytes 44.00 (H) 22.00 - 41.00 %    Monocytes 9.00 0.00 - 13.40 %    Eosinophils 3.40 0.00 - 6.90 %    Basophils 1.20 0.00 - 1.80 %    Immature " Granulocytes 1.00 (H) 0.00 - 0.90 %    Nucleated RBC 0.00 /100 WBC    Neutrophils (Absolute) 1.69 (L) 2.00 - 7.15 K/uL    Lymphs (Absolute) 1.80 1.00 - 4.80 K/uL    Monos (Absolute) 0.37 0.00 - 0.85 K/uL    Eos (Absolute) 0.14 0.00 - 0.51 K/uL    Baso (Absolute) 0.05 0.00 - 0.12 K/uL    Immature Granulocytes (abs) 0.04 0.00 - 0.11 K/uL    NRBC (Absolute) 0.00 K/uL   Complete Metabolic Panel (CMP)   Result Value Ref Range    Sodium 144 135 - 145 mmol/L    Potassium 4.1 3.6 - 5.5 mmol/L    Chloride 102 96 - 112 mmol/L    Co2 28 20 - 33 mmol/L    Anion Gap 14.0 7.0 - 16.0    Glucose 93 65 - 99 mg/dL    Bun 9 8 - 22 mg/dL    Creatinine 0.88 0.50 - 1.40 mg/dL    Calcium 8.9 8.4 - 10.2 mg/dL    AST(SGOT) 32 12 - 45 U/L    ALT(SGPT) 29 2 - 50 U/L    Alkaline Phosphatase 88 30 - 99 U/L    Total Bilirubin 0.2 0.1 - 1.5 mg/dL    Albumin 4.2 3.2 - 4.9 g/dL    Total Protein 6.8 6.0 - 8.2 g/dL    Globulin 2.6 1.9 - 3.5 g/dL    A-G Ratio 1.6 g/dL   Troponin STAT   Result Value Ref Range    Troponin T 15 6 - 19 ng/L   Lipase   Result Value Ref Range    Lipase 36 7 - 58 U/L   Magnesium   Result Value Ref Range    Magnesium 1.6 1.5 - 2.5 mg/dL   URINE DRUG SCREEN   Result Value Ref Range    Amphetamines Urine Negative Negative    Barbiturates Negative Negative    Benzodiazepines Positive (A) Negative    Cocaine Metabolite Negative Negative    Methadone Negative Negative    Opiates Negative Negative    Oxycodone Negative Negative    Phencyclidine -Pcp Negative Negative    Propoxyphene Negative Negative    Cannabinoid Metab Negative Negative   ETHYL ALCOHOL (BLOOD)   Result Value Ref Range    Diagnostic Alcohol 283.2 (H) <10.1 mg/dL   CORRECTED CALCIUM   Result Value Ref Range    Correct Calcium 8.7 8.5 - 10.5 mg/dL   ESTIMATED GFR   Result Value Ref Range    GFR (CKD-EPI) 77 >60 mL/min/1.73 m 2   EKG   Result Value Ref Range    Report       [facility] Dept.    Test Date:  2023-01-19  Pt Name:     SONIA NELSON          Department[facility]  MRN:        [id number]                      [facility]  Gender:     Female                       Technician: [id number]  :        1965                   Requested By:COURTNEY JONES  Order #:    [id number]                    Reading MD:    Measurements  Intervals                                Axis  Rate:       80                           P:          73  NC:         156                          QRS:        9  QRSD:       74                           T:          56  QT:         408  QTc:        471    Interpretive Statements  SINUS RHYTHM  VENTRICULAR PREMATURE COMPLEX  Compared to ECG 2022 07:37:27  Ventricular premature complex(es) now present           RADIOLOGY  DX-CHEST-PORTABLE (1 VIEW)   Final Result         1. No acute cardiopulmonary abnormalities are identified.            COURSE & MEDICAL DECISION MAKING    ED Observation Status? Yes; I am placing the patient in to an observation status due to a diagnostic uncertainty as well as therapeutic intensity. Patient placed in observation status at 14:13 PM, 2023.     Observation plan is as follows: Presents for alcohol withdrawal slightly tachycardic not tremulous.  Patient will require ED observation as she will need blood work EKG chest x-ray as she is reporting some chest pain and palpitations.  She has had recent negative stress test this is likely secondary to her alcohol use.  Will determine with time whether she will require inpatient management for life-threatening alcohol withdrawal.    Upon Reevaluation, the patient's condition has: Improved; and will be discharged.    Patient discharged from ED Observation status at 15:30    INITIAL ASSESSMENT AND PLAN  Care Narrative: 57-year-old female alcoholic here for concerns of alcohol withdrawal.  Alcohol level 280 the rest of her labs and work-up are unrevealing.  EKG is nonischemic chest x-ray is clear troponins negative she has  "been having symptoms well over 3 hours no indication for repeat EKG or troponin she is a heart score 2.  At this point I discussed extensively with patient that she has no indication for inpatient management she did have benzodiazepines in her system upon arrival therefore I do not feel comfortable discharging her with additional benzodiazepines especially in light of her acute alcohol intoxication at this time.  Patient is offered referral to [facility] she states that she does not want to go there because they made her wait for 4 hours the last time.  She otherwise states that she wants to try to detox at home she is given appropriate support resources instructions return for any worsening withdrawal symptoms seizure activity altered mental status any further chest pain palpitation shortness of breath any other acute symptom change or concern otherwise discharged stable and improved condition.    ADDITIONAL PROBLEM LIST AND DISPOSITION    Problem #1: Alcohol dependence  Problem #2: Chest pain/palpitations, likely secondary to above    I have discussed management of the patient with the following physicians and TONJA's:      Discussion of management with other [facility] or appropriate source(s):      Escalation of care considered, and ultimately not performed:blood analysis, diagnostic imaging, and acute inpatient care management, however at this time, the patient is most appropriate for outpatient management    Barriers to care at this time, including but not limited to: .     Decision tools and prescription drugs considered including, but not limited to: HEART Score 2 .    HTN/IDDM FOLLOW UP:  The patient has known hypertension and is being followed by their primary care doctor        /77   Pulse 88   Temp 37.2 °C (99 °F)   Resp 16   Ht 1.702 m (5' 7\")   Wt 71.4 kg (157 lb 6.5 oz)   LMP 08/15/2011   SpO2 92%   BMI 24.65 kg/m²       Deborah Moctezuma M.D.  [address] [place] [address] [address]ido [address]ERS " [id number]  [phone]    Schedule an appointment as soon as possible for a visit       [facility]  [address] [place] [id number]  [phone]  Go to       [facility], Emergency Dept  [address]  [facility] [id number] [phone]    in 12-24 hours if symptoms persist, immediately If symptoms worsen, or if you develop any other symptoms or concerns      FINAL DIAGNOSIS  1. Alcohol dependence with unspecified alcohol-induced disorder (HCC) Active   2. Alcoholic intoxication without complication (HCC) Active              Electronically signed by: Zackery Kirby M.D., 1/19/2023 1:52 PM

## 2023-01-19 NOTE — ED TRIAGE NOTES
"Chief Complaint   Patient presents with    Alcohol Intoxication    ETOH Withdrawal     Reports chronic alcoholism, feels like she is wd and in pain.  Reports heart palpitations.  Denies any n/v/shakiness.  Took zofran 4mg earlier today w/o relief.  She last took a drink about 3 hours ago.  Normally takes about 1 bottle of wine per day.  No SI/HI.      Palpitations     BP (!) 159/94   Pulse 100   Temp 37.2 °C (98.9 °F) (Temporal)   Resp 18   Ht 1.702 m (5' 7\")   Wt 71.4 kg (157 lb 6.5 oz)   LMP 08/15/2011   SpO2 90%   BMI 24.65 kg/m²     "

## 2023-01-19 NOTE — ED NOTES
D/C pt home  is at bedside, no rx given. Pt and  made aware of f/u instructions, aware to return for any changes or concerns or to follow up with PMD. Pt ambulated by self. No tremors, no n/v upon d/c. Pt and  no further questions upon d/c to home from ed

## 2023-01-21 ENCOUNTER — HOSPITAL ENCOUNTER (EMERGENCY)
Facility: MEDICAL CENTER | Age: 58
End: 2023-01-21
Attending: EMERGENCY MEDICINE
Payer: COMMERCIAL

## 2023-01-21 VITALS
BODY MASS INDEX: 24.57 KG/M2 | RESPIRATION RATE: 16 BRPM | HEART RATE: 86 BPM | OXYGEN SATURATION: 94 % | SYSTOLIC BLOOD PRESSURE: 99 MMHG | HEIGHT: 67 IN | TEMPERATURE: 98 F | WEIGHT: 156.53 LBS | DIASTOLIC BLOOD PRESSURE: 72 MMHG

## 2023-01-21 DIAGNOSIS — F10.10 ALCOHOL ABUSE: ICD-10-CM

## 2023-01-21 DIAGNOSIS — F10.920 ALCOHOLIC INTOXICATION WITHOUT COMPLICATION (HCC): ICD-10-CM

## 2023-01-21 LAB — POC BREATHALIZER: 0.33 PERCENT (ref 0–0.01)

## 2023-01-21 PROCEDURE — 700111 HCHG RX REV CODE 636 W/ 250 OVERRIDE (IP): Performed by: EMERGENCY MEDICINE

## 2023-01-21 PROCEDURE — 302970 POC BREATHALIZER: Performed by: EMERGENCY MEDICINE

## 2023-01-21 PROCEDURE — 99284 EMERGENCY DEPT VISIT MOD MDM: CPT

## 2023-01-21 RX ORDER — ONDANSETRON 4 MG/1
4 TABLET, ORALLY DISINTEGRATING ORAL ONCE
Status: COMPLETED | OUTPATIENT
Start: 2023-01-21 | End: 2023-01-21

## 2023-01-21 RX ORDER — GABAPENTIN 300 MG/1
CAPSULE ORAL
Qty: 10 CAPSULE | Refills: 0 | Status: SHIPPED | OUTPATIENT
Start: 2023-01-21 | End: 2023-06-15

## 2023-01-21 RX ADMIN — ONDANSETRON 4 MG: 4 TABLET, ORALLY DISINTEGRATING ORAL at 10:19

## 2023-01-21 ASSESSMENT — FIBROSIS 4 INDEX: FIB4 SCORE: 1.15

## 2023-01-21 NOTE — ED NOTES
Patient is stable for discharge at this time, anticipatory guidance provided, close follow-up is encouraged, and ED return instructions have been detailed. Patient is both agreeable to the disposition and plan and discharged home in ambulatory state and in good condition.    Pts sister also present for discharge education. Pt will be going home and staying with patient for the next few days. Pt also strongly encouraged to seek assistance with alcohol detox through [facility].   Rx education provided, Pt verbalized understanding;

## 2023-01-21 NOTE — DISCHARGE INSTRUCTIONS
consider following up at [facility] for ongoing detox and rehab evaluation and treatment.  Please avoid alcohol

## 2023-01-21 NOTE — ED NOTES
zofran administered as ordered. Pt tolerating sips of water.   Safety precautions in place including gurney locked in lowest position, both side rails up, call light within reach. Pt educated to use call light if requiring any assistance with getting oob.

## 2023-01-21 NOTE — DISCHARGE PLANNING
"Met with pt. Sister Madai ( her twin) was at bedside. Observed pt to be independent with mobility and Madai is willing to help pt. Pt said that she lives alone and is  from her . Madai is actually her twin who is willing to help her.     CM offerred substance abuse resources but pt declined stating \" I know all the resources and Olivia been to some of them. I dont want to go back because they let you wait for hours. \" CM suggested [facility] but pt declined. Madai said that pt has been sober for a year. Pt was teary eyed because she said whats triggered her drinking was her . She kicked him out.     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     Talked to Dr. Ham and MD is not admitting pt.     PC Dr. Deborah Moctezuma.   [place] [facility] [place] [facility] [place]          Care Transition Team Assessment    Information Source  Orientation Level: Oriented X4  Information Given By: Patient  Who is responsible for making decisions for patient? : Patient    Readmission Evaluation  Is this a readmission?: No    Elopement Risk  Legal Hold: No  Ambulatory or Self Mobile in Wheelchair: No-Not an Elopement Risk    Interdisciplinary Discharge Planning  Does Admitting Nurse Feel This Could be a Complex Discharge?: No  Primary Care Physician: Dr Deborah Moctezuma  Lives with - Patient's Self Care Capacity: Alone and Able to Care For Self  Support Systems: Family Member(s)  Housing / Facility: [address] [facility] [place] [facility]  Do You Take your Prescribed Medications Regularly: Yes  Able to Return to Previous ADL's: Yes  Mobility Issues: No  Prior Services: None, Home-Independent  Patient Prefers to be Discharged to:: Home  Assistance Needed: No  Durable Medical Equipment: Not Applicable    Discharge Preparedness  What is your plan after discharge?: Home with help  What are your discharge supports?: Sibling, Parent  Prior Functional Level: Ambulatory, Drives Self, Independent with Activities of Daily Living, Independent with Medication " Management  Difficulity with ADLs: None  Difficulity with IADLs: Driving    Functional Assesment  Prior Functional Level: Ambulatory, Drives Self, Independent with Activities of Daily Living, Independent with Medication Management    Finances  Financial Barriers to Discharge: No  Prescription Coverage: Yes    Vision / Hearing Impairment  Vision Impairment : No  Hearing Impairment : No    Values / Beliefs / Concerns  Values / Beliefs Concerns : No    Advance Directive  Advance Directive?: None    Domestic Abuse  Have you ever been the victim of abuse or violence?: No         Discharge Risks or Barriers  Discharge risks or barriers?: Substance abuse, Mental health, Post-acute placement / services  Patient risk factors: Cognitive / sensory / physical deficit, Mental health    Anticipated Discharge Information  Discharge Disposition: Discharged to home/self care (01)

## 2023-01-21 NOTE — ED PROVIDER NOTES
ED Provider Note    CHIEF COMPLAINT  Chief Complaint   Patient presents with    ETOH Withdrawal       EXTERNAL RECORDS REVIEWED  Inpatient Notes right hip arthroplasty 2022    HPI/ROS  LIMITATION TO HISTORY   Select: : None  OUTSIDE HISTORIAN(S):  Sister    Yohana Brandon is a 57 y.o. female who presents intoxicated, having increased her use of alcohol since being discharged from the ER 2 days ago.  Patient states she would like pharmacologic help to detox from alcohol, steadfastly refusing referral to detox center.  2 days ago was seen in the ER underwent medical work-up which was negative.  She requested benzodiazepine prescription which has been given in the past.  She is currently with an active Xanax prescription.  No chest pain, no abdominal pain.  She denies suicidal ideation.  Initially brought in by her friend this morning, later replaced by her twin sister in the emergency department.    PAST MEDICAL HISTORY   has a past medical history of Alcohol abuse, Alcohol abuse, ASTHMA, Elevated LFTs, Heart palpitations, Hypertension, and Psychiatric disorder.    SURGICAL HISTORY   has a past surgical history that includes appendectomy (); endometrial ablation, thermal (); remove tonsils/adenoids,<11 y/o (); anesth,surg breast reconstructive (); hemorrhoidectomy (2011); anal sphincterotomy (2011); bunionectomy ();  delivery+postpartum care; total hip arthroplasty (Right, 2021); and closed reduction (Right, 2022).    FAMILY HISTORY  Family History   Problem Relation Age of Onset    Stroke Mother        SOCIAL HISTORY  Social History     Tobacco Use    Smoking status: Never    Smokeless tobacco: Never   Vaping Use    Vaping Use: Never used   Substance and Sexual Activity    Alcohol use: Yes     Comment: Daily vodka/abuse    Drug use: No    Sexual activity: Not on file       CURRENT MEDICATIONS  Home Medications    **Home medications have not yet been  "reviewed for this encounter**         ALLERGIES  Allergies   Allergen Reactions    Banana Shortness of Breath    Kiwi Extract Swelling    Latex Anaphylaxis    Morphine Hives    Other Food Hives and Shortness of Breath     Tropical fruit      Vicodin [Hydrocodone-Acetaminophen] Rash     Generalized rash, puritis        PHYSICAL EXAM  VITAL SIGNS: BP 93/67   Pulse 76   Temp 36.3 °C (97.3 °F) (Temporal)   Resp 16   Ht 1.702 m (5' 7\")   Wt 71 kg (156 lb 8.4 oz)   LMP 08/15/2011   SpO2 91%   BMI 24.52 kg/m²    Constitutional: No distress  Respiratory: Lungs clear  Cardiac: Heart is regular with no murmur  GI: Abdomen soft and nontender  Neurologic: Slurred speech.  Strength intact.  Sensation intact  Psychiatric: Intermittently tearful.  Otherwise calm and cooperative      DIAGNOSTIC STUDIES / PROCEDURES      LABS  Results for orders placed or performed during the hospital encounter of 01/21/23   POC BREATHALIZER   Result Value Ref Range    POC Breathalizer 0.33 (A) 0.00 - 0.01 Percent       COURSE & MEDICAL DECISION MAKING    ED Observation Status? Yes; I am placing the patient in to an observation status due to a diagnostic uncertainty as well as therapeutic intensity. Patient placed in observation status at 9:37 AM, 1/21/2023.     Observation plan is as follows: [place] for signs and symptoms of withdrawal, delirium tremens    Upon Reevaluation, the patient's condition has: Improved; and will be discharged.    Patient discharged from ED Observation status at 12 noon (Time) January 21, 2023 (Date).     INITIAL ASSESSMENT AND PLAN  Care Narrative: Patient presents gross intoxicated, normal vital signs, no evidence of acute alcohol withdrawal.  No suicidal ideation.  Her request for benzodiazepine prescriptions were denied, patient is poor candidate given her ongoing alcohol use and current active prescription for Xanax.  With her twin sister present, after the patient had sobered up relatively to where no longer " slurred speech and able to walk on her own accord, patient was again offered referral to [facility] which she has refused.  Plan will be for gabapentin protocol for alcohol withdrawal which was provided, she is given given encouragement to check into a rehab facility.  Her twin sister stated she felt comfortable taking the patient home into her care.  During her stay in the ER, I saw no signs of overt alcohol withdrawal or delirium tremens.  With recent medical work-up 2 days ago, she did not require further testing today.    ADDITIONAL PROBLEM LIST AND DISPOSITION    Escalation of care considered, and ultimately not performed:IV fluids and blood analysis    Barriers to care at this time, including but not limited to:  Patient refusing referral to detox center .     Decision tools and prescription drugs considered including, but not limited to:  Benzodiazepines not prescribed .      FINAL DIAGNOSIS  1. Alcoholic intoxication without complication (HCC)    2. Alcohol abuse               Electronically signed by: Sawyer Ham M.D., 1/21/2023 10:37 AM

## 2023-01-21 NOTE — ED TRIAGE NOTES
"Pt was seen in our department 2 days ago and evaluated/treated for alcoholism.  She describes a consumption of 2 pints of vodka daily.  She presents to our ED seeking assistance to \"stop drinking.\"  She is accompanied by a friend and she is placed in a wheelchair.  She denies any N/V/D.  Her last drink was ingested approximately 3 hours ago.  Chief Complaint   Patient presents with    ETOH Withdrawal     /88   Pulse 84   Temp 36.7 °C (98 °F) (Temporal)   Resp 20   Ht 1.702 m (5' 7\")   Wt 71 kg (156 lb 8.4 oz)   LMP 08/15/2011   SpO2 94%   BMI 24.52 kg/m²   Has this patient been vaccinated for COVID YES  If not, would they like to be vaccinated while in the ER if eligible?  N/A  Would the patient like to speak with the ERP about the possibility of receiving the COVID vaccine today before making a decision? N/A        "

## 2023-02-01 ENCOUNTER — HOSPITAL ENCOUNTER (EMERGENCY)
Facility: MEDICAL CENTER | Age: 58
End: 2023-02-01
Attending: EMERGENCY MEDICINE
Payer: COMMERCIAL

## 2023-02-01 ENCOUNTER — APPOINTMENT (OUTPATIENT)
Dept: RADIOLOGY | Facility: MEDICAL CENTER | Age: 58
End: 2023-02-01
Attending: EMERGENCY MEDICINE
Payer: COMMERCIAL

## 2023-02-01 VITALS
HEIGHT: 68 IN | OXYGEN SATURATION: 95 % | SYSTOLIC BLOOD PRESSURE: 115 MMHG | DIASTOLIC BLOOD PRESSURE: 76 MMHG | TEMPERATURE: 97.5 F | BODY MASS INDEX: 23.62 KG/M2 | RESPIRATION RATE: 16 BRPM | HEART RATE: 76 BPM | WEIGHT: 155.87 LBS

## 2023-02-01 DIAGNOSIS — F10.20 UNCOMPLICATED ALCOHOL DEPENDENCE (HCC): ICD-10-CM

## 2023-02-01 DIAGNOSIS — F10.920 ALCOHOLIC INTOXICATION WITHOUT COMPLICATION (HCC): ICD-10-CM

## 2023-02-01 LAB
ALBUMIN SERPL BCP-MCNC: 4.3 G/DL (ref 3.2–4.9)
ALBUMIN/GLOB SERPL: 1.5 G/DL
ALP SERPL-CCNC: 104 U/L (ref 30–99)
ALT SERPL-CCNC: 63 U/L (ref 2–50)
ANION GAP SERPL CALC-SCNC: 15 MMOL/L (ref 7–16)
APPEARANCE UR: CLEAR
AST SERPL-CCNC: 98 U/L (ref 12–45)
BASOPHILS # BLD AUTO: 0.8 % (ref 0–1.8)
BASOPHILS # BLD: 0.03 K/UL (ref 0–0.12)
BILIRUB SERPL-MCNC: 0.2 MG/DL (ref 0.1–1.5)
BILIRUB UR QL STRIP.AUTO: NEGATIVE
BUN SERPL-MCNC: 11 MG/DL (ref 8–22)
CALCIUM ALBUM COR SERPL-MCNC: 8.6 MG/DL (ref 8.5–10.5)
CALCIUM SERPL-MCNC: 8.8 MG/DL (ref 8.5–10.5)
CHLORIDE SERPL-SCNC: 96 MMOL/L (ref 96–112)
CO2 SERPL-SCNC: 24 MMOL/L (ref 20–33)
COLOR UR: YELLOW
CREAT SERPL-MCNC: 1.14 MG/DL (ref 0.5–1.4)
EKG IMPRESSION: NORMAL
EOSINOPHIL # BLD AUTO: 0.03 K/UL (ref 0–0.51)
EOSINOPHIL NFR BLD: 0.8 % (ref 0–6.9)
ERYTHROCYTE [DISTWIDTH] IN BLOOD BY AUTOMATED COUNT: 49.8 FL (ref 35.9–50)
ETHANOL BLD-MCNC: 319 MG/DL
FLUAV RNA SPEC QL NAA+PROBE: NEGATIVE
FLUBV RNA SPEC QL NAA+PROBE: NEGATIVE
GFR SERPLBLD CREATININE-BSD FMLA CKD-EPI: 57 ML/MIN/1.73 M 2
GLOBULIN SER CALC-MCNC: 2.8 G/DL (ref 1.9–3.5)
GLUCOSE SERPL-MCNC: 96 MG/DL (ref 65–99)
GLUCOSE UR STRIP.AUTO-MCNC: NEGATIVE MG/DL
HCT VFR BLD AUTO: 40.1 % (ref 37–47)
HGB BLD-MCNC: 13.9 G/DL (ref 12–16)
IMM GRANULOCYTES # BLD AUTO: 0.01 K/UL (ref 0–0.11)
IMM GRANULOCYTES NFR BLD AUTO: 0.3 % (ref 0–0.9)
KETONES UR STRIP.AUTO-MCNC: NEGATIVE MG/DL
LACTATE SERPL-SCNC: 1.9 MMOL/L (ref 0.5–2)
LEUKOCYTE ESTERASE UR QL STRIP.AUTO: NEGATIVE
LYMPHOCYTES # BLD AUTO: 2.09 K/UL (ref 1–4.8)
LYMPHOCYTES NFR BLD: 54.9 % (ref 22–41)
MCH RBC QN AUTO: 32.6 PG (ref 27–33)
MCHC RBC AUTO-ENTMCNC: 34.7 G/DL (ref 33.6–35)
MCV RBC AUTO: 94.1 FL (ref 81.4–97.8)
MICRO URNS: NORMAL
MONOCYTES # BLD AUTO: 0.42 K/UL (ref 0–0.85)
MONOCYTES NFR BLD AUTO: 11 % (ref 0–13.4)
NEUTROPHILS # BLD AUTO: 1.23 K/UL (ref 2–7.15)
NEUTROPHILS NFR BLD: 32.2 % (ref 44–72)
NITRITE UR QL STRIP.AUTO: NEGATIVE
NRBC # BLD AUTO: 0 K/UL
NRBC BLD-RTO: 0 /100 WBC
PH UR STRIP.AUTO: 6.5 [PH] (ref 5–8)
PLATELET # BLD AUTO: 165 K/UL (ref 164–446)
PMV BLD AUTO: 8.9 FL (ref 9–12.9)
POTASSIUM SERPL-SCNC: 3.8 MMOL/L (ref 3.6–5.5)
PROT SERPL-MCNC: 7.1 G/DL (ref 6–8.2)
PROT UR QL STRIP: NEGATIVE MG/DL
RBC # BLD AUTO: 4.26 M/UL (ref 4.2–5.4)
RBC UR QL AUTO: NEGATIVE
RSV RNA SPEC QL NAA+PROBE: NEGATIVE
SARS-COV-2 RNA RESP QL NAA+PROBE: NOTDETECTED
SODIUM SERPL-SCNC: 135 MMOL/L (ref 135–145)
SP GR UR STRIP.AUTO: 1.01
SPECIMEN SOURCE: NORMAL
UROBILINOGEN UR STRIP.AUTO-MCNC: 0.2 MG/DL
WBC # BLD AUTO: 3.8 K/UL (ref 4.8–10.8)

## 2023-02-01 PROCEDURE — 96366 THER/PROPH/DIAG IV INF ADDON: CPT

## 2023-02-01 PROCEDURE — 93005 ELECTROCARDIOGRAM TRACING: CPT | Performed by: EMERGENCY MEDICINE

## 2023-02-01 PROCEDURE — 81003 URINALYSIS AUTO W/O SCOPE: CPT

## 2023-02-01 PROCEDURE — 96365 THER/PROPH/DIAG IV INF INIT: CPT

## 2023-02-01 PROCEDURE — 700105 HCHG RX REV CODE 258: Performed by: EMERGENCY MEDICINE

## 2023-02-01 PROCEDURE — 0241U HCHG SARS-COV-2 COVID-19 NFCT DS RESP RNA 4 TRGT MIC: CPT

## 2023-02-01 PROCEDURE — 99285 EMERGENCY DEPT VISIT HI MDM: CPT

## 2023-02-01 PROCEDURE — 700101 HCHG RX REV CODE 250: Performed by: EMERGENCY MEDICINE

## 2023-02-01 PROCEDURE — C9803 HOPD COVID-19 SPEC COLLECT: HCPCS | Performed by: EMERGENCY MEDICINE

## 2023-02-01 PROCEDURE — 71045 X-RAY EXAM CHEST 1 VIEW: CPT

## 2023-02-01 PROCEDURE — 93005 ELECTROCARDIOGRAM TRACING: CPT

## 2023-02-01 PROCEDURE — 83605 ASSAY OF LACTIC ACID: CPT

## 2023-02-01 PROCEDURE — 85025 COMPLETE CBC W/AUTO DIFF WBC: CPT

## 2023-02-01 PROCEDURE — 82077 ASSAY SPEC XCP UR&BREATH IA: CPT

## 2023-02-01 PROCEDURE — 87086 URINE CULTURE/COLONY COUNT: CPT

## 2023-02-01 PROCEDURE — 80053 COMPREHEN METABOLIC PANEL: CPT

## 2023-02-01 PROCEDURE — 87040 BLOOD CULTURE FOR BACTERIA: CPT | Mod: 91

## 2023-02-01 PROCEDURE — 36415 COLL VENOUS BLD VENIPUNCTURE: CPT

## 2023-02-01 RX ORDER — SODIUM CHLORIDE, SODIUM LACTATE, POTASSIUM CHLORIDE, CALCIUM CHLORIDE 600; 310; 30; 20 MG/100ML; MG/100ML; MG/100ML; MG/100ML
1000 INJECTION, SOLUTION INTRAVENOUS ONCE
Status: DISCONTINUED | OUTPATIENT
Start: 2023-02-01 | End: 2023-02-01

## 2023-02-01 RX ORDER — SODIUM CHLORIDE, SODIUM LACTATE, POTASSIUM CHLORIDE, AND CALCIUM CHLORIDE .6; .31; .03; .02 G/100ML; G/100ML; G/100ML; G/100ML
30 INJECTION, SOLUTION INTRAVENOUS ONCE
Status: COMPLETED | OUTPATIENT
Start: 2023-02-01 | End: 2023-02-01

## 2023-02-01 RX ADMIN — THIAMINE HYDROCHLORIDE: 100 INJECTION, SOLUTION INTRAMUSCULAR; INTRAVENOUS at 12:30

## 2023-02-01 RX ADMIN — SODIUM CHLORIDE, POTASSIUM CHLORIDE, SODIUM LACTATE AND CALCIUM CHLORIDE 2121 ML: 600; 310; 30; 20 INJECTION, SOLUTION INTRAVENOUS at 11:15

## 2023-02-01 ASSESSMENT — LIFESTYLE VARIABLES
EVER FELT BAD OR GUILTY ABOUT YOUR DRINKING: YES
TOTAL SCORE: 4
TOTAL SCORE: 4
HAVE PEOPLE ANNOYED YOU BY CRITICIZING YOUR DRINKING: YES
TOTAL SCORE: 4
HAVE YOU EVER FELT YOU SHOULD CUT DOWN ON YOUR DRINKING: YES
CONSUMPTION TOTAL: INCOMPLETE
AVERAGE NUMBER OF DAYS PER WEEK YOU HAVE A DRINK CONTAINING ALCOHOL: 7
EVER HAD A DRINK FIRST THING IN THE MORNING TO STEADY YOUR NERVES TO GET RID OF A HANGOVER: YES
DO YOU DRINK ALCOHOL: YES

## 2023-02-01 ASSESSMENT — FIBROSIS 4 INDEX: FIB4 SCORE: 1.15

## 2023-02-01 NOTE — ED TRIAGE NOTES
"Chief Complaint   Patient presents with    Palpitations     X 2 days. Patient states \"I'm an alcoholic and I'm afraid I'm going to die.\" Patient reports last drink was today. Patient states \"I feel like I'm going to pass out.\"      Patient hypotensive in triage. Patient to Blue 22    "

## 2023-02-01 NOTE — DISCHARGE SUMMARY
"  ED Observation Discharge Summary    Patient:Yohana Brandon  Patient : 1965  Patient MRN: [id number]  Patient PCP: Deborah Moctezuma M.D.    Admit Date: 2023  Discharge Date and Time: 23 3:31 PM  Discharge Diagnosis:   1. Alcoholic intoxication without complication (HCC)        2. Uncomplicated alcohol dependence (HCC)            Discharge Attending: Alex Mccord D.O.  Discharge Service: ED Observation    ED Course  Yohana is a 57 y.o. female who was evaluated at [facility] patient presented to the emergency department with alcohol intoxication, and alcohol dependence.  He was seen initially by Dr. Arellano, the patient is endorsed to me.  The patient has been evaluated by peers support group and they have made arrangements for the patient to be transferred to [facility] for treatment of his alcohol abuse.  He is hemodynamically stable, he has had no seizures he is stable for transfer for treatment through [facility]    Discharge Exam:  /79   Pulse 82   Temp 36.3 °C (97.4 °F) (Temporal)   Resp 14   Ht 1.727 m (5' 8\")   Wt 70.7 kg (155 lb 13.8 oz)   LMP 08/15/2011   SpO2 95%   BMI 23.70 kg/m² .    Constitutional: Awake and alert. Nontoxic  HENT:  Grossly normal  Eyes: Grossly normal  Neck: Normal range of motion  Cardiovascular: Normal heart rate   Thorax & Lungs: No respiratory distress  Abdomen: Nontender  Skin:  No pathologic rash.   Extremities: Well perfused  Psychiatric: Affect normal    Labs  Results for orders placed or performed during the hospital encounter of 23   LACTIC ACID   Result Value Ref Range    Lactic Acid 1.9 0.5 - 2.0 mmol/L   CBC WITH DIFFERENTIAL   Result Value Ref Range    WBC 3.8 (L) 4.8 - 10.8 K/uL    RBC 4.26 4.20 - 5.40 M/uL    Hemoglobin 13.9 12.0 - 16.0 g/dL    Hematocrit 40.1 37.0 - 47.0 %    MCV 94.1 81.4 - 97.8 fL    MCH 32.6 27.0 - 33.0 pg    MCHC 34.7 33.6 - 35.0 g/dL    RDW 49.8 35.9 - 50.0 fL    Platelet Count 165 164 - " 446 K/uL    MPV 8.9 (L) 9.0 - 12.9 fL    Neutrophils-Polys 32.20 (L) 44.00 - 72.00 %    Lymphocytes 54.90 (H) 22.00 - 41.00 %    Monocytes 11.00 0.00 - 13.40 %    Eosinophils 0.80 0.00 - 6.90 %    Basophils 0.80 0.00 - 1.80 %    Immature Granulocytes 0.30 0.00 - 0.90 %    Nucleated RBC 0.00 /100 WBC    Neutrophils (Absolute) 1.23 (L) 2.00 - 7.15 K/uL    Lymphs (Absolute) 2.09 1.00 - 4.80 K/uL    Monos (Absolute) 0.42 0.00 - 0.85 K/uL    Eos (Absolute) 0.03 0.00 - 0.51 K/uL    Baso (Absolute) 0.03 0.00 - 0.12 K/uL    Immature Granulocytes (abs) 0.01 0.00 - 0.11 K/uL    NRBC (Absolute) 0.00 K/uL   COMP METABOLIC PANEL   Result Value Ref Range    Sodium 135 135 - 145 mmol/L    Potassium 3.8 3.6 - 5.5 mmol/L    Chloride 96 96 - 112 mmol/L    Co2 24 20 - 33 mmol/L    Anion Gap 15.0 7.0 - 16.0    Glucose 96 65 - 99 mg/dL    Bun 11 8 - 22 mg/dL    Creatinine 1.14 0.50 - 1.40 mg/dL    Calcium 8.8 8.5 - 10.5 mg/dL    AST(SGOT) 98 (H) 12 - 45 U/L    ALT(SGPT) 63 (H) 2 - 50 U/L    Alkaline Phosphatase 104 (H) 30 - 99 U/L    Total Bilirubin 0.2 0.1 - 1.5 mg/dL    Albumin 4.3 3.2 - 4.9 g/dL    Total Protein 7.1 6.0 - 8.2 g/dL    Globulin 2.8 1.9 - 3.5 g/dL    A-G Ratio 1.5 g/dL   URINALYSIS    Specimen: Urine   Result Value Ref Range    Color Yellow     Character Clear     Specific Gravity 1.007 <1.035    Ph 6.5 5.0 - 8.0    Glucose Negative Negative mg/dL    Ketones Negative Negative mg/dL    Protein Negative Negative mg/dL    Bilirubin Negative Negative    Urobilinogen, Urine 0.2 Negative    Nitrite Negative Negative    Leukocyte Esterase Negative Negative    Occult Blood Negative Negative    Micro Urine Req see below    DIAGNOSTIC ALCOHOL   Result Value Ref Range    Diagnostic Alcohol 319.0 (H) <10.1 mg/dL   COV-2, FLU A/B, AND RSV BY PCR (2-4 HOURS CEPHEID): Collect NP swab in VTM    Specimen: Respirate   Result Value Ref Range    Influenza virus A RNA Negative Negative    Influenza virus B, PCR Negative Negative    RSV, PCR  Negative Negative    SARS-CoV-2 by PCR NotDetected     SARS-CoV-2 Source NP Swab    CORRECTED CALCIUM   Result Value Ref Range    Correct Calcium 8.6 8.5 - 10.5 mg/dL   ESTIMATED GFR   Result Value Ref Range    GFR (CKD-EPI) 57 (A) >60 mL/min/1.73 m 2   EKG   Result Value Ref Range    Report       [facility]    Test Date:  2023  Pt Name:    SONIA DAMON          Department: ER  MRN:        [id number]                      Room:  Gender:     Female                       Technician: [id number]  :        1965                   Requested By:ER TRIAGE PROTOCOL  Order #:    [id number]                    Reading MD: MARISSA GOOD MD    Measurements  Intervals                                Axis  Rate:       86                           P:          67  SD:         156                          QRS:        21  QRSD:       82                           T:          58  QT:         392  QTc:        469    Interpretive Statements  Sinus rhythm  Compared to ECG 2023 14:20:32  Ventricular premature complex(es) no longer present  Electronically Signed On 2023 14:51:32 PST by MARISSA GOOD MD         Radiology  DX-CHEST-PORTABLE (1 VIEW)   Final Result      No acute cardiopulmonary abnormality.             Medications:   New Prescriptions    No medications on file       My final assessment includes   1. Alcoholic intoxication without complication (HCC)        2. Uncomplicated alcohol dependence (HCC)            Upon Reevaluation, the patient's condition has: Improved; and will be discharged.    Patient discharged from ED Observation status at date 2023 time 1530       Total time spent on this ED Observation discharge encounter is < 30 Minutes    Electronically signed by: Alex Mccord D.O., 2023 3:31 PM

## 2023-02-01 NOTE — ED NOTES
Patient asleep in bed, family at bedside. NAD noted. Patient remains on 2L while asleep. No needs or complaints at this time

## 2023-02-01 NOTE — ED PROVIDER NOTES
"  ER Provider Note    Scribed for Soren Barry M.d. by Ayanna Castillo. 2/1/2023  11:24 AM    Primary Care Provider: Deborah Moctezuma M.D.    CHIEF COMPLAINT  Chief Complaint   Patient presents with    Palpitations     X 2 days. Patient states \"I'm an alcoholic and I'm afraid I'm going to die.\" Patient reports last drink was today. Patient states \"I feel like I'm going to pass out.\"      EXTERNAL RECORDS REVIEWED  Other Patient was seen in this facility on 1/29 and 1/21 for alcohol detox. She has been seen in this facility for similar complaints 4 times since 1/29/22.    HPI/ROS  LIMITATION TO HISTORY   Select: Intoxication  OUTSIDE HISTORIAN(S):  Family Sister at bedside    Yohana Brandon is a 57 y.o. female who presents to the ED complaining of alcohol detox onset this morning. Patient states her memory is blurred but she remembers having 2 glasses of wine today. She has been having wine delivered to her from [employer] as there is no alcohol in her house. Patient states prior to Christmas she was sober for a little over a year. She is requesting help for detox from alcohol at a rehabilitation facility.      PAST MEDICAL HISTORY  Past Medical History:   Diagnosis Date    Alcohol abuse     Alcohol abuse     ASTHMA     Elevated LFTs     Heart palpitations     w/ alcohol withdrawal    Hypertension     Psychiatric disorder     alcohol dependance       SURGICAL HISTORY  Past Surgical History:   Procedure Laterality Date    CLOSED REDUCTION Right 2/9/2022    Procedure: CLOSED REDUCTION, HIP,;  Surgeon: Jose G Bell M.D.;  Location: SURGERY [facility];  Service: Orthopedics    [place] TOTAL HIP ARTHROPLASTY Right 12/27/2021    Procedure: ARTHROPLASTY, HIP, TOTAL, ANTERIOR APPROACH;  Surgeon: Jose G Bell M.D.;  Location: SURGERY [facility];  Service: Orthopedics    HEMORRHOIDECTOMY  8/24/2011    Performed by SONY MACDONALD at [facility]    ANAL SPHINCTEROTOMY  8/24/2011    Performed by SONY MACDONALD at " [facility]    MS ENDOMETRIAL ABLATION, THERMAL      APPENDECTOMY      MS ANESTH,SURG BREAST RECONSTRUCTIVE      bilateral     BUNIONECTOMY  1983    bilateral    MS REMOVE TONSILS/ADENOIDS,<11 Y/O  1973    MS  DELIVERY+POSTPARTUM CARE      x2       FAMILY HISTORY  Family History   Problem Relation Age of Onset    Stroke Mother        SOCIAL HISTORY   reports that she has never smoked. She has never used smokeless tobacco. She reports current alcohol use. She reports that she does not use drugs.    CURRENT MEDICATIONS  Previous Medications    ALPRAZOLAM (XANAX) 0.5 MG TAB    Take 0.5 mg by mouth 2 times a day as needed for Anxiety.    ASPIRIN EC 81 MG EC TABLET    Take 1 Tablet by mouth 2 times a day.    CELECOXIB (CELEBREX) 200 MG CAP    Take 1 Capsule by mouth 2 times a day.    CHOLECALCIFEROL (VITAMIN D3 PO)    Take 1 Cap by mouth every day.    CLONIDINE (CATAPRES) 0.1 MG TAB    Take 0.1 mg by mouth 2 times a day as needed (Per pt if blood pressure is > 160/80). Indications: High Blood Pressure Disorder    COENZYME Q10 PO    Take 1 Tablet by mouth every day.    GABAPENTIN (NEURONTIN) 300 MG CAP    Take 300 mg by mouth 4 times on day #1, 300 mg 3 times on day 2, 300 mg twice on day 3, and 300 mg once on day 4 by mouth    MAGNESIUM OXIDE (MAG-OX) 400 MG TAB TABLET    Take 1 tablet by mouth every day.    MULTIVITAMIN (THERAGRAN) TAB    Take 1 Tab by mouth every day.    NON FORMULARY REQUEST    Inject 1 Each as directed every day. Hormone shots @ Dr. Lopez Jackson office (Pregnyl 250 units)    NON FORMULARY REQUEST    Apply 1.5 g topically every day. Testosterone 5% cream    NON FORMULARY REQUEST    Apply 0.5-1 mg topically every day. Bi-est cream E2 E3 80-20 ratio to 5MG    OMEGA-3 FATTY ACIDS (FISH OIL) 1000 MG CAP CAPSULE    Take 1,000 mg by mouth every day.    POLYETHYLENE GLYCOL/LYTES (MIRALAX) 17 G PACK    Take 1 Packet by mouth every day.    POTASSIUM 99 MG TAB    Take 99 mg by  "mouth every day. Indications: OTC    PROGESTERONE (PROMETRIUM) 100 MG CAP    Take 100 mg by mouth every evening.    ROSUVASTATIN (CRESTOR) 10 MG TAB    Take 10 mg by mouth every evening.    TELMISARTAN-HYDROCHLOROTHIAZIDE (MICARDIS HCT) 80-25 MG PER TABLET    Take 1 tablet by mouth every day.    THYROID (ARMOUR THYROID) 60 MG TAB    Take 60 mg by mouth every day.       ALLERGIES  Banana, Kiwi extract, Latex, Morphine, Other food, and Vicodin [hydrocodone-acetaminophen]    PHYSICAL EXAM  BP (!) 87/54   Pulse 82   Temp 36.3 °C (97.4 °F) (Temporal)   Resp 19   Ht 1.727 m (5' 8\")   Wt 70.7 kg (155 lb 13.8 oz)   LMP 08/15/2011   SpO2 94%   BMI 23.70 kg/m²   Constitutional: Well developed, Well nourished, No resting tremors, no slurred speech.  HENT: Normocephalic, Atraumatic, Bilateral external ears normal, Oropharynx is clear mucous membranes are moist. No oral exudates or nasal discharge.   Eyes: Pupils are equal round and reactive, EOMI, Conjunctiva normal, No discharge.   Neck: Normal range of motion, No tenderness, Supple, No stridor. No meningismus.  Lymphatic: No lymphadenopathy noted.   Cardiovascular: Regular rate and rhythm without murmur rub or gallop.  Thorax & Lungs: Clear breath sounds bilaterally without wheezes, rhonchi or rales. There is no chest wall tenderness.   Abdomen: Soft non-tender non-distended. There is no rebound or guarding. No organomegaly is appreciated. Bowel sounds are normal.  Skin: Normal without rash.   Back: No CVA or spinal tenderness.   Extremities: Intact distal pulses, No edema, No tenderness, No cyanosis, No clubbing. Capillary refill is less than 2 seconds.  Musculoskeletal: Good range of motion in all major joints. No tenderness to palpation or major deformities noted.   Neurologic: Alert & oriented x 3, Normal motor function, Normal sensory function, No focal deficits noted. Reflexes are normal.  Psychiatric: Affect normal, Judgment normal, Mood normal. There is no " suicidal ideation or patient reported hallucinations.     DIAGNOSTIC STUDIES    Labs:   Laboratory evaluation reveals leukopenia at 3800 with near neutropenia but showing an absolute neutrophil count of well over thousand.  Mild elevation of liver enzymes are seen with a blood alcohol of 319 and a lactate unremarkable at 1.9    EKG:   I have independently interpreted this EKG  Results for orders placed or performed during the hospital encounter of 23   EKG   Result Value Ref Range    Report       [facility] Emergency Dept.    Test Date:  2023  Pt Name:    SONIA DAMON          Department: ER  MRN:        [id number]                      Room:  Gender:     Female                       Technician: [id number]  :        1965                   Requested By:ER TRIAGE PROTOCOL  Order #:    [id number]                    Reading MD:    Measurements  Intervals                                Axis  Rate:       86                           P:          67  NV:         156                          QRS:        21  QRSD:       82                           T:          58  QT:         392  QTc:        469    Interpretive Statements  Sinus rhythm  Compared to ECG 2023 14:20:32  Ventricular premature complex(es) no longer present        Radiology:   The attending emergency physician has independently interpreted the diagnostic imaging associated with this visit and am waiting the final reading from the radiologist.   Preliminary interpretation is a follows: No evidence of acute airspace disease  Radiologist interpretation:   DX-CHEST-PORTABLE (1 VIEW)   Final Result      No acute cardiopulmonary abnormality.             COURSE & MEDICAL DECISION MAKING     ED Observation Status? Yes; I am placing the patient in to an observation status due to a diagnostic uncertainty as well as therapeutic intensity. Patient placed in observation status at 11:37 AM, 2023.     Observation plan is as  follows: Give the patient IV fluids and allow her to sober up.  She is motivated to go to a detox facility and we will have peer support involved when she is a bit more sober and ready      INITIAL ASSESSMENT, COURSE AND PLAN  Care Narrative:     11:30 AM - Patient seen and examined at bedside. Discussed plan of care, including monitoring vital signs and detox. Patient agrees to the plan of care. The patient will be resuscitated with 1L NS IV and detox IV 1 L. Ordered for EKG, DX-chest, lactic acid now and every  2 hours, CoV-2, flu A/B and RSV by PCR, diagnostic alcohol, CBC-diff, CMP, UA w/ culture and blood culture x 2 to evaluate her symptoms.     HYDRATION: Based on the patient's presentation of Hypotension the patient was given IV fluids. IV Hydration was used because oral hydration was not adequate alone. Upon recheck following hydration, the patient was improved.    ADDITIONAL PROBLEM LIST      DISPOSITION AND DISCUSSIONS    Discussion of management with other QHP or appropriate source(s): Spoke with peers support who is trying to arrange for outpatient detox therapy at approximately 2 PM    Escalation of care considered, and ultimately not performed: acute inpatient care management, however at this time, the patient is most appropriate for outpatient management.    At this point final disposition is pending and I will sign out her ongoing care.  I suspect she will go to a detox facility if a bed is available and otherwise be taken home by her family for later disposition to such facility    FINAL DIANGOSIS  1. Alcoholic intoxication without complication (HCC)    2. Uncomplicated alcohol dependence (HCC)       Ayanna GARCIA), am scribing for, and in the presence of, Soren Barry M.D..    Electronically signed by: Ayanna Beatty), 2/1/2023    Soren GARCIA M.D. personally performed the services described in this documentation, as scribed by Ayanna Castillo in my presence, and it is both  accurate and complete.     The note accurately reflects work and decisions made by me.  Soren Barry M.D.  2/1/2023  2:54 PM

## 2023-02-02 NOTE — ED NOTES
.Patient discharged with peer support in stable condition per orders. IV access removed - bandage applied. Wristband removed per protocol. Patient verbalized understanding of all discharge instructions. All belongings accounted for.

## 2023-02-03 ENCOUNTER — APPOINTMENT (OUTPATIENT)
Dept: RADIOLOGY | Facility: MEDICAL CENTER | Age: 58
End: 2023-02-03
Attending: EMERGENCY MEDICINE
Payer: COMMERCIAL

## 2023-02-03 ENCOUNTER — HOSPITAL ENCOUNTER (EMERGENCY)
Facility: MEDICAL CENTER | Age: 58
End: 2023-02-03
Attending: EMERGENCY MEDICINE
Payer: COMMERCIAL

## 2023-02-03 VITALS
WEIGHT: 157.41 LBS | RESPIRATION RATE: 16 BRPM | BODY MASS INDEX: 23.86 KG/M2 | HEIGHT: 68 IN | HEART RATE: 53 BPM | OXYGEN SATURATION: 96 % | TEMPERATURE: 98 F | DIASTOLIC BLOOD PRESSURE: 84 MMHG | SYSTOLIC BLOOD PRESSURE: 133 MMHG

## 2023-02-03 DIAGNOSIS — I10 HYPERTENSION, UNSPECIFIED TYPE: ICD-10-CM

## 2023-02-03 LAB
ALBUMIN SERPL BCP-MCNC: 4.3 G/DL (ref 3.2–4.9)
ALBUMIN SERPL BCP-MCNC: 4.3 G/DL (ref 3.2–4.9)
ALBUMIN/GLOB SERPL: 1.4 G/DL
ALBUMIN/GLOB SERPL: 1.5 G/DL
ALP SERPL-CCNC: 100 U/L (ref 30–99)
ALP SERPL-CCNC: 102 U/L (ref 30–99)
ALT SERPL-CCNC: 50 U/L (ref 2–50)
ALT SERPL-CCNC: 50 U/L (ref 2–50)
ANION GAP SERPL CALC-SCNC: 14 MMOL/L (ref 7–16)
ANION GAP SERPL CALC-SCNC: 14 MMOL/L (ref 7–16)
AST SERPL-CCNC: 63 U/L (ref 12–45)
AST SERPL-CCNC: 64 U/L (ref 12–45)
BACTERIA UR CULT: NORMAL
BASOPHILS # BLD AUTO: 0.7 % (ref 0–1.8)
BASOPHILS # BLD: 0.03 K/UL (ref 0–0.12)
BILIRUB SERPL-MCNC: 0.8 MG/DL (ref 0.1–1.5)
BILIRUB SERPL-MCNC: 0.9 MG/DL (ref 0.1–1.5)
BUN SERPL-MCNC: 16 MG/DL (ref 8–22)
BUN SERPL-MCNC: 17 MG/DL (ref 8–22)
CALCIUM ALBUM COR SERPL-MCNC: 9.1 MG/DL (ref 8.5–10.5)
CALCIUM ALBUM COR SERPL-MCNC: 9.3 MG/DL (ref 8.5–10.5)
CALCIUM SERPL-MCNC: 9.3 MG/DL (ref 8.4–10.2)
CALCIUM SERPL-MCNC: 9.5 MG/DL (ref 8.4–10.2)
CHLORIDE SERPL-SCNC: 98 MMOL/L (ref 96–112)
CHLORIDE SERPL-SCNC: 99 MMOL/L (ref 96–112)
CO2 SERPL-SCNC: 22 MMOL/L (ref 20–33)
CO2 SERPL-SCNC: 24 MMOL/L (ref 20–33)
CREAT SERPL-MCNC: 0.91 MG/DL (ref 0.5–1.4)
CREAT SERPL-MCNC: 1.05 MG/DL (ref 0.5–1.4)
EKG IMPRESSION: NORMAL
EOSINOPHIL # BLD AUTO: 0.14 K/UL (ref 0–0.51)
EOSINOPHIL NFR BLD: 3.4 % (ref 0–6.9)
ERYTHROCYTE [DISTWIDTH] IN BLOOD BY AUTOMATED COUNT: 50.4 FL (ref 35.9–50)
GFR SERPLBLD CREATININE-BSD FMLA CKD-EPI: 63 ML/MIN/1.73 M 2
GFR SERPLBLD CREATININE-BSD FMLA CKD-EPI: 74 ML/MIN/1.73 M 2
GLOBULIN SER CALC-MCNC: 2.9 G/DL (ref 1.9–3.5)
GLOBULIN SER CALC-MCNC: 3 G/DL (ref 1.9–3.5)
GLUCOSE SERPL-MCNC: 120 MG/DL (ref 65–99)
GLUCOSE SERPL-MCNC: 123 MG/DL (ref 65–99)
HCT VFR BLD AUTO: 38.4 % (ref 37–47)
HGB BLD-MCNC: 13.1 G/DL (ref 12–16)
IMM GRANULOCYTES # BLD AUTO: 0.01 K/UL (ref 0–0.11)
IMM GRANULOCYTES NFR BLD AUTO: 0.2 % (ref 0–0.9)
LYMPHOCYTES # BLD AUTO: 1.38 K/UL (ref 1–4.8)
LYMPHOCYTES NFR BLD: 33.3 % (ref 22–41)
MCH RBC QN AUTO: 33.1 PG (ref 27–33)
MCHC RBC AUTO-ENTMCNC: 34.1 G/DL (ref 33.6–35)
MCV RBC AUTO: 97 FL (ref 81.4–97.8)
MONOCYTES # BLD AUTO: 0.55 K/UL (ref 0–0.85)
MONOCYTES NFR BLD AUTO: 13.3 % (ref 0–13.4)
NEUTROPHILS # BLD AUTO: 2.04 K/UL (ref 2–7.15)
NEUTROPHILS NFR BLD: 49.1 % (ref 44–72)
NRBC # BLD AUTO: 0 K/UL
NRBC BLD-RTO: 0 /100 WBC
PLATELET # BLD AUTO: 166 K/UL (ref 164–446)
PMV BLD AUTO: 11.2 FL (ref 9–12.9)
POTASSIUM SERPL-SCNC: 3.5 MMOL/L (ref 3.6–5.5)
POTASSIUM SERPL-SCNC: 3.8 MMOL/L (ref 3.6–5.5)
PROT SERPL-MCNC: 7.2 G/DL (ref 6–8.2)
PROT SERPL-MCNC: 7.3 G/DL (ref 6–8.2)
RBC # BLD AUTO: 3.96 M/UL (ref 4.2–5.4)
SIGNIFICANT IND 70042: NORMAL
SITE SITE: NORMAL
SODIUM SERPL-SCNC: 135 MMOL/L (ref 135–145)
SODIUM SERPL-SCNC: 136 MMOL/L (ref 135–145)
SOURCE SOURCE: NORMAL
TROPONIN T SERPL-MCNC: 11 NG/L (ref 6–19)
TROPONIN T SERPL-MCNC: 11 NG/L (ref 6–19)
WBC # BLD AUTO: 4.2 K/UL (ref 4.8–10.8)

## 2023-02-03 PROCEDURE — 80053 COMPREHEN METABOLIC PANEL: CPT

## 2023-02-03 PROCEDURE — 93005 ELECTROCARDIOGRAM TRACING: CPT | Performed by: EMERGENCY MEDICINE

## 2023-02-03 PROCEDURE — 99285 EMERGENCY DEPT VISIT HI MDM: CPT

## 2023-02-03 PROCEDURE — 85025 COMPLETE CBC W/AUTO DIFF WBC: CPT

## 2023-02-03 PROCEDURE — A9270 NON-COVERED ITEM OR SERVICE: HCPCS | Performed by: EMERGENCY MEDICINE

## 2023-02-03 PROCEDURE — 84484 ASSAY OF TROPONIN QUANT: CPT | Mod: 91

## 2023-02-03 PROCEDURE — 94760 N-INVAS EAR/PLS OXIMETRY 1: CPT

## 2023-02-03 PROCEDURE — 700111 HCHG RX REV CODE 636 W/ 250 OVERRIDE (IP): Performed by: EMERGENCY MEDICINE

## 2023-02-03 PROCEDURE — 96374 THER/PROPH/DIAG INJ IV PUSH: CPT

## 2023-02-03 PROCEDURE — 36415 COLL VENOUS BLD VENIPUNCTURE: CPT

## 2023-02-03 PROCEDURE — 70450 CT HEAD/BRAIN W/O DYE: CPT

## 2023-02-03 PROCEDURE — 700102 HCHG RX REV CODE 250 W/ 637 OVERRIDE(OP): Performed by: EMERGENCY MEDICINE

## 2023-02-03 RX ORDER — TELMISARTAN AND HYDROCHLORTHIAZIDE 80; 12.5 MG/1; MG/1
2 TABLET ORAL DAILY
Qty: 30 TABLET | Refills: 0 | Status: SHIPPED | OUTPATIENT
Start: 2023-02-03 | End: 2023-06-15

## 2023-02-03 RX ORDER — LORAZEPAM 1 MG/1
2 TABLET ORAL ONCE
Status: COMPLETED | OUTPATIENT
Start: 2023-02-03 | End: 2023-02-03

## 2023-02-03 RX ORDER — HYDRALAZINE HYDROCHLORIDE 20 MG/ML
10 INJECTION INTRAMUSCULAR; INTRAVENOUS ONCE
Status: COMPLETED | OUTPATIENT
Start: 2023-02-03 | End: 2023-02-03

## 2023-02-03 RX ORDER — LORAZEPAM 2 MG/ML
1 INJECTION INTRAMUSCULAR ONCE
Status: DISCONTINUED | OUTPATIENT
Start: 2023-02-03 | End: 2023-02-03

## 2023-02-03 RX ADMIN — LORAZEPAM 2 MG: 1 TABLET ORAL at 18:51

## 2023-02-03 RX ADMIN — HYDRALAZINE HYDROCHLORIDE 10 MG: 20 INJECTION INTRAMUSCULAR; INTRAVENOUS at 19:36

## 2023-02-03 ASSESSMENT — FIBROSIS 4 INDEX: FIB4 SCORE: 4.12

## 2023-02-04 NOTE — ED NOTES
Report received from Natalie BHATT.     Rounded on pt. Pt in NAD.  Pt reports no further needs at this time.   Call bell within reach.

## 2023-02-04 NOTE — ED NOTES
Pt ambulated to bathroom and is back in room.     Rounded on pt. Pt in NAD.  Pt reports no further needs at this time.   Call bell within reach.

## 2023-02-04 NOTE — ED NOTES
ERP aware that Blood work was obtained but PIV was unsuccessful. ERP states that it is ok to hold off on IV at this time.

## 2023-02-04 NOTE — ED PROVIDER NOTES
"  ER Provider Note    Scribed for Andry Calabrese M.d. by Matt Lara. 2/3/2023  6:18 PM    Primary Care Provider: Deborah Moctezuma M.D.    CHIEF COMPLAINT  Chief Complaint   Patient presents with    Hypertension    Dizziness    Headache     EXTERNAL RECORDS REVIEWED  Other Reviewed prior ED note which shows patient was seen in the ER two days ago for alcohol intoxication.    HPI/ROS  LIMITATION TO HISTORY   Select: : None  OUTSIDE HISTORIAN(S):  None    Yohana Brandon is a 57 y.o. female who presents to the ED for evaluation of high blood pressure onset today. Patient states she has been on blood pressure medication for 12 years including Micardis and clonidine. States she noticed a couple of days ago that her diastolic blood pressure was over 100. She took her blood pressure medications without improvement of her blood pressure. Patient was seen in the ER two days ago for alcohol intoxication. States prior to that visit, she was drinking two bottles of wine per day. She stopped drinking alcohol after the ER visit two days ago.  She thinks the high blood pressure may be related to alcohol withdrawal. She reprots headache, dizziness described as feeling \"faint.\" She reports chest pain just today starting at noon. Denies history of heart problems. She no longer feels shaky. Denies nausea or vomiting. States she took two pills of 0.5 xanax today, one pill in the morning and one pill in the afternoon. Patient states her grandfather passed away from a heart attack in his 60s. Patient denies smoking or illicit drug use. She notes having history of high cholesterol. Patient notes she is already in a program for the alcohol.    PAST MEDICAL HISTORY  Past Medical History:   Diagnosis Date    Alcohol abuse     Alcohol abuse     ASTHMA     Elevated LFTs     Heart palpitations     w/ alcohol withdrawal    Hypertension     Psychiatric disorder     alcohol dependance       SURGICAL HISTORY  Past Surgical History: "   Procedure Laterality Date    CLOSED REDUCTION Right 2022    Procedure: CLOSED REDUCTION, HIP,;  Surgeon: Jose G Bell M.D.;  Location: SURGERY [facility];  Service: Orthopedics    [place] TOTAL HIP ARTHROPLASTY Right 2021    Procedure: ARTHROPLASTY, HIP, TOTAL, ANTERIOR APPROACH;  Surgeon: Jose G Bell M.D.;  Location: SURGERY [facility];  Service: Orthopedics    HEMORRHOIDECTOMY  2011    Performed by SONY MACDONALD at SURGERY [facility]    ANAL SPHINCTEROTOMY  2011    Performed by SOYN MACDONALD at SURGERY [facility]    IA ENDOMETRIAL ABLATION, THERMAL      APPENDECTOMY      [place] ANESTH,SURG BREAST RECONSTRUCTIVE      bilateral     BUNIONECTOMY      bilateral    IA REMOVE TONSILS/ADENOIDS,<13 Y/O  1973    IA  DELIVERY+POSTPARTUM CARE      x2       FAMILY HISTORY  Family History   Problem Relation Age of Onset    Stroke Mother        SOCIAL HISTORY   reports that she has never smoked. She has never used smokeless tobacco. She reports current alcohol use. She reports that she does not use drugs.    CURRENT MEDICATIONS  Previous Medications    ALPRAZOLAM (XANAX) 0.5 MG TAB    Take 0.5 mg by mouth 2 times a day as needed for Anxiety.    ASPIRIN EC 81 MG EC TABLET    Take 1 Tablet by mouth 2 times a day.    CELECOXIB (CELEBREX) 200 MG CAP    Take 1 Capsule by mouth 2 times a day.    CHOLECALCIFEROL (VITAMIN D3 PO)    Take 1 Cap by mouth every day.    CLONIDINE (CATAPRES) 0.1 MG TAB    Take 0.1 mg by mouth 2 times a day as needed (Per pt if blood pressure is > 160/80). Indications: High Blood Pressure Disorder    COENZYME Q10 PO    Take 1 Tablet by mouth every day.    GABAPENTIN (NEURONTIN) 300 MG CAP    Take 300 mg by mouth 4 times on day #1, 300 mg 3 times on day 2, 300 mg twice on day 3, and 300 mg once on day 4 by mouth    MAGNESIUM OXIDE (MAG-OX) 400 MG TAB TABLET    Take 1 tablet by mouth every day.    MULTIVITAMIN (THERAGRAN) TAB    Take 1 Tab by mouth every  "day.    NON FORMULARY REQUEST    Inject 1 Each as directed every day. Hormone shots @ Dr. Lopez Jackson office (Pregnyl 250 units)    NON FORMULARY REQUEST    Apply 1.5 g topically every day. Testosterone 5% cream    NON FORMULARY REQUEST    Apply 0.5-1 mg topically every day. Bi-est cream E2 E3 80-20 ratio to 5MG    OMEGA-3 FATTY ACIDS (FISH OIL) 1000 MG CAP CAPSULE    Take 1,000 mg by mouth every day.    POLYETHYLENE GLYCOL/LYTES (MIRALAX) 17 G PACK    Take 1 Packet by mouth every day.    POTASSIUM 99 MG TAB    Take 99 mg by mouth every day. Indications: OTC    PROGESTERONE (PROMETRIUM) 100 MG CAP    Take 100 mg by mouth every evening.    ROSUVASTATIN (CRESTOR) 10 MG TAB    Take 10 mg by mouth every evening.    TELMISARTAN-HYDROCHLOROTHIAZIDE (MICARDIS HCT) 80-25 MG PER TABLET    Take 1 tablet by mouth every day.    THYROID (ARMOUR THYROID) 60 MG TAB    Take 60 mg by mouth every day.       ALLERGIES  Banana, Kiwi extract, Latex, Morphine, Other food, and Vicodin [hydrocodone-acetaminophen]    PHYSICAL EXAM  BP (!) 188/116   Pulse 61   Temp 36.4 °C (97.5 °F) (Temporal)   Resp 16   Ht 1.727 m (5' 8\")   Wt 71.4 kg (157 lb 6.5 oz)   LMP 08/15/2011   SpO2 96%   BMI 23.93 kg/m²   Constitutional: Well developed, Well nourished, No acute distress, Non-toxic appearance.   HENT: Normocephalic, Atraumatic, mucous membranes moist, no erythema, exudates, swelling, or masses, nares patent  Eyes: nonicteric  Neck: Supple, no meningismus  Lymphatic: No lymphadenopathy noted.   Cardiovascular: Regular rate and rhythm, no gallops rubs or murmurs  Lungs: Clear bilaterally   Abdomen: Soft and nontender throughout  Skin: Warm, Dry, no rash  Genitalia: Deferred  Rectal: Deferred  Extremities: No edema  Neurologic: Alert, appropriate, follows commands, moving all extremities, no drift, visual fields intact, finger-to-nose intact, cranial nerves intact, speech normal  Psychiatric: Affect normal     DIAGNOSTIC STUDIES    Labs: "   Results for orders placed or performed during the hospital encounter of 23   CBC WITH DIFFERENTIAL   Result Value Ref Range    WBC 4.2 (L) 4.8 - 10.8 K/uL    RBC 3.96 (L) 4.20 - 5.40 M/uL    Hemoglobin 13.1 12.0 - 16.0 g/dL    Hematocrit 38.4 37.0 - 47.0 %    MCV 97.0 81.4 - 97.8 fL    MCH 33.1 (H) 27.0 - 33.0 pg    MCHC 34.1 33.6 - 35.0 g/dL    RDW 50.4 (H) 35.9 - 50.0 fL    Platelet Count 166 164 - 446 K/uL    MPV 11.2 9.0 - 12.9 fL    Neutrophils-Polys 49.10 44.00 - 72.00 %    Lymphocytes 33.30 22.00 - 41.00 %    Monocytes 13.30 0.00 - 13.40 %    Eosinophils 3.40 0.00 - 6.90 %    Basophils 0.70 0.00 - 1.80 %    Immature Granulocytes 0.20 0.00 - 0.90 %    Nucleated RBC 0.00 /100 WBC    Neutrophils (Absolute) 2.04 2.00 - 7.15 K/uL    Lymphs (Absolute) 1.38 1.00 - 4.80 K/uL    Monos (Absolute) 0.55 0.00 - 0.85 K/uL    Eos (Absolute) 0.14 0.00 - 0.51 K/uL    Baso (Absolute) 0.03 0.00 - 0.12 K/uL    Immature Granulocytes (abs) 0.01 0.00 - 0.11 K/uL    NRBC (Absolute) 0.00 K/uL   EKG   Result Value Ref Range    Report       [facility] Emergency Dept.    Test Date:  2023  Pt Name:    SONIA DAMON          Department: [facility]  MRN:        [id number]                      Room:       -ROOM 7  Gender:     Female                       Technician: [id number] milton  :        1965                   Requested By:LOW MELGAR  Order #:    [id number]                    Reading MD:    Measurements  Intervals                                Axis  Rate:       54                           P:          67  DC:         163                          QRS:        27  QRSD:       93                           T:          52  QT:         473  QTc:        449    Interpretive Statements  Sinus rhythm  Probable left atrial enlargement  Baseline wander in lead(s) V1  Compared to ECG 2023 10:49:11  No significant changes          EKG:   I have independently interpreted this EKG  Time 1832  Rhythm:  Normal  sinus rhythm   Rate: 54  Axis: normal  Ectopy: none  Conduction: normal  ST Segments: no acute change  T Waves: no acute change  Q Waves: none  Clinical Impression: Normal EKG without acute changes      Radiology:     CT-HEAD W/O   Final Result      1.  Cerebral atrophy.      2. No evidence of acute cerebral infarction, hemorrhage or mass lesion.      3. Pansinusitis.              COURSE & MEDICAL DECISION MAKING     ED Observation Status? Yes; I am placing the patient in to an observation status due to a diagnostic uncertainty as well as therapeutic intensity. Patient placed in observation status at 08:52 PM, 2/3/2023.     Observation plan is as follows: Observe for blood pressure and looking for endorgan damage related to    Upon Reevaluation, the patient's condition has: Improved; and will be discharged.    Patient discharged from ED Observation status at 10:52 PM (Time) February 3 (Date).     INITIAL ASSESSMENT, COURSE AND PLAN    6:18 PM Patient seen and examined at bedside. Ordered for CT head, labs, EKG to evaluate. Patient will be treated with ativan injection 1 mg for her symptoms.      Care Narrative: This is a 57-year-old female with hypertension without evidence of endorgan damage.  Her blood pressure is currently 133/84 although she had multiple higher blood pressure readings while here.  I do believe that some of her elevated blood pressure is related to alcohol withdrawal although she is not particularly tremulous and is not significantly tachycardic.  She does have baseline high blood pressure and does take Micardis-I am going to increase her dose of that and she will start that tomorrow morning.  At this point there is no evidence of any EKG ischemia and 2 troponins have been drawn and are negative.  Head CT is unremarkable.  Patient is nonfocal neurologically.  She will be discharged home to follow-up with outpatient detox which is scheduled for this week.    ADDITIONAL PROBLEM  LIST      DISPOSITION AND DISCUSSIONS  I have discussed management of the patient with the following physicians and TONJA's: None    Discussion of management with other hospitals or appropriate source(s): None       Barriers to care at this time, including but not limited to:  None .     Decision tools and prescription drugs considered including, but not limited to:  Change of blood pressure dose .    FINAL DIANGOSIS  1. Hypertension, unspecified type         I, Matt Lara (Scribe), am scribing for, and in the presence of, Andry Calabrese M.D..    Electronically signed by: Matt Lara (Scribe), 2/3/2023    IAndry M.D. personally performed the services described in this documentation, as scribed by Matt Lara in my presence, and it is both accurate and complete.     The note accurately reflects work and decisions made by me.  Andry Calabrese M.D.  2/3/2023  10:54 PM

## 2023-03-02 ENCOUNTER — APPOINTMENT (OUTPATIENT)
Dept: LAB | Facility: MEDICAL CENTER | Age: 58
End: 2023-03-02
Payer: COMMERCIAL

## 2023-03-06 ENCOUNTER — HOSPITAL ENCOUNTER (EMERGENCY)
Facility: MEDICAL CENTER | Age: 58
End: 2023-03-06
Payer: COMMERCIAL

## 2023-03-06 VITALS
WEIGHT: 157 LBS | OXYGEN SATURATION: 95 % | TEMPERATURE: 98.9 F | RESPIRATION RATE: 18 BRPM | BODY MASS INDEX: 23.79 KG/M2 | DIASTOLIC BLOOD PRESSURE: 101 MMHG | HEART RATE: 114 BPM | SYSTOLIC BLOOD PRESSURE: 141 MMHG | HEIGHT: 68 IN

## 2023-03-06 PROCEDURE — 302449 STATCHG TRIAGE ONLY (STATISTIC)

## 2023-03-06 ASSESSMENT — FIBROSIS 4 INDEX: FIB4 SCORE: 2.95

## 2023-03-06 NOTE — ED TRIAGE NOTES
"Chief Complaint   Patient presents with    Detox     ETOH - Last drink 0900      Pt ambulatory to triage for complaints of alcohol detox. Pt states last drink was about 2 ours ago.     Pt is alert and oriented, speaking in full sentences, follows commands and responds appropriately to questions.      Pt placed in lobby. Pt educated on triage process and apologized for wait time. Pt encouraged to alert staff for any changes.     Patient and staff wearing appropriate PPE      BP (!) 141/101   Pulse (!) 114   Temp 37.2 °C (98.9 °F) (Temporal)   Resp 18   Ht 1.727 m (5' 8\")   Wt 71.2 kg (157 lb)   SpO2 95%       "

## 2023-03-16 ENCOUNTER — HOSPITAL ENCOUNTER (OUTPATIENT)
Dept: LAB | Facility: MEDICAL CENTER | Age: 58
End: 2023-03-16
Attending: EMERGENCY MEDICINE
Payer: COMMERCIAL

## 2023-03-16 LAB
BASOPHILS # BLD AUTO: 0.8 % (ref 0–1.8)
BASOPHILS # BLD: 0.03 K/UL (ref 0–0.12)
EOSINOPHIL # BLD AUTO: 0.03 K/UL (ref 0–0.51)
EOSINOPHIL NFR BLD: 0.8 % (ref 0–6.9)
ERYTHROCYTE [DISTWIDTH] IN BLOOD BY AUTOMATED COUNT: 45.9 FL (ref 35.9–50)
HCT VFR BLD AUTO: 42.6 % (ref 37–47)
HGB BLD-MCNC: 13.9 G/DL (ref 12–16)
IMM GRANULOCYTES # BLD AUTO: 0.01 K/UL (ref 0–0.11)
IMM GRANULOCYTES NFR BLD AUTO: 0.3 % (ref 0–0.9)
LYMPHOCYTES # BLD AUTO: 1.07 K/UL (ref 1–4.8)
LYMPHOCYTES NFR BLD: 30 % (ref 22–41)
MCH RBC QN AUTO: 33 PG (ref 27–33)
MCHC RBC AUTO-ENTMCNC: 32.6 G/DL (ref 33.6–35)
MCV RBC AUTO: 101.2 FL (ref 81.4–97.8)
MONOCYTES # BLD AUTO: 0.61 K/UL (ref 0–0.85)
MONOCYTES NFR BLD AUTO: 17.1 % (ref 0–13.4)
NEUTROPHILS # BLD AUTO: 1.82 K/UL (ref 2–7.15)
NEUTROPHILS NFR BLD: 51 % (ref 44–72)
NRBC # BLD AUTO: 0 K/UL
NRBC BLD-RTO: 0 /100 WBC
PLATELET # BLD AUTO: 241 K/UL (ref 164–446)
PMV BLD AUTO: 11.4 FL (ref 9–12.9)
RBC # BLD AUTO: 4.21 M/UL (ref 4.2–5.4)
WBC # BLD AUTO: 3.6 K/UL (ref 4.8–10.8)

## 2023-03-16 PROCEDURE — 85025 COMPLETE CBC W/AUTO DIFF WBC: CPT

## 2023-03-16 PROCEDURE — 36415 COLL VENOUS BLD VENIPUNCTURE: CPT

## 2023-03-16 PROCEDURE — 84403 ASSAY OF TOTAL TESTOSTERONE: CPT

## 2023-03-16 PROCEDURE — 80053 COMPREHEN METABOLIC PANEL: CPT

## 2023-03-16 PROCEDURE — 82670 ASSAY OF TOTAL ESTRADIOL: CPT

## 2023-03-16 PROCEDURE — 80061 LIPID PANEL: CPT

## 2023-03-16 PROCEDURE — 84144 ASSAY OF PROGESTERONE: CPT

## 2023-03-16 PROCEDURE — 84305 ASSAY OF SOMATOMEDIN: CPT

## 2023-03-17 LAB
ALBUMIN SERPL BCP-MCNC: 4.7 G/DL (ref 3.2–4.9)
ALBUMIN/GLOB SERPL: 1.5 G/DL
ALP SERPL-CCNC: 59 U/L (ref 30–99)
ALT SERPL-CCNC: 39 U/L (ref 2–50)
ANION GAP SERPL CALC-SCNC: 17 MMOL/L (ref 7–16)
AST SERPL-CCNC: 38 U/L (ref 12–45)
BILIRUB SERPL-MCNC: 0.2 MG/DL (ref 0.1–1.5)
BUN SERPL-MCNC: 24 MG/DL (ref 8–22)
CALCIUM ALBUM COR SERPL-MCNC: 9.1 MG/DL (ref 8.5–10.5)
CALCIUM SERPL-MCNC: 9.7 MG/DL (ref 8.5–10.5)
CHLORIDE SERPL-SCNC: 95 MMOL/L (ref 96–112)
CHOLEST SERPL-MCNC: 176 MG/DL (ref 100–199)
CO2 SERPL-SCNC: 25 MMOL/L (ref 20–33)
CREAT SERPL-MCNC: 1.04 MG/DL (ref 0.5–1.4)
ESTRADIOL SERPL-MCNC: 13.2 PG/ML
GFR SERPLBLD CREATININE-BSD FMLA CKD-EPI: 63 ML/MIN/1.73 M 2
GLOBULIN SER CALC-MCNC: 3.1 G/DL (ref 1.9–3.5)
GLUCOSE SERPL-MCNC: 92 MG/DL (ref 65–99)
HDLC SERPL-MCNC: 56 MG/DL
LDLC SERPL CALC-MCNC: 101 MG/DL
POTASSIUM SERPL-SCNC: 3.5 MMOL/L (ref 3.6–5.5)
PROGEST SERPL-MCNC: 0.3 NG/ML
PROT SERPL-MCNC: 7.8 G/DL (ref 6–8.2)
SODIUM SERPL-SCNC: 137 MMOL/L (ref 135–145)
TESTOST SERPL-MCNC: 29 NG/DL (ref 9–75)
TRIGL SERPL-MCNC: 97 MG/DL (ref 0–149)

## 2023-03-18 LAB
IGF-I SERPL-MCNC: 149 NG/ML (ref 49–234)
IGF-I Z-SCORE SERPL: 0.6

## 2023-03-20 ENCOUNTER — HOSPITAL ENCOUNTER (OUTPATIENT)
Dept: LAB | Facility: MEDICAL CENTER | Age: 58
End: 2023-03-20
Attending: FAMILY MEDICINE
Payer: COMMERCIAL

## 2023-03-20 LAB
ALBUMIN SERPL BCP-MCNC: 4.3 G/DL (ref 3.2–4.9)
ALBUMIN/GLOB SERPL: 1.4 G/DL
ALP SERPL-CCNC: 56 U/L (ref 30–99)
ALT SERPL-CCNC: 32 U/L (ref 2–50)
ANION GAP SERPL CALC-SCNC: 14 MMOL/L (ref 7–16)
AST SERPL-CCNC: 25 U/L (ref 12–45)
BASOPHILS # BLD AUTO: 1.2 % (ref 0–1.8)
BASOPHILS # BLD: 0.08 K/UL (ref 0–0.12)
BILIRUB SERPL-MCNC: 0.2 MG/DL (ref 0.1–1.5)
BUN SERPL-MCNC: 13 MG/DL (ref 8–22)
CALCIUM ALBUM COR SERPL-MCNC: 9.8 MG/DL (ref 8.5–10.5)
CALCIUM SERPL-MCNC: 10 MG/DL (ref 8.5–10.5)
CHLORIDE SERPL-SCNC: 102 MMOL/L (ref 96–112)
CHOLEST SERPL-MCNC: 136 MG/DL (ref 100–199)
CO2 SERPL-SCNC: 23 MMOL/L (ref 20–33)
CREAT SERPL-MCNC: 0.78 MG/DL (ref 0.5–1.4)
DHEA-S SERPL-MCNC: 230 UG/DL (ref 18.9–205)
EOSINOPHIL # BLD AUTO: 0.05 K/UL (ref 0–0.51)
EOSINOPHIL NFR BLD: 0.7 % (ref 0–6.9)
ERYTHROCYTE [DISTWIDTH] IN BLOOD BY AUTOMATED COUNT: 44.7 FL (ref 35.9–50)
ESTRADIOL SERPL-MCNC: <5 PG/ML
GFR SERPLBLD CREATININE-BSD FMLA CKD-EPI: 89 ML/MIN/1.73 M 2
GLOBULIN SER CALC-MCNC: 3 G/DL (ref 1.9–3.5)
GLUCOSE SERPL-MCNC: 89 MG/DL (ref 65–99)
HCT VFR BLD AUTO: 39.2 % (ref 37–47)
HDLC SERPL-MCNC: 44 MG/DL
HGB BLD-MCNC: 13 G/DL (ref 12–16)
IMM GRANULOCYTES # BLD AUTO: 0.03 K/UL (ref 0–0.11)
IMM GRANULOCYTES NFR BLD AUTO: 0.4 % (ref 0–0.9)
LDLC SERPL CALC-MCNC: 71 MG/DL
LYMPHOCYTES # BLD AUTO: 2.2 K/UL (ref 1–4.8)
LYMPHOCYTES NFR BLD: 32.3 % (ref 22–41)
MCH RBC QN AUTO: 33 PG (ref 27–33)
MCHC RBC AUTO-ENTMCNC: 33.2 G/DL (ref 33.6–35)
MCV RBC AUTO: 99.5 FL (ref 81.4–97.8)
MONOCYTES # BLD AUTO: 0.46 K/UL (ref 0–0.85)
MONOCYTES NFR BLD AUTO: 6.7 % (ref 0–13.4)
NEUTROPHILS # BLD AUTO: 4 K/UL (ref 2–7.15)
NEUTROPHILS NFR BLD: 58.7 % (ref 44–72)
NRBC # BLD AUTO: 0 K/UL
NRBC BLD-RTO: 0 /100 WBC
PLATELET # BLD AUTO: 306 K/UL (ref 164–446)
PMV BLD AUTO: 11.6 FL (ref 9–12.9)
POTASSIUM SERPL-SCNC: 3.8 MMOL/L (ref 3.6–5.5)
PROGEST SERPL-MCNC: 0.37 NG/ML
PROT SERPL-MCNC: 7.3 G/DL (ref 6–8.2)
RBC # BLD AUTO: 3.94 M/UL (ref 4.2–5.4)
SODIUM SERPL-SCNC: 139 MMOL/L (ref 135–145)
T3FREE SERPL-MCNC: 2.93 PG/ML (ref 2–4.4)
T4 FREE SERPL-MCNC: 1.08 NG/DL (ref 0.93–1.7)
TRIGL SERPL-MCNC: 105 MG/DL (ref 0–149)
TSH SERPL DL<=0.005 MIU/L-ACNC: 0.53 UIU/ML (ref 0.38–5.33)
WBC # BLD AUTO: 6.8 K/UL (ref 4.8–10.8)

## 2023-03-20 PROCEDURE — 82627 DEHYDROEPIANDROSTERONE: CPT

## 2023-03-20 PROCEDURE — 82679 ASSAY OF ESTRONE: CPT

## 2023-03-20 PROCEDURE — 36415 COLL VENOUS BLD VENIPUNCTURE: CPT

## 2023-03-20 PROCEDURE — 82570 ASSAY OF URINE CREATININE: CPT

## 2023-03-20 PROCEDURE — 84439 ASSAY OF FREE THYROXINE: CPT

## 2023-03-20 PROCEDURE — 84402 ASSAY OF FREE TESTOSTERONE: CPT

## 2023-03-20 PROCEDURE — 84403 ASSAY OF TOTAL TESTOSTERONE: CPT

## 2023-03-20 PROCEDURE — 80061 LIPID PANEL: CPT

## 2023-03-20 PROCEDURE — 84443 ASSAY THYROID STIM HORMONE: CPT

## 2023-03-20 PROCEDURE — 80053 COMPREHEN METABOLIC PANEL: CPT

## 2023-03-20 PROCEDURE — 82670 ASSAY OF TOTAL ESTRADIOL: CPT

## 2023-03-20 PROCEDURE — 84481 FREE ASSAY (FT-3): CPT

## 2023-03-20 PROCEDURE — 84144 ASSAY OF PROGESTERONE: CPT

## 2023-03-20 PROCEDURE — 82043 UR ALBUMIN QUANTITATIVE: CPT

## 2023-03-20 PROCEDURE — 85025 COMPLETE CBC W/AUTO DIFF WBC: CPT

## 2023-03-20 PROCEDURE — 84305 ASSAY OF SOMATOMEDIN: CPT

## 2023-03-20 PROCEDURE — 84270 ASSAY OF SEX HORMONE GLOBUL: CPT

## 2023-03-21 LAB
CREAT UR-MCNC: 43.94 MG/DL
MICROALBUMIN UR-MCNC: <1.2 MG/DL
MICROALBUMIN/CREAT UR: NORMAL MG/G (ref 0–30)

## 2023-03-22 LAB
IGF-I SERPL-MCNC: 100 NG/ML (ref 49–234)
IGF-I Z-SCORE SERPL: -0.4

## 2023-03-23 LAB — ESTRONE SERPL-MCNC: 13.4 PG/ML

## 2023-03-25 LAB
SHBG SERPL-SCNC: 62 NMOL/L (ref 17–125)
TESTOST FREE SERPL-MCNC: 5.1 PG/ML (ref 0.6–3.8)
TESTOST SERPL-MCNC: 45 NG/DL (ref 9–55)

## 2023-03-29 ENCOUNTER — APPOINTMENT (RX ONLY)
Dept: URBAN - METROPOLITAN AREA CLINIC 6 | Facility: CLINIC | Age: 58
Setting detail: DERMATOLOGY
End: 2023-03-29

## 2023-03-29 DIAGNOSIS — L81.4 OTHER MELANIN HYPERPIGMENTATION: ICD-10-CM

## 2023-03-29 DIAGNOSIS — Z71.89 OTHER SPECIFIED COUNSELING: ICD-10-CM

## 2023-03-29 DIAGNOSIS — L82.1 OTHER SEBORRHEIC KERATOSIS: ICD-10-CM

## 2023-03-29 DIAGNOSIS — D22 MELANOCYTIC NEVI: ICD-10-CM

## 2023-03-29 DIAGNOSIS — D18.0 HEMANGIOMA: ICD-10-CM

## 2023-03-29 DIAGNOSIS — Q82.5 CONGENITAL NON-NEOPLASTIC NEVUS: ICD-10-CM

## 2023-03-29 PROCEDURE — ? SUNSCREEN TREATMENT REGIMEN

## 2023-03-29 PROCEDURE — ? COUNSELING

## 2023-03-29 ASSESSMENT — LOCATION DETAILED DESCRIPTION DERM
LOCATION DETAILED: RIGHT RADIAL DORSAL HAND
LOCATION DETAILED: LEFT KNEE
LOCATION DETAILED: LEFT INFERIOR FOREHEAD
LOCATION DETAILED: PERIUMBILICAL SKIN
LOCATION DETAILED: MIDDLE STERNUM
LOCATION DETAILED: LEFT RADIAL DORSAL HAND
LOCATION DETAILED: SUPERIOR THORACIC SPINE
LOCATION DETAILED: EPIGASTRIC SKIN

## 2023-03-29 ASSESSMENT — LOCATION ZONE DERM
LOCATION ZONE: LEG
LOCATION ZONE: TRUNK
LOCATION ZONE: FACE
LOCATION ZONE: HAND

## 2023-03-29 ASSESSMENT — LOCATION SIMPLE DESCRIPTION DERM
LOCATION SIMPLE: LEFT HAND
LOCATION SIMPLE: LEFT KNEE
LOCATION SIMPLE: RIGHT HAND
LOCATION SIMPLE: ABDOMEN
LOCATION SIMPLE: LEFT FOREHEAD
LOCATION SIMPLE: UPPER BACK
LOCATION SIMPLE: CHEST

## 2023-05-24 ENCOUNTER — APPOINTMENT (OUTPATIENT)
Dept: RADIOLOGY | Facility: MEDICAL CENTER | Age: 58
End: 2023-05-24
Attending: EMERGENCY MEDICINE
Payer: COMMERCIAL

## 2023-05-24 ENCOUNTER — HOSPITAL ENCOUNTER (EMERGENCY)
Facility: MEDICAL CENTER | Age: 58
End: 2023-05-24
Attending: EMERGENCY MEDICINE
Payer: COMMERCIAL

## 2023-05-24 VITALS
RESPIRATION RATE: 20 BRPM | TEMPERATURE: 98.9 F | OXYGEN SATURATION: 96 % | SYSTOLIC BLOOD PRESSURE: 123 MMHG | HEIGHT: 68 IN | WEIGHT: 138.67 LBS | HEART RATE: 99 BPM | BODY MASS INDEX: 21.02 KG/M2 | DIASTOLIC BLOOD PRESSURE: 70 MMHG

## 2023-05-24 DIAGNOSIS — F10.29 ALCOHOL DEPENDENCE WITH UNSPECIFIED ALCOHOL-INDUCED DISORDER (HCC): ICD-10-CM

## 2023-05-24 DIAGNOSIS — E86.0 DEHYDRATION: ICD-10-CM

## 2023-05-24 DIAGNOSIS — F10.929 ACUTE ALCOHOLIC INTOXICATION WITH COMPLICATION (HCC): ICD-10-CM

## 2023-05-24 DIAGNOSIS — I95.9 HYPOTENSION, UNSPECIFIED HYPOTENSION TYPE: ICD-10-CM

## 2023-05-24 LAB
ALBUMIN SERPL BCP-MCNC: 4.5 G/DL (ref 3.2–4.9)
ALBUMIN/GLOB SERPL: 1.7 G/DL
ALP SERPL-CCNC: 71 U/L (ref 30–99)
ALT SERPL-CCNC: 30 U/L (ref 2–50)
AMPHET UR QL SCN: NEGATIVE
ANION GAP SERPL CALC-SCNC: 19 MMOL/L (ref 7–16)
APAP SERPL-MCNC: <5 UG/ML (ref 10–30)
APPEARANCE UR: CLEAR
APTT PPP: 25.5 SEC (ref 24.7–36)
AST SERPL-CCNC: 40 U/L (ref 12–45)
BACTERIA #/AREA URNS HPF: ABNORMAL /HPF
BARBITURATES UR QL SCN: NEGATIVE
BASOPHILS # BLD AUTO: 1.2 % (ref 0–1.8)
BASOPHILS # BLD: 0.04 K/UL (ref 0–0.12)
BENZODIAZ UR QL SCN: NEGATIVE
BILIRUB SERPL-MCNC: 0.5 MG/DL (ref 0.1–1.5)
BILIRUB UR QL STRIP.AUTO: NEGATIVE
BUN SERPL-MCNC: 12 MG/DL (ref 8–22)
BZE UR QL SCN: NEGATIVE
CALCIUM ALBUM COR SERPL-MCNC: 8.5 MG/DL (ref 8.5–10.5)
CALCIUM SERPL-MCNC: 8.9 MG/DL (ref 8.5–10.5)
CANNABINOIDS UR QL SCN: NEGATIVE
CHLORIDE SERPL-SCNC: 100 MMOL/L (ref 96–112)
CO2 SERPL-SCNC: 22 MMOL/L (ref 20–33)
COLOR UR: YELLOW
CREAT SERPL-MCNC: 1.16 MG/DL (ref 0.5–1.4)
EKG IMPRESSION: NORMAL
EKG IMPRESSION: NORMAL
EOSINOPHIL # BLD AUTO: 0.02 K/UL (ref 0–0.51)
EOSINOPHIL NFR BLD: 0.6 % (ref 0–6.9)
EPI CELLS #/AREA URNS HPF: ABNORMAL /HPF
ERYTHROCYTE [DISTWIDTH] IN BLOOD BY AUTOMATED COUNT: 43.9 FL (ref 35.9–50)
ETHANOL BLD-MCNC: 364 MG/DL
FENTANYL UR QL: NEGATIVE
GFR SERPLBLD CREATININE-BSD FMLA CKD-EPI: 55 ML/MIN/1.73 M 2
GLOBULIN SER CALC-MCNC: 2.7 G/DL (ref 1.9–3.5)
GLUCOSE BLD STRIP.AUTO-MCNC: 99 MG/DL (ref 65–99)
GLUCOSE SERPL-MCNC: 96 MG/DL (ref 65–99)
GLUCOSE UR STRIP.AUTO-MCNC: NEGATIVE MG/DL
HCG SERPL QL: NEGATIVE
HCT VFR BLD AUTO: 39.4 % (ref 37–47)
HGB BLD-MCNC: 13.9 G/DL (ref 12–16)
IMM GRANULOCYTES # BLD AUTO: 0.01 K/UL (ref 0–0.11)
IMM GRANULOCYTES NFR BLD AUTO: 0.3 % (ref 0–0.9)
INR PPP: 1.13 (ref 0.87–1.13)
KETONES UR STRIP.AUTO-MCNC: NEGATIVE MG/DL
LEUKOCYTE ESTERASE UR QL STRIP.AUTO: ABNORMAL
LIPASE SERPL-CCNC: 56 U/L (ref 11–82)
LYMPHOCYTES # BLD AUTO: 1.12 K/UL (ref 1–4.8)
LYMPHOCYTES NFR BLD: 32.3 % (ref 22–41)
MCH RBC QN AUTO: 32.7 PG (ref 27–33)
MCHC RBC AUTO-ENTMCNC: 35.3 G/DL (ref 32.2–35.5)
MCV RBC AUTO: 92.7 FL (ref 81.4–97.8)
METHADONE UR QL SCN: NEGATIVE
MICRO URNS: ABNORMAL
MONOCYTES # BLD AUTO: 0.25 K/UL (ref 0–0.85)
MONOCYTES NFR BLD AUTO: 7.2 % (ref 0–13.4)
NEUTROPHILS # BLD AUTO: 2.03 K/UL (ref 1.82–7.42)
NEUTROPHILS NFR BLD: 58.4 % (ref 44–72)
NITRITE UR QL STRIP.AUTO: POSITIVE
NRBC # BLD AUTO: 0 K/UL
NRBC BLD-RTO: 0 /100 WBC (ref 0–0.2)
NT-PROBNP SERPL IA-MCNC: 62 PG/ML (ref 0–125)
OPIATES UR QL SCN: NEGATIVE
OXYCODONE UR QL SCN: NEGATIVE
PCP UR QL SCN: NEGATIVE
PH UR STRIP.AUTO: 6.5 [PH] (ref 5–8)
PLATELET # BLD AUTO: 223 K/UL (ref 164–446)
PMV BLD AUTO: 9.2 FL (ref 9–12.9)
POTASSIUM SERPL-SCNC: 4 MMOL/L (ref 3.6–5.5)
PROPOXYPH UR QL SCN: NEGATIVE
PROT SERPL-MCNC: 7.2 G/DL (ref 6–8.2)
PROT UR QL STRIP: 30 MG/DL
PROTHROMBIN TIME: 14.4 SEC (ref 12–14.6)
RBC # BLD AUTO: 4.25 M/UL (ref 4.2–5.4)
RBC # URNS HPF: ABNORMAL /HPF
RBC UR QL AUTO: NEGATIVE
RENAL EPI CELLS #/AREA URNS HPF: ABNORMAL /HPF
SALICYLATES SERPL-MCNC: <1 MG/DL (ref 15–25)
SODIUM SERPL-SCNC: 141 MMOL/L (ref 135–145)
SP GR UR STRIP.AUTO: 1.01
TRANS CELLS #/AREA URNS HPF: ABNORMAL /HPF
TROPONIN T SERPL-MCNC: 17 NG/L (ref 6–19)
UROBILINOGEN UR STRIP.AUTO-MCNC: 0.2 MG/DL
WBC # BLD AUTO: 3.5 K/UL (ref 4.8–10.8)
WBC #/AREA URNS HPF: ABNORMAL /HPF
WBC CASTS #/AREA URNS LPF: ABNORMAL /LPF

## 2023-05-24 PROCEDURE — 87086 URINE CULTURE/COLONY COUNT: CPT

## 2023-05-24 PROCEDURE — 93005 ELECTROCARDIOGRAM TRACING: CPT

## 2023-05-24 PROCEDURE — 82962 GLUCOSE BLOOD TEST: CPT

## 2023-05-24 PROCEDURE — 83880 ASSAY OF NATRIURETIC PEPTIDE: CPT

## 2023-05-24 PROCEDURE — 85730 THROMBOPLASTIN TIME PARTIAL: CPT

## 2023-05-24 PROCEDURE — 84484 ASSAY OF TROPONIN QUANT: CPT

## 2023-05-24 PROCEDURE — 99285 EMERGENCY DEPT VISIT HI MDM: CPT

## 2023-05-24 PROCEDURE — 80143 DRUG ASSAY ACETAMINOPHEN: CPT

## 2023-05-24 PROCEDURE — 94760 N-INVAS EAR/PLS OXIMETRY 1: CPT

## 2023-05-24 PROCEDURE — 87077 CULTURE AEROBIC IDENTIFY: CPT

## 2023-05-24 PROCEDURE — 93005 ELECTROCARDIOGRAM TRACING: CPT | Performed by: EMERGENCY MEDICINE

## 2023-05-24 PROCEDURE — 71045 X-RAY EXAM CHEST 1 VIEW: CPT

## 2023-05-24 PROCEDURE — 84703 CHORIONIC GONADOTROPIN ASSAY: CPT

## 2023-05-24 PROCEDURE — 80307 DRUG TEST PRSMV CHEM ANLYZR: CPT

## 2023-05-24 PROCEDURE — 82077 ASSAY SPEC XCP UR&BREATH IA: CPT

## 2023-05-24 PROCEDURE — 83690 ASSAY OF LIPASE: CPT

## 2023-05-24 PROCEDURE — 81001 URINALYSIS AUTO W/SCOPE: CPT

## 2023-05-24 PROCEDURE — 700105 HCHG RX REV CODE 258: Performed by: EMERGENCY MEDICINE

## 2023-05-24 PROCEDURE — 85610 PROTHROMBIN TIME: CPT

## 2023-05-24 PROCEDURE — 80053 COMPREHEN METABOLIC PANEL: CPT

## 2023-05-24 PROCEDURE — 80179 DRUG ASSAY SALICYLATE: CPT

## 2023-05-24 PROCEDURE — 85025 COMPLETE CBC W/AUTO DIFF WBC: CPT

## 2023-05-24 PROCEDURE — 36415 COLL VENOUS BLD VENIPUNCTURE: CPT

## 2023-05-24 RX ORDER — LORAZEPAM 1 MG/1
1 TABLET ORAL EVERY 6 HOURS PRN
Qty: 15 TABLET | Refills: 0 | Status: SHIPPED | OUTPATIENT
Start: 2023-05-24 | End: 2023-05-29

## 2023-05-24 RX ORDER — SODIUM CHLORIDE 9 MG/ML
1000 INJECTION, SOLUTION INTRAVENOUS ONCE
Status: COMPLETED | OUTPATIENT
Start: 2023-05-24 | End: 2023-05-24

## 2023-05-24 RX ADMIN — SODIUM CHLORIDE 1000 ML: 9 INJECTION, SOLUTION INTRAVENOUS at 12:30

## 2023-05-24 RX ADMIN — SODIUM CHLORIDE 1000 ML: 9 INJECTION, SOLUTION INTRAVENOUS at 12:54

## 2023-05-24 ASSESSMENT — FIBROSIS 4 INDEX: FIB4 SCORE: 0.79

## 2023-05-24 NOTE — ED PROVIDER NOTES
ED Provider Note    CHIEF COMPLAINT  Chief Complaint   Patient presents with    Detox     Last drink approx 1 hour ago, drinks pint of vodka daily, reports that she would like to quit.        EXTERNAL RECORDS REVIEWED  Outpatient Notes the patient was seen in the emergency department on February 3, 2023 for hypertension and dizziness    HPI/ROS  LIMITATION TO HISTORY   Select: : None  OUTSIDE HISTORIAN(S):  Family daughter    Yohana Brandon is a 57 y.o. female who presents with past medical history see med for alcohol dependence, asthma, hypertension, she reports that she drinks alcohol every 3 hours and she would like to quit.  She denies any chest pain or other symptoms.  No fever no cough, no urinary symptoms.  She denies taking any medications today.  She denies an overdose.  After the patient was roomed she became profoundly hypotensive.    PAST MEDICAL HISTORY   has a past medical history of Alcohol abuse, Alcohol abuse, ASTHMA, Elevated LFTs, Heart palpitations, Hypertension, and Psychiatric disorder.    SURGICAL HISTORY   has a past surgical history that includes appendectomy (); endometrial ablation, thermal (); remove tonsils/adenoids,<15 y/o (); anesth,surg breast reconstructive (); hemorrhoidectomy (2011); anal sphincterotomy (2011); bunionectomy ();  delivery+postpartum care; total hip arthroplasty (Right, 2021); and closed reduction (Right, 2022).    FAMILY HISTORY  Family History   Problem Relation Age of Onset    Stroke Mother        SOCIAL HISTORY  Social History     Tobacco Use    Smoking status: Never    Smokeless tobacco: Never   Vaping Use    Vaping Use: Never used   Substance and Sexual Activity    Alcohol use: Yes     Comment: Daily vodka/abuse pint daily    Drug use: No    Sexual activity: Not on file       CURRENT MEDICATIONS  Home Medications       Reviewed by Katelin Lee R.N. (Registered Nurse) on 23 at 1031  Med List  "Status: Partial     Medication Last Dose Status   ALPRAZolam (XANAX) 0.5 MG Tab  Active   aspirin EC 81 MG EC tablet  Active   celecoxib (CELEBREX) 200 MG Cap  Active   Cholecalciferol (VITAMIN D3 PO)  Active   cloNIDine (CATAPRES) 0.1 MG Tab  Active   COENZYME Q10 PO  Active   gabapentin (NEURONTIN) 300 MG Cap  Active   magnesium oxide (MAG-OX) 400 MG Tab tablet  Active   multivitamin (THERAGRAN) Tab  Active   Non Formulary Request  Active   Non Formulary Request  Active   Non Formulary Request  Active   Omega-3 Fatty Acids (FISH OIL) 1000 MG Cap capsule  Active   polyethylene glycol/lytes (MIRALAX) 17 g Pack  Active   Potassium 99 MG Tab  Active   progesterone (PROMETRIUM) 100 MG Cap  Active   rosuvastatin (CRESTOR) 10 MG Tab  Active   telmisartan-hydrochlorothiazide (MICARDIS HCT) 80-12.5 MG per tablet  Active   thyroid (ARMOUR THYROID) 60 MG Tab  Active                    ALLERGIES  Allergies   Allergen Reactions    Banana Shortness of Breath    Kiwi Extract Swelling    Latex Anaphylaxis    Morphine Hives    Other Food Hives and Shortness of Breath     Tropical fruit      Vicodin [Hydrocodone-Acetaminophen] Rash     Generalized rash, puritis        PHYSICAL EXAM  VITAL SIGNS: /70   Pulse 99   Temp 37.2 °C (98.9 °F) (Temporal)   Resp 20   Ht 1.727 m (5' 8\")   Wt 62.9 kg (138 lb 10.7 oz)   LMP 09/14/2006   SpO2 96%   BMI 21.08 kg/m²    Constitutional: Alert.  HENT: No signs of trauma, Bilateral external ears normal, Nose normal.   Eyes: Pupils are equal and reactive, Conjunctiva normal, Non-icteric.   Neck: Normal range of motion, No tenderness, Supple, No stridor.   Lymphatic: No lymphadenopathy noted.   Cardiovascular: Regular rate and rhythm, no murmurs.   Thorax & Lungs: Normal breath sounds, No respiratory distress, No wheezing, No chest tenderness.   Abdomen: Bowel sounds normal, Soft, No tenderness, No peritoneal signs, No masses, No pulsatile masses.   Skin: Warm, Dry, No erythema, No rash. "   Back: No bony tenderness, No CVA tenderness.   Extremities: Intact distal pulses, No edema, No tenderness, No cyanosis.  Musculoskeletal: Good range of motion in all major joints. No tenderness to palpation or major deformities noted.   Neurologic: Alert , Normal motor function, Normal sensory function, No focal deficits noted.   Psychiatric: Affect normal, Judgment normal, Mood normal.       DIAGNOSTIC STUDIES / PROCEDURES  EKG  I have independently interpreted this EKG  This is a twelve-lead EKG interpretation by myself.  It is normal sinus rhythm at a rate of 66.  The axis is normal.  The intervals are normal.  There is no ST elevation or depression.  My impression of this EKG, it does not indicate ischemia nor arrhythmia at this time.    LABS  Labs Reviewed   CBC WITH DIFFERENTIAL - Abnormal; Notable for the following components:       Result Value    WBC 3.5 (*)     All other components within normal limits    Narrative:     Indicate which anticoagulants the patient is on:->NONE  Biotin intake of greater than 5 mg per day may interfere with  troponin levels, causing false low values.   COMP METABOLIC PANEL - Abnormal; Notable for the following components:    Anion Gap 19.0 (*)     All other components within normal limits    Narrative:     Indicate which anticoagulants the patient is on:->NONE  Biotin intake of greater than 5 mg per day may interfere with  troponin levels, causing false low values.   DIAGNOSTIC ALCOHOL - Abnormal; Notable for the following components:    Diagnostic Alcohol 364.0 (*)     All other components within normal limits    Narrative:     Indicate which anticoagulants the patient is on:->NONE  Biotin intake of greater than 5 mg per day may interfere with  troponin levels, causing false low values.   URINALYSIS,CULTURE IF INDICATED - Abnormal; Notable for the following components:    Protein 30 (*)     Nitrite Positive (*)     Leukocyte Esterase Small (*)     All other components within  normal limits    Narrative:     Indication for culture:->Patient WITHOUT an indwelling Muñoz  catheter in place with new onset of Dysuria, Frequency,  Urgency, and/or Suprapubic pain   SALICYLATE - Abnormal; Notable for the following components:    Salicylates, Quant. <1.0 (*)     All other components within normal limits   ACETAMINOPHEN - Abnormal; Notable for the following components:    Acetaminophen -Tylenol <5.0 (*)     All other components within normal limits   ESTIMATED GFR - Abnormal; Notable for the following components:    GFR (CKD-EPI) 55 (*)     All other components within normal limits    Narrative:     Indicate which anticoagulants the patient is on:->NONE  Biotin intake of greater than 5 mg per day may interfere with  troponin levels, causing false low values.   URINE MICROSCOPIC (W/UA) - Abnormal; Notable for the following components:    WBC 10-20 (*)     Bacteria Moderate (*)     White Cell Cast 0-2 (*)     All other components within normal limits    Narrative:     Indication for culture:->Patient WITHOUT an indwelling Muñoz  catheter in place with new onset of Dysuria, Frequency,  Urgency, and/or Suprapubic pain   LIPASE    Narrative:     Indicate which anticoagulants the patient is on:->NONE  Biotin intake of greater than 5 mg per day may interfere with  troponin levels, causing false low values.   PROTHROMBIN TIME    Narrative:     Indicate which anticoagulants the patient is on:->NONE  Biotin intake of greater than 5 mg per day may interfere with  troponin levels, causing false low values.   APTT    Narrative:     Indicate which anticoagulants the patient is on:->NONE  Biotin intake of greater than 5 mg per day may interfere with  troponin levels, causing false low values.   TROPONIN    Narrative:     Indicate which anticoagulants the patient is on:->NONE  Biotin intake of greater than 5 mg per day may interfere with  troponin levels, causing false low values.   PROBRAIN NATRIURETIC PEPTIDE,  NT    Narrative:     Indicate which anticoagulants the patient is on:->NONE  Biotin intake of greater than 5 mg per day may interfere with  troponin levels, causing false low values.   URINE DRUG SCREEN    Narrative:     Indication for culture:->Patient WITHOUT an indwelling Muñoz  catheter in place with new onset of Dysuria, Frequency,  Urgency, and/or Suprapubic pain   HCG QUAL SERUM    Narrative:     Indicate which anticoagulants the patient is on:->NONE  Biotin intake of greater than 5 mg per day may interfere with  troponin levels, causing false low values.   CORRECTED CALCIUM    Narrative:     Indicate which anticoagulants the patient is on:->NONE  Biotin intake of greater than 5 mg per day may interfere with  troponin levels, causing false low values.   URINE CULTURE(NEW)    Narrative:     Indication for culture:->Patient WITHOUT an indwelling Muñoz  catheter in place with new onset of Dysuria, Frequency,  Urgency, and/or Suprapubic pain   URINE CULTURE(NEW)   POCT GLUCOSE DEVICE RESULTS         RADIOLOGY  I have independently interpreted the diagnostic imaging associated with this visit and am waiting the final reading from the radiologist.   My preliminary interpretation is as follows: Negative chest x-ray  Radiologist interpretation:     DX-CHEST-PORTABLE (1 VIEW)   Final Result      No acute cardiac or pulmonary abnormalities are identified.            COURSE & MEDICAL DECISION MAKING    ED Observation Status? Yes; I am placing the patient in to an observation status due to a diagnostic uncertainty as well as therapeutic intensity. Patient placed in observation status at 12:36 PM, 5/24/2023.     Observation plan is as follows: The patient presents asking for detox but then becoming profoundly hypotensive.  IV was established, labs ordered, chest x-ray was ordered.  EKG was ordered.    Upon Reevaluation, the patient's condition has: Improved; and will be discharged.    Patient discharged from ED  Observation status at 3 PM (Time) May 24, 2023 (Date).     INITIAL ASSESSMENT, COURSE AND PLAN  Care Narrative: EKG is normal.  IV fluids have been started, labs are pending.  HYDRATION: Based on the patient's presentation of Hypotension the patient was given IV fluids. IV Hydration was used because oral hydration was not adequate alone. Upon recheck following hydration, the patient was improved, improved blood pressure and feeling improved.      ADDITIONAL PROBLEM LIST  Hypertension, alcohol dependence  DISPOSITION AND DISCUSSIONS    After resuscitation the patient's blood pressures has improved.  The patient's Tylenol/aspirin level undetectable.  Urine drug screen negative.  Anion gap slightly elevated 19.  Diagnostic alcohol level markedly elevated 364.  White count slightly low at 3.5.  H&H normal.  Chest x-ray unremarkable.  The patient's urine is  Positive for white cells, nitrate, leukocyte Estrace, but also positive for epithelial cells and the patient has no urinary symptoms.  I will culture the urine.  She may receive a call in 2 days if the culture is positive from the pharmacist.  But at this time she has no symptoms of urinary tract infection.      I have discussed management of the patient with the following physicians and TONJA's: None    Discussion of management with other [facility] or appropriate source(s): [facility] support team     Escalation of care considered, and ultimately not performed:acute inpatient care management, however at this time, the patient is most appropriate for outpatient management    Barriers to care at this time, including but not limited to:  None .     Decision tools and prescription drugs considered including, but not limited to:  Ativan to bridge the gap between intoxication and sobriety .      The total critical care time on this patient is 40 minutes, resuscitating patient, speaking with admitting physician, and deciphering test results. This 40 minutes is exclusive of separately  billable procedures.            FINAL DIAGNOSIS  1. Acute alcoholic intoxication with complication (HCC)    2. Alcohol dependence with unspecified alcohol-induced disorder (HCC)    3. Hypotension, unspecified hypotension type    4. Dehydration           Total critical care time 40 minutes as outlined above    Electronically signed by: Mehul Cronin M.D., 5/24/2023 12:36 PM

## 2023-05-24 NOTE — ED NOTES
Discharge instructions given to pt with sister at bedside. Pt well understood. Patient agreed to take prescribed medications as directed. Discharged in stable condition with stable vital signs. Advised.

## 2023-05-24 NOTE — DISCHARGE PLANNING
ERP requesting Detox Resources for Pt.   RN Case Manager took resources to Pt , questions answered.

## 2023-05-24 NOTE — ED NOTES
"Report received from Janeth BHATT. Upon entering into room for handoff, pt was found to be hypotensive in the 70/50s. Pt initially did not complain of lightheadedness or dizziness. BP retaken multiple times and BP found to be 50/30s-60s/40s. ERP called to room. TOBIAS PIVs established. Pt then complained of lightheadedness and stating \"I feel like I am going to pass out.\" Pt diaphoretic and pale. Pt denies chest pain. EKG obtained. Fluids initialed on pressure bag as per ERP. Labs collected and sent. Pt did not lose consciousness during event. Pt on cardiac monitor, pulse ox, and automatic BP. VSS, saturating above 95% on 2L NC, SR/ST in the 90-100s.   "

## 2023-05-24 NOTE — ED NOTES
Bedside report given to Howard Dukes. Patient fount to be hypotensive. ERP called to bedside. IV started x2, lab draw completed, EKG completed, IV fluids started.

## 2023-05-24 NOTE — ED TRIAGE NOTES
Pt ambulated to triage with   Chief Complaint   Patient presents with    Detox     Last drink approx 1 hour ago, drinks pint of vodka daily, reports that she would like to quit.       Pt Informed regarding triage process and verbalized understanding to inform triage tech or RN for any changes in condition. Placed in lobby.

## 2023-05-26 LAB
BACTERIA UR CULT: NORMAL
SIGNIFICANT IND 70042: NORMAL
SITE SITE: NORMAL
SOURCE SOURCE: NORMAL

## 2023-06-15 ENCOUNTER — HOSPITAL ENCOUNTER (EMERGENCY)
Facility: MEDICAL CENTER | Age: 58
End: 2023-06-15
Attending: EMERGENCY MEDICINE | Admitting: INTERNAL MEDICINE
Payer: COMMERCIAL

## 2023-06-15 ENCOUNTER — APPOINTMENT (OUTPATIENT)
Dept: RADIOLOGY | Facility: MEDICAL CENTER | Age: 58
End: 2023-06-15
Attending: EMERGENCY MEDICINE
Payer: COMMERCIAL

## 2023-06-15 VITALS
TEMPERATURE: 98.5 F | HEIGHT: 68 IN | WEIGHT: 134.48 LBS | DIASTOLIC BLOOD PRESSURE: 90 MMHG | HEART RATE: 91 BPM | BODY MASS INDEX: 20.38 KG/M2 | OXYGEN SATURATION: 95 % | SYSTOLIC BLOOD PRESSURE: 144 MMHG | RESPIRATION RATE: 36 BRPM

## 2023-06-15 DIAGNOSIS — R79.89 ELEVATED LFTS: ICD-10-CM

## 2023-06-15 DIAGNOSIS — E87.20 ACIDOSIS: ICD-10-CM

## 2023-06-15 DIAGNOSIS — E86.0 DEHYDRATION: ICD-10-CM

## 2023-06-15 DIAGNOSIS — F10.29 ALCOHOL DEPENDENCE WITH UNSPECIFIED ALCOHOL-INDUCED DISORDER (HCC): ICD-10-CM

## 2023-06-15 DIAGNOSIS — F10.929 ACUTE ALCOHOLIC INTOXICATION WITH COMPLICATION (HCC): ICD-10-CM

## 2023-06-15 PROBLEM — K29.20 ACUTE ALCOHOLIC GASTRITIS: Status: ACTIVE | Noted: 2023-06-15

## 2023-06-15 PROBLEM — F10.930 ALCOHOL WITHDRAWAL SYNDROME, UNCOMPLICATED (HCC): Status: ACTIVE | Noted: 2023-06-15

## 2023-06-15 PROBLEM — R74.8 ELEVATED LIVER ENZYMES: Status: ACTIVE | Noted: 2023-06-15

## 2023-06-15 PROBLEM — K70.10 ALCOHOLIC HEPATITIS WITHOUT ASCITES: Status: ACTIVE | Noted: 2023-06-15

## 2023-06-15 LAB
ALBUMIN SERPL BCP-MCNC: 5 G/DL (ref 3.2–4.9)
ALBUMIN/GLOB SERPL: 1.6 G/DL
ALP SERPL-CCNC: 76 U/L (ref 30–99)
ALT SERPL-CCNC: 69 U/L (ref 2–50)
ANION GAP SERPL CALC-SCNC: 28 MMOL/L (ref 7–16)
AST SERPL-CCNC: 88 U/L (ref 12–45)
BASOPHILS # BLD AUTO: 1.3 % (ref 0–1.8)
BASOPHILS # BLD: 0.06 K/UL (ref 0–0.12)
BILIRUB SERPL-MCNC: 0.7 MG/DL (ref 0.1–1.5)
BUN SERPL-MCNC: 25 MG/DL (ref 8–22)
CALCIUM ALBUM COR SERPL-MCNC: 9.1 MG/DL (ref 8.5–10.5)
CALCIUM SERPL-MCNC: 9.9 MG/DL (ref 8.4–10.2)
CHLORIDE SERPL-SCNC: 93 MMOL/L (ref 96–112)
CO2 SERPL-SCNC: 19 MMOL/L (ref 20–33)
CREAT SERPL-MCNC: 0.92 MG/DL (ref 0.5–1.4)
EKG IMPRESSION: NORMAL
EOSINOPHIL # BLD AUTO: 0.08 K/UL (ref 0–0.51)
EOSINOPHIL NFR BLD: 1.7 % (ref 0–6.9)
ERYTHROCYTE [DISTWIDTH] IN BLOOD BY AUTOMATED COUNT: 48.5 FL (ref 35.9–50)
ETHANOL BLD-MCNC: 317.6 MG/DL
GFR SERPLBLD CREATININE-BSD FMLA CKD-EPI: 73 ML/MIN/1.73 M 2
GLOBULIN SER CALC-MCNC: 3.2 G/DL (ref 1.9–3.5)
GLUCOSE SERPL-MCNC: 105 MG/DL (ref 65–99)
HCG SERPL QL: NEGATIVE
HCT VFR BLD AUTO: 41.2 % (ref 37–47)
HGB BLD-MCNC: 14.3 G/DL (ref 12–16)
IMM GRANULOCYTES # BLD AUTO: 0.02 K/UL (ref 0–0.11)
IMM GRANULOCYTES NFR BLD AUTO: 0.4 % (ref 0–0.9)
LIPASE SERPL-CCNC: 71 U/L (ref 7–58)
LYMPHOCYTES # BLD AUTO: 0.74 K/UL (ref 1–4.8)
LYMPHOCYTES NFR BLD: 15.5 % (ref 22–41)
MCH RBC QN AUTO: 33.4 PG (ref 27–33)
MCHC RBC AUTO-ENTMCNC: 34.7 G/DL (ref 32.2–35.5)
MCV RBC AUTO: 96.3 FL (ref 81.4–97.8)
MONOCYTES # BLD AUTO: 0.29 K/UL (ref 0–0.85)
MONOCYTES NFR BLD AUTO: 6.1 % (ref 0–13.4)
NEUTROPHILS # BLD AUTO: 3.58 K/UL (ref 1.82–7.42)
NEUTROPHILS NFR BLD: 75 % (ref 44–72)
NRBC # BLD AUTO: 0 K/UL
NRBC BLD-RTO: 0 /100 WBC (ref 0–0.2)
PLATELET # BLD AUTO: 192 K/UL (ref 164–446)
PMV BLD AUTO: 9.7 FL (ref 9–12.9)
POTASSIUM SERPL-SCNC: 4 MMOL/L (ref 3.6–5.5)
PROT SERPL-MCNC: 8.2 G/DL (ref 6–8.2)
RBC # BLD AUTO: 4.28 M/UL (ref 4.2–5.4)
SODIUM SERPL-SCNC: 140 MMOL/L (ref 135–145)
WBC # BLD AUTO: 4.8 K/UL (ref 4.8–10.8)

## 2023-06-15 PROCEDURE — A9270 NON-COVERED ITEM OR SERVICE: HCPCS | Performed by: INTERNAL MEDICINE

## 2023-06-15 PROCEDURE — 82077 ASSAY SPEC XCP UR&BREATH IA: CPT

## 2023-06-15 PROCEDURE — 700117 HCHG RX CONTRAST REV CODE 255: Performed by: EMERGENCY MEDICINE

## 2023-06-15 PROCEDURE — 80053 COMPREHEN METABOLIC PANEL: CPT

## 2023-06-15 PROCEDURE — 83690 ASSAY OF LIPASE: CPT

## 2023-06-15 PROCEDURE — G0378 HOSPITAL OBSERVATION PER HR: HCPCS

## 2023-06-15 PROCEDURE — 700101 HCHG RX REV CODE 250: Performed by: INTERNAL MEDICINE

## 2023-06-15 PROCEDURE — 36415 COLL VENOUS BLD VENIPUNCTURE: CPT

## 2023-06-15 PROCEDURE — 700105 HCHG RX REV CODE 258: Performed by: EMERGENCY MEDICINE

## 2023-06-15 PROCEDURE — 96365 THER/PROPH/DIAG IV INF INIT: CPT

## 2023-06-15 PROCEDURE — 99223 1ST HOSP IP/OBS HIGH 75: CPT | Performed by: INTERNAL MEDICINE

## 2023-06-15 PROCEDURE — 99285 EMERGENCY DEPT VISIT HI MDM: CPT

## 2023-06-15 PROCEDURE — 74177 CT ABD & PELVIS W/CONTRAST: CPT

## 2023-06-15 PROCEDURE — 84703 CHORIONIC GONADOTROPIN ASSAY: CPT

## 2023-06-15 PROCEDURE — 700102 HCHG RX REV CODE 250 W/ 637 OVERRIDE(OP): Performed by: INTERNAL MEDICINE

## 2023-06-15 PROCEDURE — 700111 HCHG RX REV CODE 636 W/ 250 OVERRIDE (IP): Performed by: EMERGENCY MEDICINE

## 2023-06-15 PROCEDURE — 93005 ELECTROCARDIOGRAM TRACING: CPT | Performed by: EMERGENCY MEDICINE

## 2023-06-15 PROCEDURE — 96375 TX/PRO/DX INJ NEW DRUG ADDON: CPT

## 2023-06-15 PROCEDURE — 85025 COMPLETE CBC W/AUTO DIFF WBC: CPT

## 2023-06-15 RX ORDER — BISACODYL 10 MG
10 SUPPOSITORY, RECTAL RECTAL
Status: DISCONTINUED | OUTPATIENT
Start: 2023-06-15 | End: 2023-06-15 | Stop reason: HOSPADM

## 2023-06-15 RX ORDER — LORAZEPAM 2 MG/ML
1.5 INJECTION INTRAMUSCULAR
Status: DISCONTINUED | OUTPATIENT
Start: 2023-06-15 | End: 2023-06-15 | Stop reason: HOSPADM

## 2023-06-15 RX ORDER — GAUZE BANDAGE 2" X 2"
100 BANDAGE TOPICAL DAILY
Status: DISCONTINUED | OUTPATIENT
Start: 2023-06-16 | End: 2023-06-15 | Stop reason: HOSPADM

## 2023-06-15 RX ORDER — THYROID 30 MG/1
60 TABLET ORAL DAILY
Status: DISCONTINUED | OUTPATIENT
Start: 2023-06-15 | End: 2023-06-15 | Stop reason: HOSPADM

## 2023-06-15 RX ORDER — LORAZEPAM 1 MG/1
4 TABLET ORAL
Status: DISCONTINUED | OUTPATIENT
Start: 2023-06-15 | End: 2023-06-15 | Stop reason: HOSPADM

## 2023-06-15 RX ORDER — LORAZEPAM 2 MG/ML
0.5 INJECTION INTRAMUSCULAR EVERY 4 HOURS PRN
Status: DISCONTINUED | OUTPATIENT
Start: 2023-06-15 | End: 2023-06-15 | Stop reason: HOSPADM

## 2023-06-15 RX ORDER — OXYCODONE HYDROCHLORIDE 5 MG/1
5 TABLET ORAL
Status: DISCONTINUED | OUTPATIENT
Start: 2023-06-15 | End: 2023-06-15 | Stop reason: HOSPADM

## 2023-06-15 RX ORDER — SODIUM CHLORIDE, SODIUM LACTATE, POTASSIUM CHLORIDE, CALCIUM CHLORIDE 600; 310; 30; 20 MG/100ML; MG/100ML; MG/100ML; MG/100ML
INJECTION, SOLUTION INTRAVENOUS CONTINUOUS
Status: DISCONTINUED | OUTPATIENT
Start: 2023-06-15 | End: 2023-06-15 | Stop reason: HOSPADM

## 2023-06-15 RX ORDER — TELMISARTAN AND HYDROCHLORTHIAZIDE 80; 25 MG/1; MG/1
1 TABLET ORAL DAILY
COMMUNITY

## 2023-06-15 RX ORDER — MORPHINE SULFATE 4 MG/ML
4 INJECTION INTRAVENOUS
Status: DISCONTINUED | OUTPATIENT
Start: 2023-06-15 | End: 2023-06-15 | Stop reason: HOSPADM

## 2023-06-15 RX ORDER — LORAZEPAM 0.5 MG/1
0.5 TABLET ORAL EVERY 4 HOURS PRN
Status: DISCONTINUED | OUTPATIENT
Start: 2023-06-15 | End: 2023-06-15 | Stop reason: HOSPADM

## 2023-06-15 RX ORDER — OMEPRAZOLE 20 MG/1
20 CAPSULE, DELAYED RELEASE ORAL DAILY
Status: DISCONTINUED | OUTPATIENT
Start: 2023-06-15 | End: 2023-06-15 | Stop reason: HOSPADM

## 2023-06-15 RX ORDER — FOLIC ACID 1 MG/1
1 TABLET ORAL DAILY
Status: DISCONTINUED | OUTPATIENT
Start: 2023-06-16 | End: 2023-06-15 | Stop reason: HOSPADM

## 2023-06-15 RX ORDER — PROCHLORPERAZINE EDISYLATE 5 MG/ML
5-10 INJECTION INTRAMUSCULAR; INTRAVENOUS EVERY 4 HOURS PRN
Status: DISCONTINUED | OUTPATIENT
Start: 2023-06-15 | End: 2023-06-15 | Stop reason: HOSPADM

## 2023-06-15 RX ORDER — ONDANSETRON 2 MG/ML
4 INJECTION INTRAMUSCULAR; INTRAVENOUS ONCE
Status: COMPLETED | OUTPATIENT
Start: 2023-06-15 | End: 2023-06-15

## 2023-06-15 RX ORDER — ROSUVASTATIN CALCIUM 10 MG/1
10 TABLET, COATED ORAL EVERY EVENING
Status: DISCONTINUED | OUTPATIENT
Start: 2023-06-15 | End: 2023-06-15

## 2023-06-15 RX ORDER — LORAZEPAM 2 MG/ML
2 INJECTION INTRAMUSCULAR
Status: DISCONTINUED | OUTPATIENT
Start: 2023-06-15 | End: 2023-06-15 | Stop reason: HOSPADM

## 2023-06-15 RX ORDER — PROMETHAZINE HYDROCHLORIDE 25 MG/1
12.5-25 TABLET ORAL EVERY 4 HOURS PRN
Status: DISCONTINUED | OUTPATIENT
Start: 2023-06-15 | End: 2023-06-15 | Stop reason: HOSPADM

## 2023-06-15 RX ORDER — OXYCODONE HYDROCHLORIDE 10 MG/1
10 TABLET ORAL
Status: DISCONTINUED | OUTPATIENT
Start: 2023-06-15 | End: 2023-06-15 | Stop reason: HOSPADM

## 2023-06-15 RX ORDER — ASPIRIN 81 MG/1
81 TABLET ORAL DAILY
COMMUNITY

## 2023-06-15 RX ORDER — SODIUM CHLORIDE, SODIUM LACTATE, POTASSIUM CHLORIDE, CALCIUM CHLORIDE 600; 310; 30; 20 MG/100ML; MG/100ML; MG/100ML; MG/100ML
1000 INJECTION, SOLUTION INTRAVENOUS ONCE
Status: COMPLETED | OUTPATIENT
Start: 2023-06-15 | End: 2023-06-15

## 2023-06-15 RX ORDER — ASPIRIN 81 MG/1
81 TABLET ORAL DAILY
Status: DISCONTINUED | OUTPATIENT
Start: 2023-06-15 | End: 2023-06-15

## 2023-06-15 RX ORDER — LORAZEPAM 1 MG/1
2 TABLET ORAL
Status: DISCONTINUED | OUTPATIENT
Start: 2023-06-15 | End: 2023-06-15 | Stop reason: HOSPADM

## 2023-06-15 RX ORDER — LORAZEPAM 1 MG/1
3 TABLET ORAL
Status: DISCONTINUED | OUTPATIENT
Start: 2023-06-15 | End: 2023-06-15 | Stop reason: HOSPADM

## 2023-06-15 RX ORDER — ENOXAPARIN SODIUM 100 MG/ML
40 INJECTION SUBCUTANEOUS DAILY
Status: DISCONTINUED | OUTPATIENT
Start: 2023-06-15 | End: 2023-06-15 | Stop reason: HOSPADM

## 2023-06-15 RX ORDER — AMOXICILLIN 250 MG
2 CAPSULE ORAL 2 TIMES DAILY
Status: DISCONTINUED | OUTPATIENT
Start: 2023-06-15 | End: 2023-06-15 | Stop reason: HOSPADM

## 2023-06-15 RX ORDER — ONDANSETRON 4 MG/1
4 TABLET, ORALLY DISINTEGRATING ORAL EVERY 4 HOURS PRN
Status: DISCONTINUED | OUTPATIENT
Start: 2023-06-15 | End: 2023-06-15 | Stop reason: HOSPADM

## 2023-06-15 RX ORDER — LABETALOL HYDROCHLORIDE 5 MG/ML
10 INJECTION, SOLUTION INTRAVENOUS EVERY 4 HOURS PRN
Status: DISCONTINUED | OUTPATIENT
Start: 2023-06-15 | End: 2023-06-15 | Stop reason: HOSPADM

## 2023-06-15 RX ORDER — LORAZEPAM 2 MG/ML
1 INJECTION INTRAMUSCULAR
Status: DISCONTINUED | OUTPATIENT
Start: 2023-06-15 | End: 2023-06-15 | Stop reason: HOSPADM

## 2023-06-15 RX ORDER — LORAZEPAM 2 MG/ML
2 INJECTION INTRAMUSCULAR ONCE
Status: COMPLETED | OUTPATIENT
Start: 2023-06-15 | End: 2023-06-15

## 2023-06-15 RX ORDER — PROMETHAZINE HYDROCHLORIDE 25 MG/1
12.5-25 SUPPOSITORY RECTAL EVERY 4 HOURS PRN
Status: DISCONTINUED | OUTPATIENT
Start: 2023-06-15 | End: 2023-06-15 | Stop reason: HOSPADM

## 2023-06-15 RX ORDER — ONDANSETRON 2 MG/ML
4 INJECTION INTRAMUSCULAR; INTRAVENOUS EVERY 4 HOURS PRN
Status: DISCONTINUED | OUTPATIENT
Start: 2023-06-15 | End: 2023-06-15 | Stop reason: HOSPADM

## 2023-06-15 RX ORDER — POLYETHYLENE GLYCOL 3350 17 G/17G
1 POWDER, FOR SOLUTION ORAL
Status: DISCONTINUED | OUTPATIENT
Start: 2023-06-15 | End: 2023-06-15 | Stop reason: HOSPADM

## 2023-06-15 RX ORDER — LORAZEPAM 1 MG/1
1 TABLET ORAL EVERY 4 HOURS PRN
Status: DISCONTINUED | OUTPATIENT
Start: 2023-06-15 | End: 2023-06-15 | Stop reason: HOSPADM

## 2023-06-15 RX ORDER — TELMISARTAN 40 MG/1
80 TABLET ORAL
Status: DISCONTINUED | OUTPATIENT
Start: 2023-06-15 | End: 2023-06-15 | Stop reason: HOSPADM

## 2023-06-15 RX ADMIN — OMEPRAZOLE 20 MG: 20 CAPSULE, DELAYED RELEASE ORAL at 15:31

## 2023-06-15 RX ADMIN — THYROID, PORCINE 60 MG: 30 TABLET ORAL at 15:31

## 2023-06-15 RX ADMIN — LORAZEPAM 2 MG: 2 INJECTION INTRAMUSCULAR; INTRAVENOUS at 13:36

## 2023-06-15 RX ADMIN — THIAMINE HYDROCHLORIDE 1000 ML: 100 INJECTION, SOLUTION INTRAMUSCULAR; INTRAVENOUS at 14:44

## 2023-06-15 RX ADMIN — IOHEXOL 100 ML: 350 INJECTION, SOLUTION INTRAVENOUS at 12:55

## 2023-06-15 RX ADMIN — SODIUM CHLORIDE, POTASSIUM CHLORIDE, SODIUM LACTATE AND CALCIUM CHLORIDE 1000 ML: 600; 310; 30; 20 INJECTION, SOLUTION INTRAVENOUS at 13:34

## 2023-06-15 RX ADMIN — ONDANSETRON 4 MG: 2 INJECTION INTRAMUSCULAR; INTRAVENOUS at 12:39

## 2023-06-15 ASSESSMENT — ENCOUNTER SYMPTOMS
CHILLS: 0
BRUISES/BLEEDS EASILY: 0
SPUTUM PRODUCTION: 0
FLANK PAIN: 0
BACK PAIN: 0
DIAPHORESIS: 0
HEADACHES: 0
VOMITING: 1
FOCAL WEAKNESS: 0
WHEEZING: 0
DIARRHEA: 0
COUGH: 0
SEIZURES: 0
BLOOD IN STOOL: 0
DIZZINESS: 0
NERVOUS/ANXIOUS: 1
NAUSEA: 1
ABDOMINAL PAIN: 1
MYALGIAS: 0
NECK PAIN: 0
SORE THROAT: 0
FEVER: 0
SHORTNESS OF BREATH: 0
PALPITATIONS: 0
BLURRED VISION: 0

## 2023-06-15 ASSESSMENT — FIBROSIS 4 INDEX: FIB4 SCORE: 1.8

## 2023-06-15 ASSESSMENT — LIFESTYLE VARIABLES: SUBSTANCE_ABUSE: 1

## 2023-06-15 NOTE — DISCHARGE INSTRUCTIONS
You have decide to leave against medical advise   please follow up with your primary doctor for further care and treatment of your current condition   If you change your mind please return at anytime for further care   please abstain from alcohol use   take your medication as prescribed

## 2023-06-15 NOTE — H&P
Hospital Medicine History & Physical Note    Date of Service  6/15/2023    Primary Care Physician  Deborah Moctezuma M.D.    Consultants  None    Code Status  Full Code    Chief Complaint  Chief Complaint   Patient presents with    Alcohol Intoxication     Wanting detox  drank today  was sober for about 2 yrs  then started drinking again     Abdominal Pain     Having severe pain         History of Presenting Illness  Yohana Brandon is a 57 y.o. female with a PMH of hypertension, alcohol abuse, HLD who presented 6/15/2023 with abdominal pain.  Patient states her  recently  of cirrhosis and she has been grieving and drinking alcohol heavily.  She reports symptoms of epigastric abdominal pain, nausea and feeling generally unwell.  She states she has been drinking a lot and wants to quit drinking.  She has a history of alcohol withdrawal.  In the ER the patient is noted to be tachycardic and hypertensive and appears anxious and tremulous.  Initial blood work revealed high anion gap of 28, elevated BUN 25, elevated AST 88 and ALT 69 and lipase 71.    CT abdomen pelvis reveals fatty change of liver and no evidence of bowel obstruction or inflammatory changes.    EKG interpreted by me reveals sinus tachycardia with no ST elevation or ST depression.    I discussed the plan of care with patient, bedside RN, and ERP .    Review of Systems  Review of Systems   Constitutional:  Positive for malaise/fatigue. Negative for chills, diaphoresis and fever.   HENT:  Negative for hearing loss and sore throat.    Eyes:  Negative for blurred vision.   Respiratory:  Negative for cough, sputum production, shortness of breath and wheezing.    Cardiovascular:  Negative for chest pain, palpitations and leg swelling.   Gastrointestinal:  Positive for abdominal pain, nausea and vomiting. Negative for blood in stool and diarrhea.   Genitourinary:  Negative for dysuria, flank pain and urgency.   Musculoskeletal:  Negative for back  pain, joint pain, myalgias and neck pain.   Skin:  Negative for rash.   Neurological:  Negative for dizziness, focal weakness, seizures and headaches.   Endo/Heme/Allergies:  Does not bruise/bleed easily.   Psychiatric/Behavioral:  Positive for substance abuse. Negative for suicidal ideas. The patient is nervous/anxious.    All other systems reviewed and are negative.      Past Medical History   has a past medical history of Alcohol abuse, Alcohol abuse, ASTHMA, Elevated LFTs, Heart palpitations, Hypertension, and Psychiatric disorder.    Surgical History   has a past surgical history that includes appendectomy (); pr endometrial ablation, thermal (); pr remove tonsils/adenoids,<13 y/o (); pr anesth,surg breast reconstructive (); hemorrhoidectomy (2011); anal sphincterotomy (2011); bunionectomy (); pr  delivery+postpartum care; pr total hip arthroplasty (Right, 2021); and closed reduction (Right, 2022).     Family History  family history includes Stroke in her mother.   Family history reviewed with patient. There is no family history that is pertinent to the chief complaint.     Social History   reports that she has never smoked. She has never used smokeless tobacco. She reports current alcohol use. She reports that she does not use drugs.    Allergies  Allergies   Allergen Reactions    Banana Shortness of Breath    Kiwi Extract Swelling    Latex Anaphylaxis    Morphine Hives    Other Food Hives and Shortness of Breath     Tropical fruit      Vicodin [Hydrocodone-Acetaminophen] Rash     Generalized rash, puritis        Medications  Prior to Admission Medications   Prescriptions Last Dose Informant Patient Reported? Taking?   Non Formulary Request 6/15/2023 at am Patient Yes No   Sig: Apply 1.5 g topically every day. Testosterone 5% cream   Non Formulary Request 6/15/2023 at am Patient Yes No   Sig: Apply 0.5-1 mg topically every day. Bi-est cream E2 E3 80-20 ratio to  5MG   Omega-3 Fatty Acids (FISH OIL) 1000 MG Cap capsule 6/14/2023 at [facility] Patient Yes No   Sig: Take 1,000 mg by mouth every day.   aspirin 81 MG EC tablet 6/14/2023 at am  Yes Yes   Sig: Take 81 mg by mouth every day.   progesterone (PROMETRIUM) 100 MG Cap 6/14/2023 at [facility] Patient Yes No   Sig: Take 100 mg by mouth every evening.   rosuvastatin (CRESTOR) 10 MG Tab 6/14/2023 at [facility] Patient Yes No   Sig: Take 10 mg by mouth every evening.   telmisartan-hydrochlorothiazide (MICARDIS HCT) 80-25 MG per tablet 6/14/2023 at [facility]  Yes Yes   Sig: Take 1 Tablet by mouth every day.   thyroid (ARMOUR THYROID) 60 MG Tab 6/14/2023 at [facility] Patient Yes No   Sig: Take 60 mg by mouth every day.      Facility-Administered Medications: None       Physical Exam  Temp:  [36.9 °C (98.5 °F)] 36.9 °C (98.5 °F)  Pulse:  [] 98  Resp:  [15-20] 15  BP: (126-160)/() 155/100  SpO2:  [92 %-95 %] 92 %  Blood Pressure: (!) 155/100   Temperature: 36.9 °C (98.5 °F)   Pulse: 98   Respiration: 15   Pulse Oximetry: 92 %       Physical Exam  Vitals and nursing note reviewed.   Constitutional:       General: She is not in acute distress.     Appearance: Normal appearance.   HENT:      Head: Normocephalic and atraumatic.      Nose: Nose normal.      Mouth/Throat:      Mouth: Mucous membranes are dry.   Eyes:      Extraocular Movements: Extraocular movements intact.      Conjunctiva/sclera: Conjunctivae normal.      Pupils: Pupils are equal, round, and reactive to light.   Cardiovascular:      Rate and Rhythm: Regular rhythm. Tachycardia present.      Pulses: Normal pulses.      Heart sounds: Normal heart sounds.   Pulmonary:      Effort: Pulmonary effort is normal. No respiratory distress.      Breath sounds: Normal breath sounds. No wheezing, rhonchi or rales.   Abdominal:      General: Bowel sounds are normal. There is no distension.      Palpations: Abdomen is soft.      Tenderness: There is no abdominal tenderness.   Musculoskeletal:          General: No swelling or tenderness.      Cervical back: Neck supple.   Lymphadenopathy:      Cervical: No cervical adenopathy.   Skin:     General: Skin is warm.      Coloration: Skin is not jaundiced.      Findings: No rash.   Neurological:      General: No focal deficit present.      Mental Status: She is alert and oriented to person, place, and time.      Cranial Nerves: No cranial nerve deficit.      Motor: No weakness.      Comments: Tremulous     Psychiatric:         Mood and Affect: Mood is anxious.         Behavior: Behavior normal.         Laboratory:  Recent Labs     06/15/23  1140   WBC 4.8   RBC 4.28   HEMOGLOBIN 14.3   HEMATOCRIT 41.2   MCV 96.3   MCH 33.4*   MCHC 34.7   RDW 48.5   PLATELETCT 192   MPV 9.7     Recent Labs     06/15/23  1140   SODIUM 140   POTASSIUM 4.0   CHLORIDE 93*   CO2 19*   GLUCOSE 105*   BUN 25*   CREATININE 0.92   CALCIUM 9.9     Recent Labs     06/15/23  1140   ALTSGPT 69*   ASTSGOT 88*   ALKPHOSPHAT 76   TBILIRUBIN 0.7   LIPASE 71*   GLUCOSE 105*         No results for input(s): NTPROBNP in the last 72 hours.      No results for input(s): TROPONINT in the last 72 hours.    Imaging:  CT-ABDOMEN-PELVIS WITH   Final Result      1.  Fatty change of the liver.      2.  Sigmoid diverticulosis.      3.  No evidence of bowel obstruction or focal inflammatory change.              Assessment/Plan:  Justification for Admission Status  I anticipate this patient will require at least two midnights for appropriate medical management, necessitating inpatient admission because alcohol withdrawal    Patient will need a Telemetry bed on MEDICAL service .  The need is secondary to alcohol withdrawal.    * Alcohol withdrawal syndrome, uncomplicated (HCC)- (present on admission)  Assessment & Plan  Patient will be admitted to the telemetry unit with close cardiac monitoring on CIWA protocol  Started on rally bag, multivitamin, thiamine and folate  Replete electrolytes  Patient has been counseled on  alcohol cessation and will be provided with information for outpatient detox facilities      Acute alcoholic gastritis- (present on admission)  Assessment & Plan  IV fluid hydration with LR  Started on Omeprazole  Zofran PRN      Elevated liver enzymes- (present on admission)  Assessment & Plan  Secondary to alcohol abuse and fatty liver  Monitor CMP    Alcoholic hepatitis without ascites- (present on admission)  Assessment & Plan  Patient has been counseled on alcohol counseling  Monitor CMP      Primary hypertension- (present on admission)  Assessment & Plan  Uncontrolled  Continue Telmisartan  IV hydralazine PRN          VTE prophylaxis: enoxaparin ppx

## 2023-06-15 NOTE — ED NOTES
Pt was evaluated by hospitalist  she continues to want to leave   pt cleared for AMA  signed form and d/c'ed to home in NAD  instructed pt to f/u w/ PCP as soon as possible

## 2023-06-15 NOTE — PROGRESS NOTES
Patient states she wants to leave AGAINST MEDICAL ADVICE.  I explained the risks of leaving including worsening alcohol withdrawal and seizures.  Patient is aware of the risks and is adamant that she wants to leave.  She is capacitated to make medical decisions.  She states that she has prescription of Ativan which she will continue taking at home to help with her withdrawal.  Patient instructed to follow-up with her PCP and to return to the ER for worsening symptoms

## 2023-06-15 NOTE — ED NOTES
Hospitalist at [facility] for evaluation and admission orders   rec'ed moderate effect after IV ativan given   resting w/out distress prior to MD at BS

## 2023-06-15 NOTE — ED TRIAGE NOTES
"Pt comes in by her self  \"I drove here\"  she is wanting help to stop drinking    pt has been drinking unknown amount of time   has been sober prior however could not tell staff when     recent lost of  and has been depressed  denies SI   is worried she to will die like her  \"cirrhoses\" because I'm drinking so much  \" so scarred I'm going to die\"  c/o abdomen pain as well  pt crying and emotionally upset   "

## 2023-06-15 NOTE — ED PROVIDER NOTES
ED Provider Note    CHIEF COMPLAINT  Chief Complaint   Patient presents with    Alcohol Intoxication     Wanting detox  drank today  was sober for about 2 yrs  then started drinking again     Abdominal Pain     Having severe pain         EXTERNAL RECORDS REVIEWED  Outpatient Notes I saw this patient on May 24, 2023 at [facility], she was requesting detox at that time from alcohol    HPI/ROS  LIMITATION TO HISTORY   Select: : None  OUTSIDE HISTORIAN(S):  None    Yohana Brandon is a 57 y.o. female who presents with past medical history significant for alcohol dependence, asthma, hypertension, the patient reports that she is very upset after her  passed away and she has been drinking alcohol very heavily.  Her last drink was today.  She has been having severe diffuse abdominal pain.    PAST MEDICAL HISTORY   has a past medical history of Alcohol abuse, Alcohol abuse, ASTHMA, Elevated LFTs, Heart palpitations, Hypertension, and Psychiatric disorder.    SURGICAL HISTORY   has a past surgical history that includes appendectomy (); endometrial ablation, thermal (); remove tonsils/adenoids,<15 y/o (); anesth,surg breast reconstructive (); hemorrhoidectomy (2011); anal sphincterotomy (2011); bunionectomy ();  delivery+postpartum care; total hip arthroplasty (Right, 2021); and closed reduction (Right, 2022).    FAMILY HISTORY  Family History   Problem Relation Age of Onset    Stroke Mother        SOCIAL HISTORY  Social History     Tobacco Use    Smoking status: Never    Smokeless tobacco: Never   Vaping Use    Vaping Use: Never used   Substance and Sexual Activity    Alcohol use: Yes     Comment: Daily vodka/abuse pint daily    Drug use: No    Sexual activity: Not on file       CURRENT MEDICATIONS  Home Medications       Reviewed by Dee Ralph (Pharmacy Tech) on 06/15/23 at 1238  Med List Status: Complete     Medication Last Dose Status   aspirin 81 MG EC  "tablet 6/14/2023 Active   Non Formulary Request 6/15/2023 Active   Non Formulary Request 6/15/2023 Active   Omega-3 Fatty Acids (FISH OIL) 1000 MG Cap capsule 6/14/2023 Active   progesterone (PROMETRIUM) 100 MG Cap 6/14/2023 Active   rosuvastatin (CRESTOR) 10 MG Tab 6/14/2023 Active   telmisartan-hydrochlorothiazide (MICARDIS HCT) 80-25 MG per tablet 6/14/2023 Active   thyroid (ARMOUR THYROID) 60 MG Tab 6/14/2023 Active                    ALLERGIES  Allergies   Allergen Reactions    Banana Shortness of Breath    Kiwi Extract Swelling    Latex Anaphylaxis    Morphine Hives    Other Food Hives and Shortness of Breath     Tropical fruit      Vicodin [Hydrocodone-Acetaminophen] Rash     Generalized rash, puritis        PHYSICAL EXAM  VITAL SIGNS: BP (!) 126/97   Pulse (!) 122   Temp 36.9 °C (98.5 °F) (Temporal)   Resp 18   Ht 1.727 m (5' 8\")   Wt 61 kg (134 lb 7.7 oz)   LMP 09/14/2006   SpO2 95%   BMI 20.45 kg/m²    Constitutional: Alert.  HENT: No signs of trauma, Bilateral external ears normal, Nose normal.   Eyes: Pupils are equal and reactive, Conjunctiva normal, Non-icteric.   Neck: Normal range of motion, No tenderness, Supple, No stridor.   Lymphatic: No lymphadenopathy noted.   Cardiovascular: Tachycardic.  Thorax & Lungs: Normal breath sounds, No respiratory distress, No wheezing, No chest tenderness.   Abdomen: Bowel sounds normal, Soft, No tenderness, No peritoneal signs, No masses, No pulsatile masses.   Skin: Warm, Dry, No erythema, No rash.   Back: No bony tenderness, No CVA tenderness.   Extremities: Intact distal pulses, No edema, No tenderness, No cyanosis.  Musculoskeletal: Good range of motion in all major joints. No tenderness to palpation or major deformities noted.   Neurologic: Alert , Normal motor function, Normal sensory function, No focal deficits noted.   Psychiatric: Affect normal, Judgment normal, Mood normal.       DIAGNOSTIC STUDIES / PROCEDURES  EKG  This is a twelve-lead EKG " interpretation by myself.  It is sinus tachycardia at a rate of 106.  The axis is normal.  The intervals are normal.  There is nonspecific ST-T wave changes.  My impression of this EKG, sinus tachycardia, does not meet STEMI criteria at this time.    LABS  Labs Reviewed   CBC WITH DIFFERENTIAL - Abnormal; Notable for the following components:       Result Value    MCH 33.4 (*)     Neutrophils-Polys 75.00 (*)     Lymphocytes 15.50 (*)     Lymphs (Absolute) 0.74 (*)     All other components within normal limits   COMP METABOLIC PANEL - Abnormal; Notable for the following components:    Chloride 93 (*)     Co2 19 (*)     Anion Gap 28.0 (*)     Glucose 105 (*)     Bun 25 (*)     AST(SGOT) 88 (*)     ALT(SGPT) 69 (*)     Albumin 5.0 (*)     All other components within normal limits   LIPASE - Abnormal; Notable for the following components:    Lipase 71 (*)     All other components within normal limits   ETHYL ALCOHOL (BLOOD) - Abnormal; Notable for the following components:    Diagnostic Alcohol 317.6 (*)     All other components within normal limits   HCG QUAL SERUM   CORRECTED CALCIUM   ESTIMATED GFR   DIAGNOSTIC ALCOHOL         RADIOLOGY  I have independently interpreted the diagnostic imaging associated with this visit and am waiting the final reading from the radiologist.   My preliminary interpretation is as follows: No free air  Radiologist interpretation:     CT-ABDOMEN-PELVIS WITH   Final Result      1.  Fatty change of the liver.      2.  Sigmoid diverticulosis.      3.  No evidence of bowel obstruction or focal inflammatory change.            COURSE & MEDICAL DECISION MAKING    ED Observation Status? Yes; I am placing the patient in to an observation status due to a diagnostic uncertainty as well as therapeutic intensity. Patient placed in observation status at 11:39 AM, 6/15/2023.     Observation plan is as follows: The patient presents with vomiting, abdominal pain, history of alcohol dependence.  CT scan  was ordered, labs ordered.    Upon Reevaluation, the patient's condition has: not improved; and will be escalated to hospitalization.    Patient discharged from ED Observation status at 1:17 PM (Time) Priscila 15, 2023 (Date).     INITIAL ASSESSMENT, COURSE AND PLAN  Care Narrative: The patient presents with a history of alcoholism, vomiting and diarrhea.  She is resuscitated with 1 L LR IV and given 2 mg Ativan IV for tachycardia likely related to withdrawal.  HYDRATION: Based on the patient's presentation of Hypotension the patient was given IV fluids. IV Hydration was used because oral hydration was not as rapid as required. Upon recheck following hydration, the patient was still dehydrated and required further resuscitation.      ADDITIONAL PROBLEM LIST  Alcohol dependence, depression  DISPOSITION AND DISCUSSIONS    1:15 PM the patient's labs indicate acidosis, her CO2 was low at 19, her anion gap elevated 28, her BUN is elevated 25 indicating dehydration, her LFTs are elevated 88 and 69 AST and ALT.  Her diagnostic alcohol 317.  Her white blood count is normal.        I have discussed management of the patient with the following physicians and TONJA's: Dr. Wilder will assess the patient for hospitalization.    Discussion of management with other \[facility] [place] [facility]\"" or appropriate source(s): None         Barriers to care at this time, including but not limited to:  Alcoholism .     Decision tools and prescription drugs considered including, but not limited to:  IV fluids, IV Ativan .      The total critical care time on this patient is 40 minutes, resuscitating patient, speaking with admitting physician, and deciphering test results. This 40 minutes is exclusive of separately billable procedures.      FINAL DIAGNOSIS  1. Acute alcoholic intoxication with complication (HCC)    2. Elevated LFTs    3. Acidosis    4. Dehydration    5. Alcohol dependence with unspecified alcohol-induced disorder (HCC)    6.      Tachycardia  7.       Alcohol withdrawal       Total critical care time 40 minutes as outlined above    Electronically signed by: Mehul Cronin M.D., 6/15/2023 11:39 AM

## 2023-06-15 NOTE — ED NOTES
PO med's given per order  pt has changed her mind  she want's to leave  does not want to be admitted  hospitalist aware and will speak w/ pt

## 2023-06-16 ENCOUNTER — HOSPITAL ENCOUNTER (EMERGENCY)
Facility: MEDICAL CENTER | Age: 58
End: 2023-06-16
Attending: EMERGENCY MEDICINE
Payer: COMMERCIAL

## 2023-06-16 VITALS
SYSTOLIC BLOOD PRESSURE: 118 MMHG | TEMPERATURE: 98.3 F | WEIGHT: 137.35 LBS | RESPIRATION RATE: 18 BRPM | DIASTOLIC BLOOD PRESSURE: 80 MMHG | OXYGEN SATURATION: 91 % | HEIGHT: 67 IN | BODY MASS INDEX: 21.56 KG/M2 | HEART RATE: 102 BPM

## 2023-06-16 DIAGNOSIS — F10.20 ALCOHOLISM (HCC): ICD-10-CM

## 2023-06-16 DIAGNOSIS — F10.930 ALCOHOL WITHDRAWAL SYNDROME WITHOUT COMPLICATION (HCC): ICD-10-CM

## 2023-06-16 LAB — EKG IMPRESSION: NORMAL

## 2023-06-16 PROCEDURE — 36415 COLL VENOUS BLD VENIPUNCTURE: CPT

## 2023-06-16 PROCEDURE — 700102 HCHG RX REV CODE 250 W/ 637 OVERRIDE(OP): Performed by: EMERGENCY MEDICINE

## 2023-06-16 PROCEDURE — A9270 NON-COVERED ITEM OR SERVICE: HCPCS | Performed by: EMERGENCY MEDICINE

## 2023-06-16 PROCEDURE — 93005 ELECTROCARDIOGRAM TRACING: CPT | Performed by: EMERGENCY MEDICINE

## 2023-06-16 PROCEDURE — 99284 EMERGENCY DEPT VISIT MOD MDM: CPT

## 2023-06-16 RX ORDER — HYDROCHLOROTHIAZIDE 25 MG/1
25 TABLET ORAL ONCE
Status: DISCONTINUED | OUTPATIENT
Start: 2023-06-16 | End: 2023-06-16

## 2023-06-16 RX ORDER — TELMISARTAN 40 MG/1
80 TABLET ORAL ONCE
Status: DISCONTINUED | OUTPATIENT
Start: 2023-06-16 | End: 2023-06-16

## 2023-06-16 RX ORDER — CHLORDIAZEPOXIDE HYDROCHLORIDE 25 MG/1
25 CAPSULE, GELATIN COATED ORAL ONCE
Status: COMPLETED | OUTPATIENT
Start: 2023-06-16 | End: 2023-06-16

## 2023-06-16 RX ADMIN — CHLORDIAZEPOXIDE HYDROCHLORIDE 25 MG: 25 CAPSULE ORAL at 12:33

## 2023-06-16 ASSESSMENT — FIBROSIS 4 INDEX: FIB4 SCORE: 3.03

## 2023-06-16 NOTE — ED NOTES
Dc instructions and medications discussed with patient at bedside. All questions answered at this time. VSS. No IV in place at this time. Pt to lobby without incident. boyfriend to drive patient to rehab.

## 2023-06-16 NOTE — ED PROVIDER NOTES
ED Provider Note    CHIEF COMPLAINT  Chief Complaint   Patient presents with    ETOH Withdrawal     Pt brought herself in due to alcoholism. She was seen here on Sunday for the same complaint and they wanted to admit her.   Last drink this morning - vodka.   She has withdrawn before, no hx of seizures.   She is wanting to be admitted with help for withdrawals.        EXTERNAL RECORDS REVIEWED  Was just seen last night 6/15/2023 for alcohol withdrawal and was seen May 24 as well.    HPI/ROS  LIMITATION TO HISTORY   Select: : None  OUTSIDE HISTORIAN(S):      Yohana Brandon is a 57 y.o. female who presents with a history significant of alcohol dependence asthma and hypertension.  She states that she was here last night and she did not want to be in the hospital so she left and she did have a glass of vodka this morning.  She presents back again to the emergency department.  Last night she did have an evaluation her lipase was elevated alcohol was 317 last night.  Because she did have signs of alcoholic ketoacidosis and fluid hydration was given it was concerning the patient was recommended to be admitted to the hospital but she left AGAINST MEDICAL ADVICE.  Patient then had more alcohol this morning and she just wants to get off the alcohol.  Today she does not feel any different than she did last night denies any abdominal pain shortness of breath fevers chills just feels like she is having some withdrawal her last drink was this morning which was a full glass of vodka she typically goes through half a bottle of vodka a day.  Patient would like to go to rehab at [facility].    PAST MEDICAL HISTORY   has a past medical history of Alcohol abuse, Alcohol abuse, ASTHMA, Elevated LFTs, Heart palpitations, Hypertension, and Psychiatric disorder.    SURGICAL HISTORY   has a past surgical history that includes appendectomy (1997); endometrial ablation, thermal (1999); remove tonsils/adenoids,<11 y/o (1973);  "anesth,surg breast reconstructive (); hemorrhoidectomy (2011); anal sphincterotomy (2011); bunionectomy ();  delivery+postpartum care; total hip arthroplasty (Right, 2021); and closed reduction (Right, 2022).    FAMILY HISTORY  Family History   Problem Relation Age of Onset    Stroke Mother        SOCIAL HISTORY  Social History     Tobacco Use    Smoking status: Never    Smokeless tobacco: Never   Vaping Use    Vaping Use: Never used   Substance and Sexual Activity    Alcohol use: Yes     Comment: Daily vodka/abuse pint daily    Drug use: No    Sexual activity: Not on file       CURRENT MEDICATIONS  Home Medications       Reviewed by Dee Dhillon (Pharmacy Tech) on 23 at 1222  Med List Status: Complete     Medication Last Dose Status   aspirin 81 MG EC tablet 6/15/2023 Active   Non Formulary Request ABOUT 1 WEEK Active   Non Formulary Request ABOUT 1 WEEK Active   Omega-3 Fatty Acids (FISH OIL) 1000 MG Cap capsule 6/15/2023 Active   progesterone (PROMETRIUM) 100 MG Cap ABOUT 1 WEEK Active   rosuvastatin (CRESTOR) 10 MG Tab 6/15/2023 Active   telmisartan-hydrochlorothiazide (MICARDIS HCT) 80-25 MG per tablet 6/15/2023 Active   thyroid (ARMOUR THYROID) 60 MG Tab 6/15/2023 Active                    ALLERGIES  Allergies   Allergen Reactions    Banana Shortness of Breath    Kiwi Extract Swelling    Latex Anaphylaxis    Morphine Hives    Other Food Hives and Shortness of Breath     Tropical fruit      Vicodin [Hydrocodone-Acetaminophen] Rash     Generalized rash, puritis        PHYSICAL EXAM  VITAL SIGNS: /80   Pulse (!) 102   Temp 36.8 °C (98.3 °F)   Resp 18   Ht 1.702 m (5' 7\")   Wt 62.3 kg (137 lb 5.6 oz)   LMP 2006   SpO2 91%   BMI 21.51 kg/m²    Constitutional: Well-developed no acute distress calm laying in bed.  HENT: Normocephalic, Atraumatic, Bilateral external ears normal.  Eyes:  conjunctiva are normal.   Neck: Supple.  Nontender " midline  Cardiovascular: Tachycardic rate and rhythm without murmurs gallops or rubs.   Thorax & Lungs: No respiratory distress. Breathing comfortably. Lungs are clear to auscultation bilaterally, there are no wheezes no rales. Chest wall is nontender.  Abdomen: Soft, nontender nondistended.  Skin: Warm, Dry, No erythema,   Back: No tenderness, No CVA tenderness.  Musculoskeletal: No clubbing cyanosis or edema good range of motion   Neurologic: Alert & oriented x 3, normal sensation moving all extremities appears normal mild tremors  Psychiatric: Affect normal, Judgment normal, Mood normal.       DIAGNOSTIC STUDIES / PROCEDURES  EKG  I have independently interpreted this EKG  Interpreted below by myself    LABS  Results for orders placed or performed during the hospital encounter of 23   EKG   Result Value Ref Range    Report       [facility] Emergency Dept.    Test Date:  2023  Pt Name:    SONIA DAMON          Department: [facility]  MRN:        [id number]                      [facility]  Gender:     Female                       Technician: [id number]  :        [id number]                   Requested By:JANETT DOMINGUEZ  Order #:    [id number]                    Reading MD: JANETT DOMINGUEZ MD    Measurements  Intervals                                Axis  Rate:       99                           P:          64  OH:         128                          QRS:        31  QRSD:       85                           T:          71  QT:         369  QTc:        474    Interpretive Statements  Sinus rhythm  Compared to ECG 06/15/2023 13:13:25  Sinus tachycardia no longer present  No acute changes normal ECG  Electronically Signed On 2023 12:49:46 PDT by JANETT DOMINGUEZ MD           RADIOLOGY    COURSE & MEDICAL DECISION MAKING    ED Observation Status? Yes; I am placing the patient in to an observation status due to a diagnostic uncertainty as well as therapeutic intensity.  Patient placed in observation status at 12:30 PM, 6/16/2023.     Observation plan is as follows: Patient will be given Librium and I will do an EKG.  Laboratory studies were drawn last night.  At this point we will observe for improvement however she does not seem to be very tachycardic she is 90s to 110s here.  She is able to tolerate p.o. fluids so we will evaluate for change after Librium and as long as the patient is improving I do feel the patient is stable for outpatient treatment at [facility].    Upon Reevaluation, the patient's condition has: Improved; and will be discharged.    Patient discharged from ED Observation status at 12:55 PM (Time) 06/16/23 (Date).     INITIAL ASSESSMENT, COURSE AND PLAN  Care Narrative: Patient presents emerged department for evaluation.  The patient initially came in mildly tachycardic and was complained that she was out having alcohol withdrawal.  I did give her a dose of Librium.  As she was in the room her heart rate did reduce just on its own.  EKG is unchanged and normal and at this point I do feel the patient is stable for discharge and no need for any further evaluation.  Patient is an alcoholic and I do feel the patient should follow-up for outpatient treatment.  I did review her laboratory studies from yesterday and considered repeating them with an IV fluid bolus however the patient is appearing well she is not hypotensive she is not very tachycardic and she relates that she has been drinking alcohol today.  I do feel that she would benefit from an intensive rehab facility.  At this point I do not feel laboratory studies are necessary anymore and I do feel the patient is stable for discharge.        ADDITIONAL PROBLEM LIST  Alcoholism  DISPOSITION AND DISCUSSIONS    Escalation of care considered, and ultimately not performed: As above        FINAL DIAGNOSIS  1. Alcohol withdrawal syndrome without complication (HCC)    2. Alcoholism (HCC)         The patient will  return for new or worsening symptoms and is stable at the time of discharge.    The patient is referred to a primary physician for blood pressure management, diabetic screening, and for all other preventative health concerns.        DISPOSITION:  Patient will be discharged home in stable condition.    FOLLOW UP:  [facility] [address] [facility] [address]  [phone]    follow up for alcohol withdrawl      OUTPATIENT MEDICATIONS:  Discharge Medication List as of 6/16/2023 12:55 PM                    Electronically signed by: Lewis Paredes M.D., 6/16/2023 12:27 PM

## 2023-07-21 ENCOUNTER — HOSPITAL ENCOUNTER (EMERGENCY)
Facility: MEDICAL CENTER | Age: 58
End: 2023-07-21
Attending: STUDENT IN AN ORGANIZED HEALTH CARE EDUCATION/TRAINING PROGRAM
Payer: COMMERCIAL

## 2023-07-21 VITALS
HEART RATE: 88 BPM | RESPIRATION RATE: 14 BRPM | HEIGHT: 67 IN | OXYGEN SATURATION: 95 % | SYSTOLIC BLOOD PRESSURE: 127 MMHG | WEIGHT: 136.91 LBS | BODY MASS INDEX: 21.49 KG/M2 | TEMPERATURE: 97.9 F | DIASTOLIC BLOOD PRESSURE: 84 MMHG

## 2023-07-21 DIAGNOSIS — F10.10 ALCOHOL ABUSE: ICD-10-CM

## 2023-07-21 DIAGNOSIS — F10.920 ALCOHOLIC INTOXICATION WITHOUT COMPLICATION (HCC): ICD-10-CM

## 2023-07-21 DIAGNOSIS — F10.930 ALCOHOL WITHDRAWAL SYNDROME WITHOUT COMPLICATION (HCC): ICD-10-CM

## 2023-07-21 LAB
ALBUMIN SERPL BCP-MCNC: 4.6 G/DL (ref 3.2–4.9)
ALBUMIN/GLOB SERPL: 1.4 G/DL
ALP SERPL-CCNC: 68 U/L (ref 30–99)
ALT SERPL-CCNC: 61 U/L (ref 2–50)
ANION GAP SERPL CALC-SCNC: 22 MMOL/L (ref 7–16)
AST SERPL-CCNC: 77 U/L (ref 12–45)
BASOPHILS # BLD AUTO: 1.2 % (ref 0–1.8)
BASOPHILS # BLD: 0.07 K/UL (ref 0–0.12)
BILIRUB SERPL-MCNC: 0.5 MG/DL (ref 0.1–1.5)
BUN SERPL-MCNC: 14 MG/DL (ref 8–22)
CALCIUM ALBUM COR SERPL-MCNC: 8.9 MG/DL (ref 8.5–10.5)
CALCIUM SERPL-MCNC: 9.4 MG/DL (ref 8.4–10.2)
CHLORIDE SERPL-SCNC: 100 MMOL/L (ref 96–112)
CO2 SERPL-SCNC: 23 MMOL/L (ref 20–33)
CREAT SERPL-MCNC: 0.76 MG/DL (ref 0.5–1.4)
EOSINOPHIL # BLD AUTO: 0.02 K/UL (ref 0–0.51)
EOSINOPHIL NFR BLD: 0.4 % (ref 0–6.9)
ERYTHROCYTE [DISTWIDTH] IN BLOOD BY AUTOMATED COUNT: 49.4 FL (ref 35.9–50)
ETHANOL BLD-MCNC: 342.3 MG/DL
GFR SERPLBLD CREATININE-BSD FMLA CKD-EPI: 92 ML/MIN/1.73 M 2
GLOBULIN SER CALC-MCNC: 3.4 G/DL (ref 1.9–3.5)
GLUCOSE SERPL-MCNC: 83 MG/DL (ref 65–99)
HCT VFR BLD AUTO: 46 % (ref 37–47)
HGB BLD-MCNC: 15.7 G/DL (ref 12–16)
IMM GRANULOCYTES # BLD AUTO: 0.05 K/UL (ref 0–0.11)
IMM GRANULOCYTES NFR BLD AUTO: 0.9 % (ref 0–0.9)
LYMPHOCYTES # BLD AUTO: 1.68 K/UL (ref 1–4.8)
LYMPHOCYTES NFR BLD: 29.8 % (ref 22–41)
MCH RBC QN AUTO: 33.8 PG (ref 27–33)
MCHC RBC AUTO-ENTMCNC: 34.1 G/DL (ref 32.2–35.5)
MCV RBC AUTO: 98.9 FL (ref 81.4–97.8)
MONOCYTES # BLD AUTO: 0.3 K/UL (ref 0–0.85)
MONOCYTES NFR BLD AUTO: 5.3 % (ref 0–13.4)
NEUTROPHILS # BLD AUTO: 3.52 K/UL (ref 1.82–7.42)
NEUTROPHILS NFR BLD: 62.4 % (ref 44–72)
NRBC # BLD AUTO: 0 K/UL
NRBC BLD-RTO: 0 /100 WBC (ref 0–0.2)
PLATELET # BLD AUTO: 144 K/UL (ref 164–446)
PMV BLD AUTO: 8.7 FL (ref 9–12.9)
POC BREATHALIZER: 0.23 PERCENT (ref 0–0.01)
POTASSIUM SERPL-SCNC: 3.4 MMOL/L (ref 3.6–5.5)
PROT SERPL-MCNC: 8 G/DL (ref 6–8.2)
RBC # BLD AUTO: 4.65 M/UL (ref 4.2–5.4)
SODIUM SERPL-SCNC: 145 MMOL/L (ref 135–145)
WBC # BLD AUTO: 5.6 K/UL (ref 4.8–10.8)

## 2023-07-21 PROCEDURE — 99285 EMERGENCY DEPT VISIT HI MDM: CPT

## 2023-07-21 PROCEDURE — A9270 NON-COVERED ITEM OR SERVICE: HCPCS | Mod: JZ | Performed by: STUDENT IN AN ORGANIZED HEALTH CARE EDUCATION/TRAINING PROGRAM

## 2023-07-21 PROCEDURE — 302970 POC BREATHALIZER: Performed by: STUDENT IN AN ORGANIZED HEALTH CARE EDUCATION/TRAINING PROGRAM

## 2023-07-21 PROCEDURE — 96375 TX/PRO/DX INJ NEW DRUG ADDON: CPT

## 2023-07-21 PROCEDURE — 36415 COLL VENOUS BLD VENIPUNCTURE: CPT

## 2023-07-21 PROCEDURE — 700101 HCHG RX REV CODE 250: Performed by: STUDENT IN AN ORGANIZED HEALTH CARE EDUCATION/TRAINING PROGRAM

## 2023-07-21 PROCEDURE — 96366 THER/PROPH/DIAG IV INF ADDON: CPT

## 2023-07-21 PROCEDURE — 85025 COMPLETE CBC W/AUTO DIFF WBC: CPT

## 2023-07-21 PROCEDURE — 96365 THER/PROPH/DIAG IV INF INIT: CPT

## 2023-07-21 PROCEDURE — 82077 ASSAY SPEC XCP UR&BREATH IA: CPT

## 2023-07-21 PROCEDURE — 700102 HCHG RX REV CODE 250 W/ 637 OVERRIDE(OP): Mod: JZ | Performed by: STUDENT IN AN ORGANIZED HEALTH CARE EDUCATION/TRAINING PROGRAM

## 2023-07-21 PROCEDURE — 700105 HCHG RX REV CODE 258: Performed by: STUDENT IN AN ORGANIZED HEALTH CARE EDUCATION/TRAINING PROGRAM

## 2023-07-21 PROCEDURE — 700111 HCHG RX REV CODE 636 W/ 250 OVERRIDE (IP): Performed by: STUDENT IN AN ORGANIZED HEALTH CARE EDUCATION/TRAINING PROGRAM

## 2023-07-21 PROCEDURE — 80053 COMPREHEN METABOLIC PANEL: CPT

## 2023-07-21 PROCEDURE — 96367 TX/PROPH/DG ADDL SEQ IV INF: CPT

## 2023-07-21 RX ORDER — PHENOBARBITAL SODIUM 130 MG/ML
INJECTION, SOLUTION INTRAMUSCULAR; INTRAVENOUS
Status: DISCONTINUED
Start: 2023-07-21 | End: 2023-07-21 | Stop reason: HOSPADM

## 2023-07-21 RX ORDER — ONDANSETRON 2 MG/ML
4 INJECTION INTRAMUSCULAR; INTRAVENOUS ONCE
Status: COMPLETED | OUTPATIENT
Start: 2023-07-21 | End: 2023-07-21

## 2023-07-21 RX ORDER — PHENOBARBITAL 97.2 MG/1
200 TABLET ORAL 2 TIMES DAILY
Status: DISCONTINUED | OUTPATIENT
Start: 2023-07-21 | End: 2023-07-21

## 2023-07-21 RX ORDER — POTASSIUM CHLORIDE 20 MEQ/1
20 TABLET, EXTENDED RELEASE ORAL ONCE
Status: COMPLETED | OUTPATIENT
Start: 2023-07-21 | End: 2023-07-21

## 2023-07-21 RX ORDER — PHENOBARBITAL 97.2 MG/1
200 TABLET ORAL ONCE
Status: DISCONTINUED | OUTPATIENT
Start: 2023-07-21 | End: 2023-07-21

## 2023-07-21 RX ADMIN — POTASSIUM CHLORIDE 20 MEQ: 1500 TABLET, EXTENDED RELEASE ORAL at 04:25

## 2023-07-21 RX ADMIN — THIAMINE HYDROCHLORIDE: 100 INJECTION, SOLUTION INTRAMUSCULAR; INTRAVENOUS at 01:51

## 2023-07-21 RX ADMIN — ONDANSETRON 4 MG: 2 INJECTION INTRAMUSCULAR; INTRAVENOUS at 02:08

## 2023-07-21 RX ADMIN — PHENOBARBITAL SODIUM 260 MG: 130 INJECTION INTRAMUSCULAR; INTRAVENOUS at 05:03

## 2023-07-21 ASSESSMENT — LIFESTYLE VARIABLES
TOTAL SCORE: VERY MILD ITCHING, PINS AND NEEDLES SENSATION, BURNING OR NUMBNESS
AGITATION: SOMEWHAT MORE THAN NORMAL ACTIVITY
AGITATION: NORMAL ACTIVITY
ANXIETY: MILDLY ANXIOUS
ORIENTATION AND CLOUDING OF SENSORIUM: ORIENTED AND CAN DO SERIAL ADDITIONS
ANXIETY: NO ANXIETY (AT EASE)
NAUSEA AND VOMITING: MILD NAUSEA WITH NO VOMITING
AUDITORY DISTURBANCES: NOT PRESENT
PAROXYSMAL SWEATS: NO SWEAT VISIBLE
NAUSEA AND VOMITING: MILD NAUSEA WITH NO VOMITING
DO YOU DRINK ALCOHOL: YES
TREMOR: NO TREMOR
ANXIETY: MILDLY ANXIOUS
EVER FELT BAD OR GUILTY ABOUT YOUR DRINKING: YES
TREMOR: TREMOR NOT VISIBLE BUT CAN BE FELT, FINGERTIP TO FINGERTIP
PAROXYSMAL SWEATS: *
TOTAL SCORE: 6
ORIENTATION AND CLOUDING OF SENSORIUM: ORIENTED AND CAN DO SERIAL ADDITIONS
ORIENTATION AND CLOUDING OF SENSORIUM: ORIENTED AND CAN DO SERIAL ADDITIONS
TOTAL SCORE: 4
AUDITORY DISTURBANCES: NOT PRESENT
VISUAL DISTURBANCES: NOT PRESENT
TOTAL SCORE: 1
EVER HAD A DRINK FIRST THING IN THE MORNING TO STEADY YOUR NERVES TO GET RID OF A HANGOVER: YES
PAROXYSMAL SWEATS: NO SWEAT VISIBLE
HEADACHE, FULLNESS IN HEAD: VERY MILD
HEADACHE, FULLNESS IN HEAD: NOT PRESENT
NAUSEA AND VOMITING: MILD NAUSEA WITH NO VOMITING
CONSUMPTION TOTAL: POSITIVE
ORIENTATION AND CLOUDING OF SENSORIUM: ORIENTED AND CAN DO SERIAL ADDITIONS
HEADACHE, FULLNESS IN HEAD: NOT PRESENT
VISUAL DISTURBANCES: NOT PRESENT
AUDITORY DISTURBANCES: NOT PRESENT
VISUAL DISTURBANCES: NOT PRESENT
ON A TYPICAL DAY WHEN YOU DRINK ALCOHOL HOW MANY DRINKS DO YOU HAVE: 1
HOW MANY TIMES IN THE PAST YEAR HAVE YOU HAD 5 OR MORE DRINKS IN A DAY: 365
PAROXYSMAL SWEATS: NO SWEAT VISIBLE
NAUSEA AND VOMITING: NO NAUSEA AND NO VOMITING
TREMOR: NO TREMOR
HEADACHE, FULLNESS IN HEAD: NOT PRESENT
AUDITORY DISTURBANCES: NOT PRESENT
TOTAL SCORE: 4
HAVE PEOPLE ANNOYED YOU BY CRITICIZING YOUR DRINKING: YES
TREMOR: *
HAVE YOU EVER FELT YOU SHOULD CUT DOWN ON YOUR DRINKING: YES
AGITATION: NORMAL ACTIVITY
TOTAL SCORE: 5
AVERAGE NUMBER OF DAYS PER WEEK YOU HAVE A DRINK CONTAINING ALCOHOL: 7
TOTAL SCORE: 1
VISUAL DISTURBANCES: NOT PRESENT
ANXIETY: NO ANXIETY (AT EASE)
AGITATION: NORMAL ACTIVITY
TOTAL SCORE: 4

## 2023-07-21 ASSESSMENT — FIBROSIS 4 INDEX: FIB4 SCORE: 6.53

## 2023-07-21 NOTE — ED NOTES
IV established, blood work sent to lab, POC completed at bedside, Medications administered as prescribed. Friend at bedside reports the pt asked them to bring them in because they are on a medication they are not suppose to drink alcohol on. Pt reports they drink all day everyday for over a year, the pt states they have only been sober for a short time and they cannot remember when it was.

## 2023-07-21 NOTE — ED NOTES
Pt given medications as prescribed, ERP updated. Pt now tolerating RA at 94%.   [FreeTextEntry1] : check bloods\par same meds

## 2023-07-21 NOTE — ED PROVIDER NOTES
"ED Provider Note    CHIEF COMPLAINT  Chief Complaint   Patient presents with    Alcohol Intoxication     57 yo female reports they are an alcoholic and came to the ER for treatment. Pt reports they need to go to rehab.       EXTERNAL RECORDS REVIEWED  Previous ED visits most recently 2023 here, found to be in mild alcohol withdrawal.  Treated with Librium.  Discharged to follow-up for rehab, inpatient mission at [facility] noted.    HPI/ROS  LIMITATION TO HISTORY   Select: Intoxication  OUTSIDE HISTORIAN(S):  Friend      Yohana Brandon is a 58 y.o. female who presents with complaint of alcohol intoxication and \"depression\".  Friend endorses that patient's  passed away several months ago.  Patient states every time she does not drink alcohol for a few hours then \"her heart hurts\".  She endorses the heart hurting more as depression about her  rather than actual chest pain.  She cannot tell me when the last time she went to rehab was.  Last rehab visit at [facility] 2023 notes history of DTs, those notes endorse that her   from alcoholism.  Patient denies any SI or HI.  Patient states her last drink was about 3 hours ago.    PAST MEDICAL HISTORY  Past Medical History:   Diagnosis Date    Alcohol abuse     Alcohol abuse     ASTHMA     Elevated LFTs     Heart palpitations     w/ alcohol withdrawal    Hypertension     Psychiatric disorder     alcohol dependance        SURGICAL HISTORY  Past Surgical History:   Procedure Laterality Date    CLOSED REDUCTION Right 2022    Procedure: CLOSED REDUCTION, HIP,;  Surgeon: Jose G Bell M.D.;  Location: [facility];  Service: Orthopedics    ID TOTAL HIP ARTHROPLASTY Right 2021    Procedure: ARTHROPLASTY, HIP, TOTAL, ANTERIOR APPROACH;  Surgeon: Jose G Bell M.D.;  Location: [facility];  Service: Orthopedics    HEMORRHOIDECTOMY  2011    Performed by SONY MACDONALD at [facility] ORS    ANAL " "SPHINCTEROTOMY  2011    Performed by SONY MACDONALD at SURGERY [facility] ORS    VT ENDOMETRIAL ABLATION, THERMAL      APPENDECTOMY      VT ANESTH,SURG BREAST RECONSTRUCTIVE      bilateral     BUNIONECTOMY      bilateral    VT REMOVE TONSILS/ADENOIDS,<11 Y/O  1973    VT  DELIVERY+POSTPARTUM CARE      x2        FAMILY HISTORY  Family History   Problem Relation Age of Onset    Stroke Mother        SOCIAL HISTORY       CURRENT MEDICATIONS  Home Medications    Medication Sig Taking? Last Dose Authorizing Provider   aspirin 81 MG EC tablet Take 81 mg by mouth every day.   Physician Outpatient   telmisartan-hydrochlorothiazide (MICARDIS HCT) 80-25 MG per tablet Take 1 Tablet by mouth every day.   Physician Outpatient   progesterone (PROMETRIUM) 100 MG Cap Take 100 mg by mouth every evening.   Physician Outpatient   Non Formulary Request Apply 1.5 g topically every day. Testosterone 5% cream   Physician Outpatient   Non Formulary Request Apply 0.5-1 mg topically every day. Bi-est cream E2 E3 80-20 ratio to 5MG   Physician Outpatient   rosuvastatin (CRESTOR) 10 MG Tab Take 10 mg by mouth every evening.   Physician Outpatient   thyroid (ARMOUR THYROID) 60 MG Tab Take 60 mg by mouth every day.   Physician Outpatient   Omega-3 Fatty Acids (FISH OIL) 1000 MG Cap capsule Take 1,000 mg by mouth every day.   Physician Outpatient       ALLERGIES  No Known Allergies    PHYSICAL EXAM  /84   Pulse 88   Temp 36.6 °C (97.9 °F) (Temporal)   Resp 14   Ht 1.702 m (5' 7\")   Wt 62.1 kg (136 lb 14.5 oz)   SpO2 95%   Constitutional: Alert, intoxicated female in no acute distress  HENT: No signs of trauma, Bilateral external ears normal, Nose normal.   Eyes: Pupils are equal and reactive, Conjunctiva normal, Non-icteric.   Neck: Normal range of motion, No tenderness, Supple, No stridor.   Cardiovascular: Tachycardic, regular rhythm, no murmurs.   Thorax & Lungs: Normal breath sounds, No respiratory " distress, No wheezing  Abdomen: Soft, No tenderness, No peritoneal signs, No masses, No pulsatile masses.   Skin: Warm, Dry, No erythema, No rash.   Extremities: Intact distal pulses, No edema, No tenderness, No cyanosis  Neurologic: Alert , Normal motor function, Normal speech, No focal deficits noted.   Psychiatric: Intoxicated, poor judgment and poor insight, intermittently tearful when talking about         DIAGNOSTIC STUDIES / PROCEDURES    LABS & EKG    Results for orders placed or performed during the hospital encounter of 07/21/23   CBC WITH DIFFERENTIAL   Result Value Ref Range    WBC 5.6 4.8 - 10.8 K/uL    RBC 4.65 4.20 - 5.40 M/uL    Hemoglobin 15.7 12.0 - 16.0 g/dL    Hematocrit 46.0 37.0 - 47.0 %    MCV 98.9 (H) 81.4 - 97.8 fL    MCH 33.8 (H) 27.0 - 33.0 pg    MCHC 34.1 32.2 - 35.5 g/dL    RDW 49.4 35.9 - 50.0 fL    Platelet Count 144 (L) 164 - 446 K/uL    MPV 8.7 (L) 9.0 - 12.9 fL    Neutrophils-Polys 62.40 44.00 - 72.00 %    Lymphocytes 29.80 22.00 - 41.00 %    Monocytes 5.30 0.00 - 13.40 %    Eosinophils 0.40 0.00 - 6.90 %    Basophils 1.20 0.00 - 1.80 %    Immature Granulocytes 0.90 0.00 - 0.90 %    Nucleated RBC 0.00 0.00 - 0.20 /100 WBC    Neutrophils (Absolute) 3.52 1.82 - 7.42 K/uL    Lymphs (Absolute) 1.68 1.00 - 4.80 K/uL    Monos (Absolute) 0.30 0.00 - 0.85 K/uL    Eos (Absolute) 0.02 0.00 - 0.51 K/uL    Baso (Absolute) 0.07 0.00 - 0.12 K/uL    Immature Granulocytes (abs) 0.05 0.00 - 0.11 K/uL    NRBC (Absolute) 0.00 K/uL   COMP METABOLIC PANEL   Result Value Ref Range    Sodium 145 135 - 145 mmol/L    Potassium 3.4 (L) 3.6 - 5.5 mmol/L    Chloride 100 96 - 112 mmol/L    Co2 23 20 - 33 mmol/L    Anion Gap 22.0 (H) 7.0 - 16.0    Glucose 83 65 - 99 mg/dL    Bun 14 8 - 22 mg/dL    Creatinine 0.76 0.50 - 1.40 mg/dL    Calcium 9.4 8.4 - 10.2 mg/dL    Correct Calcium 8.9 8.5 - 10.5 mg/dL    AST(SGOT) 77 (H) 12 - 45 U/L    ALT(SGPT) 61 (H) 2 - 50 U/L    Alkaline Phosphatase 68 30 - 99 U/L     Total Bilirubin 0.5 0.1 - 1.5 mg/dL    Albumin 4.6 3.2 - 4.9 g/dL    Total Protein 8.0 6.0 - 8.2 g/dL    Globulin 3.4 1.9 - 3.5 g/dL    A-G Ratio 1.4 g/dL   ETHYL ALCOHOL (BLOOD)   Result Value Ref Range    Diagnostic Alcohol 342.3 (H) <10.1 mg/dL   ESTIMATED GFR   Result Value Ref Range    GFR (CKD-EPI) 92 >60 mL/min/1.73 m 2   POC BREATHALIZER   Result Value Ref Range    POC Breathalizer 0.23 (A) 0.00 - 0.01 Percent       COURSE & MEDICAL DECISION MAKING    ED Observation Status? Yes; I am placing the patient in to an observation status due to a diagnostic uncertainty as well as therapeutic intensity. Patient placed in observation status at 1:52 AM, 7/21/2023.     Observation plan is as follows: labs, fluids, detox, treatment and monitor for withdrawal    Upon Reevaluation, the patient's condition has: Improved; and will be discharged.    Patient discharged from ED Observation status at 5:37 AM (Time) 07/21/23   (Date).     INITIAL ASSESSMENT, COURSE AND PLAN  Care Narrative: 58-year-old female with known history of alcohol abuse presenting with alcohol intoxication, depression, and request for alcohol detox.  On arrival she is obviously intoxicated, denies SI or HI, does not meet criteria for psychiatric hold.  Will allow to sober up and reassess.  She has a known history of alcohol withdrawal and DTs in the past, will require monitoring for alcohol withdrawal here.  If she develops severe withdrawal symptoms may require admission.  If not may be able to be discharged to follow-up for detox should she desire.    2:18 AM  CIWA 5.  No indication for intervention at this time    2:31 AM  Labs with mild elevation of LFTs likely secondary to alcohol.  Anion gap is elevated suspect alcoholic ketosis, giving fluids with dextrose.  No significant leukocytosis or anemia.  Alcohol is elevated as expected.    4:20 AM  Rechecked patient, she had recent CIWA score of 1 that was improved without treatment.  She has been able  to ambulate to the bathroom.  Still states she wants to go to rehab.  Main complaint right now is anxiety and complaining of palpitations.  On the monitor she has intermittent PVCs, heart rate in the upper 90s to low 100s.  Repeat CIWA now 6. Will give loading dose of phenobarbital 260 mg for alcohol withdrawal as the half-life of this is prolonged and will allow patient to access outpatient resources if she chooses.    5:37 AM  Phenobarbital has completed, patient CIWA is now 1, she reports frequent all of her symptoms.  Will discharge with resources for rehab.    HYDRATION: Based on the patient's presentation of Dehydration the patient was given IV fluids. IV Hydration was used because oral hydration was not adequate alone. Upon recheck following hydration, the patient was improved..      ADDITIONAL PROBLEM LIST    Acute alcohol intoxication  Acute alcohol withdrawal  Chronic alcohol abuse    DISPOSITION AND DISCUSSIONS    Escalation of care considered, and ultimately not performed:acute inpatient care management, however at this time, the patient is most appropriate for outpatient management    Barriers to care at this time, including but not limited to: chronic alcohol abuse    Decision tools and prescription drugs considered including, but not limited to:  Phenobarbital .    Discharged in stable condition    FINAL DIAGNOSIS  1. Alcohol withdrawal syndrome without complication (HCC) Acute       2. Alcoholic intoxication without complication (HCC) Acute       3. Alcohol abuse Chronic           CRITICAL CARE  The very real possibilty of a deterioration of this patient's condition required the highest level of my preparedness for sudden, emergent intervention.  I provided critical care services, which included medication orders, frequent reevaluations of the patient's condition and response to treatment, ordering and reviewing test results, and discussing the case with various consultants.  The critical care time  associated with the care of the patient was 34 minutes. Review chart for interventions. This time is exclusive of any other billable procedures.       Electronically signed by: Lyric Gomez M.D., 07/21/23 1:36 AM

## 2023-07-21 NOTE — ED NOTES
NAM contacted to grab prescribed medication from central pharmacy. Will administer when medication is available.

## 2023-07-21 NOTE — ED NOTES
Pt ambulated to restroom with standby assisted. Educated to use call light in restroom for standby assist back to bed. Pt accompanied back to bed by ER tech and back on monitor.

## 2023-07-21 NOTE — ED TRIAGE NOTES
"Chief Complaint   Patient presents with    Alcohol Intoxication     59 yo female reports they are an alcoholic and came to the ER for treatment. Pt reports they need to go to rehab.   BP (!) 124/93   Pulse (!) 115   Temp 36.7 °C (98 °F) (Temporal)   Resp 16   Ht 1.702 m (5' 7\")   Wt 62.1 kg (136 lb 14.5 oz)   LMP 09/14/2006   SpO2 94%   BMI 21.44 kg/m²   "

## 2023-09-04 ENCOUNTER — HOSPITAL ENCOUNTER (EMERGENCY)
Facility: MEDICAL CENTER | Age: 58
End: 2023-09-04
Payer: COMMERCIAL

## 2023-09-04 VITALS
RESPIRATION RATE: 14 BRPM | SYSTOLIC BLOOD PRESSURE: 122 MMHG | BODY MASS INDEX: 20.45 KG/M2 | WEIGHT: 130.29 LBS | OXYGEN SATURATION: 94 % | TEMPERATURE: 98 F | HEART RATE: 111 BPM | DIASTOLIC BLOOD PRESSURE: 74 MMHG | HEIGHT: 67 IN

## 2023-09-04 PROCEDURE — 302449 STATCHG TRIAGE ONLY (STATISTIC)

## 2023-09-04 ASSESSMENT — FIBROSIS 4 INDEX: FIB4 SCORE: 3.56

## 2023-09-04 NOTE — ED TRIAGE NOTES
"Chief Complaint   Patient presents with    ETOH Withdrawal     Pt is attempting to get into step 2 after this evaluation   Pt states her last drink was about an hour ago   Pt states she is drinking half a bottle of liquor a day   PT states she is here for detox and medical clearance      /74   Pulse (!) 111   Temp 36.7 °C (98 °F) (Temporal)   Resp 14   Ht 1.702 m (5' 7\")   Wt 59.1 kg (130 lb 4.7 oz)   LMP 09/14/2006   SpO2 94%   BMI 20.41 kg/m²       "

## 2023-09-05 ENCOUNTER — HOSPITAL ENCOUNTER (EMERGENCY)
Facility: MEDICAL CENTER | Age: 58
End: 2023-09-05
Attending: EMERGENCY MEDICINE
Payer: COMMERCIAL

## 2023-09-05 VITALS
HEART RATE: 95 BPM | HEIGHT: 67 IN | SYSTOLIC BLOOD PRESSURE: 104 MMHG | OXYGEN SATURATION: 91 % | TEMPERATURE: 97.7 F | WEIGHT: 131.61 LBS | RESPIRATION RATE: 16 BRPM | BODY MASS INDEX: 20.66 KG/M2 | DIASTOLIC BLOOD PRESSURE: 63 MMHG

## 2023-09-05 DIAGNOSIS — F10.10 ALCOHOL ABUSE: ICD-10-CM

## 2023-09-05 DIAGNOSIS — N39.0 ACUTE UTI: ICD-10-CM

## 2023-09-05 DIAGNOSIS — F10.920 ALCOHOLIC INTOXICATION WITHOUT COMPLICATION (HCC): ICD-10-CM

## 2023-09-05 LAB
ALBUMIN SERPL BCP-MCNC: 4.4 G/DL (ref 3.2–4.9)
ALBUMIN/GLOB SERPL: 1.6 G/DL
ALP SERPL-CCNC: 65 U/L (ref 30–99)
ALT SERPL-CCNC: 33 U/L (ref 2–50)
ANION GAP SERPL CALC-SCNC: 18 MMOL/L (ref 7–16)
APPEARANCE UR: ABNORMAL
AST SERPL-CCNC: 59 U/L (ref 12–45)
BACTERIA #/AREA URNS HPF: ABNORMAL /HPF
BASOPHILS # BLD AUTO: 1.2 % (ref 0–1.8)
BASOPHILS # BLD: 0.04 K/UL (ref 0–0.12)
BILIRUB SERPL-MCNC: 0.8 MG/DL (ref 0.1–1.5)
BILIRUB UR QL STRIP.AUTO: NEGATIVE
BUN SERPL-MCNC: 11 MG/DL (ref 8–22)
CALCIUM ALBUM COR SERPL-MCNC: 8.5 MG/DL (ref 8.5–10.5)
CALCIUM SERPL-MCNC: 8.8 MG/DL (ref 8.5–10.5)
CHLORIDE SERPL-SCNC: 93 MMOL/L (ref 96–112)
CO2 SERPL-SCNC: 25 MMOL/L (ref 20–33)
COLOR UR: YELLOW
CREAT SERPL-MCNC: 0.93 MG/DL (ref 0.5–1.4)
EOSINOPHIL # BLD AUTO: 0.03 K/UL (ref 0–0.51)
EOSINOPHIL NFR BLD: 0.9 % (ref 0–6.9)
EPI CELLS #/AREA URNS HPF: NEGATIVE /HPF
ERYTHROCYTE [DISTWIDTH] IN BLOOD BY AUTOMATED COUNT: 42.1 FL (ref 35.9–50)
ETHANOL BLD-MCNC: 377.4 MG/DL
GFR SERPLBLD CREATININE-BSD FMLA CKD-EPI: 72 ML/MIN/1.73 M 2
GLOBULIN SER CALC-MCNC: 2.7 G/DL (ref 1.9–3.5)
GLUCOSE SERPL-MCNC: 87 MG/DL (ref 65–99)
GLUCOSE UR STRIP.AUTO-MCNC: NEGATIVE MG/DL
HCT VFR BLD AUTO: 40.7 % (ref 37–47)
HGB BLD-MCNC: 14.6 G/DL (ref 12–16)
HYALINE CASTS #/AREA URNS LPF: ABNORMAL /LPF
IMM GRANULOCYTES # BLD AUTO: 0.02 K/UL (ref 0–0.11)
IMM GRANULOCYTES NFR BLD AUTO: 0.6 % (ref 0–0.9)
KETONES UR STRIP.AUTO-MCNC: NEGATIVE MG/DL
LACTATE SERPL-SCNC: 4.8 MMOL/L (ref 0.5–2)
LEUKOCYTE ESTERASE UR QL STRIP.AUTO: ABNORMAL
LIPASE SERPL-CCNC: 68 U/L (ref 11–82)
LYMPHOCYTES # BLD AUTO: 1.28 K/UL (ref 1–4.8)
LYMPHOCYTES NFR BLD: 37.4 % (ref 22–41)
MCH RBC QN AUTO: 34.3 PG (ref 27–33)
MCHC RBC AUTO-ENTMCNC: 35.9 G/DL (ref 32.2–35.5)
MCV RBC AUTO: 95.5 FL (ref 81.4–97.8)
MICRO URNS: ABNORMAL
MONOCYTES # BLD AUTO: 0.29 K/UL (ref 0–0.85)
MONOCYTES NFR BLD AUTO: 8.5 % (ref 0–13.4)
NEUTROPHILS # BLD AUTO: 1.76 K/UL (ref 1.82–7.42)
NEUTROPHILS NFR BLD: 51.4 % (ref 44–72)
NITRITE UR QL STRIP.AUTO: NEGATIVE
NRBC # BLD AUTO: 0 K/UL
NRBC BLD-RTO: 0 /100 WBC (ref 0–0.2)
PH UR STRIP.AUTO: 6 [PH] (ref 5–8)
PLATELET # BLD AUTO: 252 K/UL (ref 164–446)
PMV BLD AUTO: 9.3 FL (ref 9–12.9)
POTASSIUM SERPL-SCNC: 3.2 MMOL/L (ref 3.6–5.5)
PROT SERPL-MCNC: 7.1 G/DL (ref 6–8.2)
PROT UR QL STRIP: 30 MG/DL
RBC # BLD AUTO: 4.26 M/UL (ref 4.2–5.4)
RBC # URNS HPF: ABNORMAL /HPF
RBC UR QL AUTO: ABNORMAL
SODIUM SERPL-SCNC: 136 MMOL/L (ref 135–145)
SP GR UR STRIP.AUTO: 1.01
UROBILINOGEN UR STRIP.AUTO-MCNC: 0.2 MG/DL
WBC # BLD AUTO: 3.4 K/UL (ref 4.8–10.8)
WBC #/AREA URNS HPF: ABNORMAL /HPF

## 2023-09-05 PROCEDURE — 80053 COMPREHEN METABOLIC PANEL: CPT

## 2023-09-05 PROCEDURE — 36415 COLL VENOUS BLD VENIPUNCTURE: CPT

## 2023-09-05 PROCEDURE — 99285 EMERGENCY DEPT VISIT HI MDM: CPT

## 2023-09-05 PROCEDURE — 85025 COMPLETE CBC W/AUTO DIFF WBC: CPT

## 2023-09-05 PROCEDURE — 82077 ASSAY SPEC XCP UR&BREATH IA: CPT

## 2023-09-05 PROCEDURE — 96366 THER/PROPH/DIAG IV INF ADDON: CPT

## 2023-09-05 PROCEDURE — 700105 HCHG RX REV CODE 258: Performed by: EMERGENCY MEDICINE

## 2023-09-05 PROCEDURE — 96365 THER/PROPH/DIAG IV INF INIT: CPT

## 2023-09-05 PROCEDURE — 700101 HCHG RX REV CODE 250: Performed by: EMERGENCY MEDICINE

## 2023-09-05 PROCEDURE — 83605 ASSAY OF LACTIC ACID: CPT

## 2023-09-05 PROCEDURE — 700111 HCHG RX REV CODE 636 W/ 250 OVERRIDE (IP): Performed by: EMERGENCY MEDICINE

## 2023-09-05 PROCEDURE — 83690 ASSAY OF LIPASE: CPT

## 2023-09-05 PROCEDURE — 81001 URINALYSIS AUTO W/SCOPE: CPT

## 2023-09-05 RX ORDER — SODIUM CHLORIDE, SODIUM LACTATE, POTASSIUM CHLORIDE, AND CALCIUM CHLORIDE .6; .31; .03; .02 G/100ML; G/100ML; G/100ML; G/100ML
30 INJECTION, SOLUTION INTRAVENOUS ONCE
Status: DISCONTINUED | OUTPATIENT
Start: 2023-09-05 | End: 2023-09-05 | Stop reason: HOSPADM

## 2023-09-05 RX ORDER — CEFTRIAXONE 2 G/1
2000 INJECTION, POWDER, FOR SOLUTION INTRAMUSCULAR; INTRAVENOUS ONCE
Status: DISCONTINUED | OUTPATIENT
Start: 2023-09-05 | End: 2023-09-05 | Stop reason: HOSPADM

## 2023-09-05 RX ORDER — ONDANSETRON 2 MG/ML
4 INJECTION INTRAMUSCULAR; INTRAVENOUS ONCE
Status: DISCONTINUED | OUTPATIENT
Start: 2023-09-05 | End: 2023-09-05 | Stop reason: HOSPADM

## 2023-09-05 RX ORDER — CEPHALEXIN 500 MG/1
500 CAPSULE ORAL ONCE
Status: DISCONTINUED | OUTPATIENT
Start: 2023-09-05 | End: 2023-09-05 | Stop reason: HOSPADM

## 2023-09-05 RX ADMIN — FOLIC ACID: 5 INJECTION, SOLUTION INTRAMUSCULAR; INTRAVENOUS; SUBCUTANEOUS at 08:54

## 2023-09-05 ASSESSMENT — LIFESTYLE VARIABLES
HEADACHE, FULLNESS IN HEAD: NOT PRESENT
ANXIETY: MILDLY ANXIOUS
TREMOR: MODERATE TREMOR WITH ARMS EXTENDED
ORIENTATION AND CLOUDING OF SENSORIUM: ORIENTED AND CAN DO SERIAL ADDITIONS
NAUSEA AND VOMITING: *
AGITATION: SOMEWHAT MORE THAN NORMAL ACTIVITY
AUDITORY DISTURBANCES: NOT PRESENT
TOTAL SCORE: 9
PAROXYSMAL SWEATS: NO SWEAT VISIBLE
VISUAL DISTURBANCES: NOT PRESENT

## 2023-09-05 ASSESSMENT — FIBROSIS 4 INDEX: FIB4 SCORE: 3.56

## 2023-09-05 NOTE — ED NOTES
Patient in lobby waiting for family. Rn walked patient back to room and gave phone to call family. PAtient aware family needs to be here before DC.

## 2023-09-05 NOTE — ED NOTES
ERP and security called to lobby. Patient agrred to return to room to speak with ERP. Patient attempting to leave. ERP talked to patient and patient agrees to call family for safe discharge home. Patient given juice and sandwich. [facility] at bedside with resources.

## 2023-09-05 NOTE — ED NOTES
Assist RN:  PIV placed in right AC, blood collected and sent to lab (except for lactic acid). Pt tolerated well. Primary RN aware.

## 2023-09-05 NOTE — ED NOTES
Patient mother arrived with care giver to take patient home. Patient refused all paperwork, vitals and medications. AMA form signed by patient.

## 2023-09-05 NOTE — ED PROVIDER NOTES
ED Provider Note    CHIEF COMPLAINT  Chief Complaint   Patient presents with    Detox     Requesting for alcohol detox. Drinks a bottle of hard liquor per day. Last drink 7 hours ago.    Abdominal Pain           HPI/ROS  LIMITATION TO HISTORY   Select: Intoxication  OUTSIDE HISTORIAN(S):  None    Yohana Brandon is a 58 y.o. female who presents to the emergency department complaining alcohol abuse.  The patient that she drinks a lot of alcohol and has some vague abdominal discomfort assist with her heavy alcohol use.  She is requesting help with alcohol cessation.  Last drink was a little while ago.  She denies any hematemesis melena hematochezia.  Denies any fevers or chills.  History somewhat limited because of her intoxication.  She denies any other acute medical problems or complaints.  Not suicidal.    PAST MEDICAL HISTORY   has a past medical history of Alcohol abuse, Alcohol abuse, ASTHMA, Elevated LFTs, Heart palpitations, Hypertension, and Psychiatric disorder.    SURGICAL HISTORY   has a past surgical history that includes appendectomy (); endometrial ablation, thermal (); remove tonsils/adenoids,<11 y/o (); anesth,surg breast reconstructive (); hemorrhoidectomy (2011); anal sphincterotomy (2011); bunionectomy ();  delivery+postpartum care; total hip arthroplasty (Right, 2021); and closed reduction (Right, 2022).    FAMILY HISTORY  Family History   Problem Relation Age of Onset    Stroke Mother        SOCIAL HISTORY  Social History     Tobacco Use    Smoking status: Never    Smokeless tobacco: Never   Vaping Use    Vaping Use: Never used   Substance and Sexual Activity    Alcohol use: Yes     Comment: Daily vodka/abuse pint daily    Drug use: No    Sexual activity: Not on file       CURRENT MEDICATIONS  Home Medications       Reviewed by India Arias R.N. (Registered Nurse) on 23 at 0702  Med List Status: Partial     Medication Last Dose Status  "  aspirin 81 MG EC tablet  Active   Non Formulary Request  Active   Non Formulary Request  Active   Omega-3 Fatty Acids (FISH OIL) 1000 MG Cap capsule  Active   progesterone (PROMETRIUM) 100 MG Cap  Active   rosuvastatin (CRESTOR) 10 MG Tab  Active   telmisartan-hydrochlorothiazide (MICARDIS HCT) 80-25 MG per tablet  Active   thyroid (ARMOUR THYROID) 60 MG Tab  Active                    ALLERGIES  Allergies   Allergen Reactions    Banana Shortness of Breath    Kiwi Extract Swelling    Latex Anaphylaxis    Morphine Hives    Other Food Hives and Shortness of Breath     Tropical fruit      Vicodin [Hydrocodone-Acetaminophen] Rash     Generalized rash, puritis        PHYSICAL EXAM  VITAL SIGNS: BP (!) 148/95   Pulse 82   Temp 36.5 °C (97.7 °F) (Temporal)   Resp 17   Ht 1.702 m (5' 7\")   Wt 59.7 kg (131 lb 9.8 oz)   LMP 09/14/2006   SpO2 97%   BMI 20.61 kg/m²    Constitutional: Awake alert no acute car pulm distress.  HENT: Normocephalic, Atraumatic, Bilateral external ears normal, Oropharynx moist, No oral exudates, Nose normal.   Eyes: PERRL, EOMI, Conjunctiva normal, No discharge.   Neck: Normal range of motion,  Cardiovascular: Normal heart rate, Normal rhythm, No murmurs, No rubs, No gallops.   Thorax & Lungs: Normal breath sounds, No respiratory distress, No wheezing  Abdomen: Bowel sounds normal, Soft, No tenderness  Skin: Warm, Dry, No erythema, No rash.   Musculoskeletal: Good range of motion in all major joints.  Neurologic: Alert, No focal deficits noted.   Psychiatric: Affect normal      DIAGNOSTIC STUDIES / PROCEDURES    Results for orders placed or performed during the hospital encounter of 09/05/23   CBC with Differential   Result Value Ref Range    WBC 3.4 (L) 4.8 - 10.8 K/uL    RBC 4.26 4.20 - 5.40 M/uL    Hemoglobin 14.6 12.0 - 16.0 g/dL    Hematocrit 40.7 37.0 - 47.0 %    MCV 95.5 81.4 - 97.8 fL    MCH 34.3 (H) 27.0 - 33.0 pg    MCHC 35.9 (H) 32.2 - 35.5 g/dL    RDW 42.1 35.9 - 50.0 fL    " Platelet Count 252 164 - 446 K/uL    MPV 9.3 9.0 - 12.9 fL    Neutrophils-Polys 51.40 44.00 - 72.00 %    Lymphocytes 37.40 22.00 - 41.00 %    Monocytes 8.50 0.00 - 13.40 %    Eosinophils 0.90 0.00 - 6.90 %    Basophils 1.20 0.00 - 1.80 %    Immature Granulocytes 0.60 0.00 - 0.90 %    Nucleated RBC 0.00 0.00 - 0.20 /100 WBC    Neutrophils (Absolute) 1.76 (L) 1.82 - 7.42 K/uL    Lymphs (Absolute) 1.28 1.00 - 4.80 K/uL    Monos (Absolute) 0.29 0.00 - 0.85 K/uL    Eos (Absolute) 0.03 0.00 - 0.51 K/uL    Baso (Absolute) 0.04 0.00 - 0.12 K/uL    Immature Granulocytes (abs) 0.02 0.00 - 0.11 K/uL    NRBC (Absolute) 0.00 K/uL   Complete Metabolic Panel   Result Value Ref Range    Sodium 136 135 - 145 mmol/L    Potassium 3.2 (L) 3.6 - 5.5 mmol/L    Chloride 93 (L) 96 - 112 mmol/L    Co2 25 20 - 33 mmol/L    Anion Gap 18.0 (H) 7.0 - 16.0    Glucose 87 65 - 99 mg/dL    Bun 11 8 - 22 mg/dL    Creatinine 0.93 0.50 - 1.40 mg/dL    Calcium 8.8 8.5 - 10.5 mg/dL    Correct Calcium 8.5 8.5 - 10.5 mg/dL    AST(SGOT) 59 (H) 12 - 45 U/L    ALT(SGPT) 33 2 - 50 U/L    Alkaline Phosphatase 65 30 - 99 U/L    Total Bilirubin 0.8 0.1 - 1.5 mg/dL    Albumin 4.4 3.2 - 4.9 g/dL    Total Protein 7.1 6.0 - 8.2 g/dL    Globulin 2.7 1.9 - 3.5 g/dL    A-G Ratio 1.6 g/dL   Lipase   Result Value Ref Range    Lipase 68 11 - 82 U/L   Urinalysis    Specimen: Urine   Result Value Ref Range    Color Yellow     Character Turbid (A)     Specific Gravity 1.015 <1.035    Ph 6.0 5.0 - 8.0    Glucose Negative Negative mg/dL    Ketones Negative Negative mg/dL    Protein 30 (A) Negative mg/dL    Bilirubin Negative Negative    Urobilinogen, Urine 0.2 Negative    Nitrite Negative Negative    Leukocyte Esterase Large (A) Negative    Occult Blood Trace (A) Negative    Micro Urine Req Microscopic    LACTIC ACID   Result Value Ref Range    Lactic Acid 4.8 (HH) 0.5 - 2.0 mmol/L   DIAGNOSTIC ALCOHOL   Result Value Ref Range    Diagnostic Alcohol 377.4 (H) <10.1 mg/dL    ESTIMATED GFR   Result Value Ref Range    GFR (CKD-EPI) 72 >60 mL/min/1.73 m 2   URINE MICROSCOPIC (W/UA)   Result Value Ref Range    WBC Packed (A) /hpf    RBC 0-2 /hpf    Bacteria Many (A) None /hpf    Epithelial Cells Negative /hpf    Hyaline Cast 0-2 /lpf      RADIOLOGY    COURSE & MEDICAL DECISION MAKING    ED Observation Status?  Patient does not meet observation criteria.    INITIAL ASSESSMENT, COURSE AND PLAN  Care Narrative:   The patient presents emergency department for evaluation of vomiting, alcohol abuse, alcohol withdrawal and some vague abdominal discomfort.  Initially she is quite intoxicated.  She overall looks well.  She states she is an alcoholic and would like to quit drinking.    The patient was seen and examined and a broader diagnosis was considered including intoxication, UTI, dehydration, alcoholic metabolic derangement electrolyte abnormality, dehydration, renal failure.    Is worked up with labs, she is given a detox bag IV to replace her vitamins and minerals thiamine and folate.    She is worked up with labs and labs show some electrolyte abnormalities likely related to alcohol use including elevated lactic acid she also has a UTI.    Ordered IV Rocephin.    Time after that I was called by the nursing staff the patient pulled out her IV and was trying to leave.  I asked the nurses to keep her in the room and ultimately the patient was out in the lobby currently.  She much more sober she has a stable gait she is a clear sensorium despite a relatively high alcohol level I think she is fairly clearheaded think this is relatively her chronic alcohol level that she operates.    Is also committed to go back to the room.  She is willing to take an oral antibiotic I asked her to call her family doctor talk to him about a plan.  He was amenable to this.  Apparently her family was called.  The patient walked out and was walked out with her she says she refused to stay to talk to me she refused  to stay for discharge instructions and return precautions.  She was made aware of the UTI.  She states that she has antibiotics at home she also states she has medicines to withdraw at home she does not want help with her alcoholism or alcohol withdrawal.    Patient is a left against medical vice is signed out with the nursing staff before I can communicate with her.  Did not have the time to complete my evaluation or make an appropriate discharge plan.  The patient has left with her family AGAINST MEDICAL ADVICE prior to my final evaluation.        HYDRATION: Based on the patient's presentation of Dehydration the patient was given IV fluids. IV Hydration was used because oral hydration was not adequate alone. Upon recheck following hydration, the patient was improved.      ADDITIONAL PROBLEM LIST  Alcohol abuse    DISPOSITION AND DISCUSSIONS    Signout by the nursing staff.  She did not wait to speak with me she is no way for appropriate discharge correction for obstruction she has eloped in the emergency department.    FINAL DIAGNOSIS  1. Alcohol abuse    2. Alcoholic intoxication without complication (HCC)    3. Acute UTI    4.  Find out AGAINST MEDICAL ADVICE.       Electronically signed by: Jarret Umaña M.D., 9/5/2023 8:38 AM

## 2023-09-05 NOTE — ED NOTES
Patient took out IV and is refusing to stay. ERP notified and asked to come to bedside due to patient wanting to leave.

## 2023-09-05 NOTE — ED TRIAGE NOTES
Yohana Brandon  58 y.o. female  Chief Complaint   Patient presents with    Detox     Requesting for alcohol detox. Drinks a bottle of hard liquor per day. Last drink 7 hours ago.    Abdominal Pain     Pt BIB EMS for above complaint. Pt ambulatory to triage with steady gait for above complaint. CIWA - 9.    Pt is GCS 15, speaking in full sentences, follows commands and responds appropriately to questions. Resp are even and unlabored.     abdominal protocol ordered. Pt placed in phlebotmy. Pt educated on triage process. Pt encouraged to alert staff for any changes.       Vitals:    09/05/23 0645   BP: (!) 148/95   Pulse: 82   Resp: 17   Temp: 36.5 °C (97.7 °F)   SpO2: 97%

## 2023-10-05 ENCOUNTER — HOSPITAL ENCOUNTER (EMERGENCY)
Facility: MEDICAL CENTER | Age: 58
End: 2023-10-05
Attending: EMERGENCY MEDICINE
Payer: COMMERCIAL

## 2023-10-05 VITALS
SYSTOLIC BLOOD PRESSURE: 127 MMHG | HEART RATE: 102 BPM | RESPIRATION RATE: 18 BRPM | OXYGEN SATURATION: 95 % | HEIGHT: 67 IN | TEMPERATURE: 98.3 F | DIASTOLIC BLOOD PRESSURE: 86 MMHG | BODY MASS INDEX: 20.9 KG/M2 | WEIGHT: 133.16 LBS

## 2023-10-05 DIAGNOSIS — F10.930 ALCOHOL WITHDRAWAL SYNDROME WITHOUT COMPLICATION (HCC): ICD-10-CM

## 2023-10-05 LAB
ALBUMIN SERPL BCP-MCNC: 4.1 G/DL (ref 3.2–4.9)
ALBUMIN/GLOB SERPL: 1.5 G/DL
ALP SERPL-CCNC: 66 U/L (ref 30–99)
ALT SERPL-CCNC: 37 U/L (ref 2–50)
ANION GAP SERPL CALC-SCNC: 18 MMOL/L (ref 7–16)
AST SERPL-CCNC: 53 U/L (ref 12–45)
BILIRUB SERPL-MCNC: 1 MG/DL (ref 0.1–1.5)
BUN SERPL-MCNC: 14 MG/DL (ref 8–22)
CALCIUM ALBUM COR SERPL-MCNC: 8.7 MG/DL (ref 8.5–10.5)
CALCIUM SERPL-MCNC: 8.8 MG/DL (ref 8.4–10.2)
CHLORIDE SERPL-SCNC: 99 MMOL/L (ref 96–112)
CO2 SERPL-SCNC: 18 MMOL/L (ref 20–33)
CREAT SERPL-MCNC: 0.7 MG/DL (ref 0.5–1.4)
GFR SERPLBLD CREATININE-BSD FMLA CKD-EPI: 101 ML/MIN/1.73 M 2
GLOBULIN SER CALC-MCNC: 2.8 G/DL (ref 1.9–3.5)
GLUCOSE SERPL-MCNC: 88 MG/DL (ref 65–99)
POTASSIUM SERPL-SCNC: 3.6 MMOL/L (ref 3.6–5.5)
PROT SERPL-MCNC: 6.9 G/DL (ref 6–8.2)
SODIUM SERPL-SCNC: 135 MMOL/L (ref 135–145)

## 2023-10-05 PROCEDURE — 99284 EMERGENCY DEPT VISIT MOD MDM: CPT

## 2023-10-05 PROCEDURE — 96374 THER/PROPH/DIAG INJ IV PUSH: CPT

## 2023-10-05 PROCEDURE — 80053 COMPREHEN METABOLIC PANEL: CPT

## 2023-10-05 PROCEDURE — 36415 COLL VENOUS BLD VENIPUNCTURE: CPT

## 2023-10-05 PROCEDURE — 700105 HCHG RX REV CODE 258: Performed by: EMERGENCY MEDICINE

## 2023-10-05 PROCEDURE — 700111 HCHG RX REV CODE 636 W/ 250 OVERRIDE (IP): Mod: JZ | Performed by: EMERGENCY MEDICINE

## 2023-10-05 RX ORDER — SODIUM CHLORIDE 9 MG/ML
1000 INJECTION, SOLUTION INTRAVENOUS ONCE
Status: COMPLETED | OUTPATIENT
Start: 2023-10-05 | End: 2023-10-05

## 2023-10-05 RX ORDER — LORAZEPAM 1 MG/1
1 TABLET ORAL EVERY 8 HOURS PRN
Qty: 9 TABLET | Refills: 0 | Status: SHIPPED | OUTPATIENT
Start: 2023-10-05 | End: 2023-10-05 | Stop reason: SDUPTHER

## 2023-10-05 RX ORDER — LORAZEPAM 1 MG/1
1 TABLET ORAL EVERY 8 HOURS PRN
Qty: 9 TABLET | Refills: 0 | Status: SHIPPED | OUTPATIENT
Start: 2023-10-05 | End: 2023-10-08

## 2023-10-05 RX ORDER — LORAZEPAM 2 MG/ML
1 INJECTION INTRAMUSCULAR ONCE
Status: COMPLETED | OUTPATIENT
Start: 2023-10-05 | End: 2023-10-05

## 2023-10-05 RX ADMIN — LORAZEPAM 1 MG: 2 INJECTION INTRAMUSCULAR; INTRAVENOUS at 12:43

## 2023-10-05 RX ADMIN — SODIUM CHLORIDE 1000 ML: 9 INJECTION, SOLUTION INTRAVENOUS at 12:43

## 2023-10-05 ASSESSMENT — FIBROSIS 4 INDEX: FIB4 SCORE: 1.96

## 2023-10-05 NOTE — ED PROVIDER NOTES
ED Provider Note    CHIEF COMPLAINT  Chief Complaint   Patient presents with    Detox     Requesting assistance with alcohol detox; last drink 1 hour PTA.       EXTERNAL RECORDS REVIEWED  Outpatient Notes      HPI/ROS  LIMITATION TO HISTORY   Select: : None  OUTSIDE HISTORIAN(S):  none    Yohana Brandon is a 58 y.o. female who presents  here for evaluation of detox. Pt states she has been drinking often, and is an 'alcoholic.'  The pt has no fever/chills. No chest pain, no sob.  The pt as stated that she was at [facility] yesterday, and then left secondary to the wait time.      PAST MEDICAL HISTORY   has a past medical history of Alcohol abuse, Alcohol abuse, ASTHMA, Elevated LFTs, Heart palpitations, Hypertension, and Psychiatric disorder.    SURGICAL HISTORY   has a past surgical history that includes appendectomy (); endometrial ablation, thermal (); remove tonsils/adenoids,<11 y/o (); anesth,surg breast reconstructive (); hemorrhoidectomy (2011); anal sphincterotomy (2011); bunionectomy ();  delivery+postpartum care; total hip arthroplasty (Right, 2021); and closed reduction (Right, 2022).    FAMILY HISTORY  Family History   Problem Relation Age of Onset    Stroke Mother        SOCIAL HISTORY  Social History     Tobacco Use    Smoking status: Never    Smokeless tobacco: Never   Vaping Use    Vaping Use: Never used   Substance and Sexual Activity    Alcohol use: Yes     Comment: Daily vodka/abuse pint daily    Drug use: No    Sexual activity: Not on file       CURRENT MEDICATIONS  Home Medications       Reviewed by Domitila Belcher R.N. (Registered Nurse) on 10/05/23 at 1135  Med List Status: Not Addressed     Medication Last Dose Status   aspirin 81 MG EC tablet  Active   Non Formulary Request  Active   Non Formulary Request  Active   Omega-3 Fatty Acids (FISH OIL) 1000 MG Cap capsule  Active   progesterone (PROMETRIUM) 100 MG Cap  Active   rosuvastatin (CRESTOR) 10  "MG Tab  Active   telmisartan-hydrochlorothiazide (MICARDIS HCT) 80-25 MG per tablet  Active   thyroid (ARMOUR THYROID) 60 MG Tab  Active                    ALLERGIES  Allergies   Allergen Reactions    Banana Shortness of Breath    Kiwi Extract Swelling    Latex Anaphylaxis    Morphine Hives    Other Food Hives and Shortness of Breath     Tropical fruit      Vicodin [Hydrocodone-Acetaminophen] Rash     Generalized rash, puritis        PHYSICAL EXAM  VITAL SIGNS: /86   Pulse (!) 102   Temp 36.8 °C (98.3 °F) (Temporal)   Resp 18   Ht 1.702 m (5' 7\")   Wt 60.4 kg (133 lb 2.5 oz)   LMP 09/14/2006   SpO2 95%   BMI 20.86 kg/m²    Constitutional: Well developed, well nourished. No acute distress.  HEENT: Normocephalic, atraumatic. Posterior pharynx clear and moist.  Eyes:  EOMI. Normal sclera.  Neck: Supple, Full range of motion, nontender.  Chest/Pulmonary: clear to ausculation. Symmetrical expansion.   Cardio: Regular rate and rhythm with no murmur.   Abdomen: Soft, nontender. No peritoneal signs. No guarding. No palpable masses.  Musculoskeletal: No deformity, no edema, neurovascular intact.   Neuro: Clear speech, appropriate, cooperative, cranial nerves II-XII grossly intact. Mild tremor   Psych: anxious  mood and affect      DIAGNOSTIC STUDIES / PROCEDURES  Results for orders placed or performed during the hospital encounter of 10/05/23   Comp Metabolic Panel   Result Value Ref Range    Sodium 135 135 - 145 mmol/L    Potassium 3.6 3.6 - 5.5 mmol/L    Chloride 99 96 - 112 mmol/L    Co2 18 (L) 20 - 33 mmol/L    Anion Gap 18.0 (H) 7.0 - 16.0    Glucose 88 65 - 99 mg/dL    Bun 14 8 - 22 mg/dL    Creatinine 0.70 0.50 - 1.40 mg/dL    Calcium 8.8 8.4 - 10.2 mg/dL    Correct Calcium 8.7 8.5 - 10.5 mg/dL    AST(SGOT) 53 (H) 12 - 45 U/L    ALT(SGPT) 37 2 - 50 U/L    Alkaline Phosphatase 66 30 - 99 U/L    Total Bilirubin 1.0 0.1 - 1.5 mg/dL    Albumin 4.1 3.2 - 4.9 g/dL    Total Protein 6.9 6.0 - 8.2 g/dL    " Globulin 2.8 1.9 - 3.5 g/dL    A-G Ratio 1.5 g/dL   ESTIMATED GFR   Result Value Ref Range    GFR (CKD-EPI) 101 >60 mL/min/1.73 m 2         RADIOLOGY  none    COURSE & MEDICAL DECISION MAKING        INITIAL ASSESSMENT, COURSE AND PLAN  Care Narrative: This is a 58-year-old female here for evaluation of of withdrawal.  Initially when the patient showed up, she had a very mild tremor.  However now, 1:24 PM, patient has no tremor, and she has no other medical concerns.  She has been hydrated, and feels well left to go.  Her fiancé is going to take her during the treatment center that she is scheduled to go to today.    DISPOSITION AND DISCUSSIONS  I have discussed management of the patient with the following physicians and TONJA's:  none    Discussion of management with other Q or appropriate source(s): None     Escalation of care considered, and ultimately not performed:none    Barriers to care at this time, including but not limited to: Patient does not have established PCP.     Decision tools and prescription drugs considered including, but not limited to: none.    FINAL DIAGNOSIS  1. Alcohol withdrawal syndrome without complication (HCC)           Electronically signed by: Adán Tapia D.O., 10/5/2023 1:05 PM

## 2023-10-05 NOTE — ED TRIAGE NOTES
"Patient presents with fiance with request for assistance with detox from alcohol. Tried to get in at [facility] yesterday and went through medical clearance process but waited for four hours before leaving. States she had her last drink about 1030 today. Denies any history of seizures from withdrawal. Drank a bottle of champaign, beer and a premixed yara bottle so far today. States she has had zofran this morning to help with some \"withdrawal symptoms\". Mild tremors noted. States that she has been drinking every 3 hours to try and howe off withdrawal symptoms.  "

## 2023-12-28 ENCOUNTER — APPOINTMENT (OUTPATIENT)
Dept: LAB | Facility: MEDICAL CENTER | Age: 58
End: 2023-12-28

## 2024-02-06 NOTE — ED NOTES
Patient provided with specimen container and instructed on clean catch technique  Verbalized understanding  Ambulating independently to restroom to attempt to provide urine specimen  
Pt ambulates back to room with male guest  Gait steady  
Pt given d/c paperwork and RX p/u information; pt verbalized understanding all information given. Pt Ambulated out of the ER w/o difficulty; spouse to pickup pt from [facility]  
Urine specimen collected and taken to lab  
Patient requests all Lab, Cardiology, and Radiology Results on their Discharge Instructions

## 2024-03-18 ENCOUNTER — HOSPITAL ENCOUNTER (EMERGENCY)
Facility: MEDICAL CENTER | Age: 59
End: 2024-03-18
Attending: EMERGENCY MEDICINE
Payer: COMMERCIAL

## 2024-03-18 VITALS
HEIGHT: 68 IN | WEIGHT: 147.05 LBS | DIASTOLIC BLOOD PRESSURE: 98 MMHG | RESPIRATION RATE: 18 BRPM | HEART RATE: 92 BPM | SYSTOLIC BLOOD PRESSURE: 148 MMHG | OXYGEN SATURATION: 96 % | TEMPERATURE: 98.2 F | BODY MASS INDEX: 22.29 KG/M2

## 2024-03-18 DIAGNOSIS — F10.920 ALCOHOLIC INTOXICATION WITHOUT COMPLICATION (HCC): ICD-10-CM

## 2024-03-18 LAB — POC BREATHALIZER: 0.3 PERCENT (ref 0–0.01)

## 2024-03-18 PROCEDURE — 99284 EMERGENCY DEPT VISIT MOD MDM: CPT

## 2024-03-18 PROCEDURE — 700102 HCHG RX REV CODE 250 W/ 637 OVERRIDE(OP): Performed by: EMERGENCY MEDICINE

## 2024-03-18 PROCEDURE — A9270 NON-COVERED ITEM OR SERVICE: HCPCS | Performed by: EMERGENCY MEDICINE

## 2024-03-18 PROCEDURE — 302970 POC BREATHALIZER: Performed by: EMERGENCY MEDICINE

## 2024-03-18 RX ORDER — HALOPERIDOL 5 MG/1
5 TABLET ORAL ONCE
Status: COMPLETED | OUTPATIENT
Start: 2024-03-18 | End: 2024-03-18

## 2024-03-18 RX ADMIN — HALOPERIDOL 5 MG: 5 TABLET ORAL at 04:00

## 2024-03-18 ASSESSMENT — FIBROSIS 4 INDEX: FIB4 SCORE: 2.4

## 2024-03-18 NOTE — ED PROVIDER NOTES
"ED Provider Note        CHIEF COMPLAINT  Chief Complaint   Patient presents with    ETOH Withdrawal     PT states \"I'm an alcoholic and I'm scared I'm going to die from detox, I need ativan.\" Last drink 2 hours PTA          HPI    OUTSIDE HISTORIAN(S):  Family  provide supporting evidence    Yohana Brandon is a 58 y.o. female who presents to the Emergency Department with alcohol intoxication.  The patient reports that she has a history of alcohol abuse and has been drinking.  Reports last drink 2 hours ago.  She is requesting Ativan, feeling anxious.  She states that she has been through multiple detoxes in the past, but has had trauma from her father and brother committing suicide.  The patient also lost her first .  These all appear to contribute to her drinking.  She is interested in treatment because she is worried.  She watches her blood pressure closely and reports that it is often elevated which worries her and she then takes clonidine for it.  She denies any abdominal pain, shortness of breath, chest pain, nausea, vomiting    Her  reports that several days ago was the anniversary of the passing of one of her loved ones and this appeared to be a trigger.  She has been drinking for the past several days heavily.    REVIEW OF SYSTEMS  See HPI for further details. All other systems are negative.     PAST MEDICAL HISTORY     Past Medical History:   Diagnosis Date    Alcohol abuse     Alcohol abuse     ASTHMA     Elevated LFTs     Heart palpitations     w/ alcohol withdrawal    Hypertension     Psychiatric disorder     alcohol dependance       SURGICAL HISTORY  Past Surgical History:   Procedure Laterality Date    CLOSED REDUCTION Right 2/9/2022    Procedure: CLOSED REDUCTION, HIP,;  Surgeon: Jose G Bell M.D.;  Location: SURGERY [facility];  Service: Orthopedics    HI TOTAL HIP ARTHROPLASTY Right 12/27/2021    Procedure: ARTHROPLASTY, HIP, TOTAL, ANTERIOR APPROACH;  Surgeon: Jose G" "EDGAR Bell;  Location: SURGERY [facility];  Service: Orthopedics    HEMORRHOIDECTOMY  2011    Performed by SONY MACDONALD at SURGERY [facility] ORS    ANAL SPHINCTEROTOMY  2011    Performed by SONY MACDONALD at SURGERY [facility] ORS    RI ENDOMETRIAL ABLATION, THERMAL      APPENDECTOMY      RI ANESTH,SURG BREAST RECONSTRUCTIVE      bilateral     BUNIONECTOMY  1983    bilateral    RI REMOVE TONSILS/ADENOIDS,<13 Y/O  1973    RI  DELIVERY+POSTPARTUM CARE      x2       FAMILY HISTORY  Family History   Problem Relation Age of Onset    Stroke Mother        SOCIAL HISTORY    reports that she has never smoked. She has never used smokeless tobacco. She reports current alcohol use. She reports that she does not use drugs.    CURRENT MEDICATIONS  Home Medications       Reviewed by Teo Mcfarlane R.N. (Registered Nurse) on 24 at 0250  Med List Status: Not Addressed     Medication Last Dose Status   aspirin 81 MG EC tablet  Active   Non Formulary Request  Active   Non Formulary Request  Active   Omega-3 Fatty Acids (FISH OIL) 1000 MG Cap capsule  Active   progesterone (PROMETRIUM) 100 MG Cap  Active   rosuvastatin (CRESTOR) 10 MG Tab  Active   telmisartan-hydrochlorothiazide (MICARDIS HCT) 80-25 MG per tablet  Active   thyroid (ARMOUR THYROID) 60 MG Tab  Active                    ALLERGIES  Allergies   Allergen Reactions    Banana Shortness of Breath    Kiwi Extract Swelling    Latex Anaphylaxis    Morphine Hives    Other Food Hives and Shortness of Breath     Tropical fruit      Vicodin [Hydrocodone-Acetaminophen] Rash     Generalized rash, puritis        PHYSICAL EXAM  VITAL SIGNS: BP (!) 160/115   Pulse (!) 110   Temp 36.6 °C (97.9 °F) (Temporal)   Resp 16   Ht 1.727 m (5' 8\")   Wt 66.7 kg (147 lb 0.8 oz)   LMP 2006   SpO2 95%   BMI 22.36 kg/m²   Gen: Alert, no acute distress  HEENT: ATNC  Eyes: PERRL, EOMI, normal conjunctiva  Neck: trachea midline  Resp: no " respiratory distress, clear to auscultation bilaterally  CV: No JVD, regular rate and rhythm, no murmurs, rubs, gallops  Abd: non-distended, soft, nontender  Ext: No deformities  Neuro: speech slightly slurred, moves all extremities    DIAGNOSTIC STUDIES / PROCEDURES      LABS  Labs Reviewed   POC BREATHALIZER - Abnormal; Notable for the following components:       Result Value    POC Breathalizer 0.301 (*)     All other components within normal limits         COURSE & MEDICAL DECISION MAKING  Pertinent Labs & Imaging studies were reviewed. (See chart for details)      EXTERNAL RECORDS REVIEWED  External ED Note patient seen at [facility] on 10/3/2023 for alcoholism, drinking a pint a day, referred to detox facility      INITIAL ASSESSMENT AND PLAN  Care Narrative: Patient presents with alcohol intoxication.  She expresses concern for her overall wellbeing concerned that her heart will stop.  She states that she has to drink every 3 hours to prevent palpitations.  She reports that she takes her blood pressure multiple times a day and this often worries her for which she takes the clonidine.  We discussed the need for taking a [place] for blood pressure management and that short-term variations are normal.  I did recommend that she discuss these with her primary care provider.        She is requesting Ativan, however is intoxicated.  Her blood alcohol level is markedly elevated.  She is not at risk for withdrawal at this point in time.  She appears to be maintaining her airway and is fully conversant at this alcohol level.  I discussed the case with the behavioral health team, however there are no detox beds available.  The patient denies any abdominal pain to suggest alcoholic hepatitis, pancreatitis.  She demonstrates no other toxidromes, no signs of trauma. At this time, no indication for blood work or imaging. The patient remains anxious, will provide oral haloperidol for her anxiety, and advised that she  present to [facility] at 9 AM or hopefully they will have detox beds available.    The patient assures me that she is aware of all the detox facilities because she has been to them all.     ADDITIONAL PROBLEM LIST AND DISPOSITION    Escalation of care considered, and ultimately not performed: blood analysis, diagnostic imaging, and acute inpatient care management, however at this time, the patient is most appropriate for outpatient management.         Decision tools and prescription drugs considered including, but not limited to: Medication modification no indication for benzodiazepines or barbiturates at this time as the patient is actively intoxicated, and I believe is low risk for detox between now and when she will be able to present to [facility] for evaluation .    Patient is referred to primary care provider for blood pressure, diabetes and all other preventative health services.  Patient was given return precautions, anticipatory guidance, and the opportunity ask questions prior to discharge        FINAL IMPRESSION  1. Alcoholic intoxication without complication (HCC)           DISPOSITION:  Patient will be discharged home in stable condition.    FOLLOW UP:  Deborah Moctezuma M.D.  [address] [place] [address] [address] [place] [id number] [phone]    Schedule an appointment as soon as possible for a visit       [facility] [address]  [facility] [address]  [phone]  Go to   at 9am             This dictation was created using voice recognition software. The accuracy of the dictation is limited to the abilities of the software. I expect there may be some errors of grammar and possibly content. The nursing notes were reviewed and certain aspects of this information were incorporated into this note.

## 2024-03-18 NOTE — ED TRIAGE NOTES
"Chief Complaint   Patient presents with    ETOH Withdrawal     PT states \"I'm an alcoholic and I'm scared I'm going to die from detox, I need ativan.\" Last drink 2 hours PTA      BP (!) 160/115   Pulse (!) 110   Temp 36.6 °C (97.9 °F) (Temporal)   Resp 16   Ht 1.727 m (5' 8\")   Wt 66.7 kg (147 lb 0.8 oz)   LMP 09/14/2006   SpO2 95%   BMI 22.36 kg/m²     "

## 2024-03-18 NOTE — DISCHARGE INSTRUCTIONS
You were seen in the emergency department for alcohol intoxication and recent alcohol abuse.  Thankfully your vital signs improved from your vital signs.  Your alcohol level is markedly elevated at 4 times the legal driving limit.  At this time, you are not at risk for withdrawals but as your blood alcohol level comes down that is a possibility.    Unfortunately there are no detox beds available right now.  We recommend going to [facility] at 9 AM for treatment.    Return to the emergency department or seek medical attention if you develop:  Vomiting, difficulty breathing, seizure, uncontrollable shakes, any other new or concerning findings

## 2024-03-18 NOTE — ED NOTES
"Patient ambulates to the ER accompanied by her  complaining of a high heart rate with palpitations and alcoholism. Patient's  reports that she has had a bottle of wind and a bottle of yara pre-mix and that her last drink was approximately two hours ago. Patient repeatedly states \"I am medically trained and I am scared for my health\".   "

## 2024-06-12 ENCOUNTER — HOSPITAL ENCOUNTER (OUTPATIENT)
Dept: RADIOLOGY | Facility: MEDICAL CENTER | Age: 59
End: 2024-06-12
Attending: OBSTETRICS & GYNECOLOGY
Payer: COMMERCIAL

## 2024-06-12 DIAGNOSIS — N64.4 MASTODYNIA: ICD-10-CM

## 2024-06-12 PROCEDURE — G0279 TOMOSYNTHESIS, MAMMO: HCPCS

## 2024-06-12 PROCEDURE — 76642 ULTRASOUND BREAST LIMITED: CPT | Mod: LT

## 2024-07-09 ENCOUNTER — HOSPITAL ENCOUNTER (EMERGENCY)
Facility: MEDICAL CENTER | Age: 59
End: 2024-07-09
Attending: EMERGENCY MEDICINE
Payer: COMMERCIAL

## 2024-07-09 VITALS
HEIGHT: 68 IN | HEART RATE: 90 BPM | WEIGHT: 148.59 LBS | DIASTOLIC BLOOD PRESSURE: 81 MMHG | BODY MASS INDEX: 22.52 KG/M2 | OXYGEN SATURATION: 93 % | TEMPERATURE: 98.5 F | RESPIRATION RATE: 19 BRPM | SYSTOLIC BLOOD PRESSURE: 112 MMHG

## 2024-07-09 DIAGNOSIS — F10.220 ALCOHOL DEPENDENCE WITH UNCOMPLICATED INTOXICATION (HCC): ICD-10-CM

## 2024-07-09 LAB
ALBUMIN SERPL BCP-MCNC: 4.6 G/DL (ref 3.2–4.9)
ALBUMIN/GLOB SERPL: 1.4 G/DL
ALP SERPL-CCNC: 82 U/L (ref 30–99)
ALT SERPL-CCNC: 24 U/L (ref 2–50)
ANION GAP SERPL CALC-SCNC: 16 MMOL/L (ref 7–16)
AST SERPL-CCNC: 31 U/L (ref 12–45)
BASOPHILS # BLD AUTO: 1.3 % (ref 0–1.8)
BASOPHILS # BLD: 0.08 K/UL (ref 0–0.12)
BILIRUB SERPL-MCNC: 0.3 MG/DL (ref 0.1–1.5)
BUN SERPL-MCNC: 14 MG/DL (ref 8–22)
CALCIUM ALBUM COR SERPL-MCNC: 8.6 MG/DL (ref 8.5–10.5)
CALCIUM SERPL-MCNC: 9.1 MG/DL (ref 8.4–10.2)
CHLORIDE SERPL-SCNC: 99 MMOL/L (ref 96–112)
CO2 SERPL-SCNC: 22 MMOL/L (ref 20–33)
CREAT SERPL-MCNC: 0.79 MG/DL (ref 0.5–1.4)
EOSINOPHIL # BLD AUTO: 0.13 K/UL (ref 0–0.51)
EOSINOPHIL NFR BLD: 2.1 % (ref 0–6.9)
ERYTHROCYTE [DISTWIDTH] IN BLOOD BY AUTOMATED COUNT: 43.5 FL (ref 35.9–50)
ETHANOL BLD-MCNC: 358.5 MG/DL
GFR SERPLBLD CREATININE-BSD FMLA CKD-EPI: 87 ML/MIN/1.73 M 2
GLOBULIN SER CALC-MCNC: 3.4 G/DL (ref 1.9–3.5)
GLUCOSE SERPL-MCNC: 96 MG/DL (ref 65–99)
HCT VFR BLD AUTO: 45.9 % (ref 37–47)
HGB BLD-MCNC: 16 G/DL (ref 12–16)
IMM GRANULOCYTES # BLD AUTO: 0.02 K/UL (ref 0–0.11)
IMM GRANULOCYTES NFR BLD AUTO: 0.3 % (ref 0–0.9)
LYMPHOCYTES # BLD AUTO: 2.37 K/UL (ref 1–4.8)
LYMPHOCYTES NFR BLD: 38.2 % (ref 22–41)
MCH RBC QN AUTO: 33.3 PG (ref 27–33)
MCHC RBC AUTO-ENTMCNC: 34.9 G/DL (ref 32.2–35.5)
MCV RBC AUTO: 95.6 FL (ref 81.4–97.8)
MONOCYTES # BLD AUTO: 0.42 K/UL (ref 0–0.85)
MONOCYTES NFR BLD AUTO: 6.8 % (ref 0–13.4)
NEUTROPHILS # BLD AUTO: 3.18 K/UL (ref 1.82–7.42)
NEUTROPHILS NFR BLD: 51.3 % (ref 44–72)
NRBC # BLD AUTO: 0 K/UL
NRBC BLD-RTO: 0 /100 WBC (ref 0–0.2)
PLATELET # BLD AUTO: 281 K/UL (ref 164–446)
PMV BLD AUTO: 9.2 FL (ref 9–12.9)
POTASSIUM SERPL-SCNC: 3.9 MMOL/L (ref 3.6–5.5)
PROT SERPL-MCNC: 8 G/DL (ref 6–8.2)
RBC # BLD AUTO: 4.8 M/UL (ref 4.2–5.4)
SODIUM SERPL-SCNC: 137 MMOL/L (ref 135–145)
WBC # BLD AUTO: 6.2 K/UL (ref 4.8–10.8)

## 2024-07-09 PROCEDURE — 36415 COLL VENOUS BLD VENIPUNCTURE: CPT

## 2024-07-09 PROCEDURE — 85025 COMPLETE CBC W/AUTO DIFF WBC: CPT

## 2024-07-09 PROCEDURE — 700102 HCHG RX REV CODE 250 W/ 637 OVERRIDE(OP): Performed by: EMERGENCY MEDICINE

## 2024-07-09 PROCEDURE — 80053 COMPREHEN METABOLIC PANEL: CPT

## 2024-07-09 PROCEDURE — 82077 ASSAY SPEC XCP UR&BREATH IA: CPT

## 2024-07-09 PROCEDURE — A9270 NON-COVERED ITEM OR SERVICE: HCPCS | Performed by: EMERGENCY MEDICINE

## 2024-07-09 PROCEDURE — 99284 EMERGENCY DEPT VISIT MOD MDM: CPT

## 2024-07-09 RX ORDER — CHLORDIAZEPOXIDE HYDROCHLORIDE 25 MG/1
25 CAPSULE, GELATIN COATED ORAL ONCE
Status: COMPLETED | OUTPATIENT
Start: 2024-07-09 | End: 2024-07-09

## 2024-07-09 RX ADMIN — CHLORDIAZEPOXIDE HYDROCHLORIDE 25 MG: 25 CAPSULE ORAL at 09:57

## 2024-07-09 ASSESSMENT — FIBROSIS 4 INDEX: FIB4 SCORE: 2.45

## 2024-07-27 ENCOUNTER — HOSPITAL ENCOUNTER (EMERGENCY)
Facility: MEDICAL CENTER | Age: 59
End: 2024-07-27
Payer: COMMERCIAL

## 2024-07-27 ENCOUNTER — HOSPITAL ENCOUNTER (EMERGENCY)
Facility: MEDICAL CENTER | Age: 59
End: 2024-07-27
Attending: EMERGENCY MEDICINE
Payer: COMMERCIAL

## 2024-07-27 VITALS
HEART RATE: 106 BPM | BODY MASS INDEX: 22.53 KG/M2 | HEIGHT: 67 IN | TEMPERATURE: 98.2 F | DIASTOLIC BLOOD PRESSURE: 104 MMHG | OXYGEN SATURATION: 95 % | RESPIRATION RATE: 18 BRPM | WEIGHT: 143.52 LBS | SYSTOLIC BLOOD PRESSURE: 157 MMHG

## 2024-07-27 VITALS
BODY MASS INDEX: 22.39 KG/M2 | RESPIRATION RATE: 12 BRPM | HEIGHT: 67 IN | OXYGEN SATURATION: 97 % | DIASTOLIC BLOOD PRESSURE: 68 MMHG | WEIGHT: 142.64 LBS | SYSTOLIC BLOOD PRESSURE: 130 MMHG | HEART RATE: 102 BPM | TEMPERATURE: 99.1 F

## 2024-07-27 DIAGNOSIS — F10.930 ALCOHOL WITHDRAWAL SYNDROME WITHOUT COMPLICATION (HCC): ICD-10-CM

## 2024-07-27 LAB
ALBUMIN SERPL BCP-MCNC: 5.1 G/DL (ref 3.2–4.9)
ALBUMIN/GLOB SERPL: 1.5 G/DL
ALP SERPL-CCNC: 77 U/L (ref 30–99)
ALT SERPL-CCNC: 24 U/L (ref 2–50)
AMPHET UR QL SCN: NEGATIVE
ANION GAP SERPL CALC-SCNC: 28 MMOL/L (ref 7–16)
AST SERPL-CCNC: 29 U/L (ref 12–45)
BARBITURATES UR QL SCN: NEGATIVE
BASOPHILS # BLD AUTO: 0.5 % (ref 0–1.8)
BASOPHILS # BLD: 0.06 K/UL (ref 0–0.12)
BENZODIAZ UR QL SCN: NEGATIVE
BILIRUB SERPL-MCNC: 0.7 MG/DL (ref 0.1–1.5)
BUN SERPL-MCNC: 21 MG/DL (ref 8–22)
BZE UR QL SCN: NEGATIVE
CA-I SERPL-SCNC: 1.12 MMOL/L (ref 1.1–1.3)
CALCIUM ALBUM COR SERPL-MCNC: 9.1 MG/DL (ref 8.5–10.5)
CALCIUM SERPL-MCNC: 10 MG/DL (ref 8.4–10.2)
CANNABINOIDS UR QL SCN: NEGATIVE
CHLORIDE SERPL-SCNC: 93 MMOL/L (ref 96–112)
CO2 SERPL-SCNC: 21 MMOL/L (ref 20–33)
CREAT SERPL-MCNC: 1.08 MG/DL (ref 0.5–1.4)
EKG IMPRESSION: NORMAL
EOSINOPHIL # BLD AUTO: 0 K/UL (ref 0–0.51)
EOSINOPHIL NFR BLD: 0 % (ref 0–6.9)
ERYTHROCYTE [DISTWIDTH] IN BLOOD BY AUTOMATED COUNT: 44.1 FL (ref 35.9–50)
ETHANOL BLD-MCNC: 51.6 MG/DL
FENTANYL UR QL: NEGATIVE
GFR SERPLBLD CREATININE-BSD FMLA CKD-EPI: 60 ML/MIN/1.73 M 2
GLOBULIN SER CALC-MCNC: 3.3 G/DL (ref 1.9–3.5)
GLUCOSE SERPL-MCNC: 131 MG/DL (ref 65–99)
HCT VFR BLD AUTO: 43.7 % (ref 37–47)
HGB BLD-MCNC: 15.3 G/DL (ref 12–16)
IMM GRANULOCYTES # BLD AUTO: 0.06 K/UL (ref 0–0.11)
IMM GRANULOCYTES NFR BLD AUTO: 0.5 % (ref 0–0.9)
LIPASE SERPL-CCNC: 22 U/L (ref 11–82)
LYMPHOCYTES # BLD AUTO: 0.75 K/UL (ref 1–4.8)
LYMPHOCYTES NFR BLD: 6.2 % (ref 22–41)
MAGNESIUM SERPL-MCNC: 1.4 MG/DL (ref 1.5–2.5)
MCH RBC QN AUTO: 33.7 PG (ref 27–33)
MCHC RBC AUTO-ENTMCNC: 35 G/DL (ref 32.2–35.5)
MCV RBC AUTO: 96.3 FL (ref 81.4–97.8)
METHADONE UR QL SCN: NEGATIVE
MONOCYTES # BLD AUTO: 0.53 K/UL (ref 0–0.85)
MONOCYTES NFR BLD AUTO: 4.4 % (ref 0–13.4)
NEUTROPHILS # BLD AUTO: 10.65 K/UL (ref 1.82–7.42)
NEUTROPHILS NFR BLD: 88.4 % (ref 44–72)
NRBC # BLD AUTO: 0 K/UL
NRBC BLD-RTO: 0 /100 WBC (ref 0–0.2)
OPIATES UR QL SCN: NEGATIVE
OXYCODONE UR QL SCN: NEGATIVE
PCP UR QL SCN: NEGATIVE
PLATELET # BLD AUTO: 410 K/UL (ref 164–446)
PMV BLD AUTO: 9.4 FL (ref 9–12.9)
POTASSIUM SERPL-SCNC: 4.1 MMOL/L (ref 3.6–5.5)
PROPOXYPH UR QL SCN: NEGATIVE
PROT SERPL-MCNC: 8.4 G/DL (ref 6–8.2)
RBC # BLD AUTO: 4.54 M/UL (ref 4.2–5.4)
SODIUM SERPL-SCNC: 142 MMOL/L (ref 135–145)
WBC # BLD AUTO: 12.1 K/UL (ref 4.8–10.8)

## 2024-07-27 PROCEDURE — 85025 COMPLETE CBC W/AUTO DIFF WBC: CPT

## 2024-07-27 PROCEDURE — 96366 THER/PROPH/DIAG IV INF ADDON: CPT

## 2024-07-27 PROCEDURE — 96365 THER/PROPH/DIAG IV INF INIT: CPT

## 2024-07-27 PROCEDURE — 82077 ASSAY SPEC XCP UR&BREATH IA: CPT

## 2024-07-27 PROCEDURE — 302449 STATCHG TRIAGE ONLY (STATISTIC)

## 2024-07-27 PROCEDURE — 700111 HCHG RX REV CODE 636 W/ 250 OVERRIDE (IP): Performed by: EMERGENCY MEDICINE

## 2024-07-27 PROCEDURE — 83735 ASSAY OF MAGNESIUM: CPT

## 2024-07-27 PROCEDURE — 80053 COMPREHEN METABOLIC PANEL: CPT

## 2024-07-27 PROCEDURE — 94760 N-INVAS EAR/PLS OXIMETRY 1: CPT

## 2024-07-27 PROCEDURE — 700105 HCHG RX REV CODE 258: Performed by: EMERGENCY MEDICINE

## 2024-07-27 PROCEDURE — 83690 ASSAY OF LIPASE: CPT

## 2024-07-27 PROCEDURE — 36415 COLL VENOUS BLD VENIPUNCTURE: CPT

## 2024-07-27 PROCEDURE — 93005 ELECTROCARDIOGRAM TRACING: CPT | Performed by: EMERGENCY MEDICINE

## 2024-07-27 PROCEDURE — 700101 HCHG RX REV CODE 250: Performed by: EMERGENCY MEDICINE

## 2024-07-27 PROCEDURE — 80307 DRUG TEST PRSMV CHEM ANLYZR: CPT

## 2024-07-27 PROCEDURE — 93005 ELECTROCARDIOGRAM TRACING: CPT

## 2024-07-27 PROCEDURE — 96375 TX/PRO/DX INJ NEW DRUG ADDON: CPT

## 2024-07-27 PROCEDURE — 82330 ASSAY OF CALCIUM: CPT

## 2024-07-27 PROCEDURE — 99284 EMERGENCY DEPT VISIT MOD MDM: CPT

## 2024-07-27 RX ORDER — ONDANSETRON 4 MG/1
4 TABLET, ORALLY DISINTEGRATING ORAL EVERY 6 HOURS PRN
Qty: 10 TABLET | Refills: 0 | Status: SHIPPED | OUTPATIENT
Start: 2024-07-27

## 2024-07-27 RX ORDER — LORAZEPAM 2 MG/ML
0.5 INJECTION INTRAMUSCULAR ONCE
Status: COMPLETED | OUTPATIENT
Start: 2024-07-27 | End: 2024-07-27

## 2024-07-27 RX ORDER — ONDANSETRON 2 MG/ML
4 INJECTION INTRAMUSCULAR; INTRAVENOUS ONCE
Status: COMPLETED | OUTPATIENT
Start: 2024-07-27 | End: 2024-07-27

## 2024-07-27 RX ADMIN — FOLIC ACID 1000 ML: 5 INJECTION, SOLUTION INTRAMUSCULAR; INTRAVENOUS; SUBCUTANEOUS at 20:20

## 2024-07-27 RX ADMIN — LORAZEPAM 0.5 MG: 2 INJECTION INTRAMUSCULAR; INTRAVENOUS at 21:37

## 2024-07-27 RX ADMIN — ONDANSETRON 4 MG: 2 INJECTION INTRAMUSCULAR; INTRAVENOUS at 20:17

## 2024-07-27 ASSESSMENT — FIBROSIS 4 INDEX
FIB4 SCORE: 1.28
FIB4 SCORE: 1.28

## 2024-07-27 ASSESSMENT — PAIN DESCRIPTION - PAIN TYPE: TYPE: ACUTE PAIN

## 2024-07-28 NOTE — ED NOTES
"Chief Complaint   Patient presents with    Detox    Palpitations    N/V     BP (!) 157/97   Pulse (!) 102   Temp 36.5 °C (97.7 °F) (Temporal)   Resp 18   Ht 1.702 m (5' 7\")   Wt 64.7 kg (142 lb 10.2 oz)   LMP 09/14/2006   SpO2 98%   BMI 22.34 kg/m²   IV established. Blood sent to lab.  Urine sample obtained and sent to lab  "
Discharge instructions given and discussed. RX for zofran given and patient educated to come back to ER for new or worsening symptoms. Instructed to follow up with PCP. Patient verbalized understanding. All IV's removed. GCS of 15. Pt walked out with steady gait.     
Pt medicated per orders IV fluids up and infusing pt tolerating well.  
Pt medicated per orders resting comfortably at this time vss.  
Alert-The patient is alert, awake and responds to voice. The patient is oriented to time, place, and person. The triage nurse is able to obtain subjective information.

## 2024-07-28 NOTE — ED TRIAGE NOTES
Pt presents with  from [facility] for nausea, vomiting and heart palpitation. Reports history of alcohol misuse, last alcoholic drink was yesterday.   She reports wanting help with her drinking. Has gone to [facility] previously.   Pt A&Ox4 and ambulatory.

## 2024-07-28 NOTE — ED PROVIDER NOTES
ED Provider Note    CHIEF COMPLAINT  Chief Complaint   Patient presents with    Detox    Palpitations    N/V       EXTERNAL RECORDS REVIEWED  Multiple ER notes between [facility] as well as [facility] over the last year for recurrent alcohol abuseOther      HPI/ROS  LIMITATION TO HISTORY   Select: Intoxication  OUTSIDE HISTORIAN(S):  Significant other  at bedside for additional information and support    Yohana Brandon is a 59 y.o. female who presents to the emergency department with complaint of recurrent alcohol binging.  States that she drinks most days of the month with occasional breaks in between binges.  Over the last 3 days she has had similar typical binge for herself.  At the end of last year she did go to [address] [place] [address] for a month of inpatient detox which she thought was helpful.  She is also been to local [facility] for detox which she would prefer at this point.  Denies any SI or HI.  Tonight having some palpitations with her intoxication and therefore once her  returned home he then brought her here.  She notes that with her twin sister leaving town for trips and also her  leaving this significant increase anxiety and depression and is the reported cause at least in part for her recurrence of drinking.  Does see outpatient psychiatric counselor which she sees once a month.  Does have the ability to escalate this if needed.    PAST MEDICAL HISTORY   has a past medical history of Alcohol abuse, Alcohol abuse, ASTHMA, Elevated LFTs, Heart palpitations, Hypertension, and Psychiatric disorder.    SURGICAL HISTORY   has a past surgical history that includes appendectomy (); endometrial ablation, thermal (); remove tonsils/adenoids,<13 y/o (); anesth,surg breast reconstructive (); hemorrhoidectomy (2011); anal sphincterotomy (2011); bunionectomy ();  delivery+postpartum care; total hip arthroplasty (Right, 2021);  "and closed reduction (Right, 2/9/2022).    FAMILY HISTORY  Family History   Problem Relation Age of Onset    Stroke Mother        SOCIAL HISTORY  Social History     Tobacco Use    Smoking status: Never    Smokeless tobacco: Never   Vaping Use    Vaping status: Never Used   Substance and Sexual Activity    Alcohol use: Yes    Drug use: No    Sexual activity: Not on file       CURRENT MEDICATIONS  Home Medications       Reviewed by Leeann Ureña R.N. (Registered Nurse) on 07/27/24 at 1901  Med List Status: Not Addressed     Medication Last Dose Status   aspirin 81 MG EC tablet  Active   Non Formulary Request  Active   Non Formulary Request  Active   Omega-3 Fatty Acids (FISH OIL PO)  Active   progesterone (PROMETRIUM) 100 MG Cap  Active   rosuvastatin (CRESTOR) 10 MG Tab  Active   telmisartan-hydrochlorothiazide (MICARDIS HCT) 80-25 MG per tablet  Active   thyroid (ARMOUR THYROID) 60 MG Tab  Active                    ALLERGIES  Allergies   Allergen Reactions    Banana Shortness of Breath    Kiwi Extract Swelling    Latex Anaphylaxis    Morphine Hives    Other Food Hives and Shortness of Breath     Tropical fruit      Vicodin [Hydrocodone-Acetaminophen] Rash     Generalized rash, puritis        PHYSICAL EXAM  VITAL SIGNS: /68   Pulse (!) 102   Temp 37.3 °C (99.1 °F)   Resp 12   Ht 1.702 m (5' 7\")   Wt 64.7 kg (142 lb 10.2 oz)   LMP 09/14/2006   SpO2 97%   BMI 22.34 kg/m²          Pulse ox interpretation: I interpret this pulse ox as normal.  Constitutional: Alert in no apparent distress.  HENT: No signs of trauma, Bilateral external ears normal, Nose normal.   Eyes: Pupils are equal and reactive  Neck: Normal range of motion, No tenderness, Supple  Cardiovascular: Slight tachycardia, regular rate and rhythm, no murmurs.   Thorax & Lungs: Normal breath sounds, No respiratory distress  Abdomen: Bowel sounds normal, Soft, No tenderness  Skin: Warm, Dry, No erythema, No rash.   Musculoskeletal: Good " range of motion in all major joints. No tenderness to palpation or major deformities noted.   Neurologic: Alert , Normal motor function, Normal sensory function, No focal deficits noted.   Psyc appears anxious and depressed with no SI HI        EKG/LABS  Results for orders placed or performed during the hospital encounter of 07/27/24   URINE DRUG SCREEN (TRIAGE)   Result Value Ref [place]    Amphetamines Urine Negative Negative    Barbiturates Negative Negative    Benzodiazepines Negative Negative    Cocaine Metabolite Negative Negative    Fentanyl, Urine Negative Negative    Methadone Negative Negative    Opiates Negative Negative    Oxycodone Negative Negative    Phencyclidine -Pcp Negative Negative    Propoxyphene Negative Negative    Cannabinoid Metab Negative Negative   CBC WITH DIFFERENTIAL   Result Value Ref [place]    WBC 12.1 (H) 4.8 - 10.8 K/uL    RBC 4.54 4.20 - 5.40 M/uL    Hemoglobin 15.3 12.0 - 16.0 g/dL    Hematocrit 43.7 37.0 - 47.0 %    MCV 96.3 81.4 - 97.8 fL    MCH 33.7 (H) 27.0 - 33.0 pg    MCHC 35.0 32.2 - 35.5 g/dL    RDW 44.1 35.9 - 50.0 fL    Platelet Count 410 164 - 446 K/uL    MPV 9.4 9.0 - 12.9 fL    Neutrophils-Polys 88.40 (H) 44.00 - 72.00 %    Lymphocytes 6.20 (L) 22.00 - 41.00 %    Monocytes 4.40 0.00 - 13.40 %    Eosinophils 0.00 0.00 - 6.90 %    Basophils 0.50 0.00 - 1.80 %    Immature Granulocytes 0.50 0.00 - 0.90 %    Nucleated RBC 0.00 0.00 - 0.20 /100 WBC    Neutrophils (Absolute) 10.65 (H) 1.82 - 7.42 K/uL    Lymphs (Absolute) 0.75 (L) 1.00 - 4.80 K/uL    Monos (Absolute) 0.53 0.00 - 0.85 K/uL    Eos (Absolute) 0.00 0.00 - 0.51 K/uL    Baso (Absolute) 0.06 0.00 - 0.12 K/uL    Immature Granulocytes (abs) 0.06 0.00 - 0.11 K/uL    NRBC (Absolute) 0.00 K/uL   COMP METABOLIC PANEL   Result Value Ref [place]    Sodium 142 135 - 145 mmol/L    Potassium 4.1 3.6 - 5.5 mmol/L    Chloride 93 (L) 96 - 112 mmol/L    Co2 21 20 - 33 mmol/L    Anion Gap 28.0 (H) 7.0 - 16.0    Glucose 131 (H) 65 - 99  mg/dL    Bun 21 8 - 22 mg/dL    Creatinine 1.08 0.50 - 1.40 mg/dL    Calcium 10.0 8.4 - 10.2 mg/dL    Correct Calcium 9.1 8.5 - 10.5 mg/dL    AST(SGOT) 29 12 - 45 U/L    ALT(SGPT) 24 2 - 50 U/L    Alkaline Phosphatase 77 30 - 99 U/L    Total Bilirubin 0.7 0.1 - 1.5 mg/dL    Albumin 5.1 (H) 3.2 - 4.9 g/dL    Total Protein 8.4 (H) 6.0 - 8.2 g/dL    Globulin 3.3 1.9 - 3.5 g/dL    A-G Ratio 1.5 g/dL   LIPASE   Result Value Ref [place]    Lipase 22 11 - 82 U/L   MAGNESIUM   Result Value Ref [place]    Magnesium 1.4 (L) 1.5 - 2.5 mg/dL   IONIZED CALCIUM   Result Value Ref [place]    Ionized Calcium 1.12 1.10 - 1.30 mmol/L   ETHYL ALCOHOL (BLOOD)   Result Value Ref [place]    Diagnostic Alcohol 51.6 (H) <10.1 mg/dL   ESTIMATED GFR   Result Value Ref [place]    GFR (CKD-EPI) 60 >60 mL/min/1.73 m 2   EKG   Result Value Ref [place]    Report       [facility] Emergency Dept.    Test Date:  2024  Pt Name:    SONIA DAMON          Department: [facility]  MRN:        [id number]                      Room:  Gender:     Female                       Technician: rocio  :        [id number]                   Requested By:ER TRIAGE PROTOCOL  Order #:    [id number]                    Reading MD: Jarret Ramirez    Measurements  Intervals                                Axis  Rate:       92                           P:          75  DE:         129                          QRS:        45  QRSD:       88                           T:          73  QT:         402  QTc:        498    Interpretive Statements  Sinus rhythm  Ventricular trigeminy  Borderline prolonged QT interval  Compared to ECG 2023 12:38:49  Ventricular premature complex(es) now present  Electronically Signed On 2024 21:16:50 PDT by Jarret Ramirez         I have independently interpreted this EKG        COURSE & MEDICAL DECISION MAKING    ASSESSMENT, COURSE AND PLAN  Care Narrative: 59-year-old female presenting to the [facility] part with above  presentation.  Will complete IV hydration/replacement given history of alcoholism.  Will check labs and electrolytes.  Will treat symptomatically for withdrawal and nausea.    DISPOSITION AND DISCUSSIONS  I have discussed management of the patient with the following physicians and TONJA's: None    Discussion of management with other QHP or appropriate source(s): None     Escalation of care considered, and ultimately not performed:acute inpatient care management, however at this time, the patient is most appropriate for outpatient management    Barriers to care at this time, including but not limited to:  Chronic alcoholism .     59-year-old female presenting to the emerged part with above presentation.  Workup as above.  Initial screening EKG did show PVCs however these have somewhat subsided throughout her stay and has been sinus with less PVCs on ongoing telemetry and no longer in trigeminy.  Unable to get patient into [facility] this evening.  Patient will follow-up as an outpatient.  Again she was provided with Ativan here.  She will be discharged with additional Zofran.  She is understanding return precautions here to the ER if needed.        FINAL DIAGNOSIS  1. Alcohol withdrawal syndrome without complication (HCC)           Electronically signed by: Jarret Ramirez M.D., 7/27/2024 7:30 PM

## 2024-08-02 ENCOUNTER — HOSPITAL ENCOUNTER (OUTPATIENT)
Dept: LAB | Facility: MEDICAL CENTER | Age: 59
End: 2024-08-02
Attending: EMERGENCY MEDICINE
Payer: COMMERCIAL

## 2024-08-02 LAB
25(OH)D3 SERPL-MCNC: 64 NG/ML (ref 30–100)
ALBUMIN SERPL BCP-MCNC: 4.6 G/DL (ref 3.2–4.9)
ALBUMIN/GLOB SERPL: 1.4 G/DL
ALP SERPL-CCNC: 74 U/L (ref 30–99)
ALT SERPL-CCNC: 54 U/L (ref 2–50)
ANION GAP SERPL CALC-SCNC: 15 MMOL/L (ref 7–16)
APPEARANCE UR: CLEAR
AST SERPL-CCNC: 44 U/L (ref 12–45)
BASOPHILS # BLD AUTO: 1 % (ref 0–1.8)
BASOPHILS # BLD: 0.08 K/UL (ref 0–0.12)
BILIRUB SERPL-MCNC: 0.7 MG/DL (ref 0.1–1.5)
BILIRUB UR QL STRIP.AUTO: NEGATIVE
BUN SERPL-MCNC: 19 MG/DL (ref 8–22)
CALCIUM ALBUM COR SERPL-MCNC: 10.2 MG/DL (ref 8.5–10.5)
CALCIUM SERPL-MCNC: 10.7 MG/DL (ref 8.5–10.5)
CHLORIDE SERPL-SCNC: 98 MMOL/L (ref 96–112)
CHOLEST SERPL-MCNC: 185 MG/DL (ref 100–199)
CO2 SERPL-SCNC: 25 MMOL/L (ref 20–33)
COLOR UR: YELLOW
CORTIS SERPL-MCNC: 11.7 UG/DL (ref 0–23)
CREAT SERPL-MCNC: 0.9 MG/DL (ref 0.5–1.4)
CRP SERPL HS-MCNC: <0.3 MG/DL (ref 0–0.75)
DHEA-S SERPL-MCNC: 95.6 UG/DL (ref 18.9–205)
EOSINOPHIL # BLD AUTO: 0.24 K/UL (ref 0–0.51)
EOSINOPHIL NFR BLD: 3.1 % (ref 0–6.9)
ERYTHROCYTE [DISTWIDTH] IN BLOOD BY AUTOMATED COUNT: 44.6 FL (ref 35.9–50)
EST. AVERAGE GLUCOSE BLD GHB EST-MCNC: 97 MG/DL
ESTRADIOL SERPL-MCNC: <5 PG/ML
FASTING STATUS PATIENT QL REPORTED: NORMAL
GFR SERPLBLD CREATININE-BSD FMLA CKD-EPI: 75 ML/MIN/1.73 M 2
GLOBULIN SER CALC-MCNC: 3.3 G/DL (ref 1.9–3.5)
GLUCOSE SERPL-MCNC: 98 MG/DL (ref 65–99)
GLUCOSE UR STRIP.AUTO-MCNC: NEGATIVE MG/DL
HBA1C MFR BLD: 5 % (ref 4–5.6)
HCT VFR BLD AUTO: 41.9 % (ref 37–47)
HCYS SERPL-SCNC: 16.78 UMOL/L
HDLC SERPL-MCNC: 64 MG/DL
HGB BLD-MCNC: 14.1 G/DL (ref 12–16)
IMM GRANULOCYTES # BLD AUTO: 0.04 K/UL (ref 0–0.11)
IMM GRANULOCYTES NFR BLD AUTO: 0.5 % (ref 0–0.9)
KETONES UR STRIP.AUTO-MCNC: NEGATIVE MG/DL
LDLC SERPL CALC-MCNC: 79 MG/DL
LEUKOCYTE ESTERASE UR QL STRIP.AUTO: NEGATIVE
LYMPHOCYTES # BLD AUTO: 2 K/UL (ref 1–4.8)
LYMPHOCYTES NFR BLD: 25.9 % (ref 22–41)
MCH RBC QN AUTO: 33.3 PG (ref 27–33)
MCHC RBC AUTO-ENTMCNC: 33.7 G/DL (ref 32.2–35.5)
MCV RBC AUTO: 98.8 FL (ref 81.4–97.8)
MICRO URNS: NORMAL
MONOCYTES # BLD AUTO: 0.73 K/UL (ref 0–0.85)
MONOCYTES NFR BLD AUTO: 9.5 % (ref 0–13.4)
NEUTROPHILS # BLD AUTO: 4.63 K/UL (ref 1.82–7.42)
NEUTROPHILS NFR BLD: 60 % (ref 44–72)
NITRITE UR QL STRIP.AUTO: NEGATIVE
NRBC # BLD AUTO: 0 K/UL
NRBC BLD-RTO: 0 /100 WBC (ref 0–0.2)
PH UR STRIP.AUTO: 7 [PH] (ref 5–8)
PLATELET # BLD AUTO: 207 K/UL (ref 164–446)
PMV BLD AUTO: 11.8 FL (ref 9–12.9)
POTASSIUM SERPL-SCNC: 3.8 MMOL/L (ref 3.6–5.5)
PROGEST SERPL-MCNC: 0.33 NG/ML
PROT SERPL-MCNC: 7.9 G/DL (ref 6–8.2)
PROT UR QL STRIP: NEGATIVE MG/DL
RBC # BLD AUTO: 4.24 M/UL (ref 4.2–5.4)
RBC UR QL AUTO: NEGATIVE
SODIUM SERPL-SCNC: 138 MMOL/L (ref 135–145)
SP GR UR STRIP.AUTO: 1.01
T3FREE SERPL-MCNC: 5.52 PG/ML (ref 2–4.4)
TRIGL SERPL-MCNC: 209 MG/DL (ref 0–149)
TSH SERPL-ACNC: 0.37 UIU/ML (ref 0.35–5.5)
UROBILINOGEN UR STRIP.AUTO-MCNC: 0.2 MG/DL
WBC # BLD AUTO: 7.7 K/UL (ref 4.8–10.8)

## 2024-08-02 PROCEDURE — 83525 ASSAY OF INSULIN: CPT

## 2024-08-02 PROCEDURE — 82533 TOTAL CORTISOL: CPT

## 2024-08-02 PROCEDURE — 80053 COMPREHEN METABOLIC PANEL: CPT

## 2024-08-02 PROCEDURE — 86140 C-REACTIVE PROTEIN: CPT

## 2024-08-02 PROCEDURE — 80061 LIPID PANEL: CPT

## 2024-08-02 PROCEDURE — 82627 DEHYDROEPIANDROSTERONE: CPT

## 2024-08-02 PROCEDURE — 84443 ASSAY THYROID STIM HORMONE: CPT

## 2024-08-02 PROCEDURE — 85025 COMPLETE CBC W/AUTO DIFF WBC: CPT

## 2024-08-02 PROCEDURE — 81003 URINALYSIS AUTO W/O SCOPE: CPT

## 2024-08-02 PROCEDURE — 84403 ASSAY OF TOTAL TESTOSTERONE: CPT

## 2024-08-02 PROCEDURE — 83090 ASSAY OF HOMOCYSTEINE: CPT

## 2024-08-02 PROCEDURE — 36415 COLL VENOUS BLD VENIPUNCTURE: CPT

## 2024-08-02 PROCEDURE — 82172 ASSAY OF APOLIPOPROTEIN: CPT

## 2024-08-02 PROCEDURE — 84144 ASSAY OF PROGESTERONE: CPT

## 2024-08-02 PROCEDURE — 83036 HEMOGLOBIN GLYCOSYLATED A1C: CPT

## 2024-08-02 PROCEDURE — 84305 ASSAY OF SOMATOMEDIN: CPT

## 2024-08-02 PROCEDURE — 82306 VITAMIN D 25 HYDROXY: CPT

## 2024-08-02 PROCEDURE — 82670 ASSAY OF TOTAL ESTRADIOL: CPT

## 2024-08-02 PROCEDURE — 84481 FREE ASSAY (FT-3): CPT

## 2024-08-02 PROCEDURE — 84439 ASSAY OF FREE THYROXINE: CPT

## 2024-08-04 LAB — APO B100 SERPL-MCNC: 83 MG/DL (ref 60–117)

## 2024-08-05 LAB
IGF-I SERPL-MCNC: 221 NG/ML (ref 47–236)
IGF-I Z-SCORE SERPL: 1.6
INSULIN P FAST SERPL-ACNC: 11 UIU/ML (ref 3–25)

## 2024-08-08 LAB — T4 FREE SERPL DIALY-MCNC: 2.1 NG/DL (ref 1.1–2.4)

## 2024-08-09 LAB — TESTOST SERPL-MCNC: 25 NG/DL (ref 9–55)

## 2024-08-20 ENCOUNTER — TELEPHONE (OUTPATIENT)
Dept: HEALTH INFORMATION MANAGEMENT | Facility: OTHER | Age: 59
End: 2024-08-20

## 2024-09-29 ENCOUNTER — HOSPITAL ENCOUNTER (EMERGENCY)
Facility: MEDICAL CENTER | Age: 59
End: 2024-09-29
Attending: EMERGENCY MEDICINE
Payer: COMMERCIAL

## 2024-09-29 VITALS
OXYGEN SATURATION: 91 % | DIASTOLIC BLOOD PRESSURE: 79 MMHG | BODY MASS INDEX: 22.34 KG/M2 | HEART RATE: 119 BPM | RESPIRATION RATE: 14 BRPM | WEIGHT: 142.64 LBS | SYSTOLIC BLOOD PRESSURE: 129 MMHG | TEMPERATURE: 97.4 F

## 2024-09-29 DIAGNOSIS — F10.29 ALCOHOL DEPENDENCE WITH UNSPECIFIED ALCOHOL-INDUCED DISORDER (HCC): ICD-10-CM

## 2024-09-29 PROCEDURE — A9270 NON-COVERED ITEM OR SERVICE: HCPCS | Performed by: EMERGENCY MEDICINE

## 2024-09-29 PROCEDURE — 99284 EMERGENCY DEPT VISIT MOD MDM: CPT

## 2024-09-29 PROCEDURE — 700102 HCHG RX REV CODE 250 W/ 637 OVERRIDE(OP): Performed by: EMERGENCY MEDICINE

## 2024-09-29 RX ORDER — ONDANSETRON 4 MG/1
4 TABLET, ORALLY DISINTEGRATING ORAL EVERY 8 HOURS PRN
Qty: 10 TABLET | Refills: 0 | Status: SHIPPED | OUTPATIENT
Start: 2024-09-29 | End: 2024-09-29

## 2024-09-29 RX ORDER — CHLORDIAZEPOXIDE HYDROCHLORIDE 25 MG/1
CAPSULE, GELATIN COATED ORAL
Qty: 10 CAPSULE | Refills: 0 | Status: SHIPPED | OUTPATIENT
Start: 2024-09-29 | End: 2024-10-03

## 2024-09-29 RX ORDER — CHLORDIAZEPOXIDE HYDROCHLORIDE 25 MG/1
CAPSULE, GELATIN COATED ORAL
Qty: 10 CAPSULE | Refills: 0 | Status: SHIPPED | OUTPATIENT
Start: 2024-09-29 | End: 2024-09-29

## 2024-09-29 RX ORDER — ONDANSETRON 4 MG/1
4 TABLET, ORALLY DISINTEGRATING ORAL EVERY 8 HOURS PRN
Qty: 10 TABLET | Refills: 0 | Status: SHIPPED | OUTPATIENT
Start: 2024-09-29 | End: 2024-10-29

## 2024-09-29 RX ORDER — CHLORDIAZEPOXIDE HYDROCHLORIDE 25 MG/1
25 CAPSULE, GELATIN COATED ORAL ONCE
Status: COMPLETED | OUTPATIENT
Start: 2024-09-29 | End: 2024-09-29

## 2024-09-29 RX ADMIN — CHLORDIAZEPOXIDE HYDROCHLORIDE 25 MG: 25 CAPSULE ORAL at 13:13

## 2024-09-29 ASSESSMENT — FIBROSIS 4 INDEX: FIB4 SCORE: 1.65

## 2024-09-29 NOTE — ED NOTES
Patient verbalized understanding to plan of care and discharge information. Patient reports pain 0/10. Patient in stable condition. Patient ambulated out of ED to person vehicle with stable gait with .

## 2024-09-29 NOTE — ED PROVIDER NOTES
"ED PHYSICIAN NOTE    CHIEF COMPLAINT  Chief Complaint   Patient presents with    Alcohol Intoxication     Pt states is here \"to get help to detox\"  Last drink PTA       EXTERNAL RECORDS REVIEWED  Several encounters for detox in the past    HPI/ROS      Yohana Brandon is a 59 y.o. female who presents requesting help with detox.  Patient has a history of alcoholism.  She has been through detox several times.  She has a plan to go to a detox facility in [place] this upcoming week.  She has an appointment tomorrow with her  doctor.  She reports benzodiazepines and Zofran tend to help her.  She does not want to have to drink.  She has had Librium in the past at detox facility and that worked quite well.  She denies any abdominal pain.  She does tend to get nauseous when she is sober denies nausea now.  Denies chest pain shortness of breath shakiness but she did drink just before coming in.    PAST MEDICAL HISTORY  Past Medical History:   Diagnosis Date    Alcohol abuse     Alcohol abuse     ASTHMA     Elevated LFTs     Heart palpitations     w/ alcohol withdrawal    Hypertension     Psychiatric disorder     alcohol dependance       SOCIAL HISTORY  Social History     Tobacco Use    Smoking status: Never    Smokeless tobacco: Never   Vaping Use    Vaping status: Never Used   Substance Use Topics    Alcohol use: Yes    Drug use: No       CURRENT MEDICATIONS  Home Medications    **Home medications have not yet been reviewed for this encounter**         ALLERGIES  Allergies   Allergen Reactions    Banana Shortness of Breath    Kiwi Extract Swelling    Latex Anaphylaxis    Morphine Hives    Other Food Hives and Shortness of Breath     Tropical fruit      Vicodin [Hydrocodone-Acetaminophen] Rash     Generalized rash, puritis        PHYSICAL EXAM  VITAL SIGNS: /79   Pulse (!) 119   Temp 36.3 °C (97.4 °F) (Temporal)   Resp 14   Wt 64.7 kg (142 lb 10.2 oz)   LMP 09/14/2006   SpO2 91%   BMI 22.34 " kg/m²    Constitutional: Awake and alert  HENT: Normal inspection  Eyes: Normal inspection  Neck: Grossly normal range of motion.  Cardiovascular: Normal heart rate during the exam  Thorax & Lungs: No respiratory distress  Abdomen: Bowel sounds normal, soft, non-distended, nontender, no mass  Skin: No obvious rash.  Back: No tenderness, No CVA tenderness.           COURSE & MEDICAL DECISION MAKING    INITIAL ASSESSMENT, COURSE AND PLAN  Care Narrative: Patient presents with requesting help with alcohol detox.  She has no evidence of withdrawal.  She has a benign exam without abdominal tenderness.  There is no indication for blood work or IV fluids.  She was given 1 Librium capsule here in the ER.  She already has a plan to go to detox center in [place].  She has an appointment with her doctor tomorrow.  No indication for hospital admission based on minimal symptoms at this time.  Patient to be discharged.  I have sent a prescription for Librium to treat withdrawal symptoms.  Also sent a prescription for Zofran to the pharmacy as she reports she gets nauseous when she has withdrawal.  she was precaution to return to the ER if she has abdominal pain, severe withdrawal or other concerning symptoms.      Interventions  Medications   chlordiazePOXIDE (Librium) capsule 25 mg (25 mg Oral Given 9/29/24 1313)       DISPOSITION AND DISCUSSIONS    Escalation of care considered, and ultimately not performed:Laboratory analysis and diagnostic imaging    Prescription drugs considered and/or prescribed:     Prescribed   Current Discharge Medication List        START taking these medications    Details   chlordiazePOXIDE (LIBRIUM) 25 MG Cap Take 1 Capsule by mouth 4 times a day for 1 day, THEN 1 Capsule 3 times a day for 1 day, THEN 1 Capsule 2 times a day for 1 day, THEN 1 Capsule every day for 1 day.  Qty: 10 Capsule, Refills: 0    Associated Diagnoses: Alcohol dependence with unspecified alcohol-induced disorder (HCC)            Follow up  Deborah Moctezuma M.D.  [address] [place] [address]  [phone]          FINAL IMPRESSION  1.  Alcohol abuse and addiction    This dictation was created using voice recognition software. The accuracy of the dictation is limited to the abilities of the software. I expect there may be some errors of grammar and possibly content. The nursing notes were reviewed and certain aspects of this information were incorporated into this note.    Electronically signed by: Asim Fry M.D., 9/29/2024

## 2024-09-29 NOTE — DISCHARGE PLANNING
Anticipated Discharge Disposition: Detox    Action: Alcohol resources and behavioral resources placed on chart.  Received these in July and in March notes she has been to several.  [facility] is the most noted. She has gone out of state in the past as well. Will offer info after seen by MD and lab ( BA less than 3) Update MD notes she has a plan for detox. Resources provided nonetheless incase she need.     Barriers to Discharge: Eval pending    Plan: Will cont to follow

## 2024-09-29 NOTE — ED TRIAGE NOTES
"Chief Complaint   Patient presents with    Alcohol Intoxication     Pt states is here \"to get help to detox\"  Last drink PTA     /79   Pulse (!) 119   Temp 36.3 °C (97.4 °F) (Temporal)   Resp 14   LMP 09/14/2006   SpO2 91%     Pt ambulated to ED w/ visitor asking for help w/ \"detox\", last alcohol intake this am.        "

## 2024-10-18 ENCOUNTER — HOSPITAL ENCOUNTER (OUTPATIENT)
Dept: RADIOLOGY | Facility: MEDICAL CENTER | Age: 59
End: 2024-10-18
Attending: FAMILY MEDICINE
Payer: COMMERCIAL

## 2024-10-18 ENCOUNTER — HOSPITAL ENCOUNTER (OUTPATIENT)
Dept: LAB | Facility: MEDICAL CENTER | Age: 59
End: 2024-10-18
Attending: FAMILY MEDICINE
Payer: COMMERCIAL

## 2024-10-18 DIAGNOSIS — R05.9 COUGH IN ADULT: ICD-10-CM

## 2024-10-18 LAB
ALBUMIN SERPL BCP-MCNC: 4.5 G/DL (ref 3.2–4.9)
ALBUMIN/GLOB SERPL: 1.4 G/DL
ALP SERPL-CCNC: 101 U/L (ref 30–99)
ALT SERPL-CCNC: 31 U/L (ref 2–50)
ANION GAP SERPL CALC-SCNC: 12 MMOL/L (ref 7–16)
AST SERPL-CCNC: 36 U/L (ref 12–45)
BASOPHILS # BLD AUTO: 2.1 % (ref 0–1.8)
BASOPHILS # BLD: 0.11 K/UL (ref 0–0.12)
BILIRUB SERPL-MCNC: 0.7 MG/DL (ref 0.1–1.5)
BUN SERPL-MCNC: 15 MG/DL (ref 8–22)
CALCIUM ALBUM COR SERPL-MCNC: 9.6 MG/DL (ref 8.5–10.5)
CALCIUM SERPL-MCNC: 10 MG/DL (ref 8.4–10.2)
CHLORIDE SERPL-SCNC: 101 MMOL/L (ref 96–112)
CHOLEST SERPL-MCNC: 282 MG/DL (ref 100–199)
CO2 SERPL-SCNC: 25 MMOL/L (ref 20–33)
CREAT SERPL-MCNC: 0.92 MG/DL (ref 0.5–1.4)
EOSINOPHIL # BLD AUTO: 0.58 K/UL (ref 0–0.51)
EOSINOPHIL NFR BLD: 10.9 % (ref 0–6.9)
ERYTHROCYTE [DISTWIDTH] IN BLOOD BY AUTOMATED COUNT: 46.5 FL (ref 35.9–50)
FASTING STATUS PATIENT QL REPORTED: NORMAL
GFR SERPLBLD CREATININE-BSD FMLA CKD-EPI: 72 ML/MIN/1.73 M 2
GLOBULIN SER CALC-MCNC: 3.3 G/DL (ref 1.9–3.5)
GLUCOSE SERPL-MCNC: 95 MG/DL (ref 65–99)
HCT VFR BLD AUTO: 42.1 % (ref 37–47)
HDLC SERPL-MCNC: 99 MG/DL
HGB BLD-MCNC: 14.2 G/DL (ref 12–16)
IMM GRANULOCYTES # BLD AUTO: 0.04 K/UL (ref 0–0.11)
IMM GRANULOCYTES NFR BLD AUTO: 0.7 % (ref 0–0.9)
LDLC SERPL CALC-MCNC: 141 MG/DL
LYMPHOCYTES # BLD AUTO: 0.9 K/UL (ref 1–4.8)
LYMPHOCYTES NFR BLD: 16.9 % (ref 22–41)
MCH RBC QN AUTO: 33.7 PG (ref 27–33)
MCHC RBC AUTO-ENTMCNC: 33.7 G/DL (ref 32.2–35.5)
MCV RBC AUTO: 100 FL (ref 81.4–97.8)
MONOCYTES # BLD AUTO: 0.4 K/UL (ref 0–0.85)
MONOCYTES NFR BLD AUTO: 7.5 % (ref 0–13.4)
NEUTROPHILS # BLD AUTO: 3.31 K/UL (ref 1.82–7.42)
NEUTROPHILS NFR BLD: 61.9 % (ref 44–72)
NRBC # BLD AUTO: 0 K/UL
NRBC BLD-RTO: 0 /100 WBC (ref 0–0.2)
PLATELET # BLD AUTO: 259 K/UL (ref 164–446)
PMV BLD AUTO: 9.6 FL (ref 9–12.9)
POTASSIUM SERPL-SCNC: 4.3 MMOL/L (ref 3.6–5.5)
PROT SERPL-MCNC: 7.8 G/DL (ref 6–8.2)
RBC # BLD AUTO: 4.21 M/UL (ref 4.2–5.4)
SODIUM SERPL-SCNC: 138 MMOL/L (ref 135–145)
T3FREE SERPL-MCNC: 2.69 PG/ML (ref 2–4.4)
T4 FREE SERPL-MCNC: 1.05 NG/DL (ref 0.93–1.7)
TRIGL SERPL-MCNC: 208 MG/DL (ref 0–149)
TSH SERPL DL<=0.005 MIU/L-ACNC: 1.95 UIU/ML (ref 0.38–5.33)
WBC # BLD AUTO: 5.3 K/UL (ref 4.8–10.8)

## 2024-10-18 PROCEDURE — 85025 COMPLETE CBC W/AUTO DIFF WBC: CPT

## 2024-10-18 PROCEDURE — 84443 ASSAY THYROID STIM HORMONE: CPT

## 2024-10-18 PROCEDURE — 80053 COMPREHEN METABOLIC PANEL: CPT

## 2024-10-18 PROCEDURE — 80061 LIPID PANEL: CPT

## 2024-10-18 PROCEDURE — 71046 X-RAY EXAM CHEST 2 VIEWS: CPT

## 2024-10-18 PROCEDURE — 36415 COLL VENOUS BLD VENIPUNCTURE: CPT

## 2024-10-18 PROCEDURE — 84481 FREE ASSAY (FT-3): CPT

## 2024-10-18 PROCEDURE — 84439 ASSAY OF FREE THYROXINE: CPT

## 2024-10-29 ENCOUNTER — HOSPITAL ENCOUNTER (EMERGENCY)
Facility: MEDICAL CENTER | Age: 59
End: 2024-10-29
Attending: EMERGENCY MEDICINE
Payer: COMMERCIAL

## 2024-10-29 ENCOUNTER — APPOINTMENT (OUTPATIENT)
Dept: RADIOLOGY | Facility: MEDICAL CENTER | Age: 59
End: 2024-10-29
Attending: EMERGENCY MEDICINE
Payer: COMMERCIAL

## 2024-10-29 VITALS
OXYGEN SATURATION: 96 % | TEMPERATURE: 97.2 F | BODY MASS INDEX: 22.62 KG/M2 | SYSTOLIC BLOOD PRESSURE: 130 MMHG | WEIGHT: 149.25 LBS | DIASTOLIC BLOOD PRESSURE: 78 MMHG | RESPIRATION RATE: 18 BRPM | HEIGHT: 68 IN | HEART RATE: 95 BPM

## 2024-10-29 DIAGNOSIS — F10.921 ALCOHOL INTOXICATION WITH DELIRIUM (HCC): ICD-10-CM

## 2024-10-29 DIAGNOSIS — R05.1 ACUTE COUGH: ICD-10-CM

## 2024-10-29 LAB — POC BREATHALIZER: 0.33 PERCENT (ref 0–0.01)

## 2024-10-29 PROCEDURE — 302970 POC BREATHALIZER: Performed by: EMERGENCY MEDICINE

## 2024-10-29 PROCEDURE — 71045 X-RAY EXAM CHEST 1 VIEW: CPT

## 2024-10-29 PROCEDURE — 700102 HCHG RX REV CODE 250 W/ 637 OVERRIDE(OP): Performed by: EMERGENCY MEDICINE

## 2024-10-29 PROCEDURE — 700111 HCHG RX REV CODE 636 W/ 250 OVERRIDE (IP): Performed by: EMERGENCY MEDICINE

## 2024-10-29 PROCEDURE — 94664 DEMO&/EVAL PT USE INHALER: CPT

## 2024-10-29 PROCEDURE — A9270 NON-COVERED ITEM OR SERVICE: HCPCS | Performed by: EMERGENCY MEDICINE

## 2024-10-29 PROCEDURE — 94640 AIRWAY INHALATION TREATMENT: CPT

## 2024-10-29 PROCEDURE — 99285 EMERGENCY DEPT VISIT HI MDM: CPT

## 2024-10-29 RX ORDER — CHLORDIAZEPOXIDE HYDROCHLORIDE 25 MG/1
CAPSULE, GELATIN COATED ORAL
Qty: 14 CAPSULE | Refills: 0 | Status: SHIPPED | OUTPATIENT
Start: 2024-10-29 | End: 2024-11-02

## 2024-10-29 RX ORDER — ONDANSETRON 4 MG/1
4 TABLET, ORALLY DISINTEGRATING ORAL EVERY 8 HOURS PRN
Qty: 20 TABLET | Refills: 0 | Status: SHIPPED | OUTPATIENT
Start: 2024-10-29

## 2024-10-29 RX ORDER — ALBUTEROL SULFATE 90 UG/1
2 INHALANT RESPIRATORY (INHALATION) ONCE
Status: COMPLETED | OUTPATIENT
Start: 2024-10-29 | End: 2024-10-29

## 2024-10-29 RX ORDER — PREDNISONE 20 MG/1
40 TABLET ORAL DAILY
Qty: 8 TABLET | Refills: 0 | Status: SHIPPED | OUTPATIENT
Start: 2024-10-30 | End: 2024-11-03

## 2024-10-29 RX ORDER — PREDNISONE 10 MG/1
40 TABLET ORAL ONCE
Status: COMPLETED | OUTPATIENT
Start: 2024-10-29 | End: 2024-10-29

## 2024-10-29 RX ORDER — BENZONATATE 100 MG/1
100 CAPSULE ORAL ONCE
Status: COMPLETED | OUTPATIENT
Start: 2024-10-29 | End: 2024-10-29

## 2024-10-29 RX ADMIN — ALBUTEROL SULFATE 2 PUFF: 90 AEROSOL, METERED RESPIRATORY (INHALATION) at 19:05

## 2024-10-29 RX ADMIN — PREDNISONE 40 MG: 10 TABLET ORAL at 19:23

## 2024-10-29 RX ADMIN — BENZONATATE 100 MG: 100 CAPSULE ORAL at 19:11

## 2024-10-29 ASSESSMENT — FIBROSIS 4 INDEX: FIB4 SCORE: 1.42

## 2024-11-17 ENCOUNTER — HOSPITAL ENCOUNTER (INPATIENT)
Facility: MEDICAL CENTER | Age: 59
LOS: 1 days | End: 2024-11-18
Attending: STUDENT IN AN ORGANIZED HEALTH CARE EDUCATION/TRAINING PROGRAM | Admitting: STUDENT IN AN ORGANIZED HEALTH CARE EDUCATION/TRAINING PROGRAM
Payer: COMMERCIAL

## 2024-11-17 ENCOUNTER — APPOINTMENT (OUTPATIENT)
Dept: RADIOLOGY | Facility: MEDICAL CENTER | Age: 59
End: 2024-11-17
Attending: STUDENT IN AN ORGANIZED HEALTH CARE EDUCATION/TRAINING PROGRAM
Payer: COMMERCIAL

## 2024-11-17 DIAGNOSIS — F10.920 ACUTE ALCOHOLIC INTOXICATION WITHOUT COMPLICATION (HCC): ICD-10-CM

## 2024-11-17 DIAGNOSIS — E87.1 HYPONATREMIA: ICD-10-CM

## 2024-11-17 DIAGNOSIS — F10.920 ALCOHOLIC INTOXICATION WITHOUT COMPLICATION (HCC): ICD-10-CM

## 2024-11-17 PROBLEM — R09.02 HYPOXIA: Status: ACTIVE | Noted: 2024-11-17

## 2024-11-17 LAB
ALBUMIN SERPL BCP-MCNC: 4.3 G/DL (ref 3.2–4.9)
ALBUMIN/GLOB SERPL: 1.3 G/DL
ALP SERPL-CCNC: 82 U/L (ref 30–99)
ALT SERPL-CCNC: 40 U/L (ref 2–50)
ANION GAP SERPL CALC-SCNC: 10 MMOL/L (ref 7–16)
ANION GAP SERPL CALC-SCNC: 10 MMOL/L (ref 7–16)
ANION GAP SERPL CALC-SCNC: 14 MMOL/L (ref 7–16)
ANION GAP SERPL CALC-SCNC: 15 MMOL/L (ref 7–16)
ANION GAP SERPL CALC-SCNC: 15 MMOL/L (ref 7–16)
AST SERPL-CCNC: 40 U/L (ref 12–45)
BASOPHILS # BLD AUTO: 1.4 % (ref 0–1.8)
BASOPHILS # BLD: 0.06 K/UL (ref 0–0.12)
BILIRUB SERPL-MCNC: 0.3 MG/DL (ref 0.1–1.5)
BUN SERPL-MCNC: 10 MG/DL (ref 8–22)
BUN SERPL-MCNC: 13 MG/DL (ref 8–22)
BUN SERPL-MCNC: 8 MG/DL (ref 8–22)
BUN SERPL-MCNC: 8 MG/DL (ref 8–22)
BUN SERPL-MCNC: 9 MG/DL (ref 8–22)
CALCIUM ALBUM COR SERPL-MCNC: 8.9 MG/DL (ref 8.5–10.5)
CALCIUM SERPL-MCNC: 8.9 MG/DL (ref 8.4–10.2)
CALCIUM SERPL-MCNC: 8.9 MG/DL (ref 8.4–10.2)
CALCIUM SERPL-MCNC: 9.1 MG/DL (ref 8.4–10.2)
CALCIUM SERPL-MCNC: 9.2 MG/DL (ref 8.4–10.2)
CALCIUM SERPL-MCNC: 9.6 MG/DL (ref 8.4–10.2)
CHLORIDE SERPL-SCNC: 81 MMOL/L (ref 96–112)
CHLORIDE SERPL-SCNC: 84 MMOL/L (ref 96–112)
CHLORIDE SERPL-SCNC: 91 MMOL/L (ref 96–112)
CHLORIDE SERPL-SCNC: 94 MMOL/L (ref 96–112)
CHLORIDE SERPL-SCNC: 96 MMOL/L (ref 96–112)
CO2 SERPL-SCNC: 26 MMOL/L (ref 20–33)
CO2 SERPL-SCNC: 27 MMOL/L (ref 20–33)
CO2 SERPL-SCNC: 28 MMOL/L (ref 20–33)
CO2 SERPL-SCNC: 28 MMOL/L (ref 20–33)
CO2 SERPL-SCNC: 29 MMOL/L (ref 20–33)
CREAT SERPL-MCNC: 0.68 MG/DL (ref 0.5–1.4)
CREAT SERPL-MCNC: 0.76 MG/DL (ref 0.5–1.4)
CREAT SERPL-MCNC: 0.78 MG/DL (ref 0.5–1.4)
CREAT SERPL-MCNC: 0.82 MG/DL (ref 0.5–1.4)
CREAT SERPL-MCNC: 0.86 MG/DL (ref 0.5–1.4)
D DIMER PPP IA.FEU-MCNC: <0.27 UG/ML (FEU) (ref 0–0.5)
EKG IMPRESSION: NORMAL
EOSINOPHIL # BLD AUTO: 0.07 K/UL (ref 0–0.51)
EOSINOPHIL NFR BLD: 1.6 % (ref 0–6.9)
ERYTHROCYTE [DISTWIDTH] IN BLOOD BY AUTOMATED COUNT: 41.1 FL (ref 35.9–50)
ETHANOL BLD-MCNC: 371.4 MG/DL
FLUAV RNA SPEC QL NAA+PROBE: NEGATIVE
FLUBV RNA SPEC QL NAA+PROBE: NEGATIVE
GFR SERPLBLD CREATININE-BSD FMLA CKD-EPI: 101 ML/MIN/1.73 M 2
GFR SERPLBLD CREATININE-BSD FMLA CKD-EPI: 79 ML/MIN/1.73 M 2
GFR SERPLBLD CREATININE-BSD FMLA CKD-EPI: 83 ML/MIN/1.73 M 2
GFR SERPLBLD CREATININE-BSD FMLA CKD-EPI: 88 ML/MIN/1.73 M 2
GFR SERPLBLD CREATININE-BSD FMLA CKD-EPI: 91 ML/MIN/1.73 M 2
GLOBULIN SER CALC-MCNC: 3.2 G/DL (ref 1.9–3.5)
GLUCOSE SERPL-MCNC: 100 MG/DL (ref 65–99)
GLUCOSE SERPL-MCNC: 106 MG/DL (ref 65–99)
GLUCOSE SERPL-MCNC: 130 MG/DL (ref 65–99)
GLUCOSE SERPL-MCNC: 137 MG/DL (ref 65–99)
GLUCOSE SERPL-MCNC: 95 MG/DL (ref 65–99)
HCT VFR BLD AUTO: 42.4 % (ref 37–47)
HGB BLD-MCNC: 15.1 G/DL (ref 12–16)
IMM GRANULOCYTES # BLD AUTO: 0.02 K/UL (ref 0–0.11)
IMM GRANULOCYTES NFR BLD AUTO: 0.5 % (ref 0–0.9)
LYMPHOCYTES # BLD AUTO: 1.89 K/UL (ref 1–4.8)
LYMPHOCYTES NFR BLD: 44.1 % (ref 22–41)
MAGNESIUM SERPL-MCNC: 1.9 MG/DL (ref 1.5–2.5)
MCH RBC QN AUTO: 33.6 PG (ref 27–33)
MCHC RBC AUTO-ENTMCNC: 35.6 G/DL (ref 32.2–35.5)
MCV RBC AUTO: 94.2 FL (ref 81.4–97.8)
MONOCYTES # BLD AUTO: 0.39 K/UL (ref 0–0.85)
MONOCYTES NFR BLD AUTO: 9.1 % (ref 0–13.4)
NEUTROPHILS # BLD AUTO: 1.86 K/UL (ref 1.82–7.42)
NEUTROPHILS NFR BLD: 43.3 % (ref 44–72)
NRBC # BLD AUTO: 0 K/UL
NRBC BLD-RTO: 0 /100 WBC (ref 0–0.2)
NT-PROBNP SERPL IA-MCNC: 52 PG/ML (ref 0–125)
PHOSPHATE SERPL-MCNC: 2.6 MG/DL (ref 2.5–4.5)
PLATELET # BLD AUTO: 397 K/UL (ref 164–446)
PMV BLD AUTO: 9.4 FL (ref 9–12.9)
POTASSIUM SERPL-SCNC: 3.3 MMOL/L (ref 3.6–5.5)
POTASSIUM SERPL-SCNC: 3.6 MMOL/L (ref 3.6–5.5)
POTASSIUM SERPL-SCNC: 3.7 MMOL/L (ref 3.6–5.5)
POTASSIUM SERPL-SCNC: 4.2 MMOL/L (ref 3.6–5.5)
POTASSIUM SERPL-SCNC: 4.3 MMOL/L (ref 3.6–5.5)
PROT SERPL-MCNC: 7.5 G/DL (ref 6–8.2)
RBC # BLD AUTO: 4.5 M/UL (ref 4.2–5.4)
RSV RNA SPEC QL NAA+PROBE: NEGATIVE
SARS-COV-2 RNA RESP QL NAA+PROBE: NOTDETECTED
SODIUM SERPL-SCNC: 123 MMOL/L (ref 135–145)
SODIUM SERPL-SCNC: 125 MMOL/L (ref 135–145)
SODIUM SERPL-SCNC: 133 MMOL/L (ref 135–145)
SODIUM SERPL-SCNC: 133 MMOL/L (ref 135–145)
SODIUM SERPL-SCNC: 134 MMOL/L (ref 135–145)
SPECIMEN SOURCE: NORMAL
TROPONIN T SERPL-MCNC: 13 NG/L (ref 6–19)
WBC # BLD AUTO: 4.3 K/UL (ref 4.8–10.8)

## 2024-11-17 PROCEDURE — 36415 COLL VENOUS BLD VENIPUNCTURE: CPT

## 2024-11-17 PROCEDURE — 700111 HCHG RX REV CODE 636 W/ 250 OVERRIDE (IP): Mod: JZ | Performed by: HOSPITALIST

## 2024-11-17 PROCEDURE — 80048 BASIC METABOLIC PNL TOTAL CA: CPT

## 2024-11-17 PROCEDURE — 96375 TX/PRO/DX INJ NEW DRUG ADDON: CPT

## 2024-11-17 PROCEDURE — 84484 ASSAY OF TROPONIN QUANT: CPT

## 2024-11-17 PROCEDURE — 99285 EMERGENCY DEPT VISIT HI MDM: CPT

## 2024-11-17 PROCEDURE — 96366 THER/PROPH/DIAG IV INF ADDON: CPT

## 2024-11-17 PROCEDURE — 700101 HCHG RX REV CODE 250: Performed by: STUDENT IN AN ORGANIZED HEALTH CARE EDUCATION/TRAINING PROGRAM

## 2024-11-17 PROCEDURE — A9270 NON-COVERED ITEM OR SERVICE: HCPCS | Performed by: HOSPITALIST

## 2024-11-17 PROCEDURE — 82077 ASSAY SPEC XCP UR&BREATH IA: CPT

## 2024-11-17 PROCEDURE — 700102 HCHG RX REV CODE 250 W/ 637 OVERRIDE(OP): Performed by: HOSPITALIST

## 2024-11-17 PROCEDURE — 96365 THER/PROPH/DIAG IV INF INIT: CPT

## 2024-11-17 PROCEDURE — 85379 FIBRIN DEGRADATION QUANT: CPT

## 2024-11-17 PROCEDURE — 84100 ASSAY OF PHOSPHORUS: CPT

## 2024-11-17 PROCEDURE — 770001 HCHG ROOM/CARE - MED/SURG/GYN PRIV*

## 2024-11-17 PROCEDURE — 700102 HCHG RX REV CODE 250 W/ 637 OVERRIDE(OP): Performed by: STUDENT IN AN ORGANIZED HEALTH CARE EDUCATION/TRAINING PROGRAM

## 2024-11-17 PROCEDURE — 71045 X-RAY EXAM CHEST 1 VIEW: CPT

## 2024-11-17 PROCEDURE — A9270 NON-COVERED ITEM OR SERVICE: HCPCS | Performed by: STUDENT IN AN ORGANIZED HEALTH CARE EDUCATION/TRAINING PROGRAM

## 2024-11-17 PROCEDURE — 83735 ASSAY OF MAGNESIUM: CPT

## 2024-11-17 PROCEDURE — 80053 COMPREHEN METABOLIC PANEL: CPT

## 2024-11-17 PROCEDURE — 85025 COMPLETE CBC W/AUTO DIFF WBC: CPT

## 2024-11-17 PROCEDURE — 93005 ELECTROCARDIOGRAM TRACING: CPT | Performed by: STUDENT IN AN ORGANIZED HEALTH CARE EDUCATION/TRAINING PROGRAM

## 2024-11-17 PROCEDURE — 83880 ASSAY OF NATRIURETIC PEPTIDE: CPT

## 2024-11-17 PROCEDURE — 0241U HCHG SARS-COV-2 COVID-19 NFCT DS RESP RNA 4 TRGT MIC: CPT

## 2024-11-17 PROCEDURE — 99222 1ST HOSP IP/OBS MODERATE 55: CPT | Performed by: HOSPITALIST

## 2024-11-17 RX ORDER — LORAZEPAM 1 MG/1
2 TABLET ORAL
Status: DISCONTINUED | OUTPATIENT
Start: 2024-11-17 | End: 2024-11-18 | Stop reason: HOSPADM

## 2024-11-17 RX ORDER — LORAZEPAM 2 MG/ML
0.5 INJECTION INTRAMUSCULAR EVERY 4 HOURS PRN
Status: DISCONTINUED | OUTPATIENT
Start: 2024-11-17 | End: 2024-11-18 | Stop reason: HOSPADM

## 2024-11-17 RX ORDER — LORAZEPAM 0.5 MG/1
0.5 TABLET ORAL EVERY 4 HOURS PRN
Status: DISCONTINUED | OUTPATIENT
Start: 2024-11-17 | End: 2024-11-18 | Stop reason: HOSPADM

## 2024-11-17 RX ORDER — PROMETHAZINE HYDROCHLORIDE 25 MG/1
12.5-25 SUPPOSITORY RECTAL EVERY 4 HOURS PRN
Status: DISCONTINUED | OUTPATIENT
Start: 2024-11-17 | End: 2024-11-18 | Stop reason: HOSPADM

## 2024-11-17 RX ORDER — LORAZEPAM 2 MG/ML
2 INJECTION INTRAMUSCULAR
Status: DISCONTINUED | OUTPATIENT
Start: 2024-11-17 | End: 2024-11-18 | Stop reason: HOSPADM

## 2024-11-17 RX ORDER — ASPIRIN 81 MG/1
81 TABLET ORAL DAILY
Status: DISCONTINUED | OUTPATIENT
Start: 2024-11-17 | End: 2024-11-18 | Stop reason: HOSPADM

## 2024-11-17 RX ORDER — TELMISARTAN AND HYDROCHLORTHIAZIDE 80; 25 MG/1; MG/1
1 TABLET ORAL DAILY
Status: DISCONTINUED | OUTPATIENT
Start: 2024-11-17 | End: 2024-11-17

## 2024-11-17 RX ORDER — LORAZEPAM 1 MG/1
3 TABLET ORAL
Status: DISCONTINUED | OUTPATIENT
Start: 2024-11-17 | End: 2024-11-18 | Stop reason: HOSPADM

## 2024-11-17 RX ORDER — HYDROCHLOROTHIAZIDE 25 MG/1
25 TABLET ORAL
Status: DISCONTINUED | OUTPATIENT
Start: 2024-11-17 | End: 2024-11-18 | Stop reason: HOSPADM

## 2024-11-17 RX ORDER — THYROID 30 MG/1
60 TABLET ORAL DAILY
Status: DISCONTINUED | OUTPATIENT
Start: 2024-11-17 | End: 2024-11-18 | Stop reason: HOSPADM

## 2024-11-17 RX ORDER — ROSUVASTATIN CALCIUM 10 MG/1
10 TABLET, COATED ORAL DAILY
Status: DISCONTINUED | OUTPATIENT
Start: 2024-11-17 | End: 2024-11-18 | Stop reason: HOSPADM

## 2024-11-17 RX ORDER — LORAZEPAM 1 MG/1
4 TABLET ORAL
Status: DISCONTINUED | OUTPATIENT
Start: 2024-11-17 | End: 2024-11-18 | Stop reason: HOSPADM

## 2024-11-17 RX ORDER — POTASSIUM CHLORIDE 1500 MG/1
40 TABLET, EXTENDED RELEASE ORAL EVERY 4 HOURS
Status: COMPLETED | OUTPATIENT
Start: 2024-11-17 | End: 2024-11-17

## 2024-11-17 RX ORDER — PROCHLORPERAZINE EDISYLATE 5 MG/ML
5-10 INJECTION INTRAMUSCULAR; INTRAVENOUS EVERY 4 HOURS PRN
Status: DISCONTINUED | OUTPATIENT
Start: 2024-11-17 | End: 2024-11-18 | Stop reason: HOSPADM

## 2024-11-17 RX ORDER — THIAMINE HYDROCHLORIDE 100 MG/ML
100 INJECTION, SOLUTION INTRAMUSCULAR; INTRAVENOUS ONCE
Status: DISCONTINUED | OUTPATIENT
Start: 2024-11-17 | End: 2024-11-17

## 2024-11-17 RX ORDER — FOLIC ACID 1 MG/1
1 TABLET ORAL DAILY
Status: DISCONTINUED | OUTPATIENT
Start: 2024-11-17 | End: 2024-11-18 | Stop reason: HOSPADM

## 2024-11-17 RX ORDER — CHLORDIAZEPOXIDE HYDROCHLORIDE 25 MG/1
25 CAPSULE, GELATIN COATED ORAL SEE ADMIN INSTRUCTIONS
Status: ON HOLD | COMMUNITY
End: 2024-11-18

## 2024-11-17 RX ORDER — GAUZE BANDAGE 2" X 2"
100 BANDAGE TOPICAL DAILY
Status: DISCONTINUED | OUTPATIENT
Start: 2024-11-17 | End: 2024-11-18 | Stop reason: HOSPADM

## 2024-11-17 RX ORDER — PROMETHAZINE HYDROCHLORIDE 25 MG/1
12.5-25 TABLET ORAL EVERY 4 HOURS PRN
Status: DISCONTINUED | OUTPATIENT
Start: 2024-11-17 | End: 2024-11-18 | Stop reason: HOSPADM

## 2024-11-17 RX ORDER — ONDANSETRON 4 MG/1
4 TABLET, ORALLY DISINTEGRATING ORAL EVERY 4 HOURS PRN
Status: DISCONTINUED | OUTPATIENT
Start: 2024-11-17 | End: 2024-11-18 | Stop reason: HOSPADM

## 2024-11-17 RX ORDER — LORAZEPAM 1 MG/1
1 TABLET ORAL EVERY 4 HOURS PRN
Status: DISCONTINUED | OUTPATIENT
Start: 2024-11-17 | End: 2024-11-18 | Stop reason: HOSPADM

## 2024-11-17 RX ORDER — SODIUM CHLORIDE 9 MG/ML
1000 INJECTION, SOLUTION INTRAVENOUS ONCE
Status: DISCONTINUED | OUTPATIENT
Start: 2024-11-17 | End: 2024-11-17

## 2024-11-17 RX ORDER — TELMISARTAN 40 MG/1
80 TABLET ORAL
Status: DISCONTINUED | OUTPATIENT
Start: 2024-11-17 | End: 2024-11-18 | Stop reason: HOSPADM

## 2024-11-17 RX ORDER — LORAZEPAM 2 MG/ML
1.5 INJECTION INTRAMUSCULAR
Status: DISCONTINUED | OUTPATIENT
Start: 2024-11-17 | End: 2024-11-18 | Stop reason: HOSPADM

## 2024-11-17 RX ORDER — FOLIC ACID 1 MG/1
1 TABLET ORAL ONCE
Status: DISCONTINUED | OUTPATIENT
Start: 2024-11-17 | End: 2024-11-17

## 2024-11-17 RX ORDER — LORAZEPAM 2 MG/ML
1 INJECTION INTRAMUSCULAR
Status: DISCONTINUED | OUTPATIENT
Start: 2024-11-17 | End: 2024-11-18 | Stop reason: HOSPADM

## 2024-11-17 RX ORDER — ONDANSETRON 2 MG/ML
4 INJECTION INTRAMUSCULAR; INTRAVENOUS EVERY 4 HOURS PRN
Status: DISCONTINUED | OUTPATIENT
Start: 2024-11-17 | End: 2024-11-18 | Stop reason: HOSPADM

## 2024-11-17 RX ADMIN — POTASSIUM CHLORIDE 40 MEQ: 1500 TABLET, EXTENDED RELEASE ORAL at 17:27

## 2024-11-17 RX ADMIN — Medication 100 MG: at 10:58

## 2024-11-17 RX ADMIN — THYROID 60 MG: 30 TABLET ORAL at 06:45

## 2024-11-17 RX ADMIN — POTASSIUM CHLORIDE 40 MEQ: 1500 TABLET, EXTENDED RELEASE ORAL at 13:39

## 2024-11-17 RX ADMIN — LORAZEPAM 0.5 MG: 0.5 TABLET ORAL at 23:05

## 2024-11-17 RX ADMIN — THIAMINE HYDROCHLORIDE: 100 INJECTION, SOLUTION INTRAMUSCULAR; INTRAVENOUS at 04:21

## 2024-11-17 RX ADMIN — LORAZEPAM 0.5 MG: 0.5 TABLET ORAL at 15:15

## 2024-11-17 RX ADMIN — MULTIVITAMIN TABLET 1 TABLET: TABLET at 10:58

## 2024-11-17 RX ADMIN — PROMETHAZINE HYDROCHLORIDE 25 MG: 25 TABLET ORAL at 11:24

## 2024-11-17 RX ADMIN — ASPIRIN 81 MG: 81 TABLET, COATED ORAL at 06:45

## 2024-11-17 RX ADMIN — FOLIC ACID 1 MG: 1 TABLET ORAL at 10:58

## 2024-11-17 RX ADMIN — ROSUVASTATIN CALCIUM 10 MG: 10 TABLET, FILM COATED ORAL at 06:45

## 2024-11-17 RX ADMIN — ONDANSETRON 4 MG: 2 INJECTION INTRAMUSCULAR; INTRAVENOUS at 08:30

## 2024-11-17 RX ADMIN — LORAZEPAM 0.5 MG: 0.5 TABLET ORAL at 19:09

## 2024-11-17 RX ADMIN — LORAZEPAM 0.5 MG: 0.5 TABLET ORAL at 11:01

## 2024-11-17 ASSESSMENT — LIFESTYLE VARIABLES
TREMOR: *
NAUSEA AND VOMITING: MILD NAUSEA WITH NO VOMITING
CONSUMPTION TOTAL: POSITIVE
AUDITORY DISTURBANCES: NOT PRESENT
TOTAL SCORE: 5
HEADACHE, FULLNESS IN HEAD: NOT PRESENT
PAROXYSMAL SWEATS: NO SWEAT VISIBLE
ANXIETY: *
EVER HAD A DRINK FIRST THING IN THE MORNING TO STEADY YOUR NERVES TO GET RID OF A HANGOVER: YES
NAUSEA AND VOMITING: MILD NAUSEA WITH NO VOMITING
ORIENTATION AND CLOUDING OF SENSORIUM: ORIENTED AND CAN DO SERIAL ADDITIONS
TOTAL SCORE: 4
AGITATION: NORMAL ACTIVITY
AGITATION: NORMAL ACTIVITY
PAROXYSMAL SWEATS: NO SWEAT VISIBLE
DOES PATIENT WANT TO STOP DRINKING: CANNOT ASSESS
AUDITORY DISTURBANCES: NOT PRESENT
HOW MANY TIMES IN THE PAST YEAR HAVE YOU HAD 5 OR MORE DRINKS IN A DAY: 5
PAROXYSMAL SWEATS: NO SWEAT VISIBLE
EVER FELT BAD OR GUILTY ABOUT YOUR DRINKING: YES
TOTAL SCORE: 5
ALCOHOL_USE: YES
VISUAL DISTURBANCES: NOT PRESENT
AGITATION: NORMAL ACTIVITY
ANXIETY: *
NAUSEA AND VOMITING: NO NAUSEA AND NO VOMITING
AUDITORY DISTURBANCES: NOT PRESENT
DO YOU DRINK ALCOHOL: YES
TOTAL SCORE: 4
HEADACHE, FULLNESS IN HEAD: MILD
ANXIETY: MILDLY ANXIOUS
HOW MANY TIMES IN THE PAST YEAR HAVE YOU HAD 5 OR MORE DRINKS IN A DAY: 5
ON A TYPICAL DAY WHEN YOU DRINK ALCOHOL HOW MANY DRINKS DO YOU HAVE: 5
TOTAL SCORE: 4
AGITATION: NORMAL ACTIVITY
HAVE PEOPLE ANNOYED YOU BY CRITICIZING YOUR DRINKING: YES
EVER FELT BAD OR GUILTY ABOUT YOUR DRINKING: YES
PAROXYSMAL SWEATS: NO SWEAT VISIBLE
ORIENTATION AND CLOUDING OF SENSORIUM: ORIENTED AND CAN DO SERIAL ADDITIONS
HEADACHE, FULLNESS IN HEAD: NOT PRESENT
HEADACHE, FULLNESS IN HEAD: NOT PRESENT
TREMOR: NO TREMOR
TREMOR: TREMOR NOT VISIBLE BUT CAN BE FELT, FINGERTIP TO FINGERTIP
ANXIETY: *
HEADACHE, FULLNESS IN HEAD: NOT PRESENT
HAVE YOU EVER FELT YOU SHOULD CUT DOWN ON YOUR DRINKING: YES
TOTAL SCORE: 4
VISUAL DISTURBANCES: NOT PRESENT
AVERAGE NUMBER OF DAYS PER WEEK YOU HAVE A DRINK CONTAINING ALCOHOL: 14
TOTAL SCORE: 5
TOTAL SCORE: 4
VISUAL DISTURBANCES: NOT PRESENT
CONSUMPTION TOTAL: POSITIVE
AUDITORY DISTURBANCES: NOT PRESENT
AUDITORY DISTURBANCES: NOT PRESENT
ON A TYPICAL DAY WHEN YOU DRINK ALCOHOL HOW MANY DRINKS DO YOU HAVE: 5
TOTAL SCORE: 6
NAUSEA AND VOMITING: NO NAUSEA AND NO VOMITING
ORIENTATION AND CLOUDING OF SENSORIUM: ORIENTED AND CAN DO SERIAL ADDITIONS
TOTAL SCORE: 4
ORIENTATION AND CLOUDING OF SENSORIUM: ORIENTED AND CAN DO SERIAL ADDITIONS
AVERAGE NUMBER OF DAYS PER WEEK YOU HAVE A DRINK CONTAINING ALCOHOL: 14
VISUAL DISTURBANCES: NOT PRESENT
VISUAL DISTURBANCES: NOT PRESENT
ANXIETY: *
TREMOR: *
TOTAL SCORE: 4
ORIENTATION AND CLOUDING OF SENSORIUM: ORIENTED AND CAN DO SERIAL ADDITIONS
EVER HAD A DRINK FIRST THING IN THE MORNING TO STEADY YOUR NERVES TO GET RID OF A HANGOVER: YES
HAVE PEOPLE ANNOYED YOU BY CRITICIZING YOUR DRINKING: YES
TREMOR: *
HAVE YOU EVER FELT YOU SHOULD CUT DOWN ON YOUR DRINKING: YES
PAROXYSMAL SWEATS: NO SWEAT VISIBLE
NAUSEA AND VOMITING: *
AGITATION: NORMAL ACTIVITY

## 2024-11-17 ASSESSMENT — COGNITIVE AND FUNCTIONAL STATUS - GENERAL
MOBILITY SCORE: 24
SUGGESTED CMS G CODE MODIFIER DAILY ACTIVITY: CH
SUGGESTED CMS G CODE MODIFIER MOBILITY: CH
DAILY ACTIVITIY SCORE: 24

## 2024-11-17 ASSESSMENT — ENCOUNTER SYMPTOMS
FEVER: 0
SORE THROAT: 0
SHORTNESS OF BREATH: 0
HEADACHES: 0
VOMITING: 0
BLURRED VISION: 0
NECK PAIN: 0
DOUBLE VISION: 0
MYALGIAS: 0
WEAKNESS: 0
INSOMNIA: 0
BRUISES/BLEEDS EASILY: 0
DEPRESSION: 0
PALPITATIONS: 0
COUGH: 0
DIZZINESS: 0
NAUSEA: 0

## 2024-11-17 ASSESSMENT — SOCIAL DETERMINANTS OF HEALTH (SDOH)
WITHIN THE LAST YEAR, HAVE TO BEEN RAPED OR FORCED TO HAVE ANY KIND OF SEXUAL ACTIVITY BY YOUR PARTNER OR EX-PARTNER?: NO
WITHIN THE LAST YEAR, HAVE YOU BEEN KICKED, HIT, SLAPPED, OR OTHERWISE PHYSICALLY HURT BY YOUR PARTNER OR EX-PARTNER?: NO
WITHIN THE LAST YEAR, HAVE YOU BEEN AFRAID OF YOUR PARTNER OR EX-PARTNER?: NO
WITHIN THE LAST YEAR, HAVE YOU BEEN HUMILIATED OR EMOTIONALLY ABUSED IN OTHER WAYS BY YOUR PARTNER OR EX-PARTNER?: NO

## 2024-11-17 ASSESSMENT — PAIN DESCRIPTION - PAIN TYPE
TYPE: ACUTE PAIN
TYPE: ACUTE PAIN

## 2024-11-17 ASSESSMENT — PATIENT HEALTH QUESTIONNAIRE - PHQ9
2. FEELING DOWN, DEPRESSED, IRRITABLE, OR HOPELESS: NOT AT ALL
SUM OF ALL RESPONSES TO PHQ9 QUESTIONS 1 AND 2: 0
1. LITTLE INTEREST OR PLEASURE IN DOING THINGS: NOT AT ALL

## 2024-11-17 ASSESSMENT — FIBROSIS 4 INDEX
FIB4 SCORE: 0.91
FIB4 SCORE: 1.42

## 2024-11-17 NOTE — PROGRESS NOTES
Patient admitted earlier this morning  59-year-old woman with history of alcohol abuse brought in due to alcohol intoxication.  Alcohol level 371 in the ED  Sodium 123, awaiting repeat, did not receive fluids in ED, will continue to monitor closely and avoid overcorrection  .  Patient signs and symptoms of early alcohol withdrawal, have started CIWA

## 2024-11-17 NOTE — ASSESSMENT & PLAN NOTE
-Patient drinks at least 2 bottles of wine daily.   reports she is very good hiding alcohol consumption.  -She was seen here for alcohol intoxication in the ED on 10/29 and started outpatient alcohol rehabilitation program.  -Patient does have Librium which took medications earlier.  -She denies history of alcohol withdrawal seizure.  -Given her alcohol level was 371.4, I will hold off on adding CIWA protocol at this time but if patient has a prolonged hospital stay, she will need alcohol withdrawal order set ordered.

## 2024-11-17 NOTE — H&P
Hospital Medicine History & Physical Note    Date of Service  11/17/2024    Primary Care Physician  Deborah Moctezuma M.D.    Consultants  None      Code Status  Full Code    Chief Complaint  Chief Complaint   Patient presents with    Alcohol Intoxication     Pt BIB  for alcohol intoxication.  was worried pt would choke on her vomit. Pt started an outpatient rehab on Tuesday and went twice this week.  states pt is good at hiding her alcohol. Pt also took 6 pills of her librium tablets that was prescribed to her.         History of Presenting Illness  Yohana Brandon is a 59 y.o. female who was undergoing outpatient alcohol rehabilitation at [facility], presented to the ER on 11/17/2024 with her  reporting she has been binge drinking.  Patient is trying to hide alcohol intake and had 6 tablets of Librium yesterday.  Has been brings her to the ED for acute alcohol intoxication evaluation.  Patient denies history of seizures with alcohol withdrawal.    Initial alcohol is 371.4 in the emergency department.  Her sodium is 123.  She was also hypoxic requiring 3 L of oxygen therefore I was called for admission.    I discussed the plan of care with patient and ERP Dr. Person .    Review of Systems  Review of Systems   Constitutional:  Positive for malaise/fatigue. Negative for fever.   HENT:  Negative for congestion and sore throat.    Eyes:  Negative for blurred vision and double vision.   Respiratory:  Negative for cough and shortness of breath.    Cardiovascular:  Negative for chest pain and palpitations.   Gastrointestinal:  Negative for nausea and vomiting.   Genitourinary:  Negative for dysuria and urgency.   Musculoskeletal:  Negative for myalgias and neck pain.   Skin:  Negative for itching and rash.   Neurological:  Negative for dizziness, weakness and headaches.   Endo/Heme/Allergies:  Does not bruise/bleed easily.   Psychiatric/Behavioral:  Negative for depression. The patient does not  have insomnia.        Past Medical History   has a past medical history of Alcohol abuse, Alcohol abuse, ASTHMA, Elevated LFTs, Heart palpitations, Hypertension, and Psychiatric disorder.    Surgical History   has a past surgical history that includes appendectomy (); pr endometrial ablation, thermal (); pr remove tonsils/adenoids,<15 y/o (); pr anesth,surg breast reconstructive (); hemorrhoidectomy (2011); anal sphincterotomy (2011); bunionectomy (); pr  delivery+postpartum care; pr total hip arthroplasty (Right, 2021); and closed reduction (Right, 2022).     Family History  family history includes Stroke in her mother.   Family history reviewed with patient. There is no family history that is pertinent to the chief complaint.     Social History   reports that she has never smoked. She has never used smokeless tobacco. She reports current alcohol use. She reports that she does not use drugs.    Allergies  Allergies   Allergen Reactions    Banana Shortness of Breath    Kiwi Extract Swelling    Latex Anaphylaxis    Morphine Hives    Other Food Hives and Shortness of Breath     Tropical fruit      Vicodin [Hydrocodone-Acetaminophen] Rash     Generalized rash, puritis        Medications  Prior to Admission Medications   Prescriptions Last Dose Informant Patient Reported? Taking?   Non Formulary Request  Patient Yes No   Sig: Apply 1.5 g topically every day. Testosterone 5% cream, pt [facility] medications [facility] ([phone])   Non Formulary Request  Patient Yes No   Sig: Apply 0.5-1 mg topically every day. Bi-est cream E2 E3 80-20 ratio to 5MG,  pt gets this medications compounded from [facility] ([phone])   Omega-3 Fatty Acids (FISH OIL PO)  Patient Yes No   Sig: Take 1 Capsule by mouth every day.   aspirin 81 MG EC tablet  Patient Yes No   Sig: Take 81 mg by mouth every day.   ondansetron (ZOFRAN ODT) 4 MG  TABLET DISPERSIBLE   No No   Sig: Take 1 Tablet by mouth every 8 hours as needed for Nausea/Vomiting.   progesterone (PROMETRIUM) 100 MG Cap  Patient Yes No   Sig: Take 100 mg by mouth every day.   rosuvastatin (CRESTOR) 10 MG Tab  Patient Yes No   Sig: Take 10 mg by mouth every day.   telmisartan-hydrochlorothiazide (MICARDIS HCT) 80-25 MG per tablet  Patient Yes No   Sig: Take 1 Tablet by mouth every day.   thyroid (ARMOUR THYROID) 60 MG Tab  Patient Yes No   Sig: Take 60 mg by mouth every day.      Facility-Administered Medications: None       Physical Exam  Temp:  [36.2 °C (97.2 °F)] 36.2 °C (97.2 °F)  Pulse:  [84-88] 84  Resp:  [19-21] 19  BP: (124-130)/(73-84) 124/73  SpO2:  [84 %-99 %] 93 %  Blood Pressure: 124/73   Temperature: 36.2 °C (97.2 °F)   Pulse: 84   Respiration: 19   Pulse Oximetry: 93 %       Physical Exam  Constitutional:       Comments: Somnolent, but appropriately responding to simple questions.   HENT:      Head: Normocephalic and atraumatic.      Mouth/Throat:      Mouth: Mucous membranes are moist.      Pharynx: Oropharynx is clear.   Eyes:      Extraocular Movements: Extraocular movements intact.      Pupils: Pupils are equal, round, and reactive to light.   Cardiovascular:      Rate and Rhythm: Normal rate and regular rhythm.      Heart sounds: Normal heart sounds.   Pulmonary:      Effort: Pulmonary effort is normal.      Breath sounds: Normal breath sounds.   Abdominal:      General: Abdomen is flat. Bowel sounds are normal.      Palpations: Abdomen is soft.   Musculoskeletal:      Cervical back: Normal range of motion and neck supple.   Skin:     General: Skin is warm and dry.   Neurological:      General: No focal deficit present.      Mental Status: She is alert and oriented to person, place, and time.   Psychiatric:         Mood and Affect: Mood normal.         Behavior: Behavior normal.         Laboratory:  Recent Labs     11/17/24  0409   WBC 4.3*   RBC 4.50   HEMOGLOBIN 15.1    HEMATOCRIT 42.4   MCV 94.2   MCH 33.6*   MCHC 35.6*   RDW 41.1   PLATELETCT 397   MPV 9.4     Recent Labs     11/17/24 0409   SODIUM 123*   POTASSIUM 3.7   CHLORIDE 81*   CO2 27   GLUCOSE 100*   BUN 13   CREATININE 0.86   CALCIUM 9.1     Recent Labs     11/17/24 0409   ALTSGPT 40   ASTSGOT 40   ALKPHOSPHAT 82   TBILIRUBIN 0.3   GLUCOSE 100*         Recent Labs     11/17/24 0409   NTPROBNP 52         Recent Labs     11/17/24 0409   TROPONINT 13       Imaging:  DX-CHEST-PORTABLE (1 VIEW)   Final Result      No acute process.          X-Ray:  I have personally reviewed the images and compared with prior images. and My impression is: Chest x-ray was normal  EKG:  I have personally reviewed the images and compared with prior images. and My impression is: Normal sinus rhythm at 78 bpm with no acute ST elevation.    Assessment/Plan:  Justification for Admission Status  I anticipate this patient is appropriate for observation status at this time because 59-year-old with acute alcohol intoxication causing hyponatremia and hypoxia        * Hyponatremia- (present on admission)  Assessment & Plan  -Observation status on medical floor with remote cardiac monitoring.  -Her sodium level was 123.  -Hyponatremia is likely related to alcohol intoxication as her alcohol level now is 371.4.  -Patient received a rally bag.  I will check her sodium in 4 hours, at 10 AM.  If hyponatremia not improving, she will need sodium studies.  -Patient is currently enrolled on outpatient alcohol rehab with [facility], started last Tuesday.        Acute alcoholic intoxication without complication (HCC)  Assessment & Plan  -Patient drinks at least 2 bottles of wine daily.   reports she is very good hiding alcohol consumption.  -She was seen here for alcohol intoxication in the ED on 10/29 and started outpatient alcohol rehabilitation program.  -Patient does have Librium which took medications earlier.  -She denies history of alcohol withdrawal  seizure.  -Given her alcohol level was 371.4, I will hold off on adding CIWA protocol at this time but if patient has a prolonged hospital stay, she will need alcohol withdrawal order set ordered.    Hypoxia  Assessment & Plan  -Hypoxia under the context of acute alcohol intoxication.  -Patient is currently requiring 3 L of oxygen via nasal cannula.  -Her portable chest x-ray was negative for abnormalities.  -I am adding COVID, flu and RSV panel in addition to D-dimer.  Otherwise anticipate her hypoxia will improve once she is more sober.  Alcohol level now is 271.    Alcohol dependence (HCC)- (present on admission)  Assessment & Plan  -As above..        VTE prophylaxis: SCDs/TEDs

## 2024-11-17 NOTE — ED NOTES
Medication history reviewed with pt and pts . Med rec is complete.  Allergies reviewed, per pt    Pt reports that she started CHLORDIAZEPOXIDE 25MG on 11/16/2024.  Pt reports that she has not had to take her LORAZEPAM 0.5MG or AIRSUPRA 90-80MCG in the last 30 days or longer.    Patient has not had any outpatient antibiotics in the last 30 days.    Pt is not on any anticoagulants

## 2024-11-17 NOTE — ASSESSMENT & PLAN NOTE
-Observation status on medical floor with remote cardiac monitoring.  -Her sodium level was 123.  -Hyponatremia is likely related to alcohol intoxication as her alcohol level now is 371.4.  -Patient received a rally bag.  I will check her sodium in 4 hours, at 10 AM.  If hyponatremia not improving, she will need sodium studies.  -Patient is currently enrolled on outpatient alcohol rehab with [facility], started last Tuesday.

## 2024-11-17 NOTE — PROGRESS NOTES
0604- Received report from ED RN Charltote    1990-Admitted pt from ED to [facility] room 217, pt ambulated from [facility] to hospital bed with one person assist. Pt is alert and oriented x4. Family on bedside no complaints at this time, reviewed ordered and med-rec, admission profile done. POC discussed with pt, verbalized understanding. Safety measures in place, treaded socks on, alarm on. Place a call light within reach. Hourly rounding.

## 2024-11-17 NOTE — PROGRESS NOTES
4 Eyes Skin Assessment Completed by Marcellus, RN and Julio RN.    Head WDL  Ears WDL  Nose WDL  Mouth WDL  Neck WDL  Breast/Chest Scar  Shoulder Blades WDL  Spine WDL  (R) Arm/Elbow/Hand WDL  (L) Arm/Elbow/Hand WDL  Abdomen WDL  Groin WDL  Scrotum/Coccyx/Buttocks WDL  (R) Leg WDL  (L) Leg WDL  (R) Heel/Foot/Toe Redness and Blanching, dry  (L) Heel/Foot/Toe Redness and blanching, dry          Devices In Places Tele Box and Pulse Ox      Interventions In Place Pillows    Possible Skin Injury No    Pictures Uploaded Into Epic N/A  Wound Consult Placed N/A  RN Wound Prevention Protocol Ordered No

## 2024-11-17 NOTE — CARE PLAN
The patient is Watcher - Medium risk of patient condition declining or worsening    Shift Goals  Clinical Goals: patient will remain free from falls during shift, electrolyte balances  Patient Goals: rest    Progress made toward(s) clinical / shift goals:  CIWA protocol in place. Bed in low and locked position. Call light within rech. Bed alarm on.     Patient is not progressing towards the following goals:

## 2024-11-17 NOTE — PROGRESS NOTES
Pt is not sure all the names and strengths of her medications. Called pts  (Gaurav) at [phone] and [phone], left message.

## 2024-11-17 NOTE — ED TRIAGE NOTES
"Chief Complaint   Patient presents with    Alcohol Intoxication     Pt BIB  for alcohol intoxication.  was worried pt would choke on her vomit. Pt started an outpatient rehab on Tuesday and went twice this week.  states pt is good at hiding her alcohol. Pt also took 6 pills of her librium tablets that was prescribed to her.       /84   Pulse 85   Resp (!) 21   Ht 1.727 m (5' 8\")   Wt 67.6 kg (149 lb)   LMP 09/14/2006   SpO2 95%   BMI 22.66 kg/m²       "

## 2024-11-17 NOTE — ED PROVIDER NOTES
ED Provider Note    CHIEF COMPLAINT  Chief Complaint   Patient presents with    Alcohol Intoxication     Pt BIB  for alcohol intoxication.  was worried pt would choke on her vomit. Pt started an outpatient rehab on Tuesday and went twice this week.  states pt is good at hiding her alcohol. Pt also took 6 pills of her librium tablets that was prescribed to her.         EXTERNAL RECORDS REVIEWED  multiple internal emergency department notes reviewed for medical history    HPI/ROS  LIMITATION TO HISTORY   Select: Intoxication  OUTSIDE HISTORIAN(S):   Gaurav at bedside providing clinically relevant collateral history     Yohana Brandon is a 59 y.o. female with past medical history of alcohol use disorder presenting to the emergency department for alcohol intoxication.  Patient brought in by her  Gaurav who says that he has been having difficulty helping to control her drinking habits.  She was recently treated at [facility].  She was prescribed Librium taper by her primary care physician.  Today she took 6 tablets of Librium and then she drank at least 2 bottles of white wine.  Her  is concerned that he cannot control her drinking habits at home and he wants her treated in an inpatient facility.    Patient says that she feels nauseous but her history is mainly noncontributory due to her level of inebriation.     says that she has not had any falls, no head strikes no trauma.  Says that she has been binge drinking lately because of depression, stress from family events        PAST MEDICAL HISTORY   has a past medical history of Alcohol abuse, Alcohol abuse, ASTHMA, Elevated LFTs, Heart palpitations, Hypertension, and Psychiatric disorder.    SURGICAL HISTORY   has a past surgical history that includes appendectomy (1997); endometrial ablation, thermal (1999); remove tonsils/adenoids,<11 y/o (1973); anesth,surg breast reconstructive (1994);  "hemorrhoidectomy (2011); anal sphincterotomy (2011); bunionectomy ();  delivery+postpartum care; total hip arthroplasty (Right, 2021); and closed reduction (Right, 2022).    FAMILY HISTORY  Family History   Problem Relation Age of Onset    Stroke Mother        SOCIAL HISTORY  Social History     Tobacco Use    Smoking status: Never    Smokeless tobacco: Never   Vaping Use    Vaping status: Never Used   Substance and Sexual Activity    Alcohol use: Yes    Drug use: No    Sexual activity: Not on file       CURRENT MEDICATIONS  Home Medications       Reviewed by Jean Claude Burrell R.N. (Registered Nurse) on 24 at 0415  Med List Status: Partial     Medication Last Dose Status   aspirin 81 MG EC tablet  Active   Non Formulary Request  Active   Non Formulary Request  Active   Omega-3 Fatty Acids (FISH OIL PO)  Active   ondansetron (ZOFRAN ODT) 4 MG TABLET DISPERSIBLE  Active   progesterone (PROMETRIUM) 100 MG Cap  Active   rosuvastatin (CRESTOR) 10 MG Tab  Active   telmisartan-hydrochlorothiazide (MICARDIS HCT) 80-25 MG per tablet  Active   thyroid (ARMOUR THYROID) 60 MG Tab  Active                  Audit from Redirected Encounters    **Home medications have not yet been reviewed for this encounter**         ALLERGIES  Allergies   Allergen Reactions    Banana Shortness of Breath    Kiwi Extract Swelling    Latex Anaphylaxis    Morphine Hives    Other Food Hives and Shortness of Breath     Tropical fruit      Vicodin [Hydrocodone-Acetaminophen] Rash     Generalized rash, puritis        PHYSICAL EXAM  VITAL SIGNS: /84   Pulse 87   Temp 36.2 °C (97.2 °F) (Temporal)   Resp (!) 21   Ht 1.727 m (5' 8\")   Wt 67.6 kg (149 lb)   LMP 2006   SpO2 99%   BMI 22.66 kg/m²    General: Inebriated, slurring speech  Neuro: oriented x 3, moving all extremities.   HEENT:   - Head: Normocephalic, atraumatic  - Eyes: PERRL anicteric sclera  - Ears/Nose: normal external nose and ears  - " Mouth: moist mucosal membranes, no tongue fasciculations   Resp: clear to auscultation, no increased work of breathing  CV: Regular rate and rhythm  Abd: Soft, non-tender, non-distended  Extremities: No peripheral edema.  No tremors  Psych: Not agitated        DIAGNOSTIC STUDIES / PROCEDURES    EKG  My independent EKG interpretation:  Results for orders placed or performed during the hospital encounter of 24   EKG (NOW)   Result Value Ref Range    Report       [facility] Emergency Dept.    Test Date:  2024  Pt Name:    SONIA DAMON          Department: [facility]  MRN:        [id number]                      Room:       -ROOM 3  Gender:     Female                       Technician: [id number]  :        1965                   Requested By:JANETT WHITE  Order #:    [id number]                    Reading MD:    Measurements  Intervals                                Axis  Rate:       78                           P:          74  RI:         153                          QRS:        49  QRSD:       87                           T:          67  QT:         401  QTc:        457    Interpretive Statements  Sinus rhythm  Minimal ST elevation, inferior leads  Baseline wander in lead(s) V1  Compared to ECG 2024 19:02:17  ST (T wave) deviation now present  Ventricular premature complex(es) no longer present         LABS  Results for orders placed or performed during the hospital encounter of 24   CBC WITH DIFFERENTIAL    Collection Time: 24  4:09 AM   Result Value Ref Range    WBC 4.3 (L) 4.8 - 10.8 K/uL    RBC 4.50 4.20 - 5.40 M/uL    Hemoglobin 15.1 12.0 - 16.0 g/dL    Hematocrit 42.4 37.0 - 47.0 %    MCV 94.2 81.4 - 97.8 fL    MCH 33.6 (H) 27.0 - 33.0 pg    MCHC 35.6 (H) 32.2 - 35.5 g/dL    RDW 41.1 35.9 - 50.0 fL    Platelet Count 397 164 - 446 K/uL    MPV 9.4 9.0 - 12.9 fL    Neutrophils-Polys 43.30 (L) 44.00 - 72.00 %    Lymphocytes 44.10 (H) 22.00 - 41.00 %     Monocytes 9.10 0.00 - 13.40 %    Eosinophils 1.60 0.00 - 6.90 %    Basophils 1.40 0.00 - 1.80 %    Immature Granulocytes 0.50 0.00 - 0.90 %    Nucleated RBC 0.00 0.00 - 0.20 /100 WBC    Neutrophils (Absolute) 1.86 1.82 - 7.42 K/uL    Lymphs (Absolute) 1.89 1.00 - 4.80 K/uL    Monos (Absolute) 0.39 0.00 - 0.85 K/uL    Eos (Absolute) 0.07 0.00 - 0.51 K/uL    Baso (Absolute) 0.06 0.00 - 0.12 K/uL    Immature Granulocytes (abs) 0.02 0.00 - 0.11 K/uL    NRBC (Absolute) 0.00 K/uL   COMP METABOLIC PANEL    Collection Time: 11/17/24  4:09 AM   Result Value Ref Range    Sodium 123 (L) 135 - 145 mmol/L    Potassium 3.7 3.6 - 5.5 mmol/L    Chloride 81 (L) 96 - 112 mmol/L    Co2 27 20 - 33 mmol/L    Anion Gap 15.0 7.0 - 16.0    Glucose 100 (H) 65 - 99 mg/dL    Bun 13 8 - 22 mg/dL    Creatinine 0.86 0.50 - 1.40 mg/dL    Calcium 9.1 8.4 - 10.2 mg/dL    Correct Calcium 8.9 8.5 - 10.5 mg/dL    AST(SGOT) 40 12 - 45 U/L    ALT(SGPT) 40 2 - 50 U/L    Alkaline Phosphatase 82 30 - 99 U/L    Total Bilirubin 0.3 0.1 - 1.5 mg/dL    Albumin 4.3 3.2 - 4.9 g/dL    Total Protein 7.5 6.0 - 8.2 g/dL    Globulin 3.2 1.9 - 3.5 g/dL    A-G Ratio 1.3 g/dL   TROPONIN    Collection Time: 11/17/24  4:09 AM   Result Value Ref Range    Troponin T 13 6 - 19 ng/L   proBrain Natriuretic Peptide, NT    Collection Time: 11/17/24  4:09 AM   Result Value Ref Range    NT-proBNP 52 0 - 125 pg/mL   PHOSPHORUS    Collection Time: 11/17/24  4:09 AM   Result Value Ref Range    Phosphorus 2.6 2.5 - 4.5 mg/dL   MAGNESIUM    Collection Time: 11/17/24  4:09 AM   Result Value Ref Range    Magnesium 1.9 1.5 - 2.5 mg/dL   ETHYL ALCOHOL (BLOOD)    Collection Time: 11/17/24  4:09 AM   Result Value Ref Range    Diagnostic Alcohol 371.4 (H) <10.1 mg/dL   ESTIMATED GFR    Collection Time: 11/17/24  4:09 AM   Result Value Ref Range    GFR (CKD-EPI) 79 >60 mL/min/1.73 m 2   EKG (NOW)    Collection Time: 11/17/24  4:22 AM   Result Value Ref Range    [facility]  [facility] Emergency Dept.    Test Date:  2024  Pt Name:    SONIA DAMON          Department: [facility]  MRN:        [id number]                      Room:       -ROOM 3  Gender:     Female                       Technician: [id number]  :        1965                   Requested By:JANETT WHITE  Order #:    [id number]                    Reading MD:    Measurements  Intervals                                Axis  Rate:       78                           P:          74  NE:         153                          QRS:        49  QRSD:       87                           T:          67  QT:         401  QTc:        457    Interpretive Statements  Sinus rhythm  Minimal ST elevation, inferior leads  Baseline wander in lead(s) V1  Compared to ECG 2024 19:02:17  ST (T wave) deviation now present  Ventricular premature complex(es) no longer present         RADIOLOGY  I have independently interpreted the diagnostic imaging associated with this visit and am waiting the final reading from the radiologist.   My preliminary interpretation is as follows:   - Plain film chest shows no evidence of acute cardiopulmonary process  Radiologist interpretation:   DX-CHEST-PORTABLE (1 VIEW)   Final Result      No acute process.              MEDICAL DECISION MAKING      ED COURSE AND PLAN    Sonia Damon is a 59 y.o. female presenting to the emergency department for evaluation of alcohol intoxication.  Patient took several doses of Librium that she was prescribed by her primary care physician earlier today and then drank at least 2 bottles of white wine.  Her  brought her in because he says that he cannot manage her level of intoxication and wants her in an inpatient rehabilitation center.    On arrival to the emergency department, patient was hypoxic on room air to the mid 80s, she was placed on supplemental oxygen.  We obtain baseline labs, EKG, chest x-ray.  She was treated with a rally bag, IV  fluids thiamine folic acid.    Her chest x-ray shows no evidence of acute cardiopulmonary process.  Her labs returned showing patient was hyponatremic, hypochloremic but she has no evidence of metabolic acidosis or JACE.  Her troponin, BMP, electrolytes otherwise unremarkable.     Given her hyponatremia and hypoxia here in the emergency department, discussed with Dr. Noguera for admission    ---Pertinent ED Course---:    5:44 AM I reviewed the patient's old records in Epic, medication list, allergies, past medical history and performed a physical examination.           Hydration: Based on the patient's presentation of Dehydration and Other hyponatremia the patient was given IV fluids. IV Hydration was used because oral hydration was not adequate alone. Upon recheck following hydration, the patient was stable.    Procedures:      ----------------------------------------------------------------------------------  DISCUSSIONS    I have discussed management of the patient with the following physicians and TONJA's: Admitting hospitalist    Discussion of management with other [facility] or appropriate source(s):     Escalation of care considered, and ultimately not performed:    Barriers to care at this time, including but not limited to: Alcohol use disorder    Decision tools and prescription drugs considered including, but not limited to:     FINAL IMPRESSION    1. Alcoholic intoxication without complication (HCC)    2. Hyponatremia        New Prescriptions    No medications on file         DISPOSITION    Admission: The patient will be admitted for further evaluation and treatment. Discussed case with consultants and relayed all necessary information.        This chart was dictated using an electronic voice recognition software. The chart has been reviewed and edited but there is still possibility for dictation errors due to limitation of software.    Lewis Person,  11/17/2024

## 2024-11-17 NOTE — ASSESSMENT & PLAN NOTE
-Hypoxia under the context of acute alcohol intoxication.  -Patient is currently requiring 3 L of oxygen via nasal cannula.  -Her portable chest x-ray was negative for abnormalities.  -I am adding COVID, flu and RSV panel in addition to D-dimer.  Otherwise anticipate her hypoxia will improve once she is more sober.  Alcohol level now is 271.

## 2024-11-18 ENCOUNTER — PHARMACY VISIT (OUTPATIENT)
Dept: PHARMACY | Facility: MEDICAL CENTER | Age: 59
End: 2024-11-18
Payer: COMMERCIAL

## 2024-11-18 VITALS
OXYGEN SATURATION: 91 % | HEIGHT: 68 IN | WEIGHT: 157.63 LBS | HEART RATE: 93 BPM | DIASTOLIC BLOOD PRESSURE: 84 MMHG | SYSTOLIC BLOOD PRESSURE: 131 MMHG | TEMPERATURE: 96.8 F | RESPIRATION RATE: 16 BRPM | BODY MASS INDEX: 23.89 KG/M2

## 2024-11-18 LAB
ANION GAP SERPL CALC-SCNC: 10 MMOL/L (ref 7–16)
ANION GAP SERPL CALC-SCNC: 11 MMOL/L (ref 7–16)
ANION GAP SERPL CALC-SCNC: 9 MMOL/L (ref 7–16)
BUN SERPL-MCNC: 8 MG/DL (ref 8–22)
BUN SERPL-MCNC: 8 MG/DL (ref 8–22)
BUN SERPL-MCNC: 9 MG/DL (ref 8–22)
CALCIUM SERPL-MCNC: 10.1 MG/DL (ref 8.4–10.2)
CALCIUM SERPL-MCNC: 9.6 MG/DL (ref 8.4–10.2)
CALCIUM SERPL-MCNC: 9.7 MG/DL (ref 8.4–10.2)
CHLORIDE SERPL-SCNC: 97 MMOL/L (ref 96–112)
CHLORIDE SERPL-SCNC: 98 MMOL/L (ref 96–112)
CHLORIDE SERPL-SCNC: 98 MMOL/L (ref 96–112)
CO2 SERPL-SCNC: 26 MMOL/L (ref 20–33)
CO2 SERPL-SCNC: 27 MMOL/L (ref 20–33)
CO2 SERPL-SCNC: 28 MMOL/L (ref 20–33)
CREAT SERPL-MCNC: 0.73 MG/DL (ref 0.5–1.4)
CREAT SERPL-MCNC: 0.73 MG/DL (ref 0.5–1.4)
CREAT SERPL-MCNC: 0.77 MG/DL (ref 0.5–1.4)
ERYTHROCYTE [DISTWIDTH] IN BLOOD BY AUTOMATED COUNT: 43.1 FL (ref 35.9–50)
GFR SERPLBLD CREATININE-BSD FMLA CKD-EPI: 90 ML/MIN/1.73 M 2
GFR SERPLBLD CREATININE-BSD FMLA CKD-EPI: 96 ML/MIN/1.73 M 2
GFR SERPLBLD CREATININE-BSD FMLA CKD-EPI: 96 ML/MIN/1.73 M 2
GLUCOSE SERPL-MCNC: 105 MG/DL (ref 65–99)
GLUCOSE SERPL-MCNC: 121 MG/DL (ref 65–99)
GLUCOSE SERPL-MCNC: 97 MG/DL (ref 65–99)
HCT VFR BLD AUTO: 38.1 % (ref 37–47)
HGB BLD-MCNC: 13.1 G/DL (ref 12–16)
MCH RBC QN AUTO: 33.4 PG (ref 27–33)
MCHC RBC AUTO-ENTMCNC: 34.4 G/DL (ref 32.2–35.5)
MCV RBC AUTO: 97.2 FL (ref 81.4–97.8)
PLATELET # BLD AUTO: 243 K/UL (ref 164–446)
PMV BLD AUTO: 11.5 FL (ref 9–12.9)
POTASSIUM SERPL-SCNC: 4 MMOL/L (ref 3.6–5.5)
POTASSIUM SERPL-SCNC: 4.1 MMOL/L (ref 3.6–5.5)
POTASSIUM SERPL-SCNC: 4.1 MMOL/L (ref 3.6–5.5)
RBC # BLD AUTO: 3.92 M/UL (ref 4.2–5.4)
SODIUM SERPL-SCNC: 134 MMOL/L (ref 135–145)
SODIUM SERPL-SCNC: 135 MMOL/L (ref 135–145)
SODIUM SERPL-SCNC: 135 MMOL/L (ref 135–145)
WBC # BLD AUTO: 4.2 K/UL (ref 4.8–10.8)

## 2024-11-18 PROCEDURE — RXMED WILLOW AMBULATORY MEDICATION CHARGE: Performed by: STUDENT IN AN ORGANIZED HEALTH CARE EDUCATION/TRAINING PROGRAM

## 2024-11-18 PROCEDURE — 36415 COLL VENOUS BLD VENIPUNCTURE: CPT

## 2024-11-18 PROCEDURE — 700102 HCHG RX REV CODE 250 W/ 637 OVERRIDE(OP): Performed by: HOSPITALIST

## 2024-11-18 PROCEDURE — A9270 NON-COVERED ITEM OR SERVICE: HCPCS | Performed by: HOSPITALIST

## 2024-11-18 PROCEDURE — 700102 HCHG RX REV CODE 250 W/ 637 OVERRIDE(OP): Performed by: STUDENT IN AN ORGANIZED HEALTH CARE EDUCATION/TRAINING PROGRAM

## 2024-11-18 PROCEDURE — 80048 BASIC METABOLIC PNL TOTAL CA: CPT | Mod: 91

## 2024-11-18 PROCEDURE — 85027 COMPLETE CBC AUTOMATED: CPT

## 2024-11-18 PROCEDURE — A9270 NON-COVERED ITEM OR SERVICE: HCPCS | Performed by: STUDENT IN AN ORGANIZED HEALTH CARE EDUCATION/TRAINING PROGRAM

## 2024-11-18 PROCEDURE — 99239 HOSP IP/OBS DSCHRG MGMT >30: CPT | Performed by: STUDENT IN AN ORGANIZED HEALTH CARE EDUCATION/TRAINING PROGRAM

## 2024-11-18 RX ORDER — FOLIC ACID 1 MG/1
1 TABLET ORAL DAILY
Qty: 30 TABLET | Refills: 0 | Status: SHIPPED | OUTPATIENT
Start: 2024-11-19 | End: 2024-12-21

## 2024-11-18 RX ORDER — LANOLIN ALCOHOL/MO/W.PET/CERES
100 CREAM (GRAM) TOPICAL DAILY
Qty: 30 TABLET | Refills: 0 | Status: SHIPPED | OUTPATIENT
Start: 2024-11-19 | End: 2024-12-19

## 2024-11-18 RX ADMIN — HYDROCHLOROTHIAZIDE 25 MG: 25 TABLET ORAL at 05:14

## 2024-11-18 RX ADMIN — FOLIC ACID 1 MG: 1 TABLET ORAL at 05:14

## 2024-11-18 RX ADMIN — ROSUVASTATIN CALCIUM 10 MG: 10 TABLET, FILM COATED ORAL at 05:14

## 2024-11-18 RX ADMIN — THYROID 60 MG: 30 TABLET ORAL at 05:14

## 2024-11-18 RX ADMIN — MULTIVITAMIN TABLET 1 TABLET: TABLET at 05:14

## 2024-11-18 RX ADMIN — Medication 100 MG: at 05:14

## 2024-11-18 RX ADMIN — ASPIRIN 81 MG: 81 TABLET, COATED ORAL at 05:14

## 2024-11-18 RX ADMIN — TELMISARTAN 80 MG: 40 TABLET ORAL at 05:14

## 2024-11-18 ASSESSMENT — LIFESTYLE VARIABLES
HEADACHE, FULLNESS IN HEAD: NOT PRESENT
VISUAL DISTURBANCES: NOT PRESENT
ORIENTATION AND CLOUDING OF SENSORIUM: ORIENTED AND CAN DO SERIAL ADDITIONS
VISUAL DISTURBANCES: NOT PRESENT
ORIENTATION AND CLOUDING OF SENSORIUM: ORIENTED AND CAN DO SERIAL ADDITIONS
NAUSEA AND VOMITING: NO NAUSEA AND NO VOMITING
AGITATION: NORMAL ACTIVITY
AUDITORY DISTURBANCES: NOT PRESENT
AGITATION: NORMAL ACTIVITY
HEADACHE, FULLNESS IN HEAD: NOT PRESENT
PAROXYSMAL SWEATS: NO SWEAT VISIBLE
AUDITORY DISTURBANCES: NOT PRESENT
PAROXYSMAL SWEATS: *
ANXIETY: NO ANXIETY (AT EASE)
ANXIETY: NO ANXIETY (AT EASE)
HEADACHE, FULLNESS IN HEAD: NOT PRESENT
VISUAL DISTURBANCES: NOT PRESENT
TREMOR: NO TREMOR
TOTAL SCORE: 0
AUDITORY DISTURBANCES: NOT PRESENT
PAROXYSMAL SWEATS: NO SWEAT VISIBLE
TREMOR: NO TREMOR
AGITATION: NORMAL ACTIVITY
NAUSEA AND VOMITING: NO NAUSEA AND NO VOMITING
ORIENTATION AND CLOUDING OF SENSORIUM: ORIENTED AND CAN DO SERIAL ADDITIONS
ANXIETY: NO ANXIETY (AT EASE)
NAUSEA AND VOMITING: NO NAUSEA AND NO VOMITING
TOTAL SCORE: 2
TOTAL SCORE: 0
TREMOR: NO TREMOR

## 2024-11-18 ASSESSMENT — PAIN DESCRIPTION - PAIN TYPE: TYPE: ACUTE PAIN

## 2024-11-18 NOTE — PROGRESS NOTES
Received report from Analilia BHATT. Assumed pt care at 2300.   Pt is awake and resting comfortably on bed. Pt received on oxygen at 0.5 Lpm via nasal cannula; not in respiratory distress. Telemonitor in place. Needs attended well. Fall precautions in place. Call light and belongings within reach. Bed in lowest position. Encouraged to call for assistance.  Hourly rounding in progress.     0200 Pt refused flu vaccine and state she is allergic with any type of flu vaccine.

## 2024-11-18 NOTE — PROGRESS NOTES
Hospital Medicine Daily Progress Note    Date of Service  11/18/2024    Chief Complaint  Yohana Brandon is a 59 y.o. female admitted 11/17/2024 with ***    Hospital Course  Very pleasant woman with history of alcohol abuse who was in rehab at [facility] however had been discharged with some chlordiazepoxide but also began drinking again.  Brought in by  due to intoxication.  Alcohol level is 370.  She was hyponatremic with sodium of 123 and received some hydration in the ED.  Was admitted for potential alcohol withdrawal as well as sodium correction    Interval Problem Update  11/18  No acute events overnight, vital signs stable  CIWA score 6 at its highest  Her sodium corrected fairly quickly however this was without any additional intervention other than the fluids in the ED    I have discussed this patient's plan of care and discharge plan at IDT rounds today with Case Management, Nursing, Nursing leadership, and other members of the IDT team.    Consultants/Specialty  {Other providers:98867}    Code Status  Full Code    Disposition  <MEDICALLYCLEARED>  I have placed the appropriate orders for post-discharge needs.    Review of Systems  ROS     Physical Exam  Temp:  [36 °C (96.8 °F)-36.6 °C (97.8 °F)] 36 °C (96.8 °F)  Pulse:  [79-95] 93  Resp:  [16-18] 16  BP: (121-159)/(78-93) 131/84  SpO2:  [90 %-95 %] 92 %    Physical Exam    Fluids    Intake/Output Summary (Last 24 hours) at 11/18/2024 0853  Last data filed at 11/17/2024 2300  Gross per 24 hour   Intake 120 ml   Output --   Net 120 ml        Laboratory  Recent Labs     11/17/24  0409 11/18/24  0229   WBC 4.3* 4.2*   RBC 4.50 3.92*   HEMOGLOBIN 15.1 13.1   HEMATOCRIT 42.4 38.1   MCV 94.2 97.2   MCH 33.6* 33.4*   MCHC 35.6* 34.4   RDW 41.1 43.1   PLATELETCT 397 243   MPV 9.4 11.5     Recent Labs     11/17/24  2214 11/18/24  0229 11/18/24  0600   SODIUM 134* 135 134*   POTASSIUM 4.3 4.0 4.1   CHLORIDE 96 98 97   CO2 28 27 28   GLUCOSE 106* 97 105*   BUN 9  8 8   CREATININE 0.76 0.77 0.73   CALCIUM 9.6 9.6 9.7                   Imaging  {Wetread or Wildcard:[id number]}     Assessment/Plan  * Hyponatremia- (present on admission)  Assessment & Plan  -Observation status on medical floor with remote cardiac monitoring.  -Her sodium level was 123.  -Hyponatremia is likely related to alcohol intoxication as her alcohol level now is 371.4.  -Patient received a rally bag.  I will check her sodium in 4 hours, at 10 AM.  If hyponatremia not improving, she will need sodium studies.  -Patient is currently enrolled on outpatient alcohol rehab with [facility], started last Tuesday.        Acute alcoholic intoxication without complication (HCC)  Assessment & Plan  -Patient drinks at least 2 bottles of wine daily.   reports she is very good hiding alcohol consumption.  -She was seen here for alcohol intoxication in the ED on 10/29 and started outpatient alcohol rehabilitation program.  -Patient does have Librium which took medications earlier.  -She denies history of alcohol withdrawal seizure.  -Given her alcohol level was 371.4, I will hold off on adding CIWA protocol at this time but if patient has a prolonged hospital stay, she will need alcohol withdrawal order set ordered.    Hypoxia  Assessment & Plan  -Hypoxia under the context of acute alcohol intoxication.  -Patient is currently requiring 3 L of oxygen via nasal cannula.  -Her portable chest x-ray was negative for abnormalities.  -I am adding COVID, flu and RSV panel in addition to D-dimer.  Otherwise anticipate her hypoxia will improve once she is more sober.  Alcohol level now is 271.    Alcohol dependence (HCC)- (present on admission)  Assessment & Plan  -As above..         VTE prophylaxis: VTE Selection    I have performed a physical exam and reviewed and updated ROS and Plan today (11/18/2024). In review of yesterday's note (11/17/2024), there are no changes except as documented above.

## 2024-11-18 NOTE — PROGRESS NOTES
Received patient from Night shift RN . Patient is awake and alert.On 0.5 liter via NC.Denies any n/v or pain. . Fall precaution in place, kept bed in lowest position and call light within reach.    1255-Discharge paper work reviewed with patient  at bedside.Copy given.Questions encouraged and aswered. I.V removed. Patient escorted to ED entrance via wheelchair.Patient discharge to home.

## 2024-11-18 NOTE — CARE PLAN
The patient is Stable - Low risk of patient condition declining or worsening    Shift Goals  Clinical Goals: CIWA monitoring throughout the shift. Pt will remain free from falls or injury throughout the shift.  Patient Goals: rest and sleep  Family Goals: n/a    Progress made toward(s) clinical / shift goals:  CIWA monitoring throughout the shift. Pt seen resting and sleeping comfortably in between rounds; even and unlabored breathing noted. Pt is free from falls or injury throughout the shift. Hourly rounding in progress.     Patient is not progressing towards the following goals: n/a

## 2024-11-18 NOTE — CARE PLAN
The patient is Stable - Low risk of patient condition declining or worsening    Shift Goals  Clinical Goals: Monitor CIWA, patient will remain free from fall  Patient Goals: rest comfortably  Family Goals: n/a    Progress made toward(s) clinical / shift goals:  Latest CIWA score is 0. Remain free from fall, fall precautions observe.    Patient is not progressing towards the following goals:NA

## 2024-11-18 NOTE — PROGRESS NOTES
Telemetry Shift Summary     Rhythm SR  HR Range 7-85  Ectopy fPVC/PAC;rare-occasionalBigem/Trigem/couplet  Measurements 0.14/0.08/0.34           Normal Values  Rhythm SR  HR Range    Measurements 0.12-0.20 / 0.06-0.10  / 0.30-0.52

## 2024-11-18 NOTE — DISCHARGE SUMMARY
Discharge Summary    CHIEF COMPLAINT ON ADMISSION  Chief Complaint   Patient presents with    Alcohol Intoxication     Pt BIB  for alcohol intoxication.  was worried pt would choke on her vomit. Pt started an outpatient rehab on Tuesday and went twice this week.  states pt is good at hiding her alcohol. Pt also took 6 pills of her librium tablets that was prescribed to her.         Reason for Admission  intoxicated     Admission Date  11/17/2024    CODE STATUS  Full Code    HPI & HOSPITAL COURSE  Yohana Brandon is a very pleasant 59-year-old woman with history of alcohol abuse who has been treated at [facility] for alcohol abuse.  She presented after taking some Librium she was prescribed but then subsequently drinking alcohol was brought into the hospital by her .  Alcohol level was 370 on admission she was still intoxicated.  She was found to be hyponatremic with sodium 123.  She was given a liter of fluids in the ED and admitted for further management of alcohol intoxication/withdrawal.  She required a brief period of treatment with CIWA/benzodiazepine however on 11/18 that morning her CIWA scores were minimal and she felt well and was eager for discharge.  Without any further interventions other than the liter of fluids in the ED her sodium corrected well though slightly quicker than goal.     Therefore, she is discharged in good and stable condition to home with close outpatient follow-up.    The patient met 2-midnight criteria for an inpatient stay at the time of discharge.    Discharge Date  11/18/2024    FOLLOW UP ITEMS POST DISCHARGE  Follow-up with PCP and [facility]    DISCHARGE DIAGNOSES  Principal Problem:    Hyponatremia (POA: Yes)  Active Problems:    Alcohol dependence (HCC) (POA: Yes)    Hypoxia (POA: Unknown)    Acute alcoholic intoxication without complication (HCC) (POA: Unknown)  Resolved Problems:    * No resolved hospital problems. *      FOLLOW UP  No  future appointments.  Deborah Moctezuma M.D.  [address] [place] [address]  [phone]    Schedule an appointment as soon as possible for a visit in 1 week(s)        MEDICATIONS ON DISCHARGE     Medication List        START taking these medications        Instructions   folic acid 1 MG Tabs  Start taking on: November 19, 2024  Commonly known as: Folvite   Take 1 Tablet by mouth every day.  Dose: 1 mg     multivitamin Tabs  Start taking on: November 19, 2024   Take 1 Tablet by mouth every day.  Dose: 1 Tablet     thiamine 100 MG tablet  Start taking on: November 19, 2024  Commonly known as: Thiamine   Take 1 Tablet by mouth every day for 30 days.  Dose: 100 mg            CONTINUE taking these medications        Instructions   aspirin 81 MG EC tablet   Take 81 mg by mouth every day.  Dose: 81 mg     Micardis HCT 80-25 MG per tablet  Generic drug: telmisartan-hydrochlorothiazide   Take 1 Tablet by mouth every day.  Dose: 1 Tablet     ondansetron 4 MG Tbdp  Commonly known as: Zofran ODT   Take 1 Tablet by mouth every 8 hours as needed for Nausea/Vomiting.  Dose: 4 mg     progesterone 100 MG Caps  Commonly known as: Prometrium   Take 100 mg by mouth every evening.  Dose: 100 mg     rosuvastatin 10 MG Tabs  Commonly known as: Crestor   Take 10 mg by mouth every evening.  Dose: 10 mg     thyroid 60 MG Tabs  Commonly known as: [place] Thyroid   Take 60 mg by mouth every day.  Dose: 60 mg            STOP taking these medications      chlordiazePOXIDE 25 MG Caps  Commonly known as: Librium     Non Formulary Request     Non Formulary Request              Allergies  Allergies   Allergen Reactions    Banana Shortness of Breath    Kiwi Extract Swelling    Latex Anaphylaxis    Morphine Hives    Other Food Hives and Shortness of Breath     Tropical fruit      Vicodin [Hydrocodone-Acetaminophen] Rash     Generalized rash, puritis     Flu Virus Vaccine Rash      Allergic to flu vaccine        DIET  Orders Placed This Encounter    Procedures    Diet Order Diet: Cardiac     Standing Status:   Standing     Number of Occurrences:   1     Order Specific Question:   Diet:     Answer:   Cardiac [6]       ACTIVITY  As tolerated.  Weight bearing as tolerated    CONSULTATIONS      PROCEDURES      LABORATORY  Lab Results   Component Value Date    SODIUM 135 11/18/2024    POTASSIUM 4.1 11/18/2024    CHLORIDE 98 11/18/2024    CO2 26 11/18/2024    GLUCOSE 121 (H) 11/18/2024    BUN 9 11/18/2024    CREATININE 0.73 11/18/2024    GLOMRATE 98 10/03/2023        Lab Results   Component Value Date    WBC 4.2 (L) 11/18/2024    HEMOGLOBIN 13.1 11/18/2024    HEMATOCRIT 38.1 11/18/2024    PLATELETCT 243 11/18/2024        Total time of the discharge process exceeds 41 minutes.

## 2024-11-18 NOTE — PROGRESS NOTES
Bedside report received from day shift nurse. Assumed patient care at 1910, Patient is assessed, awake , AxOx4. Labs and medication reviewed.Saline locked,Patient made no complaints at this time , calm  and no distress.Plan of care updated to patient. Fall precaution intervention in place.Bed locked and in low position. Call light and personal belongings within patient reach. Hourly rounding and ongoing nursing care continues.

## 2024-11-19 NOTE — PROGRESS NOTES
Late Entry    Tele strip at 0645 shows SR at 93.      Measurements from am strip were as follows:  FL=0.16  QRS=0.08  QT=0.36    Tele Shift Summary:    Rhythm : SR  Rate : 86-99  Ectopy : Per CCT Edie, pt had occasional to frequent PVC, rare to occasional trigeminy, rare couplets, rare bigeminy.     Telemetry monitoring strips placed in pt's chart.

## 2024-12-13 ENCOUNTER — APPOINTMENT (OUTPATIENT)
Dept: RADIOLOGY | Facility: MEDICAL CENTER | Age: 59
End: 2024-12-13
Attending: STUDENT IN AN ORGANIZED HEALTH CARE EDUCATION/TRAINING PROGRAM
Payer: COMMERCIAL

## 2024-12-13 ENCOUNTER — HOSPITAL ENCOUNTER (EMERGENCY)
Facility: MEDICAL CENTER | Age: 59
End: 2024-12-14
Attending: STUDENT IN AN ORGANIZED HEALTH CARE EDUCATION/TRAINING PROGRAM
Payer: COMMERCIAL

## 2024-12-13 DIAGNOSIS — R05.2 SUBACUTE COUGH: ICD-10-CM

## 2024-12-13 DIAGNOSIS — E87.1 HYPONATREMIA: ICD-10-CM

## 2024-12-13 DIAGNOSIS — J45.21 MILD INTERMITTENT ASTHMA WITH ACUTE EXACERBATION: ICD-10-CM

## 2024-12-13 DIAGNOSIS — F10.920 ACUTE ALCOHOLIC INTOXICATION WITHOUT COMPLICATION (HCC): ICD-10-CM

## 2024-12-13 LAB
ALBUMIN SERPL BCP-MCNC: 4.5 G/DL (ref 3.2–4.9)
ALBUMIN/GLOB SERPL: 1.4 G/DL
ALP SERPL-CCNC: 95 U/L (ref 30–99)
ALT SERPL-CCNC: 36 U/L (ref 2–50)
ANION GAP SERPL CALC-SCNC: 20 MMOL/L (ref 7–16)
AST SERPL-CCNC: 48 U/L (ref 12–45)
BASOPHILS # BLD AUTO: 2.4 % (ref 0–1.8)
BASOPHILS # BLD: 0.07 K/UL (ref 0–0.12)
BILIRUB SERPL-MCNC: 0.3 MG/DL (ref 0.1–1.5)
BUN SERPL-MCNC: 11 MG/DL (ref 8–22)
CALCIUM ALBUM COR SERPL-MCNC: 8.4 MG/DL (ref 8.5–10.5)
CALCIUM SERPL-MCNC: 8.8 MG/DL (ref 8.4–10.2)
CHLORIDE SERPL-SCNC: 90 MMOL/L (ref 96–112)
CO2 SERPL-SCNC: 20 MMOL/L (ref 20–33)
CREAT SERPL-MCNC: 0.78 MG/DL (ref 0.5–1.4)
EKG IMPRESSION: NORMAL
EOSINOPHIL # BLD AUTO: 0.07 K/UL (ref 0–0.51)
EOSINOPHIL NFR BLD: 2.4 % (ref 0–6.9)
ERYTHROCYTE [DISTWIDTH] IN BLOOD BY AUTOMATED COUNT: 45.5 FL (ref 35.9–50)
ETHANOL BLD-MCNC: 383.2 MG/DL
FLUAV RNA SPEC QL NAA+PROBE: NEGATIVE
FLUBV RNA SPEC QL NAA+PROBE: NEGATIVE
GFR SERPLBLD CREATININE-BSD FMLA CKD-EPI: 88 ML/MIN/1.73 M 2
GLOBULIN SER CALC-MCNC: 3.2 G/DL (ref 1.9–3.5)
GLUCOSE SERPL-MCNC: 83 MG/DL (ref 65–99)
HCT VFR BLD AUTO: 41.1 % (ref 37–47)
HGB BLD-MCNC: 14.6 G/DL (ref 12–16)
IMM GRANULOCYTES # BLD AUTO: 0.01 K/UL (ref 0–0.11)
IMM GRANULOCYTES NFR BLD AUTO: 0.3 % (ref 0–0.9)
LYMPHOCYTES # BLD AUTO: 1.48 K/UL (ref 1–4.8)
LYMPHOCYTES NFR BLD: 50.7 % (ref 22–41)
MCH RBC QN AUTO: 34.4 PG (ref 27–33)
MCHC RBC AUTO-ENTMCNC: 35.5 G/DL (ref 32.2–35.5)
MCV RBC AUTO: 96.9 FL (ref 81.4–97.8)
MONOCYTES # BLD AUTO: 0.29 K/UL (ref 0–0.85)
MONOCYTES NFR BLD AUTO: 9.9 % (ref 0–13.4)
NEUTROPHILS # BLD AUTO: 1 K/UL (ref 1.82–7.42)
NEUTROPHILS NFR BLD: 34.3 % (ref 44–72)
NRBC # BLD AUTO: 0 K/UL
NRBC BLD-RTO: 0 /100 WBC (ref 0–0.2)
NT-PROBNP SERPL IA-MCNC: <36 PG/ML (ref 0–125)
PLATELET # BLD AUTO: 278 K/UL (ref 164–446)
PMV BLD AUTO: 9 FL (ref 9–12.9)
POTASSIUM SERPL-SCNC: 5 MMOL/L (ref 3.6–5.5)
PROT SERPL-MCNC: 7.7 G/DL (ref 6–8.2)
RBC # BLD AUTO: 4.24 M/UL (ref 4.2–5.4)
RSV RNA SPEC QL NAA+PROBE: NEGATIVE
SARS-COV-2 RNA RESP QL NAA+PROBE: NOTDETECTED
SODIUM SERPL-SCNC: 130 MMOL/L (ref 135–145)
SPECIMEN SOURCE: NORMAL
WBC # BLD AUTO: 2.9 K/UL (ref 4.8–10.8)

## 2024-12-13 PROCEDURE — 87798 DETECT AGENT NOS DNA AMP: CPT

## 2024-12-13 PROCEDURE — 99284 EMERGENCY DEPT VISIT MOD MDM: CPT

## 2024-12-13 PROCEDURE — 93005 ELECTROCARDIOGRAM TRACING: CPT | Mod: TC | Performed by: STUDENT IN AN ORGANIZED HEALTH CARE EDUCATION/TRAINING PROGRAM

## 2024-12-13 PROCEDURE — 700101 HCHG RX REV CODE 250: Performed by: STUDENT IN AN ORGANIZED HEALTH CARE EDUCATION/TRAINING PROGRAM

## 2024-12-13 PROCEDURE — 36415 COLL VENOUS BLD VENIPUNCTURE: CPT

## 2024-12-13 PROCEDURE — 82077 ASSAY SPEC XCP UR&BREATH IA: CPT

## 2024-12-13 PROCEDURE — 94640 AIRWAY INHALATION TREATMENT: CPT

## 2024-12-13 PROCEDURE — 83880 ASSAY OF NATRIURETIC PEPTIDE: CPT

## 2024-12-13 PROCEDURE — 80053 COMPREHEN METABOLIC PANEL: CPT

## 2024-12-13 PROCEDURE — 700111 HCHG RX REV CODE 636 W/ 250 OVERRIDE (IP): Performed by: STUDENT IN AN ORGANIZED HEALTH CARE EDUCATION/TRAINING PROGRAM

## 2024-12-13 PROCEDURE — 71045 X-RAY EXAM CHEST 1 VIEW: CPT

## 2024-12-13 PROCEDURE — 0241U HCHG SARS-COV-2 COVID-19 NFCT DS RESP RNA 4 TRGT MIC: CPT

## 2024-12-13 PROCEDURE — 85025 COMPLETE CBC W/AUTO DIFF WBC: CPT

## 2024-12-13 RX ORDER — PREDNISONE 10 MG/1
40 TABLET ORAL ONCE
Status: COMPLETED | OUTPATIENT
Start: 2024-12-13 | End: 2024-12-13

## 2024-12-13 RX ORDER — IPRATROPIUM BROMIDE AND ALBUTEROL SULFATE 2.5; .5 MG/3ML; MG/3ML
3 SOLUTION RESPIRATORY (INHALATION) ONCE
Status: COMPLETED | OUTPATIENT
Start: 2024-12-13 | End: 2024-12-13

## 2024-12-13 RX ADMIN — IPRATROPIUM BROMIDE AND ALBUTEROL SULFATE 3 ML: .5; 3 SOLUTION RESPIRATORY (INHALATION) at 22:41

## 2024-12-13 RX ADMIN — PREDNISONE 40 MG: 10 TABLET ORAL at 23:23

## 2024-12-13 ASSESSMENT — FIBROSIS 4 INDEX: FIB4 SCORE: 1.48

## 2024-12-14 VITALS
HEIGHT: 68 IN | BODY MASS INDEX: 23.66 KG/M2 | HEART RATE: 94 BPM | TEMPERATURE: 98 F | SYSTOLIC BLOOD PRESSURE: 134 MMHG | RESPIRATION RATE: 18 BRPM | OXYGEN SATURATION: 93 % | WEIGHT: 156.09 LBS | DIASTOLIC BLOOD PRESSURE: 73 MMHG

## 2024-12-14 RX ORDER — PREDNISONE 20 MG/1
40 TABLET ORAL DAILY
Qty: 8 TABLET | Refills: 0 | Status: SHIPPED | OUTPATIENT
Start: 2024-12-14 | End: 2024-12-18

## 2024-12-14 NOTE — ED TRIAGE NOTES
"Chief Complaint   Patient presents with    Cough     Dry, frequent cough began 2 days ago. Pt denies trouble breathing, SOB, fevers/chills, or congestion. Pt reports she has had a similar cough before. Pt has a history of asthma.   Visitor reports \"pt self medicates with wine, she has had 3 bottle of wine today.\"     /67   Pulse 98   Temp 36.7 °C (98 °F) (Temporal)   Resp 20   Ht 1.727 m (5' 8\")   Wt 70.8 kg (156 lb 1.4 oz)   LMP 09/14/2006   SpO2 94%   BMI 23.73 kg/m²     Pt is 94% on room air.   "

## 2024-12-14 NOTE — ED NOTES
Patient able to ambulate without difficulty, and remained above 93% oxygen saturation the whole time.

## 2024-12-14 NOTE — ED PROVIDER NOTES
"ED Provider Note    CHIEF COMPLAINT  Chief Complaint   Patient presents with    Cough     Dry, frequent cough began 2 days ago. Pt denies trouble breathing, SOB, fevers/chills, or congestion. Pt reports she has had a similar cough before. Pt has a history of asthma.   Visitor reports \"pt self medicates with wine, she has had 3 bottle of wine today.\"       EXTERNAL RECORDS REVIEWED  She has a history of alcohol use. Recently admitted for alcohol use disorder and hyponatremia    HPI/ROS  LIMITATION TO HISTORY   Intoxication  OUTSIDE HISTORIAN(S):   at bedside    Yohana Brandon is a 59 y.o. female who presents with cough.   says that she has had a cough for more than a week it will go away and then come back.  He says that it seems to be getting worse over the past week.  He says initially it was dry but now it sounds ' deeper with more wheezing'.  She does not have any associated fever or upper respiratory symptoms.  No sick contacts.  No lower extremity swelling.  No chest pain.  She does have a history of asthma but does not use inhalers regularly.  She is concerned about 'whooping cough' although she has had Tdap vaccine.  She has been self-medicating with alcohol.  Patient is too intoxicated to answer specific questions regarding her alcohol use, last use or number of drinks.  states that she has had three bottles of wine today.     PAST MEDICAL HISTORY   has a past medical history of Alcohol abuse, Alcohol abuse, ASTHMA, Elevated LFTs, Heart palpitations, Hypertension, and Psychiatric disorder.    SURGICAL HISTORY   has a past surgical history that includes appendectomy (); endometrial ablation, thermal (); remove tonsils/adenoids,<13 y/o (); anesth,surg breast reconstructive (); hemorrhoidectomy (2011); anal sphincterotomy (2011); bunionectomy ();  delivery+postpartum care; total hip arthroplasty (Right, 2021); and closed reduction (Right, " "2/9/2022).    FAMILY HISTORY  Family History   Problem Relation Age of Onset    Stroke Mother        SOCIAL HISTORY  Social History     Tobacco Use    Smoking status: Never    Smokeless tobacco: Never   Vaping Use    Vaping status: Never Used   Substance and Sexual Activity    Alcohol use: Yes     Comment: pt unable to answer, spouse states 3 bottles of wine today    Drug use: No    Sexual activity: Not on file       CURRENT MEDICATIONS  Home Medications    **Home medications have not yet been reviewed for this encounter**         ALLERGIES  Allergies   Allergen Reactions    Banana Shortness of Breath    Kiwi Extract Swelling    Latex Anaphylaxis    Morphine Hives    Other Food Hives and Shortness of Breath     Tropical fruit      Vicodin [Hydrocodone-Acetaminophen] Rash     Generalized rash, puritis     Flu Virus Vaccine Rash      Allergic to flu vaccine        PHYSICAL EXAM  VITAL SIGNS: /73   Pulse 94   Temp 36.7 °C (98 °F) (Temporal)   Resp 18   Ht 1.727 m (5' 8\")   Wt 70.8 kg (156 lb 1.4 oz)   LMP 09/14/2006   SpO2 93%   BMI 23.73 kg/m²    Constitutional: Awake and alert Non toxic  HENT: Normal inspection  Moist mucous membranes  Eyes: Normal inspection  Neck: Grossly normal range of motion.  Cardiovascular: Normal heart rate, Normal rhythm.  Symmetric peripheral pulses.   Thorax & Lungs: No respiratory distress, She has faint expiratory wheezing bilaterally   Abdomen: Soft, non-distended, nontender to palpation in all 4 quadrants, no mass  Skin: No obvious rash.  Extremities: Warm, well perfused. No clubbing, cyanosis, edema   Neurologic: Grossly normal   Psychiatric: Normal for situation      EKG/LABS  Results for orders placed or performed during the hospital encounter of 12/13/24   CoV-2, Flu A/B, And RSV by PCR ([facility])    Collection Time: 12/13/24 10:32 PM    Specimen: Nasal; Respirate   Result Value Ref Range    Influenza virus A RNA Negative Negative    Influenza virus B, PCR Negative " Negative    RSV, PCR Negative Negative    SARS-CoV-2 by PCR NotDetected     SARS-CoV-2 Source NP Swab    CBC WITH DIFFERENTIAL    Collection Time: 12/13/24 10:51 PM   Result Value Ref Range    WBC 2.9 (L) 4.8 - 10.8 K/uL    RBC 4.24 4.20 - 5.40 M/uL    Hemoglobin 14.6 12.0 - 16.0 g/dL    Hematocrit 41.1 37.0 - 47.0 %    MCV 96.9 81.4 - 97.8 fL    MCH 34.4 (H) 27.0 - 33.0 pg    MCHC 35.5 32.2 - 35.5 g/dL    RDW 45.5 35.9 - 50.0 fL    Platelet Count 278 164 - 446 K/uL    MPV 9.0 9.0 - 12.9 fL    Neutrophils-Polys 34.30 (L) 44.00 - 72.00 %    Lymphocytes 50.70 (H) 22.00 - 41.00 %    Monocytes 9.90 0.00 - 13.40 %    Eosinophils 2.40 0.00 - 6.90 %    Basophils 2.40 (H) 0.00 - 1.80 %    Immature Granulocytes 0.30 0.00 - 0.90 %    Nucleated RBC 0.00 0.00 - 0.20 /100 WBC    Neutrophils (Absolute) 1.00 (L) 1.82 - 7.42 K/uL    Lymphs (Absolute) 1.48 1.00 - 4.80 K/uL    Monos (Absolute) 0.29 0.00 - 0.85 K/uL    Eos (Absolute) 0.07 0.00 - 0.51 K/uL    Baso (Absolute) 0.07 0.00 - 0.12 K/uL    Immature Granulocytes (abs) 0.01 0.00 - 0.11 K/uL    NRBC (Absolute) 0.00 K/uL   COMP METABOLIC PANEL    Collection Time: 12/13/24 10:51 PM   Result Value Ref Range    Sodium 130 (L) 135 - 145 mmol/L    Potassium 5.0 3.6 - 5.5 mmol/L    Chloride 90 (L) 96 - 112 mmol/L    Co2 20 20 - 33 mmol/L    Anion Gap 20.0 (H) 7.0 - 16.0    Glucose 83 65 - 99 mg/dL    Bun 11 8 - 22 mg/dL    Creatinine 0.78 0.50 - 1.40 mg/dL    Calcium 8.8 8.4 - 10.2 mg/dL    Correct Calcium 8.4 (L) 8.5 - 10.5 mg/dL    AST(SGOT) 48 (H) 12 - 45 U/L    ALT(SGPT) 36 2 - 50 U/L    Alkaline Phosphatase 95 30 - 99 U/L    Total Bilirubin 0.3 0.1 - 1.5 mg/dL    Albumin 4.5 3.2 - 4.9 g/dL    Total Protein 7.7 6.0 - 8.2 g/dL    Globulin 3.2 1.9 - 3.5 g/dL    A-G Ratio 1.4 g/dL   proBrain Natriuretic Peptide, NT    Collection Time: 12/13/24 10:51 PM   Result Value Ref Range    NT-proBNP <36 0 - 125 pg/mL   ETHYL ALCOHOL (BLOOD)    Collection Time: 12/13/24 10:51 PM   Result Value  Ref Range    Diagnostic Alcohol 383.2 (H) <10.1 mg/dL   ESTIMATED GFR    Collection Time: 24 10:51 PM   Result Value Ref Range    GFR (CKD-EPI) 88 >60 mL/min/1.73 m 2   EKG    Collection Time: 24 11:02 PM   Result Value Ref Range    Report       [facility] Emergency Dept.    Test Date:  2024  Pt Name:    SONIA DAMON          Department: [facility]  MRN:        [id number]                      Room:       -ROOM 6  Gender:     Female                       Technician: NIKHIL  :        [id number]                   Requested By:HILLARY SOUZA  Order #:    [id number]                    Reading MD:    Measurements  Intervals                                Axis  Rate:       99                           P:          68  MS:         142                          QRS:        -26  QRSD:       83                           T:          65  QT:         383  QTc:        492    Interpretive Statements  Sinus tachycardia  Multiple premature complexes, vent & supraven  Borderline left axis deviation  Borderline low voltage, extremity leads  Borderline prolonged QT interval  Compared to ECG 2024 04:22:04  Sinus rhythm no longer present  ST (T wave) deviation no longer present         I have independently interpreted this EKG    RADIOLOGY/PROCEDURES   I have independently interpreted the diagnostic imaging associated with this visit and am waiting the final reading from the radiologist.   My preliminary interpretation is as follows: No focal infiltrate    Radiologist interpretation:  DX-CHEST-PORTABLE (1 VIEW)   Final Result         1.  No acute cardiopulmonary disease.          COURSE & MEDICAL DECISION MAKING    ASSESSMENT, COURSE AND PLAN  Care Narrative: This is a 59-year-old with a history of alcohol use, recently admitted for hyponatremia who presents with one week of cough.  On arrival she is intoxicated but has normal vital signs, she has no signs of increased work of breathing and is  satting well on room air.  She is speaking in full sentences without signs of respiratory failure.  She does have some faint wheezing on exam does report a history of asthma.  Her labs are notable for hyponatremia although improved from prior.  She does have an elevated anion gap I suspect in the setting of alcohol use.  She is also leukopenic though this also appears chronic.  Her viral swab is negative.  Patient concerned about pertussis and pertussis PCR was sent an abundance of caution and is pending.  Her chest x-ray shows no focal infiltrate to suggest a bacterial pneumonia.  She has no chest pain or risk factors for PE and seems very unlikely in this clinical context.  I did obtain a BNP which is unremarkable.  She also does not appear grossly volume overloaded and no no history of heart failure. It seems she has a mild asthma exacerbation she was given steroids and a DuoNeb with significant improvement.  She says that she is no longer coughing and feel that her breathing is significantly improved . Patient was quite intoxicated her alcohol level was nearly 400. She had brief desaturation shortly after arrival when sleeping but  she was observed for a period of time after this and was able to ambulate without any hypoxia.  I will give her prescription to go home with for steroid burst, inhaler.  I had a cherri conversation with the patient and her  about my concern of her elevated blood alcohol level.  They are working as an outpatient to get her on medications and also into detox facilities.  Her   is clinically sober, is very reliable and feels comfortable watching her at home and does not feel the need for us to observe her in the ER whilst she alfonzo.  He will bring her back to the ER if she has any worsening cough, shortness of breath, chest pain or other concern             DISPOSITION AND DISCUSSIONS  I have discussed management of the patient with the following physicians and TONJA's:   None    Discussion of management with other Q or appropriate source(s): None     Escalation of care considered, and ultimately not performed:acute inpatient care management, however at this time, the patient is most appropriate for outpatient management    Barriers to care at this time, including but not limited to:  None .     Decision tools and prescription drugs considered including, but not limited to:  None .    FINAL DIAGNOSIS  1. Subacute cough Acute   2. Mild intermittent asthma with acute exacerbation Acute   3. Acute alcoholic intoxication without complication (HCC) Acute   4. Hyponatremia Acute        Electronically signed by: Janene William M.D., 12/13/2024 10:22 PM

## 2024-12-14 NOTE — ED NOTES
"Pt was brought into the ER via wheelchair with c/o cough    Pt stated that she's has had a cough for about a week and can't seem to shake it off. Pt also mentioned that she drinks wine to help her sleep because coughing wont let her sleep.   Pt believes she has \"whooping cough\".   "

## 2024-12-18 LAB — B PERT DNA SPEC QL NAA+PROBE: NORMAL

## 2024-12-21 ENCOUNTER — APPOINTMENT (OUTPATIENT)
Dept: RADIOLOGY | Facility: MEDICAL CENTER | Age: 59
DRG: 141 | End: 2024-12-21
Attending: EMERGENCY MEDICINE
Payer: COMMERCIAL

## 2024-12-21 ENCOUNTER — ANESTHESIA EVENT (OUTPATIENT)
Dept: SURGERY | Facility: MEDICAL CENTER | Age: 59
DRG: 141 | End: 2024-12-21
Payer: COMMERCIAL

## 2024-12-21 ENCOUNTER — HOSPITAL ENCOUNTER (INPATIENT)
Facility: MEDICAL CENTER | Age: 59
LOS: 2 days | DRG: 141 | End: 2024-12-23
Attending: EMERGENCY MEDICINE | Admitting: INTERNAL MEDICINE
Payer: COMMERCIAL

## 2024-12-21 DIAGNOSIS — F10.929 ACUTE ALCOHOLIC INTOXICATION WITH COMPLICATION (HCC): ICD-10-CM

## 2024-12-21 DIAGNOSIS — S01.81XA CHIN LACERATION, INITIAL ENCOUNTER: ICD-10-CM
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
## 2024-12-21 DIAGNOSIS — F10.20 ALCOHOLISM (HCC): ICD-10-CM

## 2024-12-21 DIAGNOSIS — R09.02 HYPOXIA: ICD-10-CM

## 2024-12-21 DIAGNOSIS — S02.609A CLOSED FRACTURE OF MANDIBLE, UNSPECIFIED LATERALITY, UNSPECIFIED MANDIBULAR SITE, INITIAL ENCOUNTER (HCC): ICD-10-CM

## 2024-12-21 PROBLEM — D53.9 MACROCYTIC ANEMIA: Status: ACTIVE | Noted: 2024-12-21

## 2024-12-21 PROBLEM — E78.5 DYSLIPIDEMIA: Status: ACTIVE | Noted: 2024-12-21

## 2024-12-21 PROBLEM — R74.01 TRANSAMINITIS: Status: ACTIVE | Noted: 2024-12-21

## 2024-12-21 LAB
ALBUMIN SERPL BCP-MCNC: 3.9 G/DL (ref 3.2–4.9)
ALBUMIN/GLOB SERPL: 1.6 G/DL
ALP SERPL-CCNC: 68 U/L (ref 30–99)
ALT SERPL-CCNC: 56 U/L (ref 2–50)
ANION GAP SERPL CALC-SCNC: 15 MMOL/L (ref 7–16)
APTT PPP: 26.3 SEC (ref 24.7–36)
AST SERPL-CCNC: 58 U/L (ref 12–45)
BASOPHILS # BLD AUTO: 0.6 % (ref 0–1.8)
BASOPHILS # BLD: 0.03 K/UL (ref 0–0.12)
BILIRUB SERPL-MCNC: <0.2 MG/DL (ref 0.1–1.5)
BUN SERPL-MCNC: 16 MG/DL (ref 8–22)
CALCIUM ALBUM COR SERPL-MCNC: 8.9 MG/DL (ref 8.5–10.5)
CALCIUM SERPL-MCNC: 8.8 MG/DL (ref 8.5–10.5)
CHLORIDE SERPL-SCNC: 96 MMOL/L (ref 96–112)
CO2 SERPL-SCNC: 23 MMOL/L (ref 20–33)
CREAT SERPL-MCNC: 0.96 MG/DL (ref 0.5–1.4)
EOSINOPHIL # BLD AUTO: 0.08 K/UL (ref 0–0.51)
EOSINOPHIL NFR BLD: 1.6 % (ref 0–6.9)
ERYTHROCYTE [DISTWIDTH] IN BLOOD BY AUTOMATED COUNT: 45.6 FL (ref 35.9–50)
ETHANOL BLD-MCNC: 296 MG/DL
GFR SERPLBLD CREATININE-BSD FMLA CKD-EPI: 69 ML/MIN/1.73 M 2
GLOBULIN SER CALC-MCNC: 2.5 G/DL (ref 1.9–3.5)
GLUCOSE SERPL-MCNC: 97 MG/DL (ref 65–99)
HCT VFR BLD AUTO: 33.3 % (ref 37–47)
HGB BLD-MCNC: 11.7 G/DL (ref 12–16)
IMM GRANULOCYTES # BLD AUTO: 0.05 K/UL (ref 0–0.11)
IMM GRANULOCYTES NFR BLD AUTO: 1 % (ref 0–0.9)
INR PPP: 1.06 (ref 0.87–1.13)
LYMPHOCYTES # BLD AUTO: 1.42 K/UL (ref 1–4.8)
LYMPHOCYTES NFR BLD: 27.8 % (ref 22–41)
MCH RBC QN AUTO: 34.7 PG (ref 27–33)
MCHC RBC AUTO-ENTMCNC: 35.1 G/DL (ref 32.2–35.5)
MCV RBC AUTO: 98.8 FL (ref 81.4–97.8)
MONOCYTES # BLD AUTO: 0.47 K/UL (ref 0–0.85)
MONOCYTES NFR BLD AUTO: 9.2 % (ref 0–13.4)
NEUTROPHILS # BLD AUTO: 3.06 K/UL (ref 1.82–7.42)
NEUTROPHILS NFR BLD: 59.8 % (ref 44–72)
NRBC # BLD AUTO: 0 K/UL
NRBC BLD-RTO: 0 /100 WBC (ref 0–0.2)
PLATELET # BLD AUTO: 183 K/UL (ref 164–446)
PMV BLD AUTO: 9.7 FL (ref 9–12.9)
POTASSIUM SERPL-SCNC: 3 MMOL/L (ref 3.6–5.5)
PROT SERPL-MCNC: 6.4 G/DL (ref 6–8.2)
PROTHROMBIN TIME: 13.8 SEC (ref 12–14.6)
RBC # BLD AUTO: 3.37 M/UL (ref 4.2–5.4)
SODIUM SERPL-SCNC: 134 MMOL/L (ref 135–145)
WBC # BLD AUTO: 5.1 K/UL (ref 4.8–10.8)

## 2024-12-21 PROCEDURE — 85025 COMPLETE CBC W/AUTO DIFF WBC: CPT

## 2024-12-21 PROCEDURE — 80053 COMPREHEN METABOLIC PANEL: CPT

## 2024-12-21 PROCEDURE — 70486 CT MAXILLOFACIAL W/O DYE: CPT

## 2024-12-21 PROCEDURE — 72125 CT NECK SPINE W/O DYE: CPT

## 2024-12-21 PROCEDURE — A9270 NON-COVERED ITEM OR SERVICE: HCPCS | Performed by: INTERNAL MEDICINE

## 2024-12-21 PROCEDURE — 96375 TX/PRO/DX INJ NEW DRUG ADDON: CPT

## 2024-12-21 PROCEDURE — 99222 1ST HOSP IP/OBS MODERATE 55: CPT | Performed by: SURGERY

## 2024-12-21 PROCEDURE — 700105 HCHG RX REV CODE 258: Performed by: DENTIST

## 2024-12-21 PROCEDURE — 82077 ASSAY SPEC XCP UR&BREATH IA: CPT

## 2024-12-21 PROCEDURE — 700105 HCHG RX REV CODE 258: Performed by: INTERNAL MEDICINE

## 2024-12-21 PROCEDURE — 85730 THROMBOPLASTIN TIME PARTIAL: CPT

## 2024-12-21 PROCEDURE — 700111 HCHG RX REV CODE 636 W/ 250 OVERRIDE (IP): Performed by: INTERNAL MEDICINE

## 2024-12-21 PROCEDURE — 700111 HCHG RX REV CODE 636 W/ 250 OVERRIDE (IP): Performed by: EMERGENCY MEDICINE

## 2024-12-21 PROCEDURE — 700102 HCHG RX REV CODE 250 W/ 637 OVERRIDE(OP): Performed by: INTERNAL MEDICINE

## 2024-12-21 PROCEDURE — 85610 PROTHROMBIN TIME: CPT

## 2024-12-21 PROCEDURE — 70450 CT HEAD/BRAIN W/O DYE: CPT

## 2024-12-21 PROCEDURE — 99285 EMERGENCY DEPT VISIT HI MDM: CPT

## 2024-12-21 PROCEDURE — 700111 HCHG RX REV CODE 636 W/ 250 OVERRIDE (IP): Performed by: DENTIST

## 2024-12-21 PROCEDURE — 700105 HCHG RX REV CODE 258: Performed by: EMERGENCY MEDICINE

## 2024-12-21 PROCEDURE — 700101 HCHG RX REV CODE 250: Performed by: EMERGENCY MEDICINE

## 2024-12-21 PROCEDURE — 770001 HCHG ROOM/CARE - MED/SURG/GYN PRIV*

## 2024-12-21 PROCEDURE — 99223 1ST HOSP IP/OBS HIGH 75: CPT | Performed by: INTERNAL MEDICINE

## 2024-12-21 PROCEDURE — 36415 COLL VENOUS BLD VENIPUNCTURE: CPT

## 2024-12-21 PROCEDURE — 96365 THER/PROPH/DIAG IV INF INIT: CPT

## 2024-12-21 RX ORDER — PROMETHAZINE HYDROCHLORIDE 25 MG/1
12.5-25 TABLET ORAL EVERY 4 HOURS PRN
Status: DISCONTINUED | OUTPATIENT
Start: 2024-12-21 | End: 2024-12-23 | Stop reason: HOSPADM

## 2024-12-21 RX ORDER — GAUZE BANDAGE 2" X 2"
100 BANDAGE TOPICAL DAILY
Status: DISCONTINUED | OUTPATIENT
Start: 2024-12-21 | End: 2024-12-23 | Stop reason: HOSPADM

## 2024-12-21 RX ORDER — MORPHINE SULFATE 4 MG/ML
4 INJECTION INTRAVENOUS
Status: DISCONTINUED | OUTPATIENT
Start: 2024-12-21 | End: 2024-12-22

## 2024-12-21 RX ORDER — ALBUTEROL SULFATE AND BUDESONIDE 90; 80 UG/1; UG/1
1 AEROSOL, METERED RESPIRATORY (INHALATION) 4 TIMES DAILY
COMMUNITY
Start: 2024-10-15

## 2024-12-21 RX ORDER — OXYCODONE HYDROCHLORIDE 5 MG/1
5 TABLET ORAL
Status: DISCONTINUED | OUTPATIENT
Start: 2024-12-21 | End: 2024-12-22

## 2024-12-21 RX ORDER — AZITHROMYCIN 250 MG/1
500 TABLET, FILM COATED ORAL ONCE
Status: ON HOLD | COMMUNITY
End: 2024-12-23

## 2024-12-21 RX ORDER — TELMISARTAN 80 MG/1
80 TABLET ORAL
Status: DISCONTINUED | OUTPATIENT
Start: 2024-12-21 | End: 2024-12-23 | Stop reason: HOSPADM

## 2024-12-21 RX ORDER — LORAZEPAM 0.5 MG/1
0.5 TABLET ORAL EVERY 4 HOURS PRN
Status: DISCONTINUED | OUTPATIENT
Start: 2024-12-21 | End: 2024-12-23 | Stop reason: HOSPADM

## 2024-12-21 RX ORDER — PROMETHAZINE HYDROCHLORIDE 25 MG/1
12.5-25 SUPPOSITORY RECTAL EVERY 4 HOURS PRN
Status: DISCONTINUED | OUTPATIENT
Start: 2024-12-21 | End: 2024-12-23 | Stop reason: HOSPADM

## 2024-12-21 RX ORDER — LORAZEPAM 2 MG/ML
1 INJECTION INTRAMUSCULAR
Status: DISCONTINUED | OUTPATIENT
Start: 2024-12-21 | End: 2024-12-23 | Stop reason: HOSPADM

## 2024-12-21 RX ORDER — LORAZEPAM 1 MG/1
1 TABLET ORAL EVERY 4 HOURS PRN
Status: DISCONTINUED | OUTPATIENT
Start: 2024-12-21 | End: 2024-12-23 | Stop reason: HOSPADM

## 2024-12-21 RX ORDER — HYDROMORPHONE HYDROCHLORIDE 1 MG/ML
0.5 INJECTION, SOLUTION INTRAMUSCULAR; INTRAVENOUS; SUBCUTANEOUS ONCE
Status: COMPLETED | OUTPATIENT
Start: 2024-12-21 | End: 2024-12-21

## 2024-12-21 RX ORDER — LORAZEPAM 2 MG/ML
1.5 INJECTION INTRAMUSCULAR
Status: DISCONTINUED | OUTPATIENT
Start: 2024-12-21 | End: 2024-12-23 | Stop reason: HOSPADM

## 2024-12-21 RX ORDER — OXYCODONE HYDROCHLORIDE 10 MG/1
10 TABLET ORAL
Status: DISCONTINUED | OUTPATIENT
Start: 2024-12-21 | End: 2024-12-22

## 2024-12-21 RX ORDER — CHLORDIAZEPOXIDE HYDROCHLORIDE 25 MG/1
25 CAPSULE, GELATIN COATED ORAL EVERY 6 HOURS
Status: DISCONTINUED | OUTPATIENT
Start: 2024-12-22 | End: 2024-12-23

## 2024-12-21 RX ORDER — LORAZEPAM 2 MG/ML
0.5 INJECTION INTRAMUSCULAR EVERY 4 HOURS PRN
Status: DISCONTINUED | OUTPATIENT
Start: 2024-12-21 | End: 2024-12-23 | Stop reason: HOSPADM

## 2024-12-21 RX ORDER — ROSUVASTATIN CALCIUM 10 MG/1
10 TABLET, COATED ORAL EVERY EVENING
Status: DISCONTINUED | OUTPATIENT
Start: 2024-12-21 | End: 2024-12-23 | Stop reason: HOSPADM

## 2024-12-21 RX ORDER — LABETALOL HYDROCHLORIDE 5 MG/ML
10 INJECTION, SOLUTION INTRAVENOUS EVERY 4 HOURS PRN
Status: DISCONTINUED | OUTPATIENT
Start: 2024-12-21 | End: 2024-12-23 | Stop reason: HOSPADM

## 2024-12-21 RX ORDER — SODIUM CHLORIDE 9 MG/ML
INJECTION, SOLUTION INTRAVENOUS CONTINUOUS
Status: DISCONTINUED | OUTPATIENT
Start: 2024-12-21 | End: 2024-12-23

## 2024-12-21 RX ORDER — ACETAMINOPHEN 325 MG/1
650 TABLET ORAL EVERY 6 HOURS PRN
Status: DISCONTINUED | OUTPATIENT
Start: 2024-12-21 | End: 2024-12-23 | Stop reason: HOSPADM

## 2024-12-21 RX ORDER — CHLORDIAZEPOXIDE HYDROCHLORIDE 25 MG/1
50 CAPSULE, GELATIN COATED ORAL EVERY 6 HOURS
Status: DISPENSED | OUTPATIENT
Start: 2024-12-21 | End: 2024-12-22

## 2024-12-21 RX ORDER — PREDNISONE 20 MG/1
40 TABLET ORAL DAILY
Status: ON HOLD | COMMUNITY
Start: 2024-12-14 | End: 2024-12-23

## 2024-12-21 RX ORDER — HYDROCHLOROTHIAZIDE 25 MG/1
25 TABLET ORAL
Status: DISCONTINUED | OUTPATIENT
Start: 2024-12-21 | End: 2024-12-23 | Stop reason: HOSPADM

## 2024-12-21 RX ORDER — POTASSIUM CHLORIDE 7.45 MG/ML
10 INJECTION INTRAVENOUS
Status: COMPLETED | OUTPATIENT
Start: 2024-12-21 | End: 2024-12-21

## 2024-12-21 RX ORDER — ONDANSETRON 2 MG/ML
4 INJECTION INTRAMUSCULAR; INTRAVENOUS EVERY 4 HOURS PRN
Status: DISCONTINUED | OUTPATIENT
Start: 2024-12-21 | End: 2024-12-22

## 2024-12-21 RX ORDER — LORAZEPAM 2 MG/1
2 TABLET ORAL
Status: DISCONTINUED | OUTPATIENT
Start: 2024-12-21 | End: 2024-12-23 | Stop reason: HOSPADM

## 2024-12-21 RX ORDER — LORAZEPAM 2 MG/ML
2 INJECTION INTRAMUSCULAR
Status: DISCONTINUED | OUTPATIENT
Start: 2024-12-21 | End: 2024-12-23 | Stop reason: HOSPADM

## 2024-12-21 RX ORDER — ONDANSETRON 2 MG/ML
4 INJECTION INTRAMUSCULAR; INTRAVENOUS ONCE
Status: COMPLETED | OUTPATIENT
Start: 2024-12-21 | End: 2024-12-21

## 2024-12-21 RX ORDER — TELMISARTAN AND HYDROCHLORTHIAZIDE 80; 25 MG/1; MG/1
1 TABLET ORAL DAILY
Status: DISCONTINUED | OUTPATIENT
Start: 2024-12-21 | End: 2024-12-21

## 2024-12-21 RX ORDER — LORAZEPAM 2 MG/1
4 TABLET ORAL
Status: DISCONTINUED | OUTPATIENT
Start: 2024-12-21 | End: 2024-12-23 | Stop reason: HOSPADM

## 2024-12-21 RX ORDER — PROCHLORPERAZINE EDISYLATE 5 MG/ML
5-10 INJECTION INTRAMUSCULAR; INTRAVENOUS EVERY 4 HOURS PRN
Status: DISCONTINUED | OUTPATIENT
Start: 2024-12-21 | End: 2024-12-23 | Stop reason: HOSPADM

## 2024-12-21 RX ORDER — IBUPROFEN 200 MG
400 TABLET ORAL
Status: ON HOLD | COMMUNITY
End: 2024-12-23

## 2024-12-21 RX ORDER — ONDANSETRON 4 MG/1
4 TABLET, ORALLY DISINTEGRATING ORAL EVERY 4 HOURS PRN
Status: DISCONTINUED | OUTPATIENT
Start: 2024-12-21 | End: 2024-12-23 | Stop reason: HOSPADM

## 2024-12-21 RX ORDER — FOLIC ACID 1 MG/1
1 TABLET ORAL DAILY
Status: DISCONTINUED | OUTPATIENT
Start: 2024-12-21 | End: 2024-12-23 | Stop reason: HOSPADM

## 2024-12-21 RX ADMIN — LORAZEPAM 1 MG: 2 INJECTION INTRAMUSCULAR; INTRAVENOUS at 19:15

## 2024-12-21 RX ADMIN — ONDANSETRON 4 MG: 2 INJECTION INTRAMUSCULAR; INTRAVENOUS at 11:14

## 2024-12-21 RX ADMIN — AMPICILLIN AND SULBACTAM 3 G: 1; 2 INJECTION, POWDER, FOR SOLUTION INTRAMUSCULAR; INTRAVENOUS at 10:19

## 2024-12-21 RX ADMIN — Medication 100 MG: at 15:15

## 2024-12-21 RX ADMIN — ONDANSETRON 4 MG: 2 INJECTION INTRAMUSCULAR; INTRAVENOUS at 17:40

## 2024-12-21 RX ADMIN — PROCHLORPERAZINE EDISYLATE 10 MG: 5 INJECTION INTRAMUSCULAR; INTRAVENOUS at 19:15

## 2024-12-21 RX ADMIN — CHLORDIAZEPOXIDE HYDROCHLORIDE 50 MG: 25 CAPSULE ORAL at 17:37

## 2024-12-21 RX ADMIN — CEFAZOLIN 2 G: 2 INJECTION, POWDER, FOR SOLUTION INTRAMUSCULAR; INTRAVENOUS at 22:40

## 2024-12-21 RX ADMIN — OXYCODONE HYDROCHLORIDE 10 MG: 10 TABLET ORAL at 20:44

## 2024-12-21 RX ADMIN — SODIUM CHLORIDE: 9 INJECTION, SOLUTION INTRAVENOUS at 15:24

## 2024-12-21 RX ADMIN — TELMISARTAN 80 MG: 80 TABLET ORAL at 15:15

## 2024-12-21 RX ADMIN — Medication 3 ML: at 09:48

## 2024-12-21 RX ADMIN — POTASSIUM CHLORIDE 10 MEQ: 7.46 INJECTION, SOLUTION INTRAVENOUS at 16:41

## 2024-12-21 RX ADMIN — HYDROCHLOROTHIAZIDE 25 MG: 25 TABLET ORAL at 15:16

## 2024-12-21 RX ADMIN — POTASSIUM CHLORIDE 10 MEQ: 7.46 INJECTION, SOLUTION INTRAVENOUS at 15:43

## 2024-12-21 RX ADMIN — THERA TABS 1 TABLET: TAB at 15:16

## 2024-12-21 RX ADMIN — HYDROMORPHONE HYDROCHLORIDE 0.5 MG: 1 INJECTION, SOLUTION INTRAMUSCULAR; INTRAVENOUS; SUBCUTANEOUS at 11:14

## 2024-12-21 RX ADMIN — FOLIC ACID 1 MG: 1 TABLET ORAL at 15:16

## 2024-12-21 RX ADMIN — OXYCODONE HYDROCHLORIDE 10 MG: 10 TABLET ORAL at 15:40

## 2024-12-21 RX ADMIN — POTASSIUM CHLORIDE 10 MEQ: 7.46 INJECTION, SOLUTION INTRAVENOUS at 18:43

## 2024-12-21 RX ADMIN — POTASSIUM CHLORIDE 10 MEQ: 7.46 INJECTION, SOLUTION INTRAVENOUS at 17:38

## 2024-12-21 RX ADMIN — CHLORDIAZEPOXIDE HYDROCHLORIDE 50 MG: 25 CAPSULE ORAL at 22:44

## 2024-12-21 RX ADMIN — CEFAZOLIN 2 G: 2 INJECTION, POWDER, FOR SOLUTION INTRAMUSCULAR; INTRAVENOUS at 15:25

## 2024-12-21 RX ADMIN — MORPHINE SULFATE 4 MG: 4 INJECTION INTRAVENOUS at 14:19

## 2024-12-21 RX ADMIN — ROSUVASTATIN CALCIUM 10 MG: 20 TABLET, FILM COATED ORAL at 17:37

## 2024-12-21 ASSESSMENT — ENCOUNTER SYMPTOMS
DIZZINESS: 0
HEADACHES: 0
CONSTIPATION: 0
DEPRESSION: 0
COUGH: 0
MYALGIAS: 0
FALLS: 1
CHILLS: 0
ABDOMINAL PAIN: 0
TINGLING: 0
LOSS OF CONSCIOUSNESS: 0
STRIDOR: 0
NAUSEA: 0
VOMITING: 0
SPUTUM PRODUCTION: 0
FEVER: 0
PALPITATIONS: 0
WEAKNESS: 0
SHORTNESS OF BREATH: 0
DIARRHEA: 0

## 2024-12-21 ASSESSMENT — LIFESTYLE VARIABLES
TOTAL SCORE: 13
ANXIETY: MILDLY ANXIOUS
AGITATION: NORMAL ACTIVITY
TOTAL SCORE: 4
ANXIETY: *
ORIENTATION AND CLOUDING OF SENSORIUM: ORIENTED AND CAN DO SERIAL ADDITIONS
AGITATION: NORMAL ACTIVITY
PAROXYSMAL SWEATS: NO SWEAT VISIBLE
NAUSEA AND VOMITING: NO NAUSEA AND NO VOMITING
ORIENTATION AND CLOUDING OF SENSORIUM: ORIENTED AND CAN DO SERIAL ADDITIONS
VISUAL DISTURBANCES: NOT PRESENT
TREMOR: *
AUDITORY DISTURBANCES: NOT PRESENT
PAROXYSMAL SWEATS: NO SWEAT VISIBLE
TREMOR: NO TREMOR
VISUAL DISTURBANCES: NOT PRESENT
NAUSEA AND VOMITING: *
AUDITORY DISTURBANCES: NOT PRESENT
HEADACHE, FULLNESS IN HEAD: NOT PRESENT
AGITATION: NORMAL ACTIVITY
ANXIETY: MODERATELY ANXIOUS OR GUARDED, SO ANXIETY IS INFERRED
PAROXYSMAL SWEATS: NO SWEAT VISIBLE
HEADACHE, FULLNESS IN HEAD: MODERATE
VISUAL DISTURBANCES: NOT PRESENT
ORIENTATION AND CLOUDING OF SENSORIUM: ORIENTED AND CAN DO SERIAL ADDITIONS
AUDITORY DISTURBANCES: NOT PRESENT
TOTAL SCORE: 3
TREMOR: *
SUBSTANCE_ABUSE: 1
NAUSEA AND VOMITING: NO NAUSEA AND NO VOMITING
HEADACHE, FULLNESS IN HEAD: MILD

## 2024-12-21 ASSESSMENT — PAIN DESCRIPTION - PAIN TYPE
TYPE: ACUTE PAIN;SURGICAL PAIN
TYPE: ACUTE PAIN

## 2024-12-21 ASSESSMENT — COGNITIVE AND FUNCTIONAL STATUS - GENERAL
MOBILITY SCORE: 24
SUGGESTED CMS G CODE MODIFIER MOBILITY: CH
DAILY ACTIVITIY SCORE: 24
SUGGESTED CMS G CODE MODIFIER DAILY ACTIVITY: CH

## 2024-12-21 ASSESSMENT — FIBROSIS 4 INDEX: FIB4 SCORE: 1.64

## 2024-12-21 NOTE — ASSESSMENT & PLAN NOTE
Bilateral mandibular fracture.  Oral surgery consult.  
Bilateral mandibular rami fractures and right maxillary bicuspid fractures  Trauma has seen the patient  OMFS following, NPO for surgery today, diet per surgery team   Prn pain control   
Continue home statin  
Continue home telmisartan-hydrochlorothiazide  Start as needed labetalol  Adjust as needed  
Daily, heavy alcohol use.  Refractory to treatment.  Likely to withdraw while under observation.  Admit medicine  
Due to alcohol intoxication  Resulting in mandibular and maxillary fractures  Fall precautions   
Hgb dropped from 14 to 10, No sign of gross bleeding  Monitor H/H Q12 hour, further workup if continues to down trend   
Mild, asymptomatic  Due to alcohol, nothing acute, no further workup  
No acute exacerbation  
Patient does have a significantly elevated alcohol level  Already in withdrawal with tremors  Start CIWA protocol as well as scheduled Librium  Patient is at high risk of worsening, if this does occur, consider upgrading to the IMCU for Precedex drip however that is not deemed necessary at this time  Start multivitamins  Associated with dehydration, start IV fluids  Cont. To monitor closely   
Start IV potassium  Repeat BMP in the morning  Mag low, IV replacement as well as phos   
Very mild, due to dehydration and alcohol abuse  Cont. IV fluids  
denies pain/discomfort (Rating = 0)

## 2024-12-21 NOTE — H&P
"TRAUMA HISTORY AND PHYSICAL    CHIEF COMPLAINT:     HISTORY OF PRESENT ILLNESS: The patient is a  59 year old  women who fell and struck her face.  CT shows bilateral mandible fractures.  She is amnesic of the event.  Now admitted for definitive therapy.  GISEL is 296.  The patient drinks daily and has been hospitalized in the past for withdrawal.  She began to withdraw within a few hours of not drinking alcohol.  Be      The patient was triaged as a non activation.         PAST MEDICAL HISTORY:  has a past medical history of Alcohol abuse, Alcohol abuse, ASTHMA, Elevated LFTs, Heart palpitations, Hypertension, and Psychiatric disorder.     PAST SURGICAL HISTORY:  has a past surgical history that includes appendectomy (); endometrial ablation, thermal (); remove tonsils/adenoids,<15 y/o (); anesth,surg breast reconstructive (); hemorrhoidectomy (2011); anal sphincterotomy (2011); bunionectomy ();  delivery+postpartum care; total hip arthroplasty (Right, 2021); and closed reduction (Right, 2022).     ALLERGIES:   Allergies   Allergen Reactions    Kiwi Extract Swelling    Morphine Hives    Other Food Hives and Shortness of Breath     Tropical fruit      Vicodin [Hydrocodone-Acetaminophen] Rash     Generalized rash, puritis     Flu Virus Vaccine Rash     Allergic to flu vaccine     Banana Unspecified     2024: Patient states \"I'm not allergic to that anymore\"    Latex Unspecified     2024: Patient states \"I'm not allergic to that anymore\"        CURRENT MEDICATIONS:   Home Medications       Reviewed by Radha Fairbanks R.N. (Registered Nurse) on 24 at 0921  Med List Status: Partial     Medication Last Dose Status   aspirin 81 MG EC tablet  Active   folic acid (FOLVITE) 1 MG Tab  Active   multivitamin Tab  Active   ondansetron (ZOFRAN ODT) 4 MG TABLET DISPERSIBLE  Active   progesterone (PROMETRIUM) 100 MG Cap  Active   rosuvastatin (CRESTOR) 10 MG Tab  " "Active   telmisartan-hydrochlorothiazide (MICARDIS HCT) 80-25 MG per tablet  Active   thyroid (ARMOUR THYROID) 60 MG Tab  Active                  Audit from Redirected Encounters    **Home medications have not yet been reviewed for this encounter**         FAMILY HISTORY:   Family History   Problem Relation Age of Onset    Stroke Mother         SOCIAL HISTORY:   Social History     Tobacco Use    Smoking status: Never    Smokeless tobacco: Never   Vaping Use    Vaping status: Never Used   Substance and Sexual Activity    Alcohol use: Yes     Comment: daily wine    Drug use: No    Sexual activity: Not on file       REVIEW OF SYSTEMS: Comprehensive review of systems is negative with the   exception of the aforementioned HPI, PMH, and PSH elements in accordance with CMS guidelines.     PHYSICAL EXAMINATION:     GENERAL: No distress  VITAL SIGNS: /76   Pulse 100   Temp 36.9 °C (98.5 °F) (Temporal)   Resp 14   Ht 1.727 m (5' 8\")   Wt 70.8 kg (156 lb)   SpO2 98%   HEAD AND NECK: Pupils:  Equal and Reactive    Facial:  Symmetrical.  Mandibular tenderness.  Occlusion off.  NECK: No JVD. Trachea midline. Cervical tenderness is  absent   CHEST: Breath sounds are equal. No sternal tenderness.  No  lateral rib tenderness.  CARDIOVASCULAR: Regular rhythm  ABDOMEN: Soft, no tenderness guarding or peritoneal findings.   PELVIS: Stable.  EXTREMITIES: Examination of the upper and lower extremities : No gross long bone or joint deformity.    BACK: No midline stepoffs.  No Tenderness  NEUROLOGIC: [place] Coma Score is 15.  No gross motor or sensory deficit.     LABORATORY VALUES:   Recent Labs     12/21/24  0939   WBC 5.1   RBC 3.37*   HEMOGLOBIN 11.7*   HEMATOCRIT 33.3*   MCV 98.8*   MCH 34.7*   MCHC 35.1   RDW 45.6   PLATELETCT 183   MPV 9.7     Recent Labs     12/21/24  0939   SODIUM 134*   POTASSIUM 3.0*   CHLORIDE 96   CO2 23   GLUCOSE 97   BUN 16   CREATININE 0.96   CALCIUM 8.8     Recent Labs     12/21/24  0939 "   ASTSGOT 58*   ALTSGPT 56*   TBILIRUBIN <0.2   ALKPHOSPHAT 68   GLOBULIN 2.5   INR 1.06     Recent Labs     12/21/24  0939   APTT 26.3   INR 1.06        IMAGING:   CT-MAXILLOFACIAL W/O PLUS RECONS   Final Result         1. Bilateral mandibular rami fractures. Mandible fracture anteriorly near the midline.      2. Fractures of right maxillary bicuspid and Emilia. Fracture of left mandibular bicuspid.      3. Extensive paranasal acute and chronic sinusitis. The middle ears and visible mastoid sinuses are clear.                  CT-CSPINE WITHOUT PLUS RECONS   Final Result         1. No cervical spine fracture or subluxation to T1 evident.      2. Severe degenerative changes.      3. Mandible fractures. See separate CT facial report.      CT-HEAD W/O   Final Result         1. No acute process in the brain evident.      2. Extensive acute and chronic paranasal sinusitis.      3. See separate CT facial report.                   Problems:     Mandible fracture (HCC)  Bilateral mandibular fracture.  Oral surgery consult.    Alcohol withdrawal (HCC)  Daily, heavy alcohol use.  Refractory to treatment.  Likely to withdraw while under observation.  Admit medicine      Assessment and Plan: Bilateral mandible fractures.  Oral surgery consult for definitive repair.  Impending alcohol withdrawal.  Isolated injury.  Admit to medicine for treatment of alcohol abuse and withdrawal.  Trauma to follow for tertiary survey.  Patient and  counseled.  ____________________________________   Damion Vale MD, FACS      DD: 12/21/2024   DT: 12:40 PM

## 2024-12-21 NOTE — H&P
Hospital Medicine History & Physical Note    Date of Service  12/21/2024    Primary Care Physician  Deborah Moctezuma M.D.    Consultants  Trauma    Specialist Names: Dr Vale    Code Status  Full Code    Chief Complaint  Chief Complaint   Patient presents with    GLF     Pt BIB [facility] for MGLF at home. +ETOH, +head strike, - thinners, -LOC. Pt has a laceration to chin about 2cm. Lower teeth feel loose with possible breaks.        History of Presenting Illness  Yohana Brandon is a 59 y.o. female who presented 12/21/2024 with ground-level fall with resultant jaw pain.  Patient at this time has significant pain with speaking, was able to answer questions but most of the information obtained from family at bedside.  Patient was awake this morning around 830, lost her balance and fell forward hitting her face on the tile floor.  Patient had instant pain, clear deformity so 911 was called.  Patient states she drinks 1.5 bottles of wine per day.  Family is unsure however because they were out of town until 1 this morning.  Patient upon arrival was noted to have a significant alcohol level, then began having alcohol withdrawal.  Multiple mandibular and maxillary fractures, seen by trauma surgery.  I did discuss the case including labs and imaging with the ER physician.    I discussed the plan of care with patient and family.    Review of Systems  Review of Systems   Constitutional:  Negative for chills, fever and malaise/fatigue.   HENT:  Negative for congestion.    Respiratory:  Negative for cough, sputum production, shortness of breath and stridor.    Cardiovascular:  Negative for chest pain, palpitations and leg swelling.   Gastrointestinal:  Negative for abdominal pain, constipation, diarrhea, nausea and vomiting.   Genitourinary:  Negative for dysuria and urgency.   Musculoskeletal:  Positive for falls and joint pain. Negative for myalgias.   Neurological:  Negative for dizziness, tingling, loss of consciousness,  "weakness and headaches.   Psychiatric/Behavioral:  Positive for substance abuse. Negative for depression and suicidal ideas.    All other systems reviewed and are negative.      Past Medical History   has a past medical history of Alcohol abuse, Alcohol abuse, ASTHMA, Elevated LFTs, Heart palpitations, Hypertension, and Psychiatric disorder.    Surgical History   has a past surgical history that includes appendectomy (); pr endometrial ablation, thermal (); pr remove tonsils/adenoids,<15 y/o (); pr anesth,surg breast reconstructive (); hemorrhoidectomy (2011); anal sphincterotomy (2011); bunionectomy (); pr  delivery+postpartum care; pr total hip arthroplasty (Right, 2021); and closed reduction (Right, 2022).     Family History  family history includes Stroke in her mother.   Family history reviewed with patient. There is no family history that is pertinent to the chief complaint.     Social History   reports that she has never smoked. She has never used smokeless tobacco. She reports current alcohol use. She reports that she does not use drugs.    Allergies  Allergies   Allergen Reactions    Kiwi Extract Swelling     2024: Patient unable to verify allergy / reaction. Reaction documented historically: \"Swelling\"    Flu Virus Vaccine Rash     Allergic to flu vaccine     Morphine Rash          Vicodin [Hydrocodone-Acetaminophen] Rash and Itching     2024: Patient states \"I'm not allergic to that, I don't know where that came from\"  Reaction documented historically: Generalized rash, pruritus    Banana Unspecified     2024: Patient states \"I'm not allergic to that anymore\"    Latex Unspecified     2024: Patient states \"I'm not allergic to that anymore\"       Medications  Prior to Admission Medications   Prescriptions Last Dose Informant Patient Reported? Taking?   AIRSUPRA 90-80 MCG/ACT Aerosol 2024 Patient Yes Yes   Sig: Inhale 1 Puff 4 " times a day.   aspirin 81 MG EC tablet 12/20/2024 Morning Patient Yes Yes   Sig: Take 81 mg by mouth every day.   azithromycin (ZITHROMAX) 250 MG Tab 12/19/2024 Patient Yes No   Sig: Take 500 mg by mouth one time. 2 tablets = 500 mg.   ibuprofen (ADVIL) 200 MG Tab 12/20/2024 Patient Yes Yes   Sig: Take 400 mg by mouth 1 time a day as needed for Mild Pain. 2 tablets = 400 mg.   multivitamin Tab 12/20/2024 Morning Patient No Yes   Sig: Take 1 Tablet by mouth every day.   ondansetron (ZOFRAN ODT) 4 MG TABLET DISPERSIBLE 12/18/2024 Patient No No   Sig: Take 1 Tablet by mouth every 8 hours as needed for Nausea/Vomiting.   predniSONE (DELTASONE) 20 MG Tab 12/17/2024 Patient Yes Yes   Sig: Take 40 mg by mouth every day. 2 tablets = 40 mg. 4 day course prescribed 12/14/2024. (FINISHED)   rosuvastatin (CRESTOR) 10 MG Tab 12/7/2024 Patient Yes No   Sig: Take 10 mg by mouth every evening.   telmisartan-hydrochlorothiazide (MICARDIS HCT) 80-25 MG per tablet 12/20/2024 Morning Patient Yes Yes   Sig: Take 1 Tablet by mouth every day.      Facility-Administered Medications: None       Physical Exam  Temp:  [36.9 °C (98.5 °F)] 36.9 °C (98.5 °F)  Pulse:  [] 100  Resp:  [14-18] 14  BP: ()/(55-76) 121/76  SpO2:  [95 %-98 %] 98 %  Blood Pressure: 121/76   Temperature: 36.9 °C (98.5 °F)   Pulse: 100   Respiration: 14   Pulse Oximetry: 98 %       Physical Exam  Vitals and nursing note reviewed.   Constitutional:       General: She is not in acute distress.     Appearance: She is well-developed. She is not diaphoretic.   HENT:      Head: Normocephalic.      Comments: Jaw laceration and deformity, tenderness with palpation and movement     Right Ear: External ear normal.      Left Ear: External ear normal.      Nose: Nose normal. No congestion or rhinorrhea.      Mouth/Throat:      Mouth: Mucous membranes are dry.      Pharynx: No oropharyngeal exudate.   Eyes:      General:         Right eye: No discharge.         Left eye: No  "discharge.   Neck:      Trachea: No tracheal deviation.   Cardiovascular:      Rate and Rhythm: Normal rate and regular rhythm.      Heart sounds: No murmur heard.     No friction rub. No gallop.   Pulmonary:      Effort: Pulmonary effort is normal. No respiratory distress.      Breath sounds: Normal breath sounds. No stridor. No wheezing or rales.   Chest:      Chest wall: No tenderness.   Abdominal:      General: Bowel sounds are normal. There is no distension.      Palpations: Abdomen is soft.      Tenderness: There is no abdominal tenderness.   Musculoskeletal:         General: No tenderness. Normal range of motion.      Cervical back: Neck supple.      Right lower leg: No edema.      Left lower leg: No edema.   Lymphadenopathy:      Cervical: No cervical adenopathy.   Skin:     General: Skin is warm and dry.      Findings: No erythema or rash.   Neurological:      Mental Status: She is alert and oriented to person, place, and time.      Cranial Nerves: No cranial nerve deficit.      Motor: Tremor present.      Comments: Awake, mostly nonverbal due to pain   Psychiatric:         Mood and Affect: Mood normal.         Behavior: Behavior normal.         Thought Content: Thought content normal.         Judgment: Judgment normal.         Laboratory:  Recent Labs     12/21/24  0939   WBC 5.1   RBC 3.37*   HEMOGLOBIN 11.7*   HEMATOCRIT 33.3*   MCV 98.8*   MCH 34.7*   MCHC 35.1   RDW 45.6   PLATELETCT 183   MPV 9.7     Recent Labs     12/21/24  0939   SODIUM 134*   POTASSIUM 3.0*   CHLORIDE 96   CO2 23   GLUCOSE 97   BUN 16   CREATININE 0.96   CALCIUM 8.8     Recent Labs     12/21/24  0939   ALTSGPT 56*   ASTSGOT 58*   ALKPHOSPHAT 68   TBILIRUBIN <0.2   GLUCOSE 97     Recent Labs     12/21/24  0939   APTT 26.3   INR 1.06     No results for input(s): \"NTPROBNP\" in the last 72 hours.      No results for input(s): \"TROPONINT\" in the last 72 hours.    Imaging:  CT-MAXILLOFACIAL W/O PLUS RECONS   Final Result         1. " Bilateral mandibular rami fractures. Mandible fracture anteriorly near the midline.      2. Fractures of right maxillary bicuspid and Emilia. Fracture of left mandibular bicuspid.      3. Extensive paranasal acute and chronic sinusitis. The middle ears and visible mastoid sinuses are clear.                  CT-CSPINE WITHOUT PLUS RECONS   Final Result         1. No cervical spine fracture or subluxation to T1 evident.      2. Severe degenerative changes.      3. Mandible fractures. See separate CT facial report.      CT-HEAD W/O   Final Result         1. No acute process in the brain evident.      2. Extensive acute and chronic paranasal sinusitis.      3. See separate CT facial report.                   no X-Ray or EKG requiring interpretation    Assessment/Plan:  Justification for Admission Status  I anticipate this patient will require at least two midnights for appropriate medical management, necessitating inpatient admission because alcohol withdrawal, multiple mandibular and maxillary fractures,    Patient will need a Med/Surg bed on SURGICAL service .  The need is secondary to alcohol withdrawal.    * Mandible fracture (HCC)- (present on admission)  Assessment & Plan  Bilateral mandibular rami fractures and right maxillary bicuspid fractures  Trauma has seen the patient  Plan will be for surgery, I am unsure of the timeline which will be adjusted due to her alcohol use and withdrawal  Patient is unable to eat, will keep her n.p.o.  She does feel she is capable of swallowing pills and fluids, will make her n.p.o. with sips  As needed narcotics    Alcohol withdrawal (HCC)- (present on admission)  Assessment & Plan  Patient does have a significantly elevated alcohol level  Already in withdrawal with tremors  Start CIWA protocol as well as scheduled Librium  Patient is at high risk of worsening, if this does occur, consider upgrading to the IMCU for Precedex drip however that is not deemed necessary at this  time  Start multivitamins  Associated with dehydration, start IV fluids    Macrocytic anemia- (present on admission)  Assessment & Plan  No sign of gross bleeding  Repeat CBC in the morning    Transaminitis- (present on admission)  Assessment & Plan  Mild, asymptomatic  Due to alcohol, nothing acute, no further workup    Dyslipidemia- (present on admission)  Assessment & Plan  Continue home statin    Hyponatremia- (present on admission)  Assessment & Plan  Very mild, due to dehydration and alcohol abuse  Start IV fluids    Fall- (present on admission)  Assessment & Plan  Due to alcohol intoxication  Resulting in mandibular and maxillary fractures    COPD (chronic obstructive pulmonary disease) (HCC)- (present on admission)  Assessment & Plan  No acute exacerbation    Primary hypertension- (present on admission)  Assessment & Plan  Continue home telmisartan-hydrochlorothiazide  Start as needed labetalol  Adjust as needed    Hypokalemia- (present on admission)  Assessment & Plan  Start IV potassium  Repeat BMP in the morning  Obtain magnesium level        VTE prophylaxis: SCDs/TEDs

## 2024-12-21 NOTE — ED PROVIDER NOTES
ER Provider Note    Scribed for Bairon Lee M.D. by Piyush Dunn. 12/21/2024   9:40 AM    Primary Care Provider: Deborah Moctezuma M.D.    CHIEF COMPLAINT  Chief Complaint   Patient presents with    GLF     Pt BIB [facility] for MGLF at home. +ETOH, +head strike, - thinners, -LOC. Pt has a laceration to chin about 2cm. Lower teeth feel loose with possible breaks.      EXTERNAL RECORDS REVIEWED  Outpatient Notes Patient seen in the ED last on 12/13 for a cough. She was admitted for alcohol intoxication 11/17 of this year      HPI/ROS  LIMITATION TO HISTORY   Select: : None  OUTSIDE HISTORIAN(S):  Significant other is present at bedside.     Yohana Brandon is a 59 y.o. female who presents to the ED for evaluation after a ground level fall onset last night. Patient reports that she was drinking alcohol at home last night and fell down, causing her to hit her head. She denies any loss of consciousness during the event today and reports jaw pain and a laceration to her chin. She denies any arm, leg, neck, or back pain at this time. Patient's  reports that she chipped her bottom teeth during her fall and he brought in a small fragment of tooth.     PAST MEDICAL HISTORY  Past Medical History:   Diagnosis Date    Alcohol abuse     Alcohol abuse     ASTHMA     Elevated LFTs     Heart palpitations     w/ alcohol withdrawal    Hypertension     Psychiatric disorder     alcohol dependance       SURGICAL HISTORY  Past Surgical History:   Procedure Laterality Date    CLOSED REDUCTION Right 2/9/2022    Procedure: CLOSED REDUCTION, HIP,;  Surgeon: Jose G Bell M.D.;  Location: SURGERY [facility];  Service: Orthopedics    OH TOTAL HIP ARTHROPLASTY Right 12/27/2021    Procedure: ARTHROPLASTY, HIP, TOTAL, ANTERIOR APPROACH;  Surgeon: Jose G Bell M.D.;  Location: SURGERY [facility];  Service: Orthopedics    HEMORRHOIDECTOMY  8/24/2011    Performed by SONY MACDONALD at SURGERY [facility]    ANAL SPHINCTEROTOMY  " 2011    Performed by SONY MACDONALD at SURGERY [facility]    SC ENDOMETRIAL ABLATION, THERMAL      APPENDECTOMY      SC ANESTH,SURG BREAST RECONSTRUCTIVE      bilateral     BUNIONECTOMY      bilateral    SC REMOVE TONSILS/ADENOIDS,<11 Y/O  1973    SC  DELIVERY+POSTPARTUM CARE      x2       FAMILY HISTORY  Family History   Problem Relation Age of Onset    Stroke Mother        SOCIAL HISTORY   reports that she has never smoked. She has never used smokeless tobacco. She reports current alcohol use. She reports that she does not use drugs.    CURRENT MEDICATIONS  Previous Medications    ASPIRIN 81 MG EC TABLET    Take 81 mg by mouth every day.    FOLIC ACID (FOLVITE) 1 MG TAB    Take 1 Tablet by mouth every day.    MULTIVITAMIN TAB    Take 1 Tablet by mouth every day.    ONDANSETRON (ZOFRAN ODT) 4 MG TABLET DISPERSIBLE    Take 1 Tablet by mouth every 8 hours as needed for Nausea/Vomiting.    PROGESTERONE (PROMETRIUM) 100 MG CAP    Take 100 mg by mouth every evening.    ROSUVASTATIN (CRESTOR) 10 MG TAB    Take 10 mg by mouth every evening.    TELMISARTAN-HYDROCHLOROTHIAZIDE (MICARDIS HCT) 80-25 MG PER TABLET    Take 1 Tablet by mouth every day.    THYROID (ARMOUR THYROID) 60 MG TAB    Take 60 mg by mouth every day.       ALLERGIES  Allergies   Allergen Reactions    Banana Shortness of Breath    Kiwi Extract Swelling    Latex Anaphylaxis    Morphine Hives    Other Food Hives and Shortness of Breath     Tropical fruit      Vicodin [Hydrocodone-Acetaminophen] Rash     Generalized rash, puritis     Flu Virus Vaccine Rash      Allergic to flu vaccine         PHYSICAL EXAM  BP 98/65   Pulse 90   Temp 36.9 °C (98.5 °F) (Temporal)   Resp 14   Ht 1.727 m (5' 8\")   Wt 70.8 kg (156 lb)   LMP 2006   SpO2 97%   BMI 23.72 kg/m²    Nursing note and vitals reviewed.  Constitutional: Well-developed and well-nourished. No distress.   HENT: Head is normocephalic. 3 cm laceration under the " chin. Left lower premolar is chipped and there is a likely mandible fracture.  Oropharynx is clear and moist without exudate or erythema.   Eyes: Pupils are equal, round, and reactive to light. Conjunctiva are normal.   Cardiovascular: Normal rate and regular rhythm. No murmur heard. Normal radial pulses.  Pulmonary/Chest: Breath sounds normal. No wheezes or rales.   Abdominal: Soft and non-tender. No distention    Musculoskeletal: Extremities exhibit normal range of motion without edema or tenderness.   Neurological: Awake, alert and oriented to person, place, and time. No focal deficits noted.  Skin: Skin is warm and dry. No rash.   Psychiatric: Normal mood and affect. Appropriate for clinical situation    DIAGNOSTIC STUDIES    Labs:   Results for orders placed or performed during the hospital encounter of 12/21/24   CBC WITH DIFFERENTIAL    Collection Time: 12/21/24  9:39 AM   Result Value Ref Range    WBC 5.1 4.8 - 10.8 K/uL    RBC 3.37 (L) 4.20 - 5.40 M/uL    Hemoglobin 11.7 (L) 12.0 - 16.0 g/dL    Hematocrit 33.3 (L) 37.0 - 47.0 %    MCV 98.8 (H) 81.4 - 97.8 fL    MCH 34.7 (H) 27.0 - 33.0 pg    MCHC 35.1 32.2 - 35.5 g/dL    RDW 45.6 35.9 - 50.0 fL    Platelet Count 183 164 - 446 K/uL    MPV 9.7 9.0 - 12.9 fL    Neutrophils-Polys 59.80 44.00 - 72.00 %    Lymphocytes 27.80 22.00 - 41.00 %    Monocytes 9.20 0.00 - 13.40 %    Eosinophils 1.60 0.00 - 6.90 %    Basophils 0.60 0.00 - 1.80 %    Immature Granulocytes 1.00 (H) 0.00 - 0.90 %    Nucleated RBC 0.00 0.00 - 0.20 /100 WBC    Neutrophils (Absolute) 3.06 1.82 - 7.42 K/uL    Lymphs (Absolute) 1.42 1.00 - 4.80 K/uL    Monos (Absolute) 0.47 0.00 - 0.85 K/uL    Eos (Absolute) 0.08 0.00 - 0.51 K/uL    Baso (Absolute) 0.03 0.00 - 0.12 K/uL    Immature Granulocytes (abs) 0.05 0.00 - 0.11 K/uL    NRBC (Absolute) 0.00 K/uL   PROTHROMBIN TIME    Collection Time: 12/21/24  9:39 AM   Result Value Ref Range    PT 13.8 12.0 - 14.6 sec    INR 1.06 0.87 - 1.13   APTT     Collection Time: 12/21/24  9:39 AM   Result Value Ref Range    APTT 26.3 24.7 - 36.0 sec   COMP METABOLIC PANEL    Collection Time: 12/21/24  9:39 AM   Result Value Ref Range    Sodium 134 (L) 135 - 145 mmol/L    Potassium 3.0 (L) 3.6 - 5.5 mmol/L    Chloride 96 96 - 112 mmol/L    Co2 23 20 - 33 mmol/L    Anion Gap 15.0 7.0 - 16.0    Glucose 97 65 - 99 mg/dL    Bun 16 8 - 22 mg/dL    Creatinine 0.96 0.50 - 1.40 mg/dL    Calcium 8.8 8.5 - 10.5 mg/dL    Correct Calcium 8.9 8.5 - 10.5 mg/dL    AST(SGOT) 58 (H) 12 - 45 U/L    ALT(SGPT) 56 (H) 2 - 50 U/L    Alkaline Phosphatase 68 30 - 99 U/L    Total Bilirubin <0.2 0.1 - 1.5 mg/dL    Albumin 3.9 3.2 - 4.9 g/dL    Total Protein 6.4 6.0 - 8.2 g/dL    Globulin 2.5 1.9 - 3.5 g/dL    A-G Ratio 1.6 g/dL   DIAGNOSTIC ALCOHOL    Collection Time: 12/21/24  9:39 AM   Result Value Ref Range    Diagnostic Alcohol 296.0 (H) <10.1 mg/dL   ESTIMATED GFR    Collection Time: 12/21/24  9:39 AM   Result Value Ref Range    GFR (CKD-EPI) 69 >60 mL/min/1.73 m 2       Radiology:   This attending emergency physician has independently interpreted the diagnostic imaging associated with this visit and is awaiting the final reading from the radiologist.   Preliminary interpretation is a follows: CT scan demonstrates mandible fractures.    Radiologist interpretation:   CT-MAXILLOFACIAL W/O PLUS RECONS   Final Result         1. Bilateral mandibular rami fractures. Mandible fracture anteriorly near the midline.      2. Fractures of right maxillary bicuspid and Emilia. Fracture of left mandibular bicuspid.      3. Extensive paranasal acute and chronic sinusitis. The middle ears and visible mastoid sinuses are clear.                  CT-CSPINE WITHOUT PLUS RECONS   Final Result         1. No cervical spine fracture or subluxation to T1 evident.      2. Severe degenerative changes.      3. Mandible fractures. See separate CT facial report.      CT-HEAD W/O   Final Result         1. No acute process in  the brain evident.      2. Extensive acute and chronic paranasal sinusitis.      3. See separate CT facial report.                    ASSESSMENT AND PLAN    9:40 AM - Patient was evaluated at bedside. Patient presents today after she had a ground level fall with head strike after drinking alcohol last night. She has a laceration to the chin and her exam is concerning for a mandible fracture. Ordered for Diagnostic alcohol, CMP, APTT, Prothrombin time, CBC with differential, CT-Maxillofacial without plus recons, CT-C spine without plus recons, and CT-Head without to evaluate. The patient will be medicated with Unasyn, Zofran, and Dilaudid 0.5 mg for her symptoms. Patient verbalizes understanding and support with my plan of care.  Patient will undergo evaluation for traumatic injuries.      12:32 PM I discussed the patient's case and the above findings with Dr. Fiore (facial fracture) who will consult the patient.  Plan is to take the patient to the OR for repair.    12:33 PM - I discussed the patient's case and the above findings with Dr. Vale (Trauma surgery) who recommends contacting medicine and will follow the patient's case.     1:10 PM - I discussed the patient's case and the above findings with Dr. Martinez (hospitalist) who agrees to admit the patient.       The patient was treated with Zofran for nausea.  Placed on a cardiac monitor to monitor for any arrhythmia associated with Zofran and QT prolongation.    The patient was treated with IV narcotics for pain control.  Placed on cardiac and blood pressure monitor to monitor for associated hypotension.  Also placed on pulse oximetry to monitor for hypoxia associated with medication administration/side effect.      DISPOSITION AND DISCUSSIONS    I have discussed management of the patient with the following physicians and TONJA's:  Dr. Fiore (facial fracture) and Dr. Vale (Trauma surgery) and Dr. Martinez (hospitalist)     Discussion of management with other  [facility] or appropriate source(s): None     DISPOSITION:  Patient will be hospitalized by Dr. Martinez in guarded condition.    FINAL DIAGNOSIS  1. Fall, initial encounter    2. Chin laceration, initial encounter    3. Acute alcoholic intoxication with complication (HCC)    4. Closed fracture of mandible, unspecified laterality, unspecified mandibular site, initial encounter (HCC)    5. Alcoholism (HCC)         Piyush GARCIA (Scribe), am scribing for, and in the presence of, Bairon Lee M.D..    Electronically signed by: Piyush Dunn (Scribe), 12/21/2024    Bairon GARCIA M.D. personally performed the services described in this documentation, as scribed by Piyush Dunn in my presence, and it is both accurate and complete.      The note accurately reflects work and decisions made by me.  Bairon Lee M.D.  12/21/2024  2:56 PM

## 2024-12-21 NOTE — CONSULTS
HPI: Yohana Brandon is a 60 y/o female s/p fall from standing while intoxicated.  Denies LOC.  Immediate altered occlusion and pain localized to her lower jaw.  Presented to [facility] where CT imaging revealed bilateral mandible fractures.  OMFS consulted for recommendations.     PMHx: HTN, alcohol abuse disorder  PSHx: right hip replacement  Meds: losartan/HCTZ  ALL: nKDA  Soc Hx: + ETOH abuse    ROS: altered occlusion, paresthesia of her lower lip, denies dsyphagia, dsypnea, diplopia.  A 12 point ROS was otherwise unremarkable.     Vitals:    12/21/24 1415   BP: 139/85   Pulse: 100   Resp: 14   Temp: 36.5 °C (97.7 °F)   SpO2: 97%     PE:   Gen: AAOx3, NAD,  at bedside. Breathing comfortably on room air  HEENT: 3 cm submental laceration to bone. Anterior open bite malocclusion. Floor of mouth soft, non-distended.   Neuro: bilateral V3 paresthesia. CN II-XII otherwise intact    Recent Labs     12/21/24  0939   WBC 5.1   RBC 3.37*   HEMOGLOBIN 11.7*   HEMATOCRIT 33.3*   MCV 98.8*   MCH 34.7*   RDW 45.6   PLATELETCT 183   MPV 9.7   NEUTSPOLYS 59.80   LYMPHOCYTES 27.80   MONOCYTES 9.20   EOSINOPHILS 1.60   BASOPHILS 0.60     Imaging: CT max face: bilateral mandibular condylar neck and midline symphysis fractures. Fractured maxillary pre molar and molars on right, left mandibular premolar fracture.     Assessment: 60 y/o female s/p intoxicated fall from standing, now with bilateral mandible fractures    Plan:   -OR tomorrow afternoon for ORIF of mandible fractures and extraction of teeth  -NPO after midnight tomorrow, ok for clear liquids until then  -Call with concerns    Odell Fiore DDS, MD, FACS  Cell: [phone]

## 2024-12-21 NOTE — ED TRIAGE NOTES
Yohana Brandon   59 y.o.     Chief Complaint   Patient presents with    GLF     Pt BIB [facility] for MGLF at home. +ETOH, +head strike, - thinners, -LOC. Pt has a laceration to chin about 2cm. Lower teeth feel loose with possible breaks.      Pt's BP was 73/40 upon arrival of EMS. Pt received IV bolus of 350 cc NS. Pt's BP is now 89/55.     Pt is alert, oriented, and follows commands. Pt speaking in full sentences and responds appropriately to questions. No acute distress noted. Respirations are even and unlabored.     EMS interventions: PIV 25 mcg fent, 4 mg zofran, 350 cc NS. FSBS 152. EKG sinus.     Vitals:    12/21/24 0920   BP: (!) 89/55   Pulse: 90   Resp: 18   Temp: 36.9 °C (98.5 °F)   SpO2: 95%

## 2024-12-22 ENCOUNTER — APPOINTMENT (OUTPATIENT)
Dept: RADIOLOGY | Facility: MEDICAL CENTER | Age: 59
DRG: 141 | End: 2024-12-22
Attending: DENTIST
Payer: COMMERCIAL

## 2024-12-22 ENCOUNTER — ANESTHESIA (OUTPATIENT)
Dept: SURGERY | Facility: MEDICAL CENTER | Age: 59
DRG: 141 | End: 2024-12-22
Payer: COMMERCIAL

## 2024-12-22 LAB
ANION GAP SERPL CALC-SCNC: 10 MMOL/L (ref 7–16)
BUN SERPL-MCNC: 13 MG/DL (ref 8–22)
CALCIUM SERPL-MCNC: 8.4 MG/DL (ref 8.5–10.5)
CHLORIDE SERPL-SCNC: 98 MMOL/L (ref 96–112)
CO2 SERPL-SCNC: 28 MMOL/L (ref 20–33)
CREAT SERPL-MCNC: 0.84 MG/DL (ref 0.5–1.4)
ERYTHROCYTE [DISTWIDTH] IN BLOOD BY AUTOMATED COUNT: 46 FL (ref 35.9–50)
GFR SERPLBLD CREATININE-BSD FMLA CKD-EPI: 81 ML/MIN/1.73 M 2
GLUCOSE SERPL-MCNC: 83 MG/DL (ref 65–99)
HCT VFR BLD AUTO: 31.4 % (ref 37–47)
HGB BLD-MCNC: 10.7 G/DL (ref 12–16)
MAGNESIUM SERPL-MCNC: 1.3 MG/DL (ref 1.5–2.5)
MCH RBC QN AUTO: 34.2 PG (ref 27–33)
MCHC RBC AUTO-ENTMCNC: 34.1 G/DL (ref 32.2–35.5)
MCV RBC AUTO: 100.3 FL (ref 81.4–97.8)
PHOSPHATE SERPL-MCNC: 1.6 MG/DL (ref 2.5–4.5)
PLATELET # BLD AUTO: 155 K/UL (ref 164–446)
PMV BLD AUTO: 9.8 FL (ref 9–12.9)
POTASSIUM SERPL-SCNC: 3.9 MMOL/L (ref 3.6–5.5)
RBC # BLD AUTO: 3.13 M/UL (ref 4.2–5.4)
SODIUM SERPL-SCNC: 136 MMOL/L (ref 135–145)
WBC # BLD AUTO: 4.9 K/UL (ref 4.8–10.8)

## 2024-12-22 PROCEDURE — 700111 HCHG RX REV CODE 636 W/ 250 OVERRIDE (IP): Mod: JZ | Performed by: ANESTHESIOLOGY

## 2024-12-22 PROCEDURE — 700102 HCHG RX REV CODE 250 W/ 637 OVERRIDE(OP): Performed by: INTERNAL MEDICINE

## 2024-12-22 PROCEDURE — 160041 HCHG SURGERY MINUTES - EA ADDL 1 MIN LEVEL 4: Performed by: DENTIST

## 2024-12-22 PROCEDURE — 700111 HCHG RX REV CODE 636 W/ 250 OVERRIDE (IP): Performed by: STUDENT IN AN ORGANIZED HEALTH CARE EDUCATION/TRAINING PROGRAM

## 2024-12-22 PROCEDURE — 700105 HCHG RX REV CODE 258: Performed by: ANESTHESIOLOGY

## 2024-12-22 PROCEDURE — 36415 COLL VENOUS BLD VENIPUNCTURE: CPT

## 2024-12-22 PROCEDURE — 700102 HCHG RX REV CODE 250 W/ 637 OVERRIDE(OP): Performed by: DENTIST

## 2024-12-22 PROCEDURE — A9270 NON-COVERED ITEM OR SERVICE: HCPCS | Performed by: STUDENT IN AN ORGANIZED HEALTH CARE EDUCATION/TRAINING PROGRAM

## 2024-12-22 PROCEDURE — 0NSV04Z REPOSITION LEFT MANDIBLE WITH INTERNAL FIXATION DEVICE, OPEN APPROACH: ICD-10-PCS | Performed by: DENTIST

## 2024-12-22 PROCEDURE — 99233 SBSQ HOSP IP/OBS HIGH 50: CPT | Performed by: STUDENT IN AN ORGANIZED HEALTH CARE EDUCATION/TRAINING PROGRAM

## 2024-12-22 PROCEDURE — A9270 NON-COVERED ITEM OR SERVICE: HCPCS | Performed by: ANESTHESIOLOGY

## 2024-12-22 PROCEDURE — 0NSTXZZ REPOSITION RIGHT MANDIBLE, EXTERNAL APPROACH: ICD-10-PCS | Performed by: DENTIST

## 2024-12-22 PROCEDURE — 700101 HCHG RX REV CODE 250: Performed by: DENTIST

## 2024-12-22 PROCEDURE — 83735 ASSAY OF MAGNESIUM: CPT

## 2024-12-22 PROCEDURE — 160036 HCHG PACU - EA ADDL 30 MINS PHASE I: Performed by: DENTIST

## 2024-12-22 PROCEDURE — 160009 HCHG ANES TIME/MIN: Performed by: DENTIST

## 2024-12-22 PROCEDURE — 70355 PANORAMIC X-RAY OF JAWS: CPT

## 2024-12-22 PROCEDURE — 700111 HCHG RX REV CODE 636 W/ 250 OVERRIDE (IP): Performed by: DENTIST

## 2024-12-22 PROCEDURE — 700101 HCHG RX REV CODE 250: Performed by: ANESTHESIOLOGY

## 2024-12-22 PROCEDURE — A9270 NON-COVERED ITEM OR SERVICE: HCPCS | Performed by: INTERNAL MEDICINE

## 2024-12-22 PROCEDURE — 160048 HCHG OR STATISTICAL LEVEL 1-5: Performed by: DENTIST

## 2024-12-22 PROCEDURE — 700102 HCHG RX REV CODE 250 W/ 637 OVERRIDE(OP): Performed by: ANESTHESIOLOGY

## 2024-12-22 PROCEDURE — A9270 NON-COVERED ITEM OR SERVICE: HCPCS | Performed by: DENTIST

## 2024-12-22 PROCEDURE — 700102 HCHG RX REV CODE 250 W/ 637 OVERRIDE(OP): Performed by: STUDENT IN AN ORGANIZED HEALTH CARE EDUCATION/TRAINING PROGRAM

## 2024-12-22 PROCEDURE — 700105 HCHG RX REV CODE 258: Performed by: INTERNAL MEDICINE

## 2024-12-22 PROCEDURE — 770001 HCHG ROOM/CARE - MED/SURG/GYN PRIV*

## 2024-12-22 PROCEDURE — 160002 HCHG RECOVERY MINUTES (STAT): Performed by: DENTIST

## 2024-12-22 PROCEDURE — 84100 ASSAY OF PHOSPHORUS: CPT

## 2024-12-22 PROCEDURE — 160035 HCHG PACU - 1ST 60 MINS PHASE I: Performed by: DENTIST

## 2024-12-22 PROCEDURE — 700105 HCHG RX REV CODE 258: Performed by: DENTIST

## 2024-12-22 PROCEDURE — 700111 HCHG RX REV CODE 636 W/ 250 OVERRIDE (IP): Performed by: ANESTHESIOLOGY

## 2024-12-22 PROCEDURE — 85027 COMPLETE CBC AUTOMATED: CPT

## 2024-12-22 PROCEDURE — C1713 ANCHOR/SCREW BN/BN,TIS/BN: HCPCS | Performed by: DENTIST

## 2024-12-22 PROCEDURE — 160029 HCHG SURGERY MINUTES - 1ST 30 MINS LEVEL 4: Performed by: DENTIST

## 2024-12-22 PROCEDURE — 80048 BASIC METABOLIC PNL TOTAL CA: CPT

## 2024-12-22 DEVICE — PLATE HMMF (2UFSX4=8) (2EA/PK): Type: IMPLANTABLE DEVICE | Site: FACE | Status: FUNCTIONAL

## 2024-12-22 DEVICE — PLATE STRYK UFS MIN 4H BAR HP - (2UFS2+M2=6): Type: IMPLANTABLE DEVICE | Site: FACE | Status: FUNCTIONAL

## 2024-12-22 DEVICE — IMPLANTABLE DEVICE: Type: IMPLANTABLE DEVICE | Site: FACE | Status: FUNCTIONAL

## 2024-12-22 DEVICE — SCREW LOCKING 2.0X6MM SELF DRILLING (2UFSX50=100) (5EA/PK): Type: IMPLANTABLE DEVICE | Site: FACE | Status: FUNCTIONAL

## 2024-12-22 DEVICE — SCREW STRYK UFS 2.0X12MM ST - (2UFS10+M20=40): Type: IMPLANTABLE DEVICE | Site: FACE | Status: FUNCTIONAL

## 2024-12-22 DEVICE — SCREW STRYK UFS 2.0X6MM LOCK - (2UFS5+M10=20): Type: IMPLANTABLE DEVICE | Site: FACE | Status: FUNCTIONAL

## 2024-12-22 DEVICE — SCREW STRYK UFS 2.0X6MM ST - (2UFS5+M10=20): Type: IMPLANTABLE DEVICE | Site: FACE | Status: FUNCTIONAL

## 2024-12-22 RX ORDER — OXYCODONE HCL 5 MG/5 ML
5 SOLUTION, ORAL ORAL
Status: COMPLETED | OUTPATIENT
Start: 2024-12-22 | End: 2024-12-22

## 2024-12-22 RX ORDER — MEPERIDINE HYDROCHLORIDE 25 MG/ML
6.25 INJECTION INTRAMUSCULAR; INTRAVENOUS; SUBCUTANEOUS
Status: DISCONTINUED | OUTPATIENT
Start: 2024-12-22 | End: 2024-12-22 | Stop reason: HOSPADM

## 2024-12-22 RX ORDER — ONDANSETRON 2 MG/ML
4 INJECTION INTRAMUSCULAR; INTRAVENOUS EVERY 4 HOURS PRN
Status: DISCONTINUED | OUTPATIENT
Start: 2024-12-22 | End: 2024-12-23 | Stop reason: HOSPADM

## 2024-12-22 RX ORDER — MORPHINE SULFATE 4 MG/ML
4 INJECTION INTRAVENOUS
Status: DISCONTINUED | OUTPATIENT
Start: 2024-12-22 | End: 2024-12-23 | Stop reason: HOSPADM

## 2024-12-22 RX ORDER — CELECOXIB 200 MG/1
200 CAPSULE ORAL 2 TIMES DAILY
Status: DISCONTINUED | OUTPATIENT
Start: 2024-12-22 | End: 2024-12-23 | Stop reason: HOSPADM

## 2024-12-22 RX ORDER — METOPROLOL TARTRATE 1 MG/ML
1 INJECTION, SOLUTION INTRAVENOUS
Status: DISCONTINUED | OUTPATIENT
Start: 2024-12-22 | End: 2024-12-22 | Stop reason: HOSPADM

## 2024-12-22 RX ORDER — HYDRALAZINE HYDROCHLORIDE 20 MG/ML
5 INJECTION INTRAMUSCULAR; INTRAVENOUS
Status: DISCONTINUED | OUTPATIENT
Start: 2024-12-22 | End: 2024-12-22 | Stop reason: HOSPADM

## 2024-12-22 RX ORDER — HYDROMORPHONE HYDROCHLORIDE 1 MG/ML
0.4 INJECTION, SOLUTION INTRAMUSCULAR; INTRAVENOUS; SUBCUTANEOUS
Status: DISCONTINUED | OUTPATIENT
Start: 2024-12-22 | End: 2024-12-22 | Stop reason: HOSPADM

## 2024-12-22 RX ORDER — SODIUM CHLORIDE, SODIUM LACTATE, POTASSIUM CHLORIDE, CALCIUM CHLORIDE 600; 310; 30; 20 MG/100ML; MG/100ML; MG/100ML; MG/100ML
INJECTION, SOLUTION INTRAVENOUS
Status: DISCONTINUED | OUTPATIENT
Start: 2024-12-22 | End: 2024-12-22 | Stop reason: SURG

## 2024-12-22 RX ORDER — DEXAMETHASONE SODIUM PHOSPHATE 4 MG/ML
6 INJECTION, SOLUTION INTRA-ARTICULAR; INTRALESIONAL; INTRAMUSCULAR; INTRAVENOUS; SOFT TISSUE EVERY 6 HOURS
Status: COMPLETED | OUTPATIENT
Start: 2024-12-22 | End: 2024-12-23

## 2024-12-22 RX ORDER — LIDOCAINE HYDROCHLORIDE 20 MG/ML
INJECTION, SOLUTION EPIDURAL; INFILTRATION; INTRACAUDAL; PERINEURAL PRN
Status: DISCONTINUED | OUTPATIENT
Start: 2024-12-22 | End: 2024-12-22 | Stop reason: SURG

## 2024-12-22 RX ORDER — OXYCODONE HYDROCHLORIDE 5 MG/1
2.5 TABLET ORAL
Status: DISCONTINUED | OUTPATIENT
Start: 2024-12-22 | End: 2024-12-22

## 2024-12-22 RX ORDER — ACETAMINOPHEN 500 MG
500 TABLET ORAL ONCE
Status: CANCELLED | OUTPATIENT
Start: 2024-12-22 | End: 2024-12-22

## 2024-12-22 RX ORDER — OXYCODONE HYDROCHLORIDE 5 MG/1
10 TABLET ORAL
Status: DISCONTINUED | OUTPATIENT
Start: 2024-12-22 | End: 2024-12-23 | Stop reason: HOSPADM

## 2024-12-22 RX ORDER — SUCCINYLCHOLINE CHLORIDE 20 MG/ML
INJECTION INTRAMUSCULAR; INTRAVENOUS PRN
Status: DISCONTINUED | OUTPATIENT
Start: 2024-12-22 | End: 2024-12-22 | Stop reason: SURG

## 2024-12-22 RX ORDER — ROCURONIUM BROMIDE 10 MG/ML
INJECTION, SOLUTION INTRAVENOUS PRN
Status: DISCONTINUED | OUTPATIENT
Start: 2024-12-22 | End: 2024-12-22 | Stop reason: SURG

## 2024-12-22 RX ORDER — EPHEDRINE SULFATE 50 MG/ML
5 INJECTION, SOLUTION INTRAVENOUS
Status: DISCONTINUED | OUTPATIENT
Start: 2024-12-22 | End: 2024-12-22 | Stop reason: HOSPADM

## 2024-12-22 RX ORDER — ALBUTEROL SULFATE 5 MG/ML
2.5 SOLUTION RESPIRATORY (INHALATION)
Status: DISCONTINUED | OUTPATIENT
Start: 2024-12-22 | End: 2024-12-22 | Stop reason: HOSPADM

## 2024-12-22 RX ORDER — ONDANSETRON 2 MG/ML
INJECTION INTRAMUSCULAR; INTRAVENOUS PRN
Status: DISCONTINUED | OUTPATIENT
Start: 2024-12-22 | End: 2024-12-22 | Stop reason: SURG

## 2024-12-22 RX ORDER — OXYCODONE HYDROCHLORIDE 5 MG/1
5 TABLET ORAL
Status: DISCONTINUED | OUTPATIENT
Start: 2024-12-22 | End: 2024-12-22

## 2024-12-22 RX ORDER — DEXAMETHASONE SODIUM PHOSPHATE 4 MG/ML
INJECTION, SOLUTION INTRA-ARTICULAR; INTRALESIONAL; INTRAMUSCULAR; INTRAVENOUS; SOFT TISSUE PRN
Status: DISCONTINUED | OUTPATIENT
Start: 2024-12-22 | End: 2024-12-22 | Stop reason: SURG

## 2024-12-22 RX ORDER — MAGNESIUM SULFATE HEPTAHYDRATE 40 MG/ML
2 INJECTION, SOLUTION INTRAVENOUS ONCE
Status: COMPLETED | OUTPATIENT
Start: 2024-12-22 | End: 2024-12-22

## 2024-12-22 RX ORDER — CELECOXIB 200 MG/1
200 CAPSULE ORAL 2 TIMES DAILY PRN
Status: DISCONTINUED | OUTPATIENT
Start: 2024-12-27 | End: 2024-12-23 | Stop reason: HOSPADM

## 2024-12-22 RX ORDER — HYDROMORPHONE HYDROCHLORIDE 1 MG/ML
0.1 INJECTION, SOLUTION INTRAMUSCULAR; INTRAVENOUS; SUBCUTANEOUS
Status: DISCONTINUED | OUTPATIENT
Start: 2024-12-22 | End: 2024-12-22 | Stop reason: HOSPADM

## 2024-12-22 RX ORDER — OXYCODONE HYDROCHLORIDE 5 MG/1
5 TABLET ORAL
Status: DISCONTINUED | OUTPATIENT
Start: 2024-12-22 | End: 2024-12-23 | Stop reason: HOSPADM

## 2024-12-22 RX ORDER — HYDROMORPHONE HYDROCHLORIDE 1 MG/ML
0.2 INJECTION, SOLUTION INTRAMUSCULAR; INTRAVENOUS; SUBCUTANEOUS
Status: DISCONTINUED | OUTPATIENT
Start: 2024-12-22 | End: 2024-12-22 | Stop reason: HOSPADM

## 2024-12-22 RX ORDER — LIDOCAINE HYDROCHLORIDE AND EPINEPHRINE 10; 10 MG/ML; UG/ML
INJECTION, SOLUTION INFILTRATION; PERINEURAL
Status: DISCONTINUED | OUTPATIENT
Start: 2024-12-22 | End: 2024-12-22 | Stop reason: HOSPADM

## 2024-12-22 RX ORDER — DIPHENHYDRAMINE HYDROCHLORIDE 50 MG/ML
25 INJECTION INTRAMUSCULAR; INTRAVENOUS EVERY 6 HOURS PRN
Status: DISCONTINUED | OUTPATIENT
Start: 2024-12-22 | End: 2024-12-23

## 2024-12-22 RX ORDER — OXYCODONE HCL 5 MG/5 ML
10 SOLUTION, ORAL ORAL
Status: COMPLETED | OUTPATIENT
Start: 2024-12-22 | End: 2024-12-22

## 2024-12-22 RX ORDER — SODIUM CHLORIDE, SODIUM LACTATE, POTASSIUM CHLORIDE, CALCIUM CHLORIDE 600; 310; 30; 20 MG/100ML; MG/100ML; MG/100ML; MG/100ML
INJECTION, SOLUTION INTRAVENOUS CONTINUOUS
Status: DISCONTINUED | OUTPATIENT
Start: 2024-12-22 | End: 2024-12-22 | Stop reason: HOSPADM

## 2024-12-22 RX ORDER — ONDANSETRON 2 MG/ML
4 INJECTION INTRAMUSCULAR; INTRAVENOUS
Status: COMPLETED | OUTPATIENT
Start: 2024-12-22 | End: 2024-12-22

## 2024-12-22 RX ORDER — CEFAZOLIN SODIUM 1 G/3ML
INJECTION, POWDER, FOR SOLUTION INTRAMUSCULAR; INTRAVENOUS PRN
Status: DISCONTINUED | OUTPATIENT
Start: 2024-12-22 | End: 2024-12-22 | Stop reason: SURG

## 2024-12-22 RX ORDER — MORPHINE SULFATE 4 MG/ML
2 INJECTION INTRAVENOUS
Status: DISCONTINUED | OUTPATIENT
Start: 2024-12-22 | End: 2024-12-22

## 2024-12-22 RX ORDER — HALOPERIDOL 5 MG/ML
1 INJECTION INTRAMUSCULAR
Status: DISCONTINUED | OUTPATIENT
Start: 2024-12-22 | End: 2024-12-22 | Stop reason: HOSPADM

## 2024-12-22 RX ORDER — DIPHENHYDRAMINE HYDROCHLORIDE 50 MG/ML
12.5 INJECTION INTRAMUSCULAR; INTRAVENOUS
Status: DISCONTINUED | OUTPATIENT
Start: 2024-12-22 | End: 2024-12-22 | Stop reason: HOSPADM

## 2024-12-22 RX ORDER — CHLORHEXIDINE GLUCONATE ORAL RINSE 1.2 MG/ML
15 SOLUTION DENTAL 2 TIMES DAILY
Status: DISCONTINUED | OUTPATIENT
Start: 2024-12-22 | End: 2024-12-23 | Stop reason: HOSPADM

## 2024-12-22 RX ORDER — MIDAZOLAM HYDROCHLORIDE 1 MG/ML
INJECTION INTRAMUSCULAR; INTRAVENOUS PRN
Status: DISCONTINUED | OUTPATIENT
Start: 2024-12-22 | End: 2024-12-22 | Stop reason: SURG

## 2024-12-22 RX ADMIN — OXYCODONE HYDROCHLORIDE 10 MG: 5 SOLUTION ORAL at 17:16

## 2024-12-22 RX ADMIN — MAGNESIUM SULFATE HEPTAHYDRATE 2 G: 2 INJECTION, SOLUTION INTRAVENOUS at 08:25

## 2024-12-22 RX ADMIN — OXYCODONE HYDROCHLORIDE 10 MG: 10 TABLET ORAL at 11:32

## 2024-12-22 RX ADMIN — ROCURONIUM BROMIDE 27 MG: 50 INJECTION, SOLUTION INTRAVENOUS at 15:23

## 2024-12-22 RX ADMIN — DIBASIC SODIUM PHOSPHATE, MONOBASIC POTASSIUM PHOSPHATE AND MONOBASIC SODIUM PHOSPHATE 500 MG: 852; 155; 130 TABLET ORAL at 08:25

## 2024-12-22 RX ADMIN — SUCCINYLCHOLINE CHLORIDE 100 MG: 20 INJECTION, SOLUTION INTRAMUSCULAR; INTRAVENOUS at 15:16

## 2024-12-22 RX ADMIN — CHLORDIAZEPOXIDE HYDROCHLORIDE 50 MG: 25 CAPSULE ORAL at 04:17

## 2024-12-22 RX ADMIN — SODIUM CHLORIDE: 9 INJECTION, SOLUTION INTRAVENOUS at 02:22

## 2024-12-22 RX ADMIN — FENTANYL CITRATE 50 MCG: 50 INJECTION, SOLUTION INTRAMUSCULAR; INTRAVENOUS at 15:51

## 2024-12-22 RX ADMIN — OXYCODONE 10 MG: 5 TABLET ORAL at 18:27

## 2024-12-22 RX ADMIN — FENTANYL CITRATE 50 MCG: 50 INJECTION, SOLUTION INTRAMUSCULAR; INTRAVENOUS at 16:21

## 2024-12-22 RX ADMIN — FENTANYL CITRATE 50 MCG: 50 INJECTION, SOLUTION INTRAMUSCULAR; INTRAVENOUS at 15:24

## 2024-12-22 RX ADMIN — CEFAZOLIN 2 G: 2 INJECTION, POWDER, FOR SOLUTION INTRAMUSCULAR; INTRAVENOUS at 22:17

## 2024-12-22 RX ADMIN — MIDAZOLAM HYDROCHLORIDE 2 MG: 1 INJECTION, SOLUTION INTRAMUSCULAR; INTRAVENOUS at 15:01

## 2024-12-22 RX ADMIN — FENTANYL CITRATE 50 MCG: 50 INJECTION, SOLUTION INTRAMUSCULAR; INTRAVENOUS at 15:34

## 2024-12-22 RX ADMIN — Medication 100 MG: at 04:17

## 2024-12-22 RX ADMIN — ROCURONIUM BROMIDE 10 MG: 50 INJECTION, SOLUTION INTRAVENOUS at 15:53

## 2024-12-22 RX ADMIN — OXYCODONE HYDROCHLORIDE 10 MG: 10 TABLET ORAL at 04:17

## 2024-12-22 RX ADMIN — ONDANSETRON 4 MG: 2 INJECTION INTRAMUSCULAR; INTRAVENOUS at 15:55

## 2024-12-22 RX ADMIN — THERA TABS 1 TABLET: TAB at 04:17

## 2024-12-22 RX ADMIN — DEXAMETHASONE SODIUM PHOSPHATE 8 MG: 4 INJECTION INTRA-ARTICULAR; INTRALESIONAL; INTRAMUSCULAR; INTRAVENOUS; SOFT TISSUE at 15:38

## 2024-12-22 RX ADMIN — CEFAZOLIN 2 G: 2 INJECTION, POWDER, FOR SOLUTION INTRAMUSCULAR; INTRAVENOUS at 04:20

## 2024-12-22 RX ADMIN — OXYCODONE 10 MG: 5 TABLET ORAL at 22:13

## 2024-12-22 RX ADMIN — CELECOXIB 200 MG: 200 CAPSULE ORAL at 18:27

## 2024-12-22 RX ADMIN — TELMISARTAN 80 MG: 80 TABLET ORAL at 04:18

## 2024-12-22 RX ADMIN — SUGAMMADEX 140 MG: 100 INJECTION, SOLUTION INTRAVENOUS at 16:34

## 2024-12-22 RX ADMIN — ONDANSETRON 4 MG: 2 INJECTION INTRAMUSCULAR; INTRAVENOUS at 17:00

## 2024-12-22 RX ADMIN — FENTANYL CITRATE 50 MCG: 50 INJECTION, SOLUTION INTRAMUSCULAR; INTRAVENOUS at 17:00

## 2024-12-22 RX ADMIN — DEXAMETHASONE SODIUM PHOSPHATE 6 MG: 4 INJECTION INTRA-ARTICULAR; INTRALESIONAL; INTRAMUSCULAR; INTRAVENOUS; SOFT TISSUE at 23:17

## 2024-12-22 RX ADMIN — ROCURONIUM BROMIDE 3 MG: 50 INJECTION, SOLUTION INTRAVENOUS at 15:16

## 2024-12-22 RX ADMIN — CHLORHEXIDINE GLUCONATE, 0.12% ORAL RINSE 15 ML: 1.2 SOLUTION DENTAL at 18:27

## 2024-12-22 RX ADMIN — DEXAMETHASONE SODIUM PHOSPHATE 6 MG: 4 INJECTION INTRA-ARTICULAR; INTRALESIONAL; INTRAMUSCULAR; INTRAVENOUS; SOFT TISSUE at 18:28

## 2024-12-22 RX ADMIN — OXYCODONE HYDROCHLORIDE 10 MG: 10 TABLET ORAL at 08:39

## 2024-12-22 RX ADMIN — CHLORDIAZEPOXIDE HYDROCHLORIDE 25 MG: 25 CAPSULE ORAL at 23:18

## 2024-12-22 RX ADMIN — HYDROCHLOROTHIAZIDE 25 MG: 25 TABLET ORAL at 04:17

## 2024-12-22 RX ADMIN — FOLIC ACID 1 MG: 1 TABLET ORAL at 04:17

## 2024-12-22 RX ADMIN — SODIUM CHLORIDE, POTASSIUM CHLORIDE, SODIUM LACTATE AND CALCIUM CHLORIDE: 600; 310; 30; 20 INJECTION, SOLUTION INTRAVENOUS at 14:53

## 2024-12-22 RX ADMIN — CEFAZOLIN 1 G: 1 INJECTION, POWDER, FOR SOLUTION INTRAMUSCULAR; INTRAVENOUS at 15:28

## 2024-12-22 RX ADMIN — FENTANYL CITRATE 50 MCG: 50 INJECTION, SOLUTION INTRAMUSCULAR; INTRAVENOUS at 17:15

## 2024-12-22 RX ADMIN — LORAZEPAM 0.5 MG: 0.5 TABLET ORAL at 08:39

## 2024-12-22 RX ADMIN — CHLORDIAZEPOXIDE HYDROCHLORIDE 25 MG: 25 CAPSULE ORAL at 11:31

## 2024-12-22 RX ADMIN — LIDOCAINE HYDROCHLORIDE 50 MG: 20 INJECTION, SOLUTION EPIDURAL; INFILTRATION; INTRACAUDAL; PERINEURAL at 15:16

## 2024-12-22 RX ADMIN — PROPOFOL 16 MG: 10 INJECTION, EMULSION INTRAVENOUS at 15:16

## 2024-12-22 ASSESSMENT — LIFESTYLE VARIABLES
NAUSEA AND VOMITING: NO NAUSEA AND NO VOMITING
TREMOR: *
ANXIETY: NO ANXIETY (AT EASE)
VISUAL DISTURBANCES: NOT PRESENT
NAUSEA AND VOMITING: NO NAUSEA AND NO VOMITING
VISUAL DISTURBANCES: NOT PRESENT
TOTAL SCORE: 3
TOTAL SCORE: 5
NAUSEA AND VOMITING: MILD NAUSEA WITH NO VOMITING
ANXIETY: *
AGITATION: NORMAL ACTIVITY
NAUSEA AND VOMITING: NO NAUSEA AND NO VOMITING
TOTAL SCORE: 3
TOTAL SCORE: 5
PAROXYSMAL SWEATS: NO SWEAT VISIBLE
HEADACHE, FULLNESS IN HEAD: NOT PRESENT
TREMOR: *
TREMOR: NO TREMOR
AGITATION: NORMAL ACTIVITY
PAROXYSMAL SWEATS: NO SWEAT VISIBLE
PAROXYSMAL SWEATS: NO SWEAT VISIBLE
ORIENTATION AND CLOUDING OF SENSORIUM: ORIENTED AND CAN DO SERIAL ADDITIONS
ANXIETY: NO ANXIETY (AT EASE)
VISUAL DISTURBANCES: NOT PRESENT
PAROXYSMAL SWEATS: NO SWEAT VISIBLE
TREMOR: NO TREMOR
AUDITORY DISTURBANCES: NOT PRESENT
SUBSTANCE_ABUSE: 1
HEADACHE, FULLNESS IN HEAD: MODERATE
AUDITORY DISTURBANCES: NOT PRESENT
PAROXYSMAL SWEATS: NO SWEAT VISIBLE
HEADACHE, FULLNESS IN HEAD: VERY MILD
ORIENTATION AND CLOUDING OF SENSORIUM: ORIENTED AND CAN DO SERIAL ADDITIONS
ORIENTATION AND CLOUDING OF SENSORIUM: ORIENTED AND CAN DO SERIAL ADDITIONS
AUDITORY DISTURBANCES: NOT PRESENT
ANXIETY: MODERATELY ANXIOUS OR GUARDED, SO ANXIETY IS INFERRED
AGITATION: NORMAL ACTIVITY
ORIENTATION AND CLOUDING OF SENSORIUM: ORIENTED AND CAN DO SERIAL ADDITIONS
TOTAL SCORE: 6
TREMOR: NO TREMOR
HEADACHE, FULLNESS IN HEAD: NOT PRESENT
ORIENTATION AND CLOUDING OF SENSORIUM: CANNOT DO SERIAL ADDITIONS OR IS UNCERTAIN ABOUT DATE
PAROXYSMAL SWEATS: NO SWEAT VISIBLE
HEADACHE, FULLNESS IN HEAD: MILD
VISUAL DISTURBANCES: NOT PRESENT
TREMOR: *
TOTAL SCORE: 4
TREMOR: *
HEADACHE, FULLNESS IN HEAD: MILD
VISUAL DISTURBANCES: NOT PRESENT
PAROXYSMAL SWEATS: NO SWEAT VISIBLE
AUDITORY DISTURBANCES: NOT PRESENT
AUDITORY DISTURBANCES: NOT PRESENT
AGITATION: NORMAL ACTIVITY
NAUSEA AND VOMITING: NO NAUSEA AND NO VOMITING
ORIENTATION AND CLOUDING OF SENSORIUM: ORIENTED AND CAN DO SERIAL ADDITIONS
NAUSEA AND VOMITING: NO NAUSEA AND NO VOMITING
NAUSEA AND VOMITING: NO NAUSEA AND NO VOMITING
HEADACHE, FULLNESS IN HEAD: MODERATELY SEVERE
VISUAL DISTURBANCES: NOT PRESENT
AUDITORY DISTURBANCES: NOT PRESENT
AGITATION: NORMAL ACTIVITY
ANXIETY: MILDLY ANXIOUS
ANXIETY: NO ANXIETY (AT EASE)
AGITATION: NORMAL ACTIVITY
AUDITORY DISTURBANCES: NOT PRESENT
ANXIETY: NO ANXIETY (AT EASE)
ORIENTATION AND CLOUDING OF SENSORIUM: ORIENTED AND CAN DO SERIAL ADDITIONS
VISUAL DISTURBANCES: NOT PRESENT
TOTAL SCORE: 7
AGITATION: NORMAL ACTIVITY

## 2024-12-22 ASSESSMENT — PAIN DESCRIPTION - PAIN TYPE
TYPE: ACUTE PAIN
TYPE: ACUTE PAIN;SURGICAL PAIN
TYPE: ACUTE PAIN
TYPE: SURGICAL PAIN
TYPE: ACUTE PAIN
TYPE: ACUTE PAIN;SURGICAL PAIN
TYPE: SURGICAL PAIN
TYPE: ACUTE PAIN;SURGICAL PAIN
TYPE: SURGICAL PAIN
TYPE: ACUTE PAIN
TYPE: SURGICAL PAIN
TYPE: ACUTE PAIN
TYPE: ACUTE PAIN;SURGICAL PAIN

## 2024-12-22 ASSESSMENT — ENCOUNTER SYMPTOMS
TINGLING: 0
SORE THROAT: 0
BACK PAIN: 0
DIZZINESS: 0
VOMITING: 0
SENSORY CHANGE: 0
HALLUCINATIONS: 0
SHORTNESS OF BREATH: 0
FEVER: 0
WEAKNESS: 1
HEADACHES: 1
MYALGIAS: 1
CHILLS: 0
TREMORS: 1
TINGLING: 1
NECK PAIN: 0
DOUBLE VISION: 0
COUGH: 0
WEAKNESS: 0
NERVOUS/ANXIOUS: 0
ABDOMINAL PAIN: 0
FALLS: 1
NERVOUS/ANXIOUS: 1
NAUSEA: 0
BLURRED VISION: 0

## 2024-12-22 NOTE — PROGRESS NOTES
HPI: Yohana Brandon is a 60 y/o female s/p fall from standing while intoxicated.  Denies LOC.  Immediate altered occlusion and pain localized to her lower jaw.  Presented to [facility] where CT imaging revealed bilateral mandible fractures.  OMFS consulted for recommendations.      PMHx: HTN, alcohol abuse disorder  PSHx: right hip replacement  Meds: losartan/HCTZ  ALL: nKDA  Soc Hx: + ETOH abuse     ROS: altered occlusion, paresthesia of her lower lip, denies dsyphagia, dsypnea, diplopia.  A 12 point ROS was otherwise unremarkable.     Vitals:    12/22/24 0823   BP: 137/89   Pulse: 89   Resp: 16   Temp: 36.5 °C (97.7 °F)   SpO2: 99%     PE:   Gen: AAOx3, NAD,  at bedside. Breathing comfortably on room air  HEENT: 3 cm submental laceration to bone. Anterior open bite malocclusion. Floor of mouth soft, non-distended.   Neuro: bilateral V3 paresthesia. CN II-XII otherwise intact    Recent Labs     12/21/24  0939 12/22/24  0203   WBC 5.1 4.9   RBC 3.37* 3.13*   HEMOGLOBIN 11.7* 10.7*   HEMATOCRIT 33.3* 31.4*   MCV 98.8* 100.3*   MCH 34.7* 34.2*   RDW 45.6 46.0   PLATELETCT 183 155*   MPV 9.7 9.8   NEUTSPOLYS 59.80  --    LYMPHOCYTES 27.80  --    MONOCYTES 9.20  --    EOSINOPHILS 1.60  --    BASOPHILS 0.60  --      Imaging: CT max face: bilateral mandibular condylar neck and midline symphysis fractures. Fractured maxillary pre molar and molars on right, left mandibular premolar fracture.      Assessment: 60 y/o female s/p intoxicated fall from standing, now with bilateral mandible fractures     Plan:   -OR today for ORIF of bilateral mandible fractures  -Call with concerns     Odell Fiore DDS, MD, FACS  Cell: [phone]

## 2024-12-22 NOTE — ANESTHESIA PROCEDURE NOTES
Airway    Date/Time: 12/22/2024 3:18 PM    Performed by: Lewis Villafuerte M.D.  Authorized by: Lewis Villafuerte M.D.    Location:  OR  Urgency:  Elective  Indications for Airway Management:  Anesthesia      Spontaneous Ventilation: absent    Sedation Level:  Deep  Preoxygenated: Yes    Patient Position:  Sniffing  Final Airway Type:  Endotracheal airway  Final Endotracheal Airway:  ETT  Cuffed: Yes    Technique Used for Successful ETT Placement:  Direct laryngoscopy  Devices/Methods Used in Placement:  Anterior pressure/BURP and Magill forceps    Insertion Site:  Left naris  Blade Type:  Blaire  Laryngoscope Blade/Videolaryngoscope Blade Size:  3  ETT Size (mm):  7.0  Measured from:  Teeth  ETT to Teeth (cm):  8  Placement Verified by: auscultation and capnometry    Cormack-Lehane Classification:  Grade IIa - partial view of glottis  Number of Attempts at Approach:  1   Anterior pressure and magill forceps to intubate

## 2024-12-22 NOTE — CARE PLAN
The patient is Stable - Low risk of patient condition declining or worsening    Shift Goals  Clinical Goals: pain, npo at mn, poc  Patient Goals: pain  Family Goals: poc    Progress made toward(s) clinical / shift goals:    Problem: Knowledge Deficit - Standard  Goal: Patient and family/care givers will demonstrate understanding of plan of care, disease process/condition, diagnostic tests and medications  Outcome: Progressing  Note: Patient updated on POC, including potential surgery tomorrow, continuing IV fluids. No concerns at this time. Personal belongings and call light within reach.        Problem: Optimal Care for Alcohol Withdrawal  Goal: Optimal Care for the alcohol withdrawal patient  Outcome: Progressing     Problem: Seizure Precautions  Goal: Implementation of seizure precautions  Outcome: Progressing     Problem: Lifestyle Changes  Goal: Patient's ability to identify lifestyle changes and available resources to help reduce recurrence of condition will improve  Outcome: Progressing     Problem: Psychosocial  Goal: Patient's level of anxiety will decrease  Outcome: Progressing  Goal: Spiritual and cultural needs incorporated into hospitalization  Outcome: Progressing     Problem: Risk for Aspiration  Goal: Patient's risk for aspiration will be absent or decrease  Outcome: Progressing  Flowsheets (Taken 12/21/2024 1607)  Aspiration Prevention:   Assessed for signs and symptoms of aspiration   Implemented aspiration precautions   Elevated head of bed to 90 degrees   Encouraged small bites   Ensured patient consumed liquid of appropriate consistency     Problem: Pain - Standard  Goal: Alleviation of pain or a reduction in pain to the patient’s comfort goal  Outcome: Progressing  Note: Patient educated on 0-10 pain scale, available pain medications, and non-pharmacological methods of pain management. Verbalizes understanding. See MAR       Problem: Fall Risk  Goal: Patient will remain free from  falls  Outcome: Progressing  Note: Patient educated on using call light and to wait for staff to be in the room before attempting to mobilize. Verbalizes understanding          Patient is not progressing towards the following goals:

## 2024-12-22 NOTE — RESPIRATORY CARE
COPD EDUCATION by COPD CLINICAL EDUCATOR  12/22/2024 at 7:55 AM by Dulce Mullins, LOU     Patient reviewed by COPD education team. Patient does not have a history or diagnosis of COPD and is a non-smoker.  Therefore, patient does not qualify for the COPD program.

## 2024-12-22 NOTE — HOSPITAL COURSE
Yohana Brandon is a 59 y.o. female who presented 12/21/2024 with ground-level fall with resultant jaw pain.  Patient at this time has significant pain with speaking, was able to answer questions but most of the information obtained from family at bedside.  Patient was awake this morning around 830, lost her balance and fell forward hitting her face on the tile floor.  Patient had instant pain, clear deformity so 911 was called.  Patient states she drinks 1.5 bottles of wine per day.  Family is unsure however because they were out of town until 1 this morning.  Patient upon arrival was noted to have a significant alcohol level, then began having alcohol withdrawal.  Multiple mandibular and maxillary fractures, seen by trauma surgery.  OMFS was consulted and she was admitted for surgical intervention and management of alcohol withdrawal

## 2024-12-22 NOTE — CARE PLAN
The patient is Stable - Low risk of patient condition declining or worsening    Shift Goals  Clinical Goals: pain, surgery, safety  Patient Goals: pain  Family Goals: na    Progress made toward(s) clinical / shift goals:    Problem: Knowledge Deficit - Standard  Goal: Patient and family/care givers will demonstrate understanding of plan of care, disease process/condition, diagnostic tests and medications  Outcome: Progressing  Note: Patient updated on POC, including surgery today, continuing CIWA. No concerns at this time. Personal belongings and call light within reach.        Problem: Optimal Care for Alcohol Withdrawal  Goal: Optimal Care for the alcohol withdrawal patient  Outcome: Progressing  Note: CIWA in place.     Problem: Seizure Precautions  Goal: Implementation of seizure precautions  Outcome: Progressing     Problem: Lifestyle Changes  Goal: Patient's ability to identify lifestyle changes and available resources to help reduce recurrence of condition will improve  Outcome: Progressing     Problem: Psychosocial  Goal: Patient's level of anxiety will decrease  Outcome: Progressing  Goal: Spiritual and cultural needs incorporated into hospitalization  Outcome: Progressing     Problem: Risk for Aspiration  Goal: Patient's risk for aspiration will be absent or decrease  Outcome: Progressing  Flowsheets (Taken 12/22/2024 2164)  Aspiration Prevention:   Assessed for signs and symptoms of aspiration   Confirmed NPO order until medically cleared  Note: Tolerating sips with meds appropriately.     Problem: Pain - Standard  Goal: Alleviation of pain or a reduction in pain to the patient’s comfort goal  Outcome: Progressing  Note: Patient educated on 0-10 pain scale, available pain medications, and non-pharmacological methods of pain management. Verbalizes understanding. See MAR.        Problem: Fall Risk  Goal: Patient will remain free from falls  Outcome: Progressing  Note: Patient educated on using call light  and to wait for staff to be in the room before attempting to mobilize. Verbalizes understanding          Patient is not progressing towards the following goals:

## 2024-12-22 NOTE — PROGRESS NOTES
[facility] Medicine Daily Progress Note    Date of Service  12/22/2024    Chief Complaint  Yohana Brandon is a 59 y.o. female admitted 12/21/2024 with fall and jaw fracture     Hospital Course  Yohana Brandon is a 59 y.o. female who presented 12/21/2024 with ground-level fall with resultant jaw pain.  Patient at this time has significant pain with speaking, was able to answer questions but most of the information obtained from family at bedside.  Patient was awake this morning around 830, lost her balance and fell forward hitting her face on the tile floor.  Patient had instant pain, clear deformity so 911 was called.  Patient states she drinks 1.5 bottles of wine per day.  Family is unsure however because they were out of town until 1 this morning.  Patient upon arrival was noted to have a significant alcohol level, then began having alcohol withdrawal.  Multiple mandibular and maxillary fractures, seen by trauma surgery.  OMFS was consulted and she was admitted for surgical intervention and management of alcohol withdrawal     Interval Problem Update  Pt seen and examined at bedside, awake and alert, having jaw pain, feels anxious and tremulous, notes ativan helping   - planning to go to OR today with OMFS  - mag and phos both low, replacement provided   - H/H down trending, no signs of obvious bleeding, will monitor closely (14 ->10) monitor with q12h h/h   - diet per surgery post op   - continue CIWA     I have discussed this patient's plan of care and discharge plan at IDT rounds today with Case Management, Nursing, Nursing leadership, and other members of the IDT team.    Consultants/Specialty  trauma surgery and OMFS    Code Status  Full Code    Disposition  The patient is not medically cleared for discharge to home or a post-acute facility.      I have placed the appropriate orders for post-discharge needs.    Review of Systems  Review of Systems   Constitutional:  Positive for malaise/fatigue.  Negative for chills and fever.   HENT:          Facial pain and numb lower lip   Eyes:  Negative for blurred vision and double vision.   Respiratory:  Negative for shortness of breath.    Cardiovascular:  Negative for chest pain.   Gastrointestinal:  Negative for abdominal pain, nausea and vomiting.   Genitourinary:  Negative for dysuria.   Musculoskeletal:  Positive for falls, joint pain and myalgias.   Neurological:  Positive for tingling, weakness and headaches. Negative for dizziness.   Psychiatric/Behavioral:  Positive for substance abuse (ETOH). The patient is nervous/anxious.         Physical Exam  Temp:  [36.3 °C (97.3 °F)-36.7 °C (98.1 °F)] 36.3 °C (97.3 °F)  Pulse:  [] 93  Resp:  [15-18] 18  BP: (137-150)/(78-89) 143/78  SpO2:  [90 %-99 %] 96 %    Physical Exam  Vitals and nursing note reviewed.   Constitutional:       General: She is not in acute distress.     Appearance: She is normal weight. She is not toxic-appearing.      Comments: Appears uncomfortable    HENT:      Mouth/Throat:      Mouth: Mucous membranes are dry.      Comments: Malocclusion, anterior laceration with dressing   Eyes:      Conjunctiva/sclera: Conjunctivae normal.   Cardiovascular:      Rate and Rhythm: Tachycardia present.   Pulmonary:      Effort: Pulmonary effort is normal.   Abdominal:      General: Bowel sounds are normal.      Palpations: Abdomen is soft.   Musculoskeletal:         General: Normal range of motion.      Cervical back: Neck supple.   Skin:     General: Skin is warm.   Neurological:      Mental Status: She is alert and oriented to person, place, and time.      Comments: tremulous   Psychiatric:         Mood and Affect: Mood normal.         Behavior: Behavior normal.         Fluids    Intake/Output Summary (Last 24 hours) at 12/22/2024 1503  Last data filed at 12/21/2024 1843  Gross per 24 hour   Intake --   Output 400 ml   Net -400 ml        Laboratory  Recent Labs     12/21/24  0939 12/22/24  0203   WBC  5.1 4.9   RBC 3.37* 3.13*   HEMOGLOBIN 11.7* 10.7*   HEMATOCRIT 33.3* 31.4*   MCV 98.8* 100.3*   MCH 34.7* 34.2*   MCHC 35.1 34.1   RDW 45.6 46.0   PLATELETCT 183 155*   MPV 9.7 9.8     Recent Labs     12/21/24  0939 12/22/24  0203   SODIUM 134* 136   POTASSIUM 3.0* 3.9   CHLORIDE 96 98   CO2 23 28   GLUCOSE 97 83   BUN 16 13   CREATININE 0.96 0.84   CALCIUM 8.8 8.4*     Recent Labs     12/21/24  0939   APTT 26.3   INR 1.06               Imaging  CT-MAXILLOFACIAL W/O PLUS RECONS   Final Result         1. Bilateral mandibular rami fractures. Mandible fracture anteriorly near the midline.      2. Fractures of right maxillary bicuspid and Emilia. Fracture of left mandibular bicuspid.      3. Extensive paranasal acute and chronic sinusitis. The middle ears and visible mastoid sinuses are clear.                  CT-CSPINE WITHOUT PLUS RECONS   Final Result         1. No cervical spine fracture or subluxation to T1 evident.      2. Severe degenerative changes.      3. Mandible fractures. See separate CT facial report.      CT-HEAD W/O   Final Result         1. No acute process in the brain evident.      2. Extensive acute and chronic paranasal sinusitis.      3. See separate CT facial report.                    Assessment/Plan  * Mandible fracture (HCC)- (present on admission)  Assessment & Plan  Bilateral mandibular rami fractures and right maxillary bicuspid fractures  Trauma has seen the patient  OMFS following, NPO for surgery today, diet per surgery team   Prn pain control     Macrocytic anemia- (present on admission)  Assessment & Plan  Hgb dropped from 14 to 10, No sign of gross bleeding  Monitor H/H Q12 hour, further workup if continues to down trend     Transaminitis- (present on admission)  Assessment & Plan  Mild, asymptomatic  Due to alcohol, nothing acute, no further workup    Dyslipidemia- (present on admission)  Assessment & Plan  Continue home statin    Hyponatremia- (present on admission)  Assessment &  Plan  Very mild, due to dehydration and alcohol abuse  Cont. IV fluids    Fall- (present on admission)  Assessment & Plan  Due to alcohol intoxication  Resulting in mandibular and maxillary fractures  Fall precautions     COPD (chronic obstructive pulmonary disease) (HCC)- (present on admission)  Assessment & Plan  No acute exacerbation    Primary hypertension- (present on admission)  Assessment & Plan  Continue home telmisartan-hydrochlorothiazide  Start as needed labetalol  Adjust as needed    Hypokalemia- (present on admission)  Assessment & Plan  Start IV potassium  Repeat BMP in the morning  Mag low, IV replacement as well as phos     Alcohol withdrawal (HCC)- (present on admission)  Assessment & Plan  Patient does have a significantly elevated alcohol level  Already in withdrawal with tremors  Start CIWA protocol as well as scheduled Librium  Patient is at high risk of worsening, if this does occur, consider upgrading to the IMCU for Precedex drip however that is not deemed necessary at this time  Start multivitamins  Associated with dehydration, start IV fluids  Cont. To monitor closely          VTE prophylaxis:   SCDs/TEDs      I have performed a physical exam and reviewed and updated ROS and Plan today (12/22/2024). In review of yesterday's note (12/21/2024), there are no changes except as documented above.      Greater than 51 minutes spent prepping to see patient (e.g. review of tests) obtaining and/or reviewing separately obtained history. Performing a medically appropriate examination and/ evaluation.  Counseling and educating the patient/family/caregiver.  Ordering medications, tests, or procedures.  Referring and communicating with other health care professionals.  Documenting clinical information in EPIC.  Independently interpreting results and communicating results to patient/family/caregiver.  Care coordination.

## 2024-12-22 NOTE — ANESTHESIA PREPROCEDURE EVALUATION
Case: [id number] Date/Time: 12/22/24 1345    Procedure: ORIF, FRACTURE, MANDIBLE (Bilateral)    Pre-op diagnosis: bilateral mandible fractures    Location: [facility] OR  / SURGERY [facility]    Surgeons: Marcel Fiore D.D.S.          ETOH  Asthma  Relevant Problems   PULMONARY   (positive) COPD (chronic obstructive pulmonary disease) (HCC)      CARDIAC   (positive) Coronary artery calcification seen on CT scan   (positive) Primary hypertension         (positive) Alcoholic hepatitis without ascites       Physical Exam    Airway   Mallampati: IV  TM distance: >3 FB  Neck ROM: full       Cardiovascular - normal exam  Rhythm: regular  Rate: normal  (-) murmur     Dental - normal exam           Pulmonary - normal exam  Breath sounds clear to auscultation     Abdominal    Neurological - normal exam                   Anesthesia Plan    ASA 3   ASA physical status 3 criteria: alcohol and/or substance dependence or abuse    Plan - general               Induction: intravenous    Postoperative Plan: Postoperative administration of opioids is intended.    Pertinent diagnostic labs and testing reviewed    Informed Consent:    Anesthetic plan and risks discussed with patient.    Use of blood products discussed with: patient whom consented to blood products.

## 2024-12-22 NOTE — CARE PLAN
The patient is Stable - Low risk of patient condition declining or worsening    Shift Goals  Clinical Goals: pain mgmt, surgery  Patient Goals: pain control, NPO  Family Goals: not present    Progress made toward(s) clinical / shift goals:        Problem: Knowledge Deficit - Standard  Goal: Patient and family/care givers will demonstrate understanding of plan of care, disease process/condition, diagnostic tests and medications  Outcome: Progressing     Problem: Seizure Precautions  Goal: Implementation of seizure precautions  Outcome: Progressing     Problem: Risk for Aspiration  Goal: Patient's risk for aspiration will be absent or decrease  Outcome: Progressing     Problem: Pain - Standard  Goal: Alleviation of pain or a reduction in pain to the patient’s comfort goal  Outcome: Progressing     Problem: Fall Risk  Goal: Patient will remain free from falls  Outcome: Progressing       Patient is not progressing towards the following goals:

## 2024-12-22 NOTE — PROGRESS NOTES
"      Trauma tertiary and SBIRT per trauma guidelines and quality measures. This note does not constitute as a formal trauma consult. Please defer all management to primary team.     Mental status adequate for full examination?: Yes      REVIEW OF SYSTEMS:  Review of Systems   Constitutional:  Positive for malaise/fatigue. Negative for chills and fever.   HENT:  Negative for sore throat.    Eyes:  Negative for blurred vision and double vision.   Respiratory:  Negative for cough and shortness of breath.    Cardiovascular:  Negative for chest pain.   Gastrointestinal:  Negative for abdominal pain, nausea and vomiting.   Genitourinary:  Negative for dysuria.   Musculoskeletal:  Positive for myalgias. Negative for back pain, joint pain and neck pain.   Neurological:  Positive for tremors and headaches. Negative for tingling, sensory change and weakness.   Psychiatric/Behavioral:  Positive for substance abuse. Negative for hallucinations. The patient is not nervous/anxious.    All other systems reviewed and are negative.      PHYSICAL EXAMINATION:  BP (!) 140/88   Pulse (!) 106   Temp 36.6 °C (97.9 °F) (Temporal)   Resp 16   Ht 1.727 m (5' 8\")   Wt 70.8 kg (156 lb)   LMP 09/14/2006   SpO2 94%   BMI 23.72 kg/m²   Physical Exam  Vitals and nursing note reviewed.   Constitutional:       General: She is not in acute distress.     Appearance: She is not ill-appearing.      Interventions: Nasal cannula in place.   HENT:      Head: Normocephalic and atraumatic.      Right Ear: External ear normal.      Nose: Nose normal.      Mouth/Throat:      Mouth: Mucous membranes are dry.      Comments: Bilateral Jaw pain  Eyes:      Conjunctiva/sclera: Conjunctivae normal.      Pupils: Pupils are equal, round, and reactive to light.   Cardiovascular:      Rate and Rhythm: Normal rate and regular rhythm.      Pulses: Normal pulses.      Heart sounds: Normal heart sounds.   Pulmonary:      Effort: Pulmonary effort is normal. No " respiratory distress.   Chest:      Chest wall: No tenderness.   Abdominal:      General: Abdomen is flat. There is no distension.      Palpations: Abdomen is soft.      Tenderness: There is no abdominal tenderness. There is no guarding.   Musculoskeletal:         General: Tenderness (bilateral jaw) present. Normal range of motion.      Cervical back: Normal range of motion. No tenderness.      Right lower leg: No edema.      Left lower leg: No edema.   Skin:     General: Skin is warm and dry.      Capillary Refill: Capillary refill takes less than 2 seconds.      Findings: No bruising.   Neurological:      Mental Status: She is alert and oriented to person, place, and time.      Sensory: Sensation is intact.      Motor: Motor function is intact.      Comments: Tremors noted.    Psychiatric:         Attention and Perception: She does not perceive auditory or visual hallucinations.         Mood and Affect: Mood normal.         Behavior: Behavior normal. Behavior is cooperative.         Judgment: Judgment normal.         LABORATORY VALUES:  Recent Labs     12/21/24  0939 12/22/24  0203   WBC 5.1 4.9   RBC 3.37* 3.13*   HEMOGLOBIN 11.7* 10.7*   HEMATOCRIT 33.3* 31.4*   MCV 98.8* 100.3*   MCH 34.7* 34.2*   MCHC 35.1 34.1   RDW 45.6 46.0   PLATELETCT 183 155*   MPV 9.7 9.8     Recent Labs     12/21/24  0939 12/22/24  0203   SODIUM 134* 136   POTASSIUM 3.0* 3.9   CHLORIDE 96 98   CO2 23 28   GLUCOSE 97 83   BUN 16 13   CREATININE 0.96 0.84   CALCIUM 8.8 8.4*     Recent Labs     12/21/24  0939   ASTSGOT 58*   ALTSGPT 56*   TBILIRUBIN <0.2   ALKPHOSPHAT 68   GLOBULIN 2.5   INR 1.06     Recent Labs     12/21/24  0939   APTT 26.3   INR 1.06       IMAGING:  CT-MAXILLOFACIAL W/O PLUS RECONS   Final Result         1. Bilateral mandibular rami fractures. Mandible fracture anteriorly near the midline.      2. Fractures of right maxillary bicuspid and Emilia. Fracture of left mandibular bicuspid.      3. Extensive paranasal acute  and chronic sinusitis. The middle ears and visible mastoid sinuses are clear.                  CT-CSPINE WITHOUT PLUS RECONS   Final Result         1. No cervical spine fracture or subluxation to T1 evident.      2. Severe degenerative changes.      3. Mandible fractures. See separate CT facial report.      CT-HEAD W/O   Final Result         1. No acute process in the brain evident.      2. Extensive acute and chronic paranasal sinusitis.      3. See separate CT facial report.                     CAGE Results: positive Blood Alcohol>0.08: yes       Assessment complete date: 12/22/2024  Intervention: Complete. Patient response to intervention:.   Patient demonstrates understanding of intervention. Patient does not agree to follow-up.   has not been contacted. Follow up with: PCP  Total ETOH intervention time: 15 - 30 mintues      PDI Score: Not completed at time of tertiary  (Score > 23 = Psychiatry consult)        Newly identified traumatic injuries.  No new injuries identified at this time. No obvious deformities or swelling noted        Trauma tertiary and SBIRT per trauma guidelines and quality measures. This note does not constitute as a formal trauma consult. Please defer all management to primary team.

## 2024-12-23 ENCOUNTER — PHARMACY VISIT (OUTPATIENT)
Dept: PHARMACY | Facility: MEDICAL CENTER | Age: 59
End: 2024-12-23
Payer: COMMERCIAL

## 2024-12-23 VITALS
RESPIRATION RATE: 16 BRPM | OXYGEN SATURATION: 95 % | WEIGHT: 156 LBS | SYSTOLIC BLOOD PRESSURE: 114 MMHG | DIASTOLIC BLOOD PRESSURE: 73 MMHG | HEART RATE: 84 BPM | TEMPERATURE: 98.1 F | HEIGHT: 68 IN | BODY MASS INDEX: 23.64 KG/M2

## 2024-12-23 PROCEDURE — 700111 HCHG RX REV CODE 636 W/ 250 OVERRIDE (IP): Performed by: DENTIST

## 2024-12-23 PROCEDURE — 700102 HCHG RX REV CODE 250 W/ 637 OVERRIDE(OP): Performed by: DENTIST

## 2024-12-23 PROCEDURE — 700102 HCHG RX REV CODE 250 W/ 637 OVERRIDE(OP): Performed by: INTERNAL MEDICINE

## 2024-12-23 PROCEDURE — A9270 NON-COVERED ITEM OR SERVICE: HCPCS | Performed by: DENTIST

## 2024-12-23 PROCEDURE — 700102 HCHG RX REV CODE 250 W/ 637 OVERRIDE(OP): Performed by: STUDENT IN AN ORGANIZED HEALTH CARE EDUCATION/TRAINING PROGRAM

## 2024-12-23 PROCEDURE — A9270 NON-COVERED ITEM OR SERVICE: HCPCS | Performed by: INTERNAL MEDICINE

## 2024-12-23 PROCEDURE — RXMED WILLOW AMBULATORY MEDICATION CHARGE: Performed by: STUDENT IN AN ORGANIZED HEALTH CARE EDUCATION/TRAINING PROGRAM

## 2024-12-23 PROCEDURE — 700111 HCHG RX REV CODE 636 W/ 250 OVERRIDE (IP): Mod: JZ | Performed by: STUDENT IN AN ORGANIZED HEALTH CARE EDUCATION/TRAINING PROGRAM

## 2024-12-23 PROCEDURE — 99239 HOSP IP/OBS DSCHRG MGMT >30: CPT | Performed by: STUDENT IN AN ORGANIZED HEALTH CARE EDUCATION/TRAINING PROGRAM

## 2024-12-23 PROCEDURE — 700105 HCHG RX REV CODE 258: Performed by: DENTIST

## 2024-12-23 PROCEDURE — A9270 NON-COVERED ITEM OR SERVICE: HCPCS | Performed by: STUDENT IN AN ORGANIZED HEALTH CARE EDUCATION/TRAINING PROGRAM

## 2024-12-23 RX ORDER — CHLORHEXIDINE GLUCONATE ORAL RINSE 1.2 MG/ML
15 SOLUTION DENTAL 2 TIMES DAILY
Qty: 473 ML | Refills: 0 | Status: SHIPPED | OUTPATIENT
Start: 2024-12-23 | End: 2025-01-08

## 2024-12-23 RX ORDER — HYDROCODONE BITARTRATE AND ACETAMINOPHEN 7.5; 325 MG/1; MG/1
1 TABLET ORAL EVERY 6 HOURS PRN
Qty: 12 TABLET | Refills: 0 | Status: SHIPPED | OUTPATIENT
Start: 2024-12-23 | End: 2024-12-26

## 2024-12-23 RX ORDER — DIPHENHYDRAMINE HYDROCHLORIDE 50 MG/ML
25 INJECTION INTRAMUSCULAR; INTRAVENOUS EVERY 6 HOURS PRN
Status: DISCONTINUED | OUTPATIENT
Start: 2024-12-23 | End: 2024-12-23 | Stop reason: HOSPADM

## 2024-12-23 RX ORDER — IBUPROFEN 800 MG/1
800 TABLET, FILM COATED ORAL EVERY 6 HOURS PRN
Qty: 20 TABLET | Refills: 0 | Status: SHIPPED | OUTPATIENT
Start: 2024-12-23 | End: 2024-12-28

## 2024-12-23 RX ADMIN — AMOXICILLIN AND CLAVULANATE POTASSIUM 1 TABLET: 875; 125 TABLET, FILM COATED ORAL at 08:38

## 2024-12-23 RX ADMIN — CEFAZOLIN 2 G: 2 INJECTION, POWDER, FOR SOLUTION INTRAMUSCULAR; INTRAVENOUS at 04:37

## 2024-12-23 RX ADMIN — Medication 100 MG: at 04:34

## 2024-12-23 RX ADMIN — OXYCODONE 10 MG: 5 TABLET ORAL at 02:29

## 2024-12-23 RX ADMIN — DIPHENHYDRAMINE HYDROCHLORIDE 25 MG: 50 INJECTION, SOLUTION INTRAMUSCULAR; INTRAVENOUS at 15:37

## 2024-12-23 RX ADMIN — HYDROCHLOROTHIAZIDE 25 MG: 25 TABLET ORAL at 05:23

## 2024-12-23 RX ADMIN — OXYCODONE 10 MG: 5 TABLET ORAL at 12:13

## 2024-12-23 RX ADMIN — THERA TABS 1 TABLET: TAB at 04:34

## 2024-12-23 RX ADMIN — FOLIC ACID 1 MG: 1 TABLET ORAL at 04:35

## 2024-12-23 RX ADMIN — MORPHINE SULFATE 4 MG: 4 INJECTION INTRAVENOUS at 04:30

## 2024-12-23 RX ADMIN — DEXAMETHASONE SODIUM PHOSPHATE 6 MG: 4 INJECTION INTRA-ARTICULAR; INTRALESIONAL; INTRAMUSCULAR; INTRAVENOUS; SOFT TISSUE at 04:31

## 2024-12-23 RX ADMIN — CHLORHEXIDINE GLUCONATE, 0.12% ORAL RINSE 15 ML: 1.2 SOLUTION DENTAL at 04:34

## 2024-12-23 RX ADMIN — CELECOXIB 200 MG: 200 CAPSULE ORAL at 04:34

## 2024-12-23 RX ADMIN — TELMISARTAN 80 MG: 80 TABLET ORAL at 04:34

## 2024-12-23 RX ADMIN — OXYCODONE 10 MG: 5 TABLET ORAL at 08:39

## 2024-12-23 RX ADMIN — CHLORDIAZEPOXIDE HYDROCHLORIDE 25 MG: 25 CAPSULE ORAL at 05:23

## 2024-12-23 SDOH — ECONOMIC STABILITY: TRANSPORTATION INSECURITY
IN THE PAST 12 MONTHS, HAS LACK OF RELIABLE TRANSPORTATION KEPT YOU FROM MEDICAL APPOINTMENTS, MEETINGS, WORK OR FROM GETTING THINGS NEEDED FOR DAILY LIVING?: NO

## 2024-12-23 SDOH — ECONOMIC STABILITY: TRANSPORTATION INSECURITY
IN THE PAST 12 MONTHS, HAS THE LACK OF TRANSPORTATION KEPT YOU FROM MEDICAL APPOINTMENTS OR FROM GETTING MEDICATIONS?: NO

## 2024-12-23 ASSESSMENT — LIFESTYLE VARIABLES
AGITATION: NORMAL ACTIVITY
ORIENTATION AND CLOUDING OF SENSORIUM: ORIENTED AND CAN DO SERIAL ADDITIONS
VISUAL DISTURBANCES: NOT PRESENT
HEADACHE, FULLNESS IN HEAD: NOT PRESENT
TOTAL SCORE: 2
ORIENTATION AND CLOUDING OF SENSORIUM: ORIENTED AND CAN DO SERIAL ADDITIONS
TOTAL SCORE: 4
ANXIETY: MODERATELY ANXIOUS OR GUARDED, SO ANXIETY IS INFERRED
TOTAL SCORE: 2
AGITATION: NORMAL ACTIVITY
ALCOHOL_USE: YES
PAROXYSMAL SWEATS: NO SWEAT VISIBLE
PAROXYSMAL SWEATS: NO SWEAT VISIBLE
AUDITORY DISTURBANCES: NOT PRESENT
DOES PATIENT WANT TO STOP DRINKING: NO
NAUSEA AND VOMITING: NO NAUSEA AND NO VOMITING
AGITATION: NORMAL ACTIVITY
TREMOR: NO TREMOR
TOTAL SCORE: 4
EVER FELT BAD OR GUILTY ABOUT YOUR DRINKING: YES
AUDITORY DISTURBANCES: NOT PRESENT
TREMOR: NO TREMOR
HOW MANY TIMES IN THE PAST YEAR HAVE YOU HAD 5 OR MORE DRINKS IN A DAY: 150
AGITATION: NORMAL ACTIVITY
ORIENTATION AND CLOUDING OF SENSORIUM: ORIENTED AND CAN DO SERIAL ADDITIONS
ANXIETY: *
TREMOR: NO TREMOR
HEADACHE, FULLNESS IN HEAD: NOT PRESENT
HEADACHE, FULLNESS IN HEAD: NOT PRESENT
NAUSEA AND VOMITING: NO NAUSEA AND NO VOMITING
PAROXYSMAL SWEATS: NO SWEAT VISIBLE
EVER HAD A DRINK FIRST THING IN THE MORNING TO STEADY YOUR NERVES TO GET RID OF A HANGOVER: NO
HEADACHE, FULLNESS IN HEAD: NOT PRESENT
VISUAL DISTURBANCES: NOT PRESENT
TOTAL SCORE: 4
ORIENTATION AND CLOUDING OF SENSORIUM: ORIENTED AND CAN DO SERIAL ADDITIONS
TOTAL SCORE: 2
HAVE PEOPLE ANNOYED YOU BY CRITICIZING YOUR DRINKING: NO
TREMOR: NO TREMOR
PAROXYSMAL SWEATS: NO SWEAT VISIBLE
HAVE YOU EVER FELT YOU SHOULD CUT DOWN ON YOUR DRINKING: YES
CONSUMPTION TOTAL: POSITIVE
ON A TYPICAL DAY WHEN YOU DRINK ALCOHOL HOW MANY DRINKS DO YOU HAVE: 6
ANXIETY: MODERATELY ANXIOUS OR GUARDED, SO ANXIETY IS INFERRED
VISUAL DISTURBANCES: NOT PRESENT
AUDITORY DISTURBANCES: NOT PRESENT
NAUSEA AND VOMITING: NO NAUSEA AND NO VOMITING
VISUAL DISTURBANCES: NOT PRESENT
AUDITORY DISTURBANCES: NOT PRESENT
TOTAL SCORE: 3
AVERAGE NUMBER OF DAYS PER WEEK YOU HAVE A DRINK CONTAINING ALCOHOL: 3
ANXIETY: MODERATELY ANXIOUS OR GUARDED, SO ANXIETY IS INFERRED
NAUSEA AND VOMITING: NO NAUSEA AND NO VOMITING

## 2024-12-23 ASSESSMENT — PATIENT HEALTH QUESTIONNAIRE - PHQ9
5. POOR APPETITE OR OVEREATING: SEVERAL DAYS
SUM OF ALL RESPONSES TO PHQ QUESTIONS 1-9: 10
8. MOVING OR SPEAKING SO SLOWLY THAT OTHER PEOPLE COULD HAVE NOTICED. OR THE OPPOSITE, BEING SO FIGETY OR RESTLESS THAT YOU HAVE BEEN MOVING AROUND A LOT MORE THAN USUAL: NOT AT ALL
4. FEELING TIRED OR HAVING LITTLE ENERGY: SEVERAL DAYS
3. TROUBLE FALLING OR STAYING ASLEEP OR SLEEPING TOO MUCH: NEARLY EVERY DAY
6. FEELING BAD ABOUT YOURSELF - OR THAT YOU ARE A FAILURE OR HAVE LET YOURSELF OR YOUR FAMILY DOWN: NEARLY EVERY DAY
9. THOUGHTS THAT YOU WOULD BE BETTER OFF DEAD, OR OF HURTING YOURSELF: NOT AT ALL
1. LITTLE INTEREST OR PLEASURE IN DOING THINGS: NOT AT ALL
SUM OF ALL RESPONSES TO PHQ9 QUESTIONS 1 AND 2: 2
7. TROUBLE CONCENTRATING ON THINGS, SUCH AS READING THE NEWSPAPER OR WATCHING TELEVISION: NOT AT ALL
2. FEELING DOWN, DEPRESSED, IRRITABLE, OR HOPELESS: MORE THAN HALF THE DAYS

## 2024-12-23 ASSESSMENT — SOCIAL DETERMINANTS OF HEALTH (SDOH)
WITHIN THE LAST YEAR, HAVE YOU BEEN KICKED, HIT, SLAPPED, OR OTHERWISE PHYSICALLY HURT BY YOUR PARTNER OR EX-PARTNER?: NO
WITHIN THE LAST YEAR, HAVE TO BEEN RAPED OR FORCED TO HAVE ANY KIND OF SEXUAL ACTIVITY BY YOUR PARTNER OR EX-PARTNER?: NO
WITHIN THE PAST 12 MONTHS, YOU WORRIED THAT YOUR FOOD WOULD RUN OUT BEFORE YOU GOT THE MONEY TO BUY MORE: NEVER TRUE
WITHIN THE LAST YEAR, HAVE YOU BEEN HUMILIATED OR EMOTIONALLY ABUSED IN OTHER WAYS BY YOUR PARTNER OR EX-PARTNER?: NO
WITHIN THE PAST 12 MONTHS, THE FOOD YOU BOUGHT JUST DIDN'T LAST AND YOU DIDN'T HAVE MONEY TO GET MORE: NEVER TRUE
WITHIN THE LAST YEAR, HAVE YOU BEEN AFRAID OF YOUR PARTNER OR EX-PARTNER?: NO
IN THE PAST 12 MONTHS, HAS THE ELECTRIC, GAS, OIL, OR WATER COMPANY THREATENED TO SHUT OFF SERVICE IN YOUR HOME?: NO

## 2024-12-23 ASSESSMENT — PAIN DESCRIPTION - PAIN TYPE
TYPE: ACUTE PAIN;SURGICAL PAIN

## 2024-12-23 NOTE — PROGRESS NOTES
Virtual Nurse rounding and admission profile complete.    Round Needs: Other: designated caregiver; set up privacy protection password; completed PDI, score 13 and Notified of Patient Needs: RN, unit clerk

## 2024-12-23 NOTE — ANESTHESIA POSTPROCEDURE EVALUATION
Patient: Yohana Brandon    Procedure Summary       Date: 12/22/24 Room / Location: [facility] 05 / SURGERY [facility]    Anesthesia Start: 1453 Anesthesia Stop: 1656    Procedure: ORIF, FRACTURE, MANDIBLE SYMPHYSIS AND LEFT SUBCONDYLAR FRACTURE, EXTRACTION OF ANY NECESSARY TEETH (Left: Face) Diagnosis: (bilateral mandible fractures)    Surgeons: Marcel Fiore D.D.S. Responsible Provider: Lewis Villafuerte M.D.    Anesthesia Type: general ASA Status: 3            Final Anesthesia Type: general  Last vitals  BP   Blood Pressure: (!) 160/97    Temp   36.3 °C (97.3 °F)    Pulse   (!) 118   Resp   17    SpO2   93 %      Anesthesia Post Evaluation    Patient location during evaluation: PACU  Patient participation: complete - patient participated  Level of consciousness: awake and alert    Airway patency: patent  Anesthetic complications: no  Cardiovascular status: hemodynamically stable  Respiratory status: acceptable  Hydration status: euvolemic    PONV: none          There were no known notable events for this encounter.     Nurse Pain Score: 8 (NPRS)

## 2024-12-23 NOTE — OR NURSING
Pt is aaox4, with slight tremors to upper extremities, resting comfortably in hospital bed on 3lpm nasal cannula with jaw bra in place. She is treated for pain per MAR with good response. She does not complain of n/v and tolerates ice chips. Pt given handheld suction as needed and demonstrates understanding of how to use device while in pacu. Surgical site is clean, dry and intact. Will continue to monitor.     Xray informed pt must be able to  xray machine for Dr. Fiore's imaging order. Pt given narcotics and still slightly drowsy from her procedure and not safe to stand independently at this time. Xray informed they will follow up with floor to obtain imaging.

## 2024-12-23 NOTE — DISCHARGE SUMMARY
Discharge Summary    CHIEF COMPLAINT ON ADMISSION  Chief Complaint   Patient presents with    GLF     Pt BIB [facility] for MGLF at home. +ETOH, +head strike, - thinners, -LOC. Pt has a laceration to chin about 2cm. Lower teeth feel loose with possible breaks.        Reason for Admission  EMS     Admission Date  12/21/2024    CODE STATUS  Full Code    HPI & HOSPITAL COURSE    Yohana Brandon is a 59 y.o. female with a past medical history of alcohol abuse/withdrawal and hypertension who presented 12/21/2024 with ground-level fall with resultant jaw pain.  Patient at this time has significant pain with speaking, was able to answer questions but most of the information obtained from family at bedside.  Patient was awake this morning around 830, lost her balance and fell forward hitting her face on the tile floor.  Patient had instant pain, clear deformity so 911 was called.  Patient states she drinks 1.5 bottles of wine per day.  Family is unsure however because they were out of town until 1 this morning.  Patient upon arrival was noted to have a significant alcohol level, then began having alcohol withdrawal.  Multiple mandibular and maxillary fractures, seen by trauma surgery.  OMFS was consulted and she was admitted for surgical intervention and management of alcohol withdrawal. She underwent Open reduction with internal fixation of mandibular symphysis fracture, Open reduction with internal fixation of left mandibular subcondylar fracture, Closed reduction with manipulation of right mandibular condylar neck fracture, she did well post operatively and has been cleared by oral surgery for discharge with course of oral Augmentin and pain control. She does not appear to be having symptoms of severe withdrawal and her scores have overall been low. She is eager to discharge. She is requiring oxygen and will need oxygen on discharge, this has been ordered. Continue to encourage IS as her jaw surgery allows. She has  follow up already scheduled. She was educated on importance of complete alcohol cessation.       Therefore, she is discharged in fair and stable condition to home with close outpatient follow-up.    The patient met 2-midnight criteria for an inpatient stay at the time of discharge.    Discharge Date  12/23/2024    FOLLOW UP ITEMS POST DISCHARGE  Oral surgery follow up  Alcohol abuse/dependence   Hypertension and other chronic medical follow up     DISCHARGE DIAGNOSES  Principal Problem:    Mandible fracture (HCC) (POA: Yes)  Active Problems:    Alcohol withdrawal (HCC) (POA: Yes)    Hypokalemia (POA: Yes)    Primary hypertension (Chronic) (POA: Yes)    COPD (chronic obstructive pulmonary disease) (HCC) (POA: Yes)    Fall (POA: Yes)    Hyponatremia (POA: Yes)    Dyslipidemia (POA: Yes)    Transaminitis (POA: Yes)    Macrocytic anemia (POA: Yes)  Resolved Problems:    * No resolved hospital problems. *      FOLLOW UP  No future appointments.  Marcel Fiore D.D.S.  [address] [place] [id number] [phone]    Call in 1 week(s)  For wound re-check    Deborah Moctezuma M.D.  [address] [place] [id number] [phone]    Schedule an appointment as soon as possible for a visit in 1 week(s)  hospital follow up      MEDICATIONS ON DISCHARGE     Medication List        START taking these medications        Instructions   amoxicillin-clavulanate 875-125 MG Tabs  Commonly known as: Augmentin   Take 1 Tablet by mouth every 12 hours for 7 days.  Dose: 1 Tablet     chlorhexidine 0.12 % Soln  Commonly known as: Peridex   Take 15 mL by mouth 2 times a day for 14 days.  Dose: 15 mL     HYDROcodone-acetaminophen 7.5-325 MG tab  Commonly known as: Norco   Take 1 Tablet by mouth every 6 hours as needed for Severe Pain for up to 3 days.  Dose: 1 Tablet            CHANGE how you take these medications        Instructions   ibuprofen 800 MG Tabs  What changed:   medication strength  how much to take  when to take  "this  Commonly known as: Motrin   Take 1 Tablet by mouth every 6 hours as needed for Mild Pain for up to 5 days. 2 tablets = 400 mg.  Dose: 800 mg            CONTINUE taking these medications        Instructions   Airsupra 90-80 MCG/ACT Aero  Generic drug: Albuterol-Budesonide   Inhale 1 Puff 4 times a day.  Dose: 1 Puff     aspirin 81 MG EC tablet   Take 81 mg by mouth every day.  Dose: 81 mg     Micardis HCT 80-25 MG per tablet  Generic drug: telmisartan-hydrochlorothiazide   Take 1 Tablet by mouth every day.  Dose: 1 Tablet     Multivitamin Tabs   Take 1 Tablet by mouth every day.  Dose: 1 Tablet     ondansetron 4 MG Tbdp  Commonly known as: Zofran ODT   Take 1 Tablet by mouth every 8 hours as needed for Nausea/Vomiting.  Dose: 4 mg     rosuvastatin 10 MG Tabs  Commonly known as: Crestor   Take 10 mg by mouth every evening.  Dose: 10 mg            STOP taking these medications      azithromycin 250 MG Tabs  Commonly known as: Zithromax     predniSONE 20 MG Tabs  Commonly known as: Deltasone              Allergies  Allergies   Allergen Reactions    Kiwi Extract Swelling     12/21/2024: Patient unable to verify allergy / reaction. Reaction documented historically: \"Swelling\"    Flu Virus Vaccine Rash     Allergic to flu vaccine     Morphine Rash          Vicodin [Hydrocodone-Acetaminophen] Rash and Itching     12/21/2024: Patient states \"I'm not allergic to that, I don't know where that came from\"  Reaction documented historically: Generalized rash, pruritus    Banana Unspecified     12/21/2024: Patient states \"I'm not allergic to that anymore\"    Latex Unspecified     12/21/2024: Patient states \"I'm not allergic to that anymore\"       DIET  Orders Placed This Encounter   Procedures    Diet Order Diet: Clear Liquid; Tray Modifications (optional): No Straws     Standing Status:   Standing     Number of Occurrences:   1     Order Specific Question:   Diet:     Answer:   Clear Liquid [10]     Order Specific Question:  "  Tray Modifications (optional)     Answer:   No Straws       ACTIVITY  As tolerated.      CONSULTATIONS  Oral surgery   Trauma surgery     PROCEDURES  12/23  Procedure  Open reduction with internal fixation of mandibular symphysis fracture  Open reduction with internal fixation of left mandibular subcondylar fracture  Closed reduction with manipulation of right mandibular condylar neck fracture    LABORATORY  Lab Results   Component Value Date    SODIUM 136 12/22/2024    POTASSIUM 3.9 12/22/2024    CHLORIDE 98 12/22/2024    CO2 28 12/22/2024    GLUCOSE 83 12/22/2024    BUN 13 12/22/2024    CREATININE 0.84 12/22/2024    GLOMRATE 98 10/03/2023        Lab Results   Component Value Date    WBC 4.9 12/22/2024    HEMOGLOBIN 10.7 (L) 12/22/2024    HEMATOCRIT 31.4 (L) 12/22/2024    PLATELETCT 155 (L) 12/22/2024        Total time of the discharge process exceeds 41 minutes.

## 2024-12-23 NOTE — FACE TO FACE
"Face to Face Note  -  Durable Medical Equipment    Emilia Thompson M.D. - NPI: [id number]  I certify that this patient is under my care and that they had a durable medical equipment(DME)face to face encounter by myself that meets the physician DME face-to-face encounter requirements with this patient on:    Date of encounter:   Patient:                    MRN:                       YOB: 2022  Yohana Brandon  [id number]  1965     The encounter with the patient was in whole, or in part, for the following medical condition, which is the primary reason for durable medical equipment:  Post-Op Surgery    I certify that, based on my findings, the following durable medical equipment is medically necessary:    Oxygen   HOME O2 Saturation Measurements:(Values must be present for Home Oxygen orders)  Room air sat at rest: 93  Room air sat with amb: 76  With liters of O2: 1.5, O2 sat at rest with O2: 96  With Liters of O2: 1.5, O2 sat with amb with O2 : 90  Is the patient mobile?: Yes  If patient feels more short of breath, they can go up to 6 liters per minute and contact healthcare provider.    Supporting Symptoms: The patient requires supplemental oxygen, as the following interventions have been tried with limited or no improvement: \"Ambulation with oximetry and \"Incentive spirometry.    My Clinical findings support the need for the above equipment due to:  Hypoxia  "

## 2024-12-23 NOTE — DISCHARGE PLANNING
Case Management Discharge Planning    Admission Date: 12/21/2024  GMLOS: 3.1  ALOS: 2    6-Clicks ADL Score: 24  6-Clicks Mobility Score: 24      Anticipated Discharge Dispo: Discharge Disposition: Discharged to home/self care (01)  Discharge Address: [place] [address]  Discharge Contact Phone Number: [phone]    DME Needed: Yes    DME Ordered: Yes    Action(s) Taken: Patient was discussed in IDT rounds.  Per MD, patient is requiring supplemental O2--order placed for home oxygen.  RNCM met with patient at the bedside to obtain choice for oxygen.  #1 [employer] #2 [employer] #3 [facility].  RNCM faxed choice form to [facility].  RNCM requested [facility] to send orders for home oxygen per choice form.      1325  RNCM spoke to Vern with [facility].  Per Vern, O2 tank and concentrator will be delivered to patient's bedside by 1500.  RNCM updated bedside RN.      Escalations Completed: None    Medically Clear: Yes    Next Steps: RN CM to continue to follow for DC planning      Barriers to Discharge: Oxygen delivery     Is the patient up for discharge tomorrow: No    Care Transition Team Assessment    RNCM met with patient at the bedside.  Patient A&Ox4 and able to verify the information on the face sheet.  Patient lives with her spouse, Gaurav Leonard.  Pt has access to transportation.  Pt has [employer] for medical coverage.  Pt's PCP is Deborah Moctezuma MD.  Pt [facility].  Pt does not use DME at baseline.  Patient doesn't use home oxygen at baseline.  Pt denies smoking or drug use.  Pt admits to ETOH and is currently in a IOP program at [facility].  Pt declined further resources for ETOH use.      Information Source  Orientation Level: Oriented X4  Information Given By: Patient  Who is responsible for making decisions for patient? : Patient    Readmission Evaluation  Is this a readmission?: No    Elopement Risk  Legal Hold: No  Ambulatory or Self Mobile in Wheelchair: Yes  Disoriented:  No  Psychiatric Symptoms: None  History of Wandering: No  Elopement this Admit: No  Vocalizing Wanting to Leave: No  Displays Behaviors, Body Language Wanting to Leave: No-Not at Risk for Elopement  Elopement Risk: Not at Risk for Elopement    Discharge Preparedness  What is your plan after discharge?: Home with help  What are your discharge supports?: Spouse  Prior Functional Level: Ambulatory, Independent with Activities of Daily Living  Difficulity with ADLs: None  Difficulity with IADLs: None    Functional Assesment  Prior Functional Level: Ambulatory, Independent with Activities of Daily Living    Finances  Financial Barriers to Discharge: No  Prescription Coverage: Yes    Advance Directive  Advance Directive?: None    Domestic Abuse  Have you ever been the victim of abuse or violence?: No    Psychological Assessment  History of Substance Abuse: Alcohol  History of Psychiatric Problems: No  Non-compliant with Treatment: Yes  Newly Diagnosed Illness: Yes    Discharge Risks or Barriers  Discharge risks or barriers?: Complex medical needs  Patient risk factors: Substance abuse    Anticipated Discharge Information  Discharge Disposition: Discharged to home/self care (01)  Discharge Address: [address] [place] [address]  [place] [address]  Discharge Contact Phone Number: [phone]

## 2024-12-23 NOTE — PROGRESS NOTES
HPI: 60 y/o female POD #1 s/p ORIF of bilateral mandible fractures.  Surgery uneventful. Rested comfortably. AFVSS.     Vitals:    12/23/24 0320   BP: 128/79   Pulse: 90   Resp: 16   Temp: 36.5 °C (97.7 °F)   SpO2: 96%     PE:  Gen: AAOx3, NAD  HEENT: facial incisions submental and left retromandibular c/d/I, soft, no hematoma, floor of mouth soft, non-distended. Occlusion stable and reproducible. Arch bars in place.   Neuro: CN II-XII intact    Recent Labs     12/21/24  0939 12/22/24  0203   WBC 5.1 4.9   RBC 3.37* 3.13*   HEMOGLOBIN 11.7* 10.7*   HEMATOCRIT 33.3* 31.4*   MCV 98.8* 100.3*   MCH 34.7* 34.2*   RDW 45.6 46.0   PLATELETCT 183 155*   MPV 9.7 9.8   NEUTSPOLYS 59.80  --    LYMPHOCYTES 27.80  --    MONOCYTES 9.20  --    EOSINOPHILS 1.60  --    BASOPHILS 0.60  --          Imaging: Panoramic xray shows good reduction of left subcondylar and symphysis fractures.     Assessment: 60 y/o female POD #1 s/p ORIF of bilateral mandible fx, doing well    Plan:   -Ok to discharge at anytime from OMFS perspective  -Please discharge with:   Augmentin 875 BID x 1 week  Ibuprofen 800 mg q6h x 5 days  Norco 7.5/325 q6h x 3 days  Chlorhexadine oral rinse swish and spit 5 ml BID x 2 weeks  -Patient has information to follow up in my clinic on Friday.     Call with concerns    Odell Fiore DDS, MD, FACS  [phone]

## 2024-12-23 NOTE — ANESTHESIA TIME REPORT
Anesthesia Start and Stop Event Times       Date Time Event    12/22/2024 1452 Ready for Procedure     1453 Anesthesia Start     1656 Anesthesia Stop          Responsible Staff  12/22/24      Name Role Begin End    Lewis Villafuerte M.D. Anesth 1453 1656          Overtime Reason:  no overtime (within assigned shift)    Comments:

## 2024-12-23 NOTE — CARE PLAN
Problem: Knowledge Deficit - Standard  Goal: Patient and family/care givers will demonstrate understanding of plan of care, disease process/condition, diagnostic tests and medications  Outcome: Progressing     Problem: Seizure Precautions  Goal: Implementation of seizure precautions  Outcome: Progressing     Problem: Lifestyle Changes  Goal: Patient's ability to identify lifestyle changes and available resources to help reduce recurrence of condition will improve  Outcome: Progressing     Problem: Psychosocial  Goal: Patient's level of anxiety will decrease  Outcome: Progressing     Problem: Risk for Aspiration  Goal: Patient's risk for aspiration will be absent or decrease  Outcome: Progressing     Problem: Pain - Standard  Goal: Alleviation of pain or a reduction in pain to the patient’s comfort goal  Outcome: Progressing     Problem: Fall Risk  Goal: Patient will remain free from falls  Outcome: Progressing    The patient is Stable - Low risk of patient condition declining or worsening    Shift Goals  Clinical Goals: CIWA, Pain management, IV abx management, safety  Patient Goals: CIWA protocol, pain control, IV abx administration, comfort  Family Goals: Not present    Progress made toward(s) clinical / shift goals: Administered IV abx as ordered. Maintained padded side rails. Dimmed lights and provided quiet environment. Encouraged verbalization of feelings. On CIWA protocol- see flowsheet for CIWA scores. Administered medication per CIWA score. Administered pain medication as ordered. Verbalized understanding towards the side effects of the medication provided. No new skin tear/ breakdown of the skin found. Placed bed in a lowest possible position, placed bed side table and call bell within reach, side rails up x2, kept safe and comfortably.

## 2024-12-23 NOTE — DISCHARGE INSTRUCTIONS
Follow up with Dr. Fiore as scheduled this Friday, if you have questions or concerns prior to that, call his office   You have been prescribed pain medications, do not take these medications with alcohol and do not drive while taking   It is imperative that you stop drinking alcohol   Recommend you follow up with your primary care doctor for guidance of tapering off oxygen that was provided to you on discharge  While wearing oxygen do not smoke or be around other smoking, oxygen is highly flammable   Recommend that you purchase a pulse oximeter, used to measure your oxygen level, goal is >90% you can turn down/off your oxygen if you are above that, otherwise follow up with your primary care doctor to help guide you

## 2024-12-23 NOTE — PROGRESS NOTES
Upon assessing pt's oral cavity, it was noted that the pt's sublingual area had moderate swelling to frenulum area. Pt had decreased motion in tongue due to the swelling. Dr. Fiore updated and shown pt tongue swelling and informed that it was okay for pt to continue clear liquid diet at this time with no straws and to update if swelling becomes worse.

## 2024-12-23 NOTE — OP REPORT
Patient: Yohana Brandon  : 1965     Diagnosis:  Mandibular symphysis fracture  Left mandibular subcondylar fracture  Right mandibular condylar neck fracture        Procedure  Open reduction with internal fixation of mandibular symphysis fracture  Open reduction with internal fixation of left mandibular subcondylar fracture  Closed reduction with manipulation of right mandibular condylar neck fracture        Surgeon: Odell Fiore DDS, MD   Assistant: none     Anesthesia: General anesthesia via nasal intubation     Complications None      Fluids: see anesthesia log     Blood loss: 20 cc     Counts: Correct x2     Specimen: none     Hardware: Aria hybrid arch bars      Procedure Details:  Pt was seen in pre-op were consents were obtained, H&P was updated, and all of her questions were answered. Pt was then taken back to the OR and transferred onto the OR table in the supine position. Various monitors were attached and deemed to be working properly, see anesthesia log for details. She was then induced to general anesthesia through her IV and nasally intubated without complication.  The tube was secured by the surgery team, lacrilube placed in her eyes and tegaderms over them.  She was then prepped with betadine paint and draped in a standard fashion.      Profound local anesthesia with 15 ml of 1% lidocaine with 1:100k epi overling a preexisting submental laceration as well over her retromandibular region on the left.  A throat pack was placed. Aria hybrid arch bars were fixated to her maxilla and mandible using 6 mm monocortical screws.  She was then placed into maxillomandibular fixation using 24 gauge people wires.       First a 3 cm incision was marked in a retromandibular fashion over her left mandibular angle.  Incision was completed with a #15 blade through skin and subcutaneous tissue.  A nerve stimulator was used for the remainder of the dissection which proceeded in a layered fashion under  periosteum was reached.  This was incised and a subperiosteal plane was raised with a #9 periosteal elevator to reveal a fracture at her left subcondylar region.  The site was packed with gauze.     Attention then directed to the symphysis.  Through the preexisting laceration a subcutaneous plane was raised to reveal a fracture at the symphysis.  Bone reducing forceps were used to reduce the fracture and a marilynn fracture plate with bar, 4 hole was adapted to span the fracture and fixed with 12 mm bicortical screws.      Next, the subcondylar fracture was reduced and fixed with a marilynn 4 hole miniplate with bar using 6 mm bicortical locking and non-locking screws.  She was released from maxillomandibular fixation and her occlusion was noted to be stable and reproducible. Both surgical sites were copiously irrigated with sterile saline and attention directed to closure which proceeded in a layered fashion, first using 3-0 vicryl suture in an interrupted fashion to reapprximate the masseter muscular sling and subcutaneous tissue and then the overlying skin with 5-0 fast gut in a running fashion.  Steri strips were placed over and the throat pack removed.      The pt was wiped clean.  Tape removed from her eyes. She was then turned over to ansethesia where she was extubated without complication and returned to the PACU for recovery in stable condition.

## 2024-12-24 NOTE — CARE PLAN
The patient is Stable - Low risk of patient condition declining or worsening    Shift Goals  Clinical Goals: CIWA, pain management, IV ABx, safety  Patient Goals: Rest, pain control, comfort  Family Goals: Not present    Progress made toward(s) clinical / shift goals:    Problem: Knowledge Deficit - Standard  Goal: Patient and family/care givers will demonstrate understanding of plan of care, disease process/condition, diagnostic tests and medications  12/23/2024 1828 by Ceasar Horn R.N.  Outcome: Met  12/23/2024 1359 by Ceasar Horn R.N.  Outcome: Progressing     Problem: Optimal Care for Alcohol Withdrawal  Goal: Optimal Care for the alcohol withdrawal patient  12/23/2024 1828 by Ceasar Horn R.N.  Outcome: Met  12/23/2024 1359 by Ceasar Horn R.N.  Outcome: Progressing     Problem: Seizure Precautions  Goal: Implementation of seizure precautions  12/23/2024 1828 by Ceasar Horn R.N.  Outcome: Met  12/23/2024 1359 by Ceasar Horn R.N.  Outcome: Progressing     Problem: Lifestyle Changes  Goal: Patient's ability to identify lifestyle changes and available resources to help reduce recurrence of condition will improve  12/23/2024 1828 by Ceasar Horn R.N.  Outcome: Met  12/23/2024 1359 by Ceasar Horn R.N.  Outcome: Progressing     Problem: Psychosocial  Goal: Patient's level of anxiety will decrease  12/23/2024 1828 by Ceasar Horn R.N.  Outcome: Met  12/23/2024 1359 by Ceasar Horn R.N.  Outcome: Progressing  Goal: Spiritual and cultural needs incorporated into hospitalization  12/23/2024 1828 by Ceasar Horn R.N.  Outcome: Met  12/23/2024 1359 by Ceasar Horn R.N.  Outcome: Progressing     Problem: Risk for Aspiration  Goal: Patient's risk for aspiration will be absent or decrease  12/23/2024 1828 by Ceasar Horn R.N.  Outcome: Met  12/23/2024 1359 by Ceasar Horn R.N.  Outcome: Progressing     Problem: Pain - Standard  Goal: Alleviation of pain or a  reduction in pain to the patient’s comfort goal  12/23/2024 1828 by Ceasar Horn R.N.  Outcome: Met  12/23/2024 1359 by Ceasar Horn R.N.  Outcome: Progressing     Problem: Fall Risk  Goal: Patient will remain free from falls  12/23/2024 1828 by Ceasar Horn R.N.  Outcome: Met  12/23/2024 1359 by Ceasar Horn R.N.  Outcome: Progressing     Problem: Fall Risk  Goal: Patient will remain free from falls  12/23/2024 1828 by Ceasar Horn R.N.  Outcome: Met  12/23/2024 1359 by Ceasar Horn R.N.  Outcome: Progressing     Problem: Depression  Goal: Patient and family/caregiver will verbalize accurate information about at least two of the possible causes of depression, three-four of the signs and symptoms of depression  12/23/2024 1828 by Ceasar Horn R.N.  Outcome: Met  12/23/2024 1359 by Ceasar Horn R.N.  Outcome: Progressing       Patient is not progressing towards the following goals:

## 2024-12-24 NOTE — DISCHARGE PLANNING
[employer] msg to follow up on oxygen which hasn't been delivered yet. [facility] sent message to after hours  as it was supposed to be delivered at 1500 today. Await return call.

## 2024-12-24 NOTE — PROGRESS NOTES
Pt discharged home in stable condition. IV removed from arm. All discharge paperwork reviewed with pt verbalizing understanding. Able to ambulate to car independently.

## 2024-12-24 NOTE — DISCHARGE PLANNING
@3629  Agency/Facility Name: [facility]  Spoke To: Abdi  Outcome: MATEO called and got the after hours call center.  MATEO was checking on an ETA for the O2 and concentrator delivery bedside for the pt to discharge.  Abdi gave the on-call  a call and had to leave a message.  MATEO gave Abdi the ER phone number to call with an ETA.

## 2024-12-24 NOTE — PROGRESS NOTES
Patient waiting for O2, walking O2 sat performed for second attempt, lowest saturation at 88%. Notified Dr. Thompson, stated if patient okay to discharge then patient can be discharged. Patient stated ready to go home and feels okay to go home. Educated patient on discharge instructions and discharge lounge protocol. Questions answered, patient and family stated understanding. Patient stable throughout shift, no complaints, spouse at bedside. Patient belongings, extra dressing materials, and ice packs with patient. On transport to discharge lounge.

## 2024-12-31 ENCOUNTER — HOSPITAL ENCOUNTER (EMERGENCY)
Facility: MEDICAL CENTER | Age: 59
End: 2024-12-31
Payer: COMMERCIAL

## 2024-12-31 VITALS
OXYGEN SATURATION: 93 % | SYSTOLIC BLOOD PRESSURE: 113 MMHG | HEART RATE: 102 BPM | BODY MASS INDEX: 22.76 KG/M2 | HEIGHT: 67 IN | RESPIRATION RATE: 14 BRPM | WEIGHT: 145 LBS | DIASTOLIC BLOOD PRESSURE: 79 MMHG | TEMPERATURE: 98 F

## 2024-12-31 PROCEDURE — 302449 STATCHG TRIAGE ONLY (STATISTIC)

## 2024-12-31 ASSESSMENT — FIBROSIS 4 INDEX: FIB4 SCORE: 2.85

## 2024-12-31 NOTE — ED NOTES
"Called to lobby as pt got return call from her Surgeon at [facility] and they were able to see her in the office.  Pt was getting up with SO from  to leave and go to outpatient office for surgeon when she tripped over the WC peg and fell to knees near security podium.  Pt denies hitting head or any pain.  This RN informed pt and SO that they had been assigned to room to see ERP and pt declined, reporting \"I just want to go see my surgeon now\".  SO brought car to entrance, this RN and Tech assisted pt with minimal assist to car.  Pt ambulatory with minimal assist to car, assisted to front seat and informed SO to return if needed or surgeon unable to see her upon arrival.    "

## 2024-12-31 NOTE — ED TRIAGE NOTES
"Chief Complaint   Patient presents with    Jaw Pain     BIB EMS from home for jaw pain after falling off couch. Pt had jaw surgery recently for fracture, per ems pt ran out of pain medication and started drinking.   Previously on Naltrexone for etoh dependency.        Pt arrives via EMS for above complaint, and placed in wheel wheelchair.     Pt is GCS 15, appears intoxicated, able to speak,  follows commands and responds appropriately to questions. Resp are even and unlabored.       Pt educated on triage process, updated/ agreeable to plan of care.    /79   Pulse (!) 102   Temp 36.7 °C (98 °F) (Temporal)   Resp 14   Ht 1.702 m (5' 7\")   Wt 65.8 kg (145 lb)   LMP 09/14/2006   SpO2 93%   BMI 22.71 kg/m²       "

## 2025-03-19 ENCOUNTER — HOSPITAL ENCOUNTER (EMERGENCY)
Facility: MEDICAL CENTER | Age: 60
End: 2025-03-19
Payer: COMMERCIAL

## 2025-03-19 VITALS
HEART RATE: 84 BPM | DIASTOLIC BLOOD PRESSURE: 92 MMHG | WEIGHT: 146.83 LBS | TEMPERATURE: 98.1 F | RESPIRATION RATE: 16 BRPM | BODY MASS INDEX: 23.04 KG/M2 | SYSTOLIC BLOOD PRESSURE: 158 MMHG | OXYGEN SATURATION: 93 % | HEIGHT: 67 IN

## 2025-03-19 LAB
ALBUMIN SERPL BCP-MCNC: 4.7 G/DL (ref 3.2–4.9)
ALBUMIN/GLOB SERPL: 1.4 G/DL
ALP SERPL-CCNC: 122 U/L (ref 30–99)
ALT SERPL-CCNC: 19 U/L (ref 2–50)
ANION GAP SERPL CALC-SCNC: 23 MMOL/L (ref 7–16)
APPEARANCE UR: CLEAR
AST SERPL-CCNC: 29 U/L (ref 12–45)
BACTERIA #/AREA URNS HPF: ABNORMAL /HPF
BASOPHILS # BLD AUTO: 0.7 % (ref 0–1.8)
BASOPHILS # BLD: 0.06 K/UL (ref 0–0.12)
BILIRUB SERPL-MCNC: 0.6 MG/DL (ref 0.1–1.5)
BILIRUB UR QL STRIP.AUTO: NEGATIVE
BUN SERPL-MCNC: 20 MG/DL (ref 8–22)
CALCIUM ALBUM COR SERPL-MCNC: 10.3 MG/DL (ref 8.5–10.5)
CALCIUM SERPL-MCNC: 10.9 MG/DL (ref 8.4–10.2)
CASTS URNS QL MICRO: ABNORMAL /LPF (ref 0–2)
CHLORIDE SERPL-SCNC: 90 MMOL/L (ref 96–112)
CO2 SERPL-SCNC: 20 MMOL/L (ref 20–33)
COLOR UR: YELLOW
CREAT SERPL-MCNC: 0.89 MG/DL (ref 0.5–1.4)
EOSINOPHIL # BLD AUTO: 0.02 K/UL (ref 0–0.51)
EOSINOPHIL NFR BLD: 0.2 % (ref 0–6.9)
EPITHELIAL CELLS 1715: ABNORMAL /HPF (ref 0–5)
ERYTHROCYTE [DISTWIDTH] IN BLOOD BY AUTOMATED COUNT: 40.8 FL (ref 35.9–50)
ETHANOL BLD-MCNC: 303 MG/DL
GFR SERPLBLD CREATININE-BSD FMLA CKD-EPI: 75 ML/MIN/1.73 M 2
GLOBULIN SER CALC-MCNC: 3.3 G/DL (ref 1.9–3.5)
GLUCOSE SERPL-MCNC: 80 MG/DL (ref 65–99)
GLUCOSE UR STRIP.AUTO-MCNC: NEGATIVE MG/DL
HCT VFR BLD AUTO: 39.5 % (ref 37–47)
HGB BLD-MCNC: 13.6 G/DL (ref 12–16)
IMM GRANULOCYTES # BLD AUTO: 0.05 K/UL (ref 0–0.11)
IMM GRANULOCYTES NFR BLD AUTO: 0.6 % (ref 0–0.9)
KETONES UR STRIP.AUTO-MCNC: ABNORMAL MG/DL
LEUKOCYTE ESTERASE UR QL STRIP.AUTO: ABNORMAL
LIPASE SERPL-CCNC: 30 U/L (ref 11–82)
LYMPHOCYTES # BLD AUTO: 1.01 K/UL (ref 1–4.8)
LYMPHOCYTES NFR BLD: 11.8 % (ref 22–41)
MCH RBC QN AUTO: 33.7 PG (ref 27–33)
MCHC RBC AUTO-ENTMCNC: 34.4 G/DL (ref 32.2–35.5)
MCV RBC AUTO: 97.8 FL (ref 81.4–97.8)
MICRO URNS: ABNORMAL
MONOCYTES # BLD AUTO: 0.32 K/UL (ref 0–0.85)
MONOCYTES NFR BLD AUTO: 3.7 % (ref 0–13.4)
NEUTROPHILS # BLD AUTO: 7.08 K/UL (ref 1.82–7.42)
NEUTROPHILS NFR BLD: 83 % (ref 44–72)
NITRITE UR QL STRIP.AUTO: NEGATIVE
NRBC # BLD AUTO: 0 K/UL
NRBC BLD-RTO: 0 /100 WBC (ref 0–0.2)
PH UR STRIP.AUTO: 5 [PH] (ref 5–8)
PLATELET # BLD AUTO: 365 K/UL (ref 164–446)
PMV BLD AUTO: 8.9 FL (ref 9–12.9)
POTASSIUM SERPL-SCNC: 4.7 MMOL/L (ref 3.6–5.5)
PROT SERPL-MCNC: 8 G/DL (ref 6–8.2)
PROT UR QL STRIP: 30 MG/DL
RBC # BLD AUTO: 4.04 M/UL (ref 4.2–5.4)
RBC # URNS HPF: ABNORMAL /HPF
RBC UR QL AUTO: NEGATIVE
SODIUM SERPL-SCNC: 133 MMOL/L (ref 135–145)
SP GR UR STRIP.AUTO: 1.02
UROBILINOGEN UR STRIP.AUTO-MCNC: 0.2 EU/DL
WBC # BLD AUTO: 8.5 K/UL (ref 4.8–10.8)
WBC #/AREA URNS HPF: ABNORMAL /HPF

## 2025-03-19 PROCEDURE — 80053 COMPREHEN METABOLIC PANEL: CPT

## 2025-03-19 PROCEDURE — 83690 ASSAY OF LIPASE: CPT

## 2025-03-19 PROCEDURE — 82077 ASSAY SPEC XCP UR&BREATH IA: CPT

## 2025-03-19 PROCEDURE — 85025 COMPLETE CBC W/AUTO DIFF WBC: CPT

## 2025-03-19 PROCEDURE — 302449 STATCHG TRIAGE ONLY (STATISTIC)

## 2025-03-19 PROCEDURE — 81001 URINALYSIS AUTO W/SCOPE: CPT

## 2025-03-19 ASSESSMENT — FIBROSIS 4 INDEX: FIB4 SCORE: 2.85

## 2025-03-20 NOTE — ED TRIAGE NOTES
Pt intoxicated Slightly unsteady on feet Speech slow Min slurred    Pt reports no hx withdrawal SZ

## 2025-04-18 ENCOUNTER — APPOINTMENT (OUTPATIENT)
Dept: RADIOLOGY | Facility: MEDICAL CENTER | Age: 60
End: 2025-04-18
Attending: EMERGENCY MEDICINE
Payer: COMMERCIAL

## 2025-04-18 ENCOUNTER — HOSPITAL ENCOUNTER (EMERGENCY)
Facility: MEDICAL CENTER | Age: 60
End: 2025-04-18
Attending: EMERGENCY MEDICINE
Payer: COMMERCIAL

## 2025-04-18 VITALS
SYSTOLIC BLOOD PRESSURE: 91 MMHG | HEART RATE: 78 BPM | RESPIRATION RATE: 15 BRPM | HEIGHT: 68 IN | DIASTOLIC BLOOD PRESSURE: 60 MMHG | BODY MASS INDEX: 21.22 KG/M2 | OXYGEN SATURATION: 96 % | TEMPERATURE: 98 F | WEIGHT: 140 LBS

## 2025-04-18 DIAGNOSIS — R68.84 MANDIBLE PAIN: ICD-10-CM

## 2025-04-18 DIAGNOSIS — S09.90XA CLOSED HEAD INJURY, INITIAL ENCOUNTER: ICD-10-CM

## 2025-04-18 DIAGNOSIS — F10.920 ALCOHOLIC INTOXICATION WITHOUT COMPLICATION (HCC): ICD-10-CM

## 2025-04-18 LAB — POC BREATHALIZER: 0.27 PERCENT (ref 0–0.01)

## 2025-04-18 PROCEDURE — 302970 POC BREATHALIZER: Performed by: EMERGENCY MEDICINE

## 2025-04-18 PROCEDURE — 70450 CT HEAD/BRAIN W/O DYE: CPT

## 2025-04-18 PROCEDURE — 72125 CT NECK SPINE W/O DYE: CPT

## 2025-04-18 PROCEDURE — 70486 CT MAXILLOFACIAL W/O DYE: CPT

## 2025-04-18 PROCEDURE — 99285 EMERGENCY DEPT VISIT HI MDM: CPT

## 2025-04-18 ASSESSMENT — FIBROSIS 4 INDEX: FIB4 SCORE: 1.04

## 2025-04-18 ASSESSMENT — PAIN DESCRIPTION - PAIN TYPE: TYPE: ACUTE PAIN

## 2025-04-18 NOTE — ED NOTES
Discharge instructions reviewed with patient and signed. They verbalized understanding of follow up instructions. All belongings with patient.  IV removed from arm. They ambulated with a steady gait to ANAMIKA cristina.

## 2025-04-18 NOTE — ED PROVIDER NOTES
ER Provider Note    Scribed for Tejas Kapoor II, M.D. by Robin Youngblood. 4/18/2025  12:43 AM    Primary Care Provider: Deborah Moctezuma M.D.    CHIEF COMPLAINT   Chief Complaint   Patient presents with    T-5000 Head Injury     Pt had GLF while drinking this evening. +HS, +thinners, neg LOC     Alcohol Intoxication     Drank about 1 bottle of wine this evening.      EXTERNAL RECORDS REVIEWED  Inpatient Notes The patient was hospitalized on 12/21/24 after she fell while intoxicated. She was diagnosed with multiple mandible fractures and had repair performed by Dr. Lake (Oral Surgery)    HPI/ROS  LIMITATION TO HISTORY   Select: Intoxication  OUTSIDE HISTORIAN(S):  EMS via nursing provides context.     Yohana Brandon is a 59 y.o. female who presents to the ED via EMS as a TBI with spine precautions in place for a ground level fall prior to arrival. EMS via nursing reports the patient fell while intoxicated and struck her head on the counter, prompting her  to call EMS. No loss of consciousness. She endorses jaw pain, but notes no other symptoms. Nursing notes a history of jaw surgery. The patient takes Aspirin. The patient states she is an alcoholic and endorses daily drinking. She denies any other medical history.     PAST MEDICAL HISTORY  Past Medical History:   Diagnosis Date    Alcohol abuse     Alcohol abuse     ASTHMA     Elevated LFTs     Heart palpitations     w/ alcohol withdrawal    Hypertension     Psychiatric disorder     alcohol dependance     SURGICAL HISTORY  Past Surgical History:   Procedure Laterality Date    MANDIBLE FRACTURE ORIF Left 12/22/2024    Procedure: ORIF, FRACTURE, MANDIBLE SYMPHYSIS AND LEFT SUBCONDYLAR FRACTURE, EXTRACTION OF ANY NECESSARY TEETH;  Surgeon: Marcel Fiore D.D.S.;  Location: SURGERY [facility];  Service: Oral Surgery    CLOSED REDUCTION Right 2/9/2022    Procedure: CLOSED REDUCTION, HIP,;  Surgeon: Jose G Bell M.D.;  Location: SURGERY [facility]  [facility];  Service: Orthopedics    [place] TOTAL HIP ARTHROPLASTY Right 2021    Procedure: ARTHROPLASTY, HIP, TOTAL, ANTERIOR APPROACH;  Surgeon: Jose G Bell M.D.;  Location: SURGERY [facility];  Service: Orthopedics    HEMORRHOIDECTOMY  2011    Performed by SONY MACDONALD at SURGERY [facility]    ANAL SPHINCTEROTOMY  2011    Performed by SONY MACDONADL at SURGERY [facility]    WY ENDOMETRIAL ABLATION, THERMAL      APPENDECTOMY      W[place] ANESTH,SURG BREAST RECONSTRUCTIVE      bilateral     BUNIONECTOMY      bilateral    [place] REMOVE TONSILS/ADENOIDS,<11 Y/O  1973    [place]  DELIVERY+POSTPARTUM CARE      x2     FAMILY HISTORY  Family History   Problem Relation Age of Onset    Stroke Mother      SOCIAL HISTORY   reports that she has never smoked. She has never used smokeless tobacco. She reports current alcohol use. She reports that she does not use drugs.    CURRENT MEDICATIONS  Previous Medications    AIRSUPRA 90-80 MCG/ACT AEROSOL    Inhale 1 Puff 4 times a day.    ASPIRIN 81 MG EC TABLET    Take 81 mg by mouth every day.    MULTIVITAMIN TAB    Take 1 Tablet by mouth every day.    ONDANSETRON (ZOFRAN ODT) 4 MG TABLET DISPERSIBLE    Take 1 Tablet by mouth every 8 hours as needed for Nausea/Vomiting.    ROSUVASTATIN (CRESTOR) 10 MG TAB    Take 10 mg by mouth every evening.    TELMISARTAN-HYDROCHLOROTHIAZIDE (MICARDIS HCT) 80-25 MG PER TABLET    Take 1 Tablet by mouth every day.     ALLERGIES  Kiwi extract, Flu virus vaccine, Morphine, Vicodin [hydrocodone-acetaminophen], Banana, and Latex    PHYSICAL EXAM  /83   Pulse 81   Temp 36.7 °C (98 °F) (Temporal)   Resp 15   Ht 1.727 m (5' 8\")   Wt 63.5 kg (140 lb)   LMP 2006   SpO2 91%   BMI 21.29 kg/m²   Physical Exam  Vitals and nursing note reviewed.   Constitutional:       Comments: Semi supine wearing cervical collar, appears intoxicated   HENT:      Head: Normocephalic.      Mouth/Throat:      Mouth: Mucous " membranes are moist.   Eyes:      Extraocular Movements: Extraocular movements intact.      Pupils: Pupils are equal, round, and reactive to light.   Neck:      Comments: Cervical collar in place  Cardiovascular:      Rate and Rhythm: Normal rate and regular rhythm.   Pulmonary:      Effort: Pulmonary effort is normal.      Breath sounds: Normal breath sounds.   Abdominal:      General: There is no distension.      Tenderness: There is no abdominal tenderness.   Neurological:      Mental Status: She is alert.      Comments: Alert and oriented to person, place. Requires redirection. Moving all 4 extremities with equal strength     DIAGNOSTIC STUDIES    EKG/LABS  Results for orders placed or performed during the hospital encounter of 04/18/25   POC BREATHALIZER    Collection Time: 04/18/25  3:17 AM   Result Value Ref Range    POC Breathalizer 0.268 (A) 0.00 - 0.01 Percent      RADIOLOGY/PROCEDURES   The attending emergency physician has independently interpreted the diagnostic imaging associated with this visit and am waiting the final reading from the radiologist.   My preliminary interpretation is a follows: No intracranial hemorrhage.  No C-spine fracture on CT imaging.    Radiologist interpretation:  CT-CSPINE WITHOUT PLUS RECONS   Final Result         1.  Multilevel degenerative changes of the cervical spine limit diagnostic sensitivity of this examination, otherwise no acute traumatic bony injury of the cervical spine is apparent.   2.  Atherosclerosis      CT-MAXILLOFACIAL W/O PLUS RECONS   Final Result         1.  Subacute appearing right condylar neck fracture with overriding of bony fracture fragments, which appear stable to prior CT.   2.  Left condylar neck fracture with postsurgical changes of ORIF, the bone screws in the distal mandible do not appear to be within the bone, which may represent acute posttraumatic changes.   3.  Missing left maxillary first incisor tooth, may be acute, correlate with  history.   4.  Chronic sinusitis changes.      CT-HEAD W/O   Final Result         1.  No acute intracranial abnormality is identified.   2.  Mild cerebral atrophy is noted.   3.  Chronic right maxillary sinusitis changes.   4.  Atherosclerosis.                 COURSE & MEDICAL DECISION MAKING     ASSESSMENT, COURSE AND PLAN  Care Narrative:     12:43 AM - This is an emergency department evaluation of a 59 y.o. female who presents after she fell at home striking face/head on kitchen counter.  called EMS. She is very intoxicated, requires frequent redirection. She is complaining of mandible pain. No clear signs of trauma to head or face but given intoxication, exam unreliable. Plan for CT head, face, cspine.  Looking for signs of intracranial hemorrhage, facial fracture, spine fracture.    1:52 AM - reviewed CT imaging which is negative for intracranial hemorrhage, shows stable appearing mandible fracture but shows bone screws in the distal mandible that appear to be disrupted.  Paged Dr. Fiore (Oral Surgery).     1:58 AM - I discussed the patient's case and the above findings with Dr. Lake (Oral Surgery). Went over CT findings of hardware abnormality. He informed me this was already known she had already had fall and injury after the initial repair that led to the hardware problem.  No further intervention needed.  Awaiting for sobriety now.    2:17 AM - C-collar removed at this time.     3:19 AM - POC breathalizer 0.268.     3:25 AM - The patient was reevaluated at bedside with significant other at bedside. She is more alert now. Discussed imaging results and my conversation with Dr. Lake with the patient. She informs me she saw her dentist earlier today. The patient will be ambulated. Her  is able to take her home.  Discussed cutting down on alcohol and considering full detox and rehab.  She has had multiple incidences of falling and injuring herself.  Plan for discharge.       PROBLEM  LIST  # Alcohol intoxication    # Closed head injury    # Mandible pain    DISPOSITION AND DISCUSSIONS  I have discussed management of the patient with the following physicians and TONJA's:  Dr. Fiore     Discussion of management with other [facility] [place] or appropriate source(s): None     Barriers to care at this time, including but not limited to:  None known at this time .       The patient will return for new or worsening symptoms and is stable at the time of discharge.    DISPOSITION:  Patient will be discharged home in stable condition.    FOLLOW UP:  Follow up with your psychiatrist to talk about alcohol problems          OUTPATIENT MEDICATIONS:  Discharge Medication List as of 4/18/2025  3:35 AM           FINAL DIAGNOSIS  1. Alcoholic intoxication without complication (HCC)    2. Closed head injury, initial encounter    3. Mandible pain         I, Robin Youngblood (Scribe), am scribing for, and in the presence of, ANGELA Reyes II.    Electronically signed by: Robin Youngblood (Scribe), 4/18/2025    ITejas II, M* personally performed the services described in this documentation, as scribed by Robin Youngblood in my presence, and it is both accurate and complete.     The note accurately reflects work and decisions made by me.  Tejas Kapoor II, M.D.  4/18/2025  7:26 AM

## 2025-04-18 NOTE — ED TRIAGE NOTES
Chief Complaint   Patient presents with    T-5000 Head Injury     Pt had GLF while drinking this evening. +HS, +thinners, neg LOC     Alcohol Intoxication     Drank about 1 bottle of wine this evening.      Pt BIB [facility] for above complaint. Per EMS report pt  witnessed pt fall with +HS to cabinet. Pt hypotensive with SBP in the 70s with EMS and received 300mL NS bolus. Pt A&Ox4 upon arrival, following commands. Pt placed on monitor. Reporting facial/jaw pain upon arrival to ED. Charge RN notified of pt +thinners. TBI activated. ERP at bedside for eval. Pt to CT.

## 2025-04-18 NOTE — ED NOTES
Discharge instructions reviewed with patient and signed. They verbalized understanding of follow up instructions. All belongings with patient. They were instructed on how to  prescriptions. IV removed from arm. They left by wheelchair to ER lobby.

## 2025-06-03 ENCOUNTER — HOSPITAL ENCOUNTER (OUTPATIENT)
Dept: LAB | Facility: MEDICAL CENTER | Age: 60
End: 2025-06-03
Attending: EMERGENCY MEDICINE
Payer: COMMERCIAL

## 2025-06-03 LAB
APPEARANCE UR: CLEAR
BASOPHILS # BLD AUTO: 1.1 % (ref 0–1.8)
BASOPHILS # BLD: 0.05 K/UL (ref 0–0.12)
BILIRUB UR QL STRIP.AUTO: NEGATIVE
COLOR UR: YELLOW
EOSINOPHIL # BLD AUTO: 0.09 K/UL (ref 0–0.51)
EOSINOPHIL NFR BLD: 1.9 % (ref 0–6.9)
ERYTHROCYTE [DISTWIDTH] IN BLOOD BY AUTOMATED COUNT: 44.7 FL (ref 35.9–50)
EST. AVERAGE GLUCOSE BLD GHB EST-MCNC: 108 MG/DL
GLUCOSE UR STRIP.AUTO-MCNC: NEGATIVE MG/DL
HBA1C MFR BLD: 5.4 % (ref 4–5.6)
HCT VFR BLD AUTO: 36.6 % (ref 37–47)
HGB BLD-MCNC: 11.8 G/DL (ref 12–16)
IMM GRANULOCYTES # BLD AUTO: 0.02 K/UL (ref 0–0.11)
IMM GRANULOCYTES NFR BLD AUTO: 0.4 % (ref 0–0.9)
KETONES UR STRIP.AUTO-MCNC: NEGATIVE MG/DL
LEUKOCYTE ESTERASE UR QL STRIP.AUTO: NEGATIVE
LYMPHOCYTES # BLD AUTO: 1.48 K/UL (ref 1–4.8)
LYMPHOCYTES NFR BLD: 32 % (ref 22–41)
MCH RBC QN AUTO: 32.9 PG (ref 27–33)
MCHC RBC AUTO-ENTMCNC: 32.2 G/DL (ref 32.2–35.5)
MCV RBC AUTO: 101.9 FL (ref 81.4–97.8)
MICRO URNS: NORMAL
MONOCYTES # BLD AUTO: 0.57 K/UL (ref 0–0.85)
MONOCYTES NFR BLD AUTO: 12.3 % (ref 0–13.4)
NEUTROPHILS # BLD AUTO: 2.42 K/UL (ref 1.82–7.42)
NEUTROPHILS NFR BLD: 52.3 % (ref 44–72)
NITRITE UR QL STRIP.AUTO: NEGATIVE
NRBC # BLD AUTO: 0 K/UL
NRBC BLD-RTO: 0 /100 WBC (ref 0–0.2)
PH UR STRIP.AUTO: 7 [PH] (ref 5–8)
PLATELET # BLD AUTO: 176 K/UL (ref 164–446)
PMV BLD AUTO: 12.3 FL (ref 9–12.9)
PROT UR QL STRIP: NEGATIVE MG/DL
RBC # BLD AUTO: 3.59 M/UL (ref 4.2–5.4)
RBC UR QL AUTO: NEGATIVE
SP GR UR STRIP.AUTO: 1.01
UROBILINOGEN UR STRIP.AUTO-MCNC: 0.2 EU/DL
WBC # BLD AUTO: 4.6 K/UL (ref 4.8–10.8)

## 2025-06-03 PROCEDURE — 80061 LIPID PANEL: CPT

## 2025-06-03 PROCEDURE — 84305 ASSAY OF SOMATOMEDIN: CPT

## 2025-06-03 PROCEDURE — 36415 COLL VENOUS BLD VENIPUNCTURE: CPT

## 2025-06-03 PROCEDURE — 82627 DEHYDROEPIANDROSTERONE: CPT

## 2025-06-03 PROCEDURE — 84439 ASSAY OF FREE THYROXINE: CPT

## 2025-06-03 PROCEDURE — 81003 URINALYSIS AUTO W/O SCOPE: CPT

## 2025-06-03 PROCEDURE — 83525 ASSAY OF INSULIN: CPT

## 2025-06-03 PROCEDURE — 84403 ASSAY OF TOTAL TESTOSTERONE: CPT

## 2025-06-03 PROCEDURE — 80053 COMPREHEN METABOLIC PANEL: CPT

## 2025-06-03 PROCEDURE — 82670 ASSAY OF TOTAL ESTRADIOL: CPT

## 2025-06-03 PROCEDURE — 86141 C-REACTIVE PROTEIN HS: CPT

## 2025-06-03 PROCEDURE — 82533 TOTAL CORTISOL: CPT

## 2025-06-03 PROCEDURE — 84144 ASSAY OF PROGESTERONE: CPT

## 2025-06-03 PROCEDURE — 82172 ASSAY OF APOLIPOPROTEIN: CPT

## 2025-06-03 PROCEDURE — 84443 ASSAY THYROID STIM HORMONE: CPT

## 2025-06-03 PROCEDURE — 82306 VITAMIN D 25 HYDROXY: CPT

## 2025-06-03 PROCEDURE — 83090 ASSAY OF HOMOCYSTEINE: CPT

## 2025-06-03 PROCEDURE — 84481 FREE ASSAY (FT-3): CPT

## 2025-06-03 PROCEDURE — 83036 HEMOGLOBIN GLYCOSYLATED A1C: CPT

## 2025-06-03 PROCEDURE — 85025 COMPLETE CBC W/AUTO DIFF WBC: CPT

## 2025-06-04 LAB
25(OH)D3 SERPL-MCNC: 48 NG/ML (ref 30–100)
ALBUMIN SERPL BCP-MCNC: 4.3 G/DL (ref 3.2–4.9)
ALBUMIN/GLOB SERPL: 1.6 G/DL
ALP SERPL-CCNC: 61 U/L (ref 30–99)
ALT SERPL-CCNC: 23 U/L (ref 2–50)
ANION GAP SERPL CALC-SCNC: 11 MMOL/L (ref 7–16)
AST SERPL-CCNC: 20 U/L (ref 12–45)
BILIRUB SERPL-MCNC: 0.2 MG/DL (ref 0.1–1.5)
BUN SERPL-MCNC: 13 MG/DL (ref 8–22)
CALCIUM ALBUM COR SERPL-MCNC: 9.9 MG/DL (ref 8.5–10.5)
CALCIUM SERPL-MCNC: 10.1 MG/DL (ref 8.5–10.5)
CHLORIDE SERPL-SCNC: 100 MMOL/L (ref 96–112)
CHOLEST SERPL-MCNC: 145 MG/DL (ref 100–199)
CO2 SERPL-SCNC: 25 MMOL/L (ref 20–33)
CORTIS SERPL-MCNC: 7.6 UG/DL (ref 0–23)
CREAT SERPL-MCNC: 1.08 MG/DL (ref 0.5–1.4)
CRP SERPL HS-MCNC: 2.4 MG/L (ref 0–3)
DHEA-S SERPL-MCNC: 416 UG/DL (ref 18.9–205)
ESTRADIOL SERPL-MCNC: 51.4 PG/ML
GFR SERPLBLD CREATININE-BSD FMLA CKD-EPI: 60 ML/MIN/1.73 M 2
GLOBULIN SER CALC-MCNC: 2.7 G/DL (ref 1.9–3.5)
GLUCOSE SERPL-MCNC: 95 MG/DL (ref 65–99)
HCYS SERPL-SCNC: 18.4 UMOL/L
HDLC SERPL-MCNC: 59 MG/DL
LDLC SERPL CALC-MCNC: 64 MG/DL
POTASSIUM SERPL-SCNC: 4.2 MMOL/L (ref 3.6–5.5)
PROGEST SERPL-MCNC: 0.75 NG/ML
PROT SERPL-MCNC: 7 G/DL (ref 6–8.2)
SODIUM SERPL-SCNC: 136 MMOL/L (ref 135–145)
T3FREE SERPL-MCNC: 2.85 PG/ML (ref 2–4.4)
TRIGL SERPL-MCNC: 112 MG/DL (ref 0–149)
TSH SERPL-ACNC: 1.58 UIU/ML (ref 0.38–5.33)

## 2025-06-05 LAB
APO B100 SERPL-MCNC: 65 MG/DL (ref 60–117)
INSULIN P FAST SERPL-ACNC: 9 UIU/ML (ref 3–25)

## 2025-06-06 LAB
IGF-I SERPL-MCNC: 121 NG/ML (ref 47–236)
IGF-I Z-SCORE SERPL: 0.1

## 2025-06-09 LAB — TESTOST SERPL-MCNC: 196 NG/DL (ref 9–55)

## 2025-06-13 LAB — T4 FREE SERPL DIALY-MCNC: 1.2 NG/DL (ref 1.1–2.4)

## 2025-06-19 NOTE — H&P
Surgery Oral History & Physical Note    Date  6/19/2025    Primary Care Physician  Deborah Moctezuma M.D.    CC  My mandible hardware is poking out    HPI  This is a 59 y.o. female with a history of alcohol abuse (now s/p rehab) and multiple falls with multiple mandible fratcures.  Presented for follow up and had displaced left mandibular hardware. Denies altered occlsuion, pain, or dysphagia.     Past Medical History[1]    Past Surgical History[2]    Current Medications[3]    Social History     Socioeconomic History    Marital status:      Spouse name: Not on file    Number of children: Not on file    Years of education: Not on file    Highest education level: Not on file   Occupational History    Not on file   Tobacco Use    Smoking status: Never    Smokeless tobacco: Never   Vaping Use    Vaping status: Never Used   Substance and Sexual Activity    Alcohol use: Yes     Comment: daily wine    Drug use: No    Sexual activity: Not on file   Other Topics Concern    Not on file   Social History Narrative    Not on file     Social Drivers of Health     Financial Resource Strain: Low Risk  (2/10/2021)    Received from [facility]    Overall Financial Resource Strain (CARDIA)     Difficulty of Paying Living Expenses: Not hard at all   Food Insecurity: No Food Insecurity (12/23/2024)    Hunger Vital Sign     Worried About Running Out of Food in the Last Year: Never true     Ran Out of Food in the Last Year: Never true   Transportation Needs: No Transportation Needs (12/23/2024)    PRAPARE - Transportation     Lack of Transportation (Medical): No     Lack of Transportation (Non-Medical): No   Physical Activity: Sufficiently Active (2/10/2021)    Received from [facility]    Exercise Vital Sign     Days of Exercise per Week: 3 days     Minutes of Exercise per Session: 60 min   Stress: No Stress Concern Present (2/10/2021)    Received from [facility]  Occupational Health - Occupational Stress Questionnaire     Feeling of Stress : Only a little   Social Connections: Socially Integrated (2/10/2021)    Received from [facility]    Social Connection and Isolation Panel [NHANES]     Frequency of Communication with Friends and Family: More than three times a week     Frequency of Social Gatherings with Friends and Family: More than three times a week     Attends [employer] Services: 1 to 4 times per year     Active Member of Clubs or Organizations: Yes     Attends Club or Organization Meetings: More than 4 times per year     Marital Status:    Intimate Partner Violence: Not At Risk (12/23/2024)    Humiliation, Afraid, Rape, and Kick questionnaire     Fear of Current or Ex-Partner: No     Emotionally Abused: No     Physically Abused: No     Sexually Abused: No   Housing Stability: Low Risk  (12/23/2024)    Housing Stability Vital Sign     Unable to Pay for Housing in the Last Year: No     Number of Times Moved in the Last Year: 0     Homeless in the Last Year: No       Family History   Problem Relation Age of Onset    Stroke Mother        Allergies  Kiwi extract, Flu virus vaccine, Morphine, Vicodin [hydrocodone-acetaminophen], Banana, and Latex    Review of Systems  Negative    Physical Exam    Vital Signs  Gen: AAOx3, NAD  HEENT: left mandible angle hardware palpable through skin  CV: RRR  RESP: lungs CTAB  Abd: not distended  Neuro: CN II-XII intact    Labs:  None    Radiology:  None    Assessment/Plan:  Removal of mandibular hardware         [1]   Past Medical History:  Diagnosis Date    Alcohol abuse     Alcohol abuse     ASTHMA     Elevated LFTs     Heart palpitations     w/ alcohol withdrawal    Hypertension     Psychiatric disorder     alcohol dependance   [2]   Past Surgical History:  Procedure Laterality Date    MANDIBLE FRACTURE ORIF Left 12/22/2024    Procedure: ORIF, FRACTURE, MANDIBLE SYMPHYSIS AND LEFT SUBCONDYLAR FRACTURE, EXTRACTION  OF ANY NECESSARY TEETH;  Surgeon: Marcel Fiore D.D.S.;  Location: SURGERY [facility];  Service: Oral Surgery    CLOSED REDUCTION Right 2022    Procedure: CLOSED REDUCTION, HIP,;  Surgeon: Jose G Bell M.D.;  Location: SURGERY [facility];  Service: Orthopedics    [place] TOTAL HIP ARTHROPLASTY Right 2021    Procedure: ARTHROPLASTY, HIP, TOTAL, ANTERIOR APPROACH;  Surgeon: Jose G Bell M.D.;  Location: SURGERY [facility];  Service: Orthopedics    HEMORRHOIDECTOMY  2011    Performed by SONY MACDONALD at SURGERY [facility]    ANAL SPHINCTEROTOMY  2011    Performed by SONY MACDONALD at SURGERY [facility]    ID ENDOMETRIAL ABLATION, THERMAL      APPENDECTOMY      ID ANESTH,SURG BREAST RECONSTRUCTIVE      bilateral     BUNIONECTOMY      bilateral    ID REMOVE TONSILS/ADENOIDS,<11 Y/O  1973    ID  DELIVERY+POSTPARTUM CARE      x2   [3]   No current facility-administered medications for this encounter.     Current Outpatient Medications   Medication Sig Dispense Refill    AIRSUPRA 90-80 MCG/ACT Aerosol Inhale 1 Puff 4 times a day.      multivitamin Tab Take 1 Tablet by mouth every day. 30 Tablet 0    ondansetron (ZOFRAN ODT) 4 MG TABLET DISPERSIBLE Take 1 Tablet by mouth every 8 hours as needed for Nausea/Vomiting. 20 Tablet 0    aspirin 81 MG EC tablet Take 81 mg by mouth every day.      telmisartan-hydrochlorothiazide (MICARDIS HCT) 80-25 MG per tablet Take 1 Tablet by mouth every day.      rosuvastatin (CRESTOR) 10 MG Tab Take 10 mg by mouth every evening.

## 2025-06-20 ENCOUNTER — APPOINTMENT (OUTPATIENT)
Dept: ADMISSIONS | Facility: MEDICAL CENTER | Age: 60
End: 2025-06-20
Attending: DENTIST
Payer: COMMERCIAL

## 2025-06-20 NOTE — OR NURSING
RAHATM tiffanie Muller for Preadmission's appt. Return call to [phone] Opt 2 then Option 1

## 2025-06-23 ENCOUNTER — ANESTHESIA (OUTPATIENT)
Dept: SURGERY | Facility: MEDICAL CENTER | Age: 60
End: 2025-06-23
Payer: COMMERCIAL

## 2025-06-23 ENCOUNTER — HOSPITAL ENCOUNTER (OUTPATIENT)
Facility: MEDICAL CENTER | Age: 60
End: 2025-06-23
Attending: DENTIST | Admitting: DENTIST
Payer: COMMERCIAL

## 2025-06-23 ENCOUNTER — ANESTHESIA EVENT (OUTPATIENT)
Dept: SURGERY | Facility: MEDICAL CENTER | Age: 60
End: 2025-06-23
Payer: COMMERCIAL

## 2025-06-23 VITALS
RESPIRATION RATE: 16 BRPM | BODY MASS INDEX: 21.92 KG/M2 | TEMPERATURE: 97.1 F | HEART RATE: 78 BPM | SYSTOLIC BLOOD PRESSURE: 156 MMHG | OXYGEN SATURATION: 94 % | HEIGHT: 68 IN | DIASTOLIC BLOOD PRESSURE: 88 MMHG | WEIGHT: 144.62 LBS

## 2025-06-23 LAB — EKG IMPRESSION: NORMAL

## 2025-06-23 PROCEDURE — 160046 HCHG PACU - 1ST 60 MINS PHASE II: Performed by: DENTIST

## 2025-06-23 PROCEDURE — 700101 HCHG RX REV CODE 250: Performed by: DENTIST

## 2025-06-23 PROCEDURE — 700111 HCHG RX REV CODE 636 W/ 250 OVERRIDE (IP): Performed by: ANESTHESIOLOGY

## 2025-06-23 PROCEDURE — 160002 HCHG RECOVERY MINUTES (STAT): Performed by: DENTIST

## 2025-06-23 PROCEDURE — 160039 HCHG SURGERY MINUTES - EA ADDL 1 MIN LEVEL 3: Performed by: DENTIST

## 2025-06-23 PROCEDURE — 160048 HCHG OR STATISTICAL LEVEL 1-5: Performed by: DENTIST

## 2025-06-23 PROCEDURE — 93010 ELECTROCARDIOGRAM REPORT: CPT | Performed by: STUDENT IN AN ORGANIZED HEALTH CARE EDUCATION/TRAINING PROGRAM

## 2025-06-23 PROCEDURE — 700105 HCHG RX REV CODE 258: Performed by: DENTIST

## 2025-06-23 PROCEDURE — 160025 RECOVERY II MINUTES (STATS): Performed by: DENTIST

## 2025-06-23 PROCEDURE — 160191 HCHG ANESTHESIA STANDARD: Performed by: DENTIST

## 2025-06-23 PROCEDURE — 93005 ELECTROCARDIOGRAM TRACING: CPT | Mod: TC | Performed by: DENTIST

## 2025-06-23 PROCEDURE — 160193 HCHG PACU STANDARD - 1ST 60 MINS: Performed by: DENTIST

## 2025-06-23 PROCEDURE — 160028 HCHG SURGERY MINUTES - 1ST 30 MINS LEVEL 3: Performed by: DENTIST

## 2025-06-23 PROCEDURE — 700101 HCHG RX REV CODE 250: Performed by: ANESTHESIOLOGY

## 2025-06-23 PROCEDURE — 160015 HCHG STAT PREOP MINUTES: Performed by: DENTIST

## 2025-06-23 RX ORDER — OXYCODONE HCL 5 MG/5 ML
10 SOLUTION, ORAL ORAL
Status: DISCONTINUED | OUTPATIENT
Start: 2025-06-23 | End: 2025-06-23 | Stop reason: HOSPADM

## 2025-06-23 RX ORDER — THYROID 60 MG/1
60 TABLET ORAL DAILY
COMMUNITY

## 2025-06-23 RX ORDER — DEXAMETHASONE SODIUM PHOSPHATE 4 MG/ML
INJECTION, SOLUTION INTRA-ARTICULAR; INTRALESIONAL; INTRAMUSCULAR; INTRAVENOUS; SOFT TISSUE PRN
Status: DISCONTINUED | OUTPATIENT
Start: 2025-06-23 | End: 2025-06-23 | Stop reason: SURG

## 2025-06-23 RX ORDER — MEPERIDINE HYDROCHLORIDE 50 MG/ML
6.25 INJECTION INTRAMUSCULAR; INTRAVENOUS; SUBCUTANEOUS
Status: DISCONTINUED | OUTPATIENT
Start: 2025-06-23 | End: 2025-06-23 | Stop reason: HOSPADM

## 2025-06-23 RX ORDER — TRAZODONE HYDROCHLORIDE 50 MG/1
50 TABLET ORAL NIGHTLY
COMMUNITY

## 2025-06-23 RX ORDER — SODIUM CHLORIDE, SODIUM LACTATE, POTASSIUM CHLORIDE, CALCIUM CHLORIDE 600; 310; 30; 20 MG/100ML; MG/100ML; MG/100ML; MG/100ML
INJECTION, SOLUTION INTRAVENOUS CONTINUOUS
Status: DISCONTINUED | OUTPATIENT
Start: 2025-06-23 | End: 2025-06-23 | Stop reason: HOSPADM

## 2025-06-23 RX ORDER — SODIUM CHLORIDE, SODIUM LACTATE, POTASSIUM CHLORIDE, CALCIUM CHLORIDE 600; 310; 30; 20 MG/100ML; MG/100ML; MG/100ML; MG/100ML
INJECTION, SOLUTION INTRAVENOUS CONTINUOUS
Status: ACTIVE | OUTPATIENT
Start: 2025-06-23 | End: 2025-06-23

## 2025-06-23 RX ORDER — OMEPRAZOLE 20 MG/1
20 CAPSULE, DELAYED RELEASE ORAL DAILY
COMMUNITY

## 2025-06-23 RX ORDER — LIDOCAINE HYDROCHLORIDE 20 MG/ML
INJECTION, SOLUTION EPIDURAL; INFILTRATION; INTRACAUDAL; PERINEURAL PRN
Status: DISCONTINUED | OUTPATIENT
Start: 2025-06-23 | End: 2025-06-23 | Stop reason: SURG

## 2025-06-23 RX ORDER — DIPHENHYDRAMINE HYDROCHLORIDE 50 MG/ML
12.5 INJECTION, SOLUTION INTRAMUSCULAR; INTRAVENOUS
Status: DISCONTINUED | OUTPATIENT
Start: 2025-06-23 | End: 2025-06-23 | Stop reason: HOSPADM

## 2025-06-23 RX ORDER — ONDANSETRON 2 MG/ML
INJECTION INTRAMUSCULAR; INTRAVENOUS PRN
Status: DISCONTINUED | OUTPATIENT
Start: 2025-06-23 | End: 2025-06-23 | Stop reason: SURG

## 2025-06-23 RX ORDER — MIDAZOLAM HYDROCHLORIDE 1 MG/ML
INJECTION INTRAMUSCULAR; INTRAVENOUS PRN
Status: DISCONTINUED | OUTPATIENT
Start: 2025-06-23 | End: 2025-06-23 | Stop reason: SURG

## 2025-06-23 RX ORDER — OXYCODONE HCL 5 MG/5 ML
5 SOLUTION, ORAL ORAL
Status: DISCONTINUED | OUTPATIENT
Start: 2025-06-23 | End: 2025-06-23 | Stop reason: HOSPADM

## 2025-06-23 RX ORDER — ROCURONIUM BROMIDE 10 MG/ML
INJECTION, SOLUTION INTRAVENOUS PRN
Status: DISCONTINUED | OUTPATIENT
Start: 2025-06-23 | End: 2025-06-23 | Stop reason: SURG

## 2025-06-23 RX ORDER — ONDANSETRON 2 MG/ML
4 INJECTION INTRAMUSCULAR; INTRAVENOUS
Status: DISCONTINUED | OUTPATIENT
Start: 2025-06-23 | End: 2025-06-23 | Stop reason: HOSPADM

## 2025-06-23 RX ORDER — HALOPERIDOL 5 MG/ML
1 INJECTION INTRAMUSCULAR
Status: DISCONTINUED | OUTPATIENT
Start: 2025-06-23 | End: 2025-06-23 | Stop reason: HOSPADM

## 2025-06-23 RX ORDER — EPINEPHRINE 0.3 MG/.3ML
0.3 INJECTION SUBCUTANEOUS ONCE
COMMUNITY

## 2025-06-23 RX ORDER — DEXMEDETOMIDINE HYDROCHLORIDE 100 UG/ML
INJECTION, SOLUTION INTRAVENOUS PRN
Status: DISCONTINUED | OUTPATIENT
Start: 2025-06-23 | End: 2025-06-23 | Stop reason: SURG

## 2025-06-23 RX ORDER — CEFAZOLIN SODIUM 1 G/3ML
INJECTION, POWDER, FOR SOLUTION INTRAMUSCULAR; INTRAVENOUS PRN
Status: DISCONTINUED | OUTPATIENT
Start: 2025-06-23 | End: 2025-06-23 | Stop reason: SURG

## 2025-06-23 RX ORDER — ROSUVASTATIN CALCIUM 10 MG/1
10 TABLET, COATED ORAL EVERY EVENING
COMMUNITY

## 2025-06-23 RX ADMIN — FENTANYL CITRATE 50 MCG: 50 INJECTION, SOLUTION INTRAMUSCULAR; INTRAVENOUS at 08:41

## 2025-06-23 RX ADMIN — ONDANSETRON 4 MG: 2 INJECTION INTRAMUSCULAR; INTRAVENOUS at 08:47

## 2025-06-23 RX ADMIN — FENTANYL CITRATE 50 MCG: 50 INJECTION, SOLUTION INTRAMUSCULAR; INTRAVENOUS at 08:51

## 2025-06-23 RX ADMIN — PROPOFOL 150 MG: 10 INJECTION, EMULSION INTRAVENOUS at 08:41

## 2025-06-23 RX ADMIN — ROCURONIUM BROMIDE 50 MG: 10 INJECTION INTRAVENOUS at 08:41

## 2025-06-23 RX ADMIN — CEFAZOLIN 2 G: 1 INJECTION, POWDER, FOR SOLUTION INTRAMUSCULAR; INTRAVENOUS at 08:45

## 2025-06-23 RX ADMIN — PROPOFOL 50 MG: 10 INJECTION, EMULSION INTRAVENOUS at 08:51

## 2025-06-23 RX ADMIN — DEXMEDETOMIDINE 20 MCG: 100 INJECTION, SOLUTION INTRAVENOUS at 08:41

## 2025-06-23 RX ADMIN — MIDAZOLAM HYDROCHLORIDE 2 MG: 1 INJECTION, SOLUTION INTRAMUSCULAR; INTRAVENOUS at 08:39

## 2025-06-23 RX ADMIN — LIDOCAINE HYDROCHLORIDE 100 MG: 20 INJECTION, SOLUTION EPIDURAL; INFILTRATION; INTRACAUDAL; PERINEURAL at 08:41

## 2025-06-23 RX ADMIN — SODIUM CHLORIDE, POTASSIUM CHLORIDE, SODIUM LACTATE AND CALCIUM CHLORIDE: 600; 310; 30; 20 INJECTION, SOLUTION INTRAVENOUS at 08:39

## 2025-06-23 RX ADMIN — SUGAMMADEX 200 MG: 100 INJECTION, SOLUTION INTRAVENOUS at 09:02

## 2025-06-23 RX ADMIN — DEXAMETHASONE SODIUM PHOSPHATE 8 MG: 4 INJECTION INTRA-ARTICULAR; INTRALESIONAL; INTRAMUSCULAR; INTRAVENOUS; SOFT TISSUE at 08:47

## 2025-06-23 ASSESSMENT — FIBROSIS 4 INDEX
FIB4 SCORE: 1.35
FIB4 SCORE: 1.35

## 2025-06-23 ASSESSMENT — PAIN SCALES - GENERAL: PAIN_LEVEL: 0

## 2025-06-23 NOTE — OP REPORT
Patient: Yohana Brandon  : 1965     Diagnosis:  History of a left mandibular subcondylar fracture  Failed left mandibular hardware        Procedure  Removal of left mandibular hardware        Surgeon: Odell Fiore DDS, MD   Assistant: none     Anesthesia: General anesthesia via oral intubation     Complications None      Fluids: see anesthesia log     Blood loss: 100 cc     Counts: Correct x2     Specimen: none     Hardware: none     Procedure Details:  Pt was seen in pre-op were consents were obtained, H&P was updated, and all of her questions were answered. Pt was then taken back to the OR and transferred onto the OR table in the supine position. Various monitors were attached and deemed to be working properly, see anesthesia log for details. She was then induced to general anesthesia through her IV and orally intubated without complication.  The tube was secured by the surgery team, lacrilube placed in her eyes and tegaderms over them.  She was then prepped with betadine paint and draped in a standard fashion.      Profound local anesthesia with 15 ml of 1% lidocaine with 1:100k epi over her retromandibular region on the left.   First a 3 cm incision was marked in a retromandibular fashion over her left mandibular angle at the site of a preexisting surgical scar.  Incision was completed with a #15 blade through skin and subcutaneous tissue.  A nerve stimulator was used for the remainder of the dissection which proceeded in a layered fashion under periosteum was reached.  This was incised and a subperiosteal plane was raised with a #9 periosteal elevator to reveal previously placed 4 hole marilynn miniplate.  This was then removed with a screwdriver.   The surgical site was then copiously irrigated with sterile saline and attention directed to closure which proceeded in a layered fashion, first using 3-0 vicryl suture in an interrupted fashion to reapprximate the masseter muscular sling and subcutaneous  tissue and then the overlying skin with 5-0 fast gut in a running fashion.  Steri strips were placed the incision and a pressure dressing over the top.     The pt was wiped clean.  Tape removed from her eyes. She was then turned over to ansethesia where she was extubated without complication and returned to the PACU for recovery in stable condition.

## 2025-06-23 NOTE — INTERVAL H&P NOTE
Consented Procedure: REMOVAL OF IMPLANTED HARDWARE  I have examined the patient, provided the risks, benefits, and alternatives to the procedure(s) indicated on the signed consent form, and the patient wishes to proceed.    H&P reviewed. The patient was examined and there are no changes to the H&P      Plan:   Removal of LEFT mandibular hardware    Marcel Fiore D.D.S.  06/23/25 7:36 AM

## 2025-06-23 NOTE — DISCHARGE INSTRUCTIONS
HOME CARE INSTRUCTIONS    ACTIVITY: Rest and take it easy for the first 24 hours.  A responsible adult is recommended to remain with you during that time.  It is normal to feel sleepy.  We encourage you to not do anything that requires balance, judgment or coordination.    FOR 24 HOURS DO NOT:  Drive, operate machinery or run household appliances.  Drink beer or alcoholic beverages.  Make important decisions or sign legal documents.    SPECIAL INSTRUCTIONS    DIET: To avoid nausea, slowly advance diet as tolerated, avoiding spicy or greasy foods for the first day.  Add more substantial food to your diet according to your physician's instructions.  Babies can be fed formula or breast milk as soon as they are hungry.  INCREASE FLUIDS AND FIBER TO AVOID CONSTIPATION.    SURGICAL DRESSING/BATHING: keep dressing clean dry and intact. May shower after 2 days . No submersion tub bathing , pool swimming ) until cleared by Dr. Fiore.     MEDICATIONS: Resume taking daily medication.  Take prescribed pain medication with food.  If no medication is prescribed, you may take non-aspirin pain medication if needed.  PAIN MEDICATION CAN BE VERY CONSTIPATING.  Take a stool softener or laxative such as senokot, pericolace, or milk of magnesia if needed.     Last pain medication given at: none taken post op.    A follow-up appointment should be arranged with your doctor in 1-2 weeks; call to schedule.    You should CALL YOUR PHYSICIAN if you develop:  Fever greater than 101 degrees F.  Pain not relieved by medication, or persistent nausea or vomiting.  Excessive bleeding (blood soaking through dressing) or unexpected drainage from the wound.  Extreme redness or swelling around the incision site, drainage of pus or foul smelling drainage.  Inability to urinate or empty your bladder within 8 hours.  Problems with breathing or chest pain.    You should call 911 if you develop problems with breathing or chest pain.  If you are unable  to contact your doctor or surgical center, you should go to the nearest emergency room or urgent care center.  Physician's telephone #: [phone]     MILD FLU-LIKE SYMPTOMS ARE NORMAL.  YOU MAY EXPERIENCE GENERALIZED MUSCLE ACHES, THROAT IRRITATION, HEADACHE AND/OR SOME NAUSEA.    If any questions arise, call your doctor.  If your doctor is not available, please feel free to call the Surgical Center at [phone].  The Center is open Monday through Friday from 7AM to 7PM.      A registered nurse may call you a few days after your surgery to see how you are doing after your procedure.    You may also receive a survey in the mail within the next two weeks and we ask that you take a few moments to complete the survey and return it to us.  Our goal is to provide you with very good care and we value your comments.     Depression / Suicide Risk    As you are discharged from this [facility] facility, it is important to learn how to keep safe from harming yourself.    Recognize the warning signs:  Abrupt changes in personality, positive or negative- including increase in energy   Giving away possessions  Change in eating patterns- significant weight changes-  positive or negative  Change in sleeping patterns- unable to sleep or sleeping all the time   Unwillingness or inability to communicate  Depression  Unusual sadness, discouragement and loneliness  Talk of wanting to die  Neglect of personal appearance   Rebelliousness- reckless behavior  Withdrawal from people/activities they love  Confusion- inability to concentrate     If you or a loved one observes any of these behaviors or has concerns about self-harm, here's what you can do:  Talk about it- your feelings and reasons for harming yourself  Remove any means that you might use to hurt yourself (examples: pills, rope, extension cords, firearm)  Get professional help from the community (Mental Health, Substance Abuse, psychological counseling)  Do not be  alone:Call your Safe Contact- someone whom you trust who will be there for you.  Call your local CRISIS HOTLINE 386-2102 or 099-674-0822  Call your local Children's Mobile Crisis Response Team [place] (259) 037-3741 or www.Saygent  Call the toll free National Suicide Prevention Hotlines   National Suicide Prevention Lifeline 625-157-RVBD (2321)  [facility] 800-CCLQICX (567-1690)    I acknowledge receipt and understanding of these Home Care instructions.

## 2025-06-23 NOTE — ANESTHESIA PROCEDURE NOTES
Airway    Date/Time: 6/23/2025 8:42 AM    Performed by: Cori De M.D.  Authorized by: Cori De M.D.    Location:  OR  Urgency:  Elective  Difficult Airway: No    Indications for Airway Management:  Anesthesia      Spontaneous Ventilation: absent    Sedation Level:  Deep  Preoxygenated: Yes    Patient Position:  Sniffing  Mask Difficulty Assessment:  1 - vent by mask  Final Airway Type:  Endotracheal airway  Final Endotracheal Airway:  ETT  Cuffed: Yes    Technique Used for Successful ETT Placement:  Video laryngoscopy  Devices/Methods Used in Placement:  Intubating stylet    Insertion Site:  Oral  Blade Type:  Glide  Laryngoscope Blade/Videolaryngoscope Blade Size:  3  ETT Size (mm):  7.0  Measured from:  Teeth  ETT to Teeth (cm):  21  Placement Verified by: auscultation and capnometry    Cormack-Lehane Classification:  Grade I - full view of glottis  Number of Attempts at Approach:  1  Number of Other Approaches Attempted:  0

## 2025-06-23 NOTE — ANESTHESIA POSTPROCEDURE EVALUATION
Patient: Yohana Brandon    Procedure Summary       Date: 06/23/25 Room / Location: [place] [address] / SURGERY [facility]    Anesthesia Start: 0839 Anesthesia Stop: 0915    Procedure: REMOVAL OF IMPLANTED HARDWARE (Left: Mouth) Diagnosis: (mandible fracture)    Surgeons: Marcel Fiore D.D.S. Responsible Provider: Cori De M.D.    Anesthesia Type: general ASA Status: 2            Final Anesthesia Type: general  Last vitals  BP   Blood Pressure: (!) 156/88    Temp   36.2 °C (97.1 °F)    Pulse   78   Resp   16    SpO2   94 %      Anesthesia Post Evaluation    Patient location during evaluation: PACU  Patient participation: complete - patient participated  Level of consciousness: awake and alert  Pain score: 0    Airway patency: patent  Anesthetic complications: no  Cardiovascular status: hemodynamically stable  Respiratory status: acceptable  Hydration status: euvolemic    PONV: none          No notable events documented.     Nurse Pain Score: 0 (NPRS)

## 2025-06-23 NOTE — OR NURSING
0913 Pt arrived to PACU with Anesthesiologist and OR RN. Even, unlabored respirations. VSS. No pain. No nausea. Left jaw dressing CDI.     1025 POC update given to Gaurav over the phone. All questions answered.     1000 Pt meets phase I criteria. Report called to MILLER Saleh.     1015 Pt transported to phase II with RN. Chart with patient. VSS.

## 2025-06-23 NOTE — PROGRESS NOTES
Medication history reviewed with pt. Med rec is complete.  Allergies reviewed, per pt  Interviewed pt with  at bedside with permission from pt.    Pt reports that she has not used any of her inhaler in the last 30 days or longer.    Pt has not started her AMOXICILLIN 500MG, NORCO 7.5-325MG or IBUPROFEN 600MG, these medications are for after surgery     Patient has not had any outpatient antibiotics in the last 30 days.    Pt is not on any anticoagulants      Dispense history available in EPIC? Some medications.

## 2025-06-23 NOTE — OR NURSING
1018 Report received from PACU Rn and patient arrived to phase 2. AO x 4.     1030 Vital signs stable. Pain level 0/10, no complaints of n/v.  Dressing to L jaw c/d/I, jaw bra in place. IS in use, good effort achieves 4773-1399.       1115 Patient states ready to go home.  VSS, patient has all belongings. IV removed.  Patient dressed.  Discharge instructions discussed with patient and family.  Questions answered. Patient and family verbalized understanding. Patient taken out with wheelchair and all belongings.

## 2025-06-23 NOTE — ANESTHESIA TIME REPORT
Anesthesia Start and Stop Event Times       Date Time Event    6/23/2025 0821 Ready for Procedure     0839 Anesthesia Start     0915 Anesthesia Stop          Responsible Staff  06/23/25      Name Role Begin End    Cori De M.D. Anesth 0839 0915          Overtime Reason:  no overtime (within assigned shift)    Comments:

## 2025-06-23 NOTE — ANESTHESIA PREPROCEDURE EVALUATION
Case: [id number] Date/Time: 06/23/25 0810    Procedure: REMOVAL OF IMPLANTED HARDWARE    Pre-op diagnosis: FRACTURE OF MANDIBLE    Location: [place] OR 17 / SURGERY [facility]    Surgeons: Marcel Fiore D.D.S.          58yo for jaw HWR , h/o HTN, high cholesterol, GERD, 35 days no alcohol newly sober  Relevant Problems   PULMONARY   (positive) COPD (chronic obstructive pulmonary disease) (HCC)      CARDIAC   (positive) Coronary artery calcification seen on CT scan   (positive) Primary hypertension         (positive) Alcoholic hepatitis without ascites       Physical Exam    Airway   Mallampati: I  TM distance: >3 FB  Neck ROM: full       Cardiovascular - normal exam  Rhythm: regular  Rate: normal    (-) murmur     Dental - normal exam  Comments: Front two teeth temporary and feel painful, not wiggling           Pulmonary - normal examBreath sounds clear to auscultation     Abdominal    Neurological - normal exam                   Anesthesia Plan    ASA 2       Plan - general       Airway plan will be ETT          Induction: intravenous    Postoperative Plan: Postoperative administration of opioids is intended.    Pertinent diagnostic labs and testing reviewed    Informed Consent:    Anesthetic plan and risks discussed with patient.    Use of blood products discussed with: patient whom consented to blood products.

## 2025-07-09 ENCOUNTER — HOSPITAL ENCOUNTER (EMERGENCY)
Facility: MEDICAL CENTER | Age: 60
End: 2025-07-09
Attending: EMERGENCY MEDICINE
Payer: COMMERCIAL

## 2025-07-09 ASSESSMENT — FIBROSIS 4 INDEX: FIB4 SCORE: 1.37

## 2025-07-10 NOTE — ED TRIAGE NOTES
"Chief Complaint   Patient presents with    Alcohol Intoxication     Last drink approx 30 min ago. Spouse reports pt. Drank 1 bottle of chardonnay \"that's all I could find\".     Post-Op Pain     \"I had a plate removed\" from jaw 6/23. Spouse reports \"she self medicates with ETOH. Denies known fever.     Jaw Pain       Physical Exam  Pulmonary:      Effort: Pulmonary effort is normal.   Skin:     General: Skin is warm and dry.   Neurological:      Mental Status: She is alert.       BP (!) 161/103   Pulse (!) 112   Temp 36.5 °C (97.7 °F) (Temporal)   Resp (!) 22   Ht 1.702 m (5' 7\")   Wt 64.3 kg (141 lb 12.1 oz)   LMP 09/14/2006   SpO2 93%   BMI 22.20 kg/m²     "

## 2025-07-10 NOTE — ED PROVIDER NOTES
"ER Provider Note    Scribed for Dr. Emanuel Wood M.D. by Emilia Calle. 7/9/2025  8:06 PM    Primary Care Provider: Deborah Moctezuma M.D.    CHIEF COMPLAINT  Chief Complaint   Patient presents with    Alcohol Intoxication     Last drink approx 30 min ago. Spouse reports pt. Drank 1 bottle of chardonnay \"that's all I could find\".     Post-Op Pain     \"I had a plate removed\" from jaw 6/23. Spouse reports \"she self medicates with ETOH. Denies known fever.     Jaw Pain       EXTERNAL RECORDS REVIEWED  Inpatient Notes Removal of mandibular hardware by mouth on 6/23/2025.     HPI/ROS  LIMITATION TO HISTORY   Select: Intoxication    OUTSIDE HISTORIAN(S):   at bedside contributing to history as seen below    Yohana Brandon is a 60 y.o. female with a history of alcohol abuse, who presents to the ED for evaluation of worsening post-operative jaw pain onset two weeks ago. Patient had a plate pulled out approximately 6 months ago. She fell and broke her jaw in 2 spots. However, her jaw pain is worsening.  explains that patient has been self-medicating her pain with alcohol. He notes she started drinking heavily again with family issues and pain, prompting him to take patient to the ED today.   reports a patient history of alcohol abuse, and states cannot differentiate \"what is real\" and \"what is not real\" secondary to intoxication. She just got out of rehab 6 days ago, and was doing great post operatively, up until recently. Patient has been on a liquid diet for the past 6 months. She also reports upper and lower abdominal pain. Denies fever. No alleviating or exacerbating factors noted. Her next dental appointment is 6/23/2025.    PAST MEDICAL HISTORY  Past Medical History[1]    SURGICAL HISTORY  Past Surgical History[2]    FAMILY HISTORY  Family History   Problem Relation Age of Onset    Stroke Mother        SOCIAL HISTORY   reports that she has never smoked. She has never used smokeless " "tobacco. She reports current alcohol use. She reports that she does not use drugs.    CURRENT MEDICATIONS  Previous Medications    ASPIRIN 81 MG EC TABLET    Take 81 mg by mouth every day.    EPINEPHRINE (EPIPEN 2-ANNE) 0.3 MG/0.3ML SOLUTION AUTO-INJECTOR SOLUTION FOR INJECTION    Inject 0.3 mg into the shoulder, thigh, or buttocks one time. Indications: Life-Threatening Hypersensitivity Reaction    IBUPROFEN-ACETAMINOPHEN (DUAL ACTION PAIN RELIEF PO)    Take 2 Tablets by mouth 2 times a day as needed (For pain).    MULTIVITAMIN TAB    Take 1 Tablet by mouth every day.    OMEPRAZOLE (PRILOSEC) 20 MG DELAYED-RELEASE CAPSULE    Take 20 mg by mouth every day.    ONDANSETRON (ZOFRAN ODT) 4 MG TABLET DISPERSIBLE    Take 1 Tablet by mouth every 8 hours as needed for Nausea/Vomiting.    ROSUVASTATIN (CRESTOR) 10 MG TAB    Take 10 mg by mouth every evening.    ROSUVASTATIN (CRESTOR) 10 MG TAB    Take 10 mg by mouth every evening.    TELMISARTAN-HYDROCHLOROTHIAZIDE (MICARDIS HCT) 80-25 MG PER TABLET    Take 1 Tablet by mouth every day.    THYROID (ARMOUR THYROID) 60 MG TAB    Take 60 mg by mouth every day.    TRAZODONE (DESYREL) 50 MG TAB    Take 50 mg by mouth every evening.       ALLERGIES  Kiwi extract, Flu virus vaccine, Morphine, Vicodin [hydrocodone-acetaminophen], Banana, and Latex    PHYSICAL EXAM  BP (!) 161/103   Pulse (!) 112   Temp 36.5 °C (97.7 °F) (Temporal)   Resp (!) 22   Ht 1.702 m (5' 7\")   Wt 64.3 kg (141 lb 12.1 oz)   LMP 09/14/2006   SpO2 93%   BMI 22.20 kg/m²   Constitutional: Alert in no apparent distress. Tearful, Intoxicated.   HENT: No signs of trauma, Bilateral external ears normal, Nose normal. Eroded tooth in the left lower jaw, no abscess.  Eyes: Pupils are equal and reactive, Conjunctiva normal, Non-icteric.   Neck: Normal range of motion, No tenderness, Supple,   Cardiovascular: Tachycardic rate and rhythm, no murmurs.   Thorax & Lungs: Normal breath sounds, No respiratory distress, No " wheezing, No chest tenderness.   Abdomen: Bowel sounds normal, Soft, Epigastric tenderness   Skin: Warm, Dry, No erythema, No rash.   Back: No bony tenderness, No CVA tenderness.   Extremities: No edema, No tenderness, No cyanosis  Neurologic: Alert.   Psychiatric: Affect normal. Tearful, Intoxicated.       DIAGNOSTIC STUDIES & PROCEDURES    Labs:   Labs Reviewed   CBC WITH DIFFERENTIAL - Abnormal; Notable for the following components:       Result Value    WBC 2.4 (*)     RBC 3.59 (*)     Hematocrit 34.9 (*)     MCH 34.5 (*)     RDW 55.1 (*)     MPV 8.7 (*)     Neutrophils (Absolute) 1.07 (*)     Lymphs (Absolute) 0.98 (*)     All other components within normal limits   COMP METABOLIC PANEL - Abnormal; Notable for the following components:    Sodium 129 (*)     Chloride 86 (*)     Anion Gap 23.0 (*)     AST(SGOT) 52 (*)     All other components within normal limits   LIPASE   ESTIMATED GFR     All labs reviewed by me.      Procedures:  Procedure  Dental Block:   9:17 PM performed by Dr. Wood  Sterile process was used  Indication: dental pain  Consent: verbal from the patient  Location: inferior alveolar   Anesthesia: 0.25% bupivacaine without epi 3 cc's  Toleration: the patient tolerated well, no immediate complications or bleeding  Total procedure time: 7 minutes      COURSE & MEDICAL DECISION MAKING    ED Observation Status? No; Patient does not meet criteria for ED Observation.     INITIAL ASSESSMENT AND PLAN  Care Narrative:       8:06 PM - Patient seen and evaluated at bedside presenting with worsening jaw pain since mandibular hardware removal on 6/23. She has a history of alcohol abuse, and has been self-medicating pain with alcohol. Discussed plan of care, including performing a dental block and treating her with antibiotics for her eroded tooth. Patient agrees to plan of care. Patient will be treated with Augmentin 875-125 mg PO for her symptoms. Ordered Lipase, CMP, and CBC w/ diff, to  evaluate.    9:17 PM - Inferior alveolar dental block performed by myself at this time. See above for details. The patient informs me they feel improved following the block. I will send her home with a course of antibiotics for her dental infection. I discussed the patient's diagnostic study results which show hyponatremia. I discussed plan for discharge and follow up as outlined below. I had a long conversation with patient about her history of alcohol abuse, and recommended AA multiple times a day. She is agreeable.The patient is stable for discharge at this time and will return for any new or worsening symptoms. Patient verbalizes understanding and support with my plan for discharge.     HYDRATION: Based on the patient's presentation of Tachycardia the patient was given IV fluids. IV Hydration was used because oral hydration was not adequate alone. Upon recheck following hydration, the patient was improved.    ADDITIONAL PROBLEM LIST AND DISPOSITION  Patient with dental infection that is causing the patient's pain.  No concern for postoperative pain given there is no tenderness over the operative area and does not appear infected.    Dental block performed.  Patient feeling improved.  Hyponatremic likely related to alcohol abuse.  At this time spoke to the patient about alcohol cessation.  Has only been drinking since July 4.  Do not believe the patient will go into withdrawal.  Antibiotics for the patient's dental infection.  Will discharge home with strict return precautions and follow-up.    Spoke about alcoholic Anonymous as well for the patient               DISPOSITION AND DISCUSSIONS  I have discussed management of the patient with the following physicians and TONJA's: None    Discussion of management with other QHP or appropriate source(s): None     Escalation of care considered, and ultimately not performed: acute inpatient care management, however at this time, the patient is most appropriate for  outpatient management.    Barriers to care at this time, including but not limited to: None.     Decision tools and prescription drugs considered including, but not limited to: Antibiotics Augmentin 875-125 mg.    The patient will return for new or worsening symptoms and is stable at the time of discharge.    The patient is referred to a primary physician for blood pressure management, diabetic screening, and for all other preventative health concerns.      DISPOSITION:  Patient will be discharged home in stable condition.    FOLLOW UP:  Deborah Moctezuma M.D.  [address] [place] [address] [address] [place] [address] [phone]    In 2 days        OUTPATIENT MEDICATIONS:  New Prescriptions    AMOXICILLIN-CLAVULANATE (AUGMENTIN) 875-125 MG TAB    Take 1 Tablet by mouth 2 times a day for 10 days.       FINAL IMPRESSION   1. Alcoholic intoxication without complication (HCC)    2. Dental infection    3. Hyponatremia         Emilia GARCIA), am scribing for, and in the presence of, Emanuel Wood M.D..    Electronically signed by: Emilia Calle ([place]), 7/9/2025    Emanuel GARCIA M.D. personally performed the services described in this documentation, as scribed by Emilia Calle in my presence, and it is both accurate and complete.    The note accurately reflects work and decisions made by me.  Emanuel Wood M.D.  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    Elevated LFTs     Heart palpitations     w/ alcohol withdrawal    High cholesterol     Hypertension     Pain     Psychiatric disorder     alcohol dependance   [2]   Past Surgical History:  Procedure Laterality Date    PB REMOVAL DEEP IMPLANT Left 6/23/2025    Procedure: REMOVAL OF IMPLANTED HARDWARE;  Surgeon: Marcel Fiore D.D.S.;  Location: SURGERY [facility];  Service: Oral  Surgery    MANDIBLE FRACTURE ORIF Left 2024    Procedure: ORIF, FRACTURE, MANDIBLE SYMPHYSIS AND LEFT SUBCONDYLAR FRACTURE, EXTRACTION OF ANY NECESSARY TEETH;  Surgeon: Marcel Fioer D.D.S.;  Location: [facility];  Service: Oral Surgery    CLOSED REDUCTION Right 2022    Procedure: CLOSED REDUCTION, HIP,;  Surgeon: Jose G Bell M.D.;  Location: SURGERY [facility];  Service: Orthopedics    VA TOTAL HIP ARTHROPLASTY Right 2021    Procedure: ARTHROPLASTY, HIP, TOTAL, ANTERIOR APPROACH;  Surgeon: Jose G Bell M.D.;  Location: [facility];  Service: Orthopedics    HEMORRHOIDECTOMY  2011    Performed by SONY MACDONALD at [facility] ORS    ANAL SPHINCTEROTOMY  2011    Performed by SONY MACDONALD at [facility] ORS    VA ENDOMETRIAL ABLATION, THERMAL      APPENDECTOMY      VA ANESTH,SURG BREAST RECONSTRUCTIVE      bilateral     BUNIONECTOMY      bilateral    VA REMOVE TONSILS/ADENOIDS,<15 Y/O  1973    VA  DELIVERY+POSTPARTUM CARE      x2

## 2025-08-14 ENCOUNTER — HOSPITAL ENCOUNTER (EMERGENCY)
Facility: MEDICAL CENTER | Age: 60
End: 2025-08-14
Attending: EMERGENCY MEDICINE

## 2025-08-14 ENCOUNTER — APPOINTMENT (OUTPATIENT)
Dept: RADIOLOGY | Facility: MEDICAL CENTER | Age: 60
End: 2025-08-14
Attending: EMERGENCY MEDICINE

## 2025-08-14 VITALS
RESPIRATION RATE: 18 BRPM | HEIGHT: 67 IN | TEMPERATURE: 98 F | SYSTOLIC BLOOD PRESSURE: 110 MMHG | WEIGHT: 141.76 LBS | HEART RATE: 94 BPM | BODY MASS INDEX: 22.25 KG/M2 | OXYGEN SATURATION: 92 % | DIASTOLIC BLOOD PRESSURE: 68 MMHG
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
## 2025-08-14 DIAGNOSIS — T07.XXXA MULTIPLE ABRASIONS: ICD-10-CM

## 2025-08-14 DIAGNOSIS — F10.920 ALCOHOLIC INTOXICATION WITHOUT COMPLICATION (HCC): Primary | ICD-10-CM

## 2025-08-14 PROCEDURE — 72125 CT NECK SPINE W/O DYE: CPT

## 2025-08-14 PROCEDURE — 36415 COLL VENOUS BLD VENIPUNCTURE: CPT

## 2025-08-14 PROCEDURE — 70486 CT MAXILLOFACIAL W/O DYE: CPT

## 2025-08-14 PROCEDURE — 99284 EMERGENCY DEPT VISIT MOD MDM: CPT

## 2025-08-14 PROCEDURE — 70450 CT HEAD/BRAIN W/O DYE: CPT

## 2025-08-14 ASSESSMENT — FIBROSIS 4 INDEX: FIB4 SCORE: 2.15

## (undated) DEVICE — NEPTUNE 4 PORT MANIFOLD - (20/PK)

## (undated) DEVICE — HEAD HOLDER JUNIOR/ADULT

## (undated) DEVICE — BLADE SURGICAL #11 - (50/BX)

## (undated) DEVICE — SUTURE 3-0 VICRYL PLUS SH - 8X 18 INCH (12/BX)

## (undated) DEVICE — CHLORAPREP 26 ML APPLICATOR - ORANGE TINT(25/CA)

## (undated) DEVICE — DRAPE HAND STERILE

## (undated) DEVICE — PACK UPPER EXTREMITY SM OR - (3/CA)

## (undated) DEVICE — LACTATED RINGERS INJ 1000 ML - (14EA/CA 60CA/PF)

## (undated) DEVICE — ADHESIVE MASTISOL - (48/BX)

## (undated) DEVICE — PROTECTOR CORNEAL - (10/BX)

## (undated) DEVICE — TOWELS CLOTH SURGICAL - (4/PK 20PK/CA)

## (undated) DEVICE — BATTERY VARISPEED

## (undated) DEVICE — ELECTRODE 850 FOAM ADHESIVE - HYDROGEL RADIOTRNSPRNT (50/PK)

## (undated) DEVICE — SET EXTENSION WITH 2 PORTS (48EA/CA) ***PART #2C8610 IS A SUBSTITUTE*****

## (undated) DEVICE — PADDING CAST 4 IN X 4 YDS - SOF-ROLL (12RL/BG 6BG/CT)

## (undated) DEVICE — SUCTION INSTRUMENT YANKAUER BULBOUS TIP W/O VENT (50EA/CA)

## (undated) DEVICE — BAG SPONGE COUNT 10.25 X 32 - BLUE (250/CA)

## (undated) DEVICE — JAW BRA #93

## (undated) DEVICE — KIT  I.V. START (100EA/CA)

## (undated) DEVICE — STAPLER SKIN DISP - (6/BX 10BX/CA) VISISTAT

## (undated) DEVICE — SUTURE 1 VICRYL PLUS CTX - 8 X 18 INCH (12/BX)

## (undated) DEVICE — SUTURE 3-0 MONOCRYL PLUS PS-1 - 27 INCH (36/BX)

## (undated) DEVICE — SODIUM CHL IRRIGATION 0.9% 1000ML (12EA/CA)

## (undated) DEVICE — GOWN WARMING STANDARD FLEX - (30/CA)

## (undated) DEVICE — BUR 703 2.1 (ORAL)

## (undated) DEVICE — CANISTER SUCTION 3000ML MECHANICAL FILTER AUTO SHUTOFF MEDI-VAC NONSTERILE LF DISP (40EA/CA)

## (undated) DEVICE — GLOVE BIOGEL PI INDICATOR SZ 8.0 SURGICAL PF LF -(50/BX 4BX/CA)

## (undated) DEVICE — SLEEVE VASO DVT COMPRESSION CALF MED - (10PR/CA)

## (undated) DEVICE — GLOVE BIOGEL PI INDICATOR SZ 7.5 SURGICAL PF LF -(50/BX 4BX/CA)

## (undated) DEVICE — TUBING CLEARLINK DUO-VENT - C-FLO (48EA/CA)

## (undated) DEVICE — DRAPE IOBAN II INCISE 23X17 - (10EA/BX 4BX/CA)

## (undated) DEVICE — GLOVE BIOGEL SZ 8 SURGICAL PF LTX - (50PR/BX 4BX/CA)

## (undated) DEVICE — TOWEL STOP TIMEOUT SAFETY FLAG (40EA/CA)

## (undated) DEVICE — KIT ANESTHESIA W/CIRCUIT & 3/LT BAG W/FILTER (20EA/CA)

## (undated) DEVICE — SODIUM CHL. IRRIGATION 0.9% 3000ML (4EA/CA 65CA/PF)

## (undated) DEVICE — WATER IRRIGATION STERILE 1000ML (12EA/CA)

## (undated) DEVICE — SUTURE 3-0 VICRYL PLUS RB-1 - (36/BX)

## (undated) DEVICE — BOVIE BLADE COATED - (50/PK)

## (undated) DEVICE — BOVIE NEEDLE TIP 3CM COLORADO

## (undated) DEVICE — ELECTRODE DUAL RETURN W/ CORD - (50/PK)

## (undated) DEVICE — DRILL BIT STRYKER MMF - (2UFSMMFX2=4)

## (undated) DEVICE — GLOVE BIOGEL PI ORTHO SZ 9 PF LF

## (undated) DEVICE — CATHETER IV 20 GA X 1-1/4 ---SURG.& SDS ONLY--- (50EA/BX)

## (undated) DEVICE — SENSOR OXIMETER ADULT SPO2 RD SET (20EA/BX)

## (undated) DEVICE — COTTON ROLL 1 POUND BIOSEAL - (5RL/CA)

## (undated) DEVICE — Device

## (undated) DEVICE — DRILL BIT STRYK UFS 1.6X50X7 - (2UFS2+M2=6) 7MM STOP

## (undated) DEVICE — COVER LIGHT HANDLE ALC PLUS DISP (18EA/BX)

## (undated) DEVICE — PACK MAJOR ORTHO TRAUMA- (3EA/CA)

## (undated) DEVICE — CLOSURE SKIN STRIP 1/2 X 4 IN - (STERI STRIP) (50/BX 4BX/CA)

## (undated) DEVICE — DRAPE LARGE 3 QUARTER - (20/CA)

## (undated) DEVICE — SUTURE GENERAL

## (undated) DEVICE — SUTURE 5-0 PLAIN GUT PC-1 - (12/BX)

## (undated) DEVICE — DRAPE C-ARM LARGE 41IN X 74 IN - (10/BX 2BX/CA)

## (undated) DEVICE — TIP INTPLS HFLO ML ORFC BTRY - (12/CS)  FOR SURGILAV

## (undated) DEVICE — DRESSING AQUACEL AG ADVANTAGE 3.5 X 10" (10EA/BX)"

## (undated) DEVICE — NEEDLE NON SAFETY HYPO 22 GA X 1 1/2 IN (100/BX)

## (undated) DEVICE — DRAPE SURGICAL U 77X120 - (10/CA)

## (undated) DEVICE — GLOVE BIOGEL PI INDICATOR SZ 7.0 SURGICAL PF LF - (50/BX 4BX/CA)

## (undated) DEVICE — WRAP COBAN SELF-ADHERENT 6 IN X  5YDS STERILE TAN (12/CA)

## (undated) DEVICE — HANDPIECE 10FT INTPLS SCT PLS IRRIGATION HAND CONTROL SET (6/PK)

## (undated) DEVICE — NEEDLE NON SAFETY 25 GA X 1 1/2 IN HYPO (100EA/BX)

## (undated) DEVICE — CANISTER SUCTION 3000ML MECHANICAL FILTER AUTO SHUTOFF MEDI-VAC NONSTERILE LF DISP  (40EA/CA)

## (undated) DEVICE — TRAY SRGPRP PVP IOD WT PRP - (20/CA)

## (undated) DEVICE — SET LEADWIRE 5 LEAD BEDSIDE DISPOSABLE ECG (1SET OF 5/EA)

## (undated) DEVICE — SUTURE 2-0 MONOCRYL PLUS UNDYED CT-1 1 X 36 (36EA/BX)"

## (undated) DEVICE — PACK MINOR BASIN - (2EA/CA)

## (undated) DEVICE — GLOVE SZ 6.5 BIOGEL PI MICRO - PF LF (50PR/BX)

## (undated) DEVICE — SUTURE 4-0 ETHILON FS-1 18 (36PK/BX)"

## (undated) DEVICE — PAD PREP 24 X 48 CUFFED - (100/CA)

## (undated) DEVICE — GLOVE BIOGEL PI ORTHO SZ 7.5 PF LF (40PR/BX)

## (undated) DEVICE — LENS/HOOD FOR SPACESUIT - (32/PK) PEEL AWAY FACE

## (undated) DEVICE — BLADE RECIP 77.5 X 11.2 X .76MM (1/EA)

## (undated) DEVICE — GLOVE, LITE (PAIR)

## (undated) DEVICE — PACK LOWER EXTREMITY - (2/CA)

## (undated) DEVICE — GLOVE SZ 8 BIOGEL PI MICRO - PF LF (50PR/BX)

## (undated) DEVICE — GLOVE BIOGEL PI ULTRATOUCH SZ 7.5 SURGICAL PF LF -(50/BX 4BX/CA)

## (undated) DEVICE — SUTURE 5 ETHIBOND V-37 (12PK/BX)

## (undated) DEVICE — BLADE SURGICAL #15 - (50/BX 3BX/CA)

## (undated) DEVICE — PACK TOTAL HIP - (1/CA)

## (undated) DEVICE — GOWN SURGEONS X-LARGE - DISP. (30/CA)

## (undated) DEVICE — MASK ANESTHESIA ADULT  - (100/CA)

## (undated) DEVICE — SUTURE 3-0 VICRYL PLUS SH - 27 INCH (36/BX)

## (undated) DEVICE — GLOVE BIOGEL ECLIPSE PF LATEX SIZE 8.5 (50PR/BX)